# Patient Record
Sex: FEMALE | Race: WHITE | Employment: OTHER | URBAN - METROPOLITAN AREA
[De-identification: names, ages, dates, MRNs, and addresses within clinical notes are randomized per-mention and may not be internally consistent; named-entity substitution may affect disease eponyms.]

---

## 2017-01-01 ENCOUNTER — HOSPITAL ENCOUNTER (EMERGENCY)
Age: 72
Discharge: HOME | End: 2017-06-03
Attending: EMERGENCY MEDICINE
Payer: MEDICARE

## 2017-01-01 ENCOUNTER — HOSPITAL ENCOUNTER (EMERGENCY)
Age: 72
Discharge: HOME | End: 2017-07-10
Attending: EMERGENCY MEDICINE
Payer: MEDICARE

## 2017-01-01 ENCOUNTER — HOSPITAL ENCOUNTER (INPATIENT)
Dept: CARDIAC CATH/INVASIVE PROCEDURES | Age: 72
LOS: 5 days | Discharge: NURSING, REHAB OR LONG-TERM CARE | DRG: 270 | End: 2017-08-08
Attending: INTERNAL MEDICINE | Admitting: INTERNAL MEDICINE
Payer: MEDICARE

## 2017-01-01 ENCOUNTER — ANESTHESIA (OUTPATIENT)
Dept: CARDIAC CATH/INVASIVE PROCEDURES | Age: 72
DRG: 270 | End: 2017-01-01
Payer: MEDICARE

## 2017-01-01 ENCOUNTER — HOSPITAL ENCOUNTER (INPATIENT)
Age: 72
LOS: 3 days | Discharge: HOME | DRG: 871 | End: 2017-05-27
Attending: EMERGENCY MEDICINE | Admitting: INTERNAL MEDICINE
Payer: MEDICARE

## 2017-01-01 ENCOUNTER — APPOINTMENT (OUTPATIENT)
Dept: CT IMAGING | Age: 72
End: 2017-01-01
Attending: EMERGENCY MEDICINE
Payer: MEDICARE

## 2017-01-01 ENCOUNTER — APPOINTMENT (OUTPATIENT)
Dept: CT IMAGING | Age: 72
DRG: 871 | End: 2017-01-01
Attending: INTERNAL MEDICINE
Payer: MEDICARE

## 2017-01-01 ENCOUNTER — APPOINTMENT (OUTPATIENT)
Dept: GENERAL RADIOLOGY | Age: 72
End: 2017-01-01
Payer: MEDICARE

## 2017-01-01 ENCOUNTER — HOSPITAL ENCOUNTER (OUTPATIENT)
Dept: GENERAL RADIOLOGY | Age: 72
Discharge: HOME | End: 2017-07-07
Attending: ORTHOPAEDIC SURGERY
Payer: MEDICARE

## 2017-01-01 ENCOUNTER — APPOINTMENT (OUTPATIENT)
Dept: GENERAL RADIOLOGY | Age: 72
DRG: 871 | End: 2017-01-01
Attending: INTERNAL MEDICINE
Payer: MEDICARE

## 2017-01-01 ENCOUNTER — HOSPITAL ENCOUNTER (OUTPATIENT)
Dept: GENERAL RADIOLOGY | Age: 72
Discharge: HOME | End: 2017-03-27
Attending: INTERNAL MEDICINE
Payer: MEDICARE

## 2017-01-01 ENCOUNTER — CLINICAL SUPPORT (OUTPATIENT)
Dept: CARDIOLOGY CLINIC | Age: 72
End: 2017-01-01

## 2017-01-01 ENCOUNTER — APPOINTMENT (OUTPATIENT)
Dept: ULTRASOUND IMAGING | Age: 72
End: 2017-01-01
Attending: EMERGENCY MEDICINE
Payer: MEDICARE

## 2017-01-01 ENCOUNTER — HOSPITAL ENCOUNTER (EMERGENCY)
Age: 72
Discharge: HOME | End: 2017-08-15
Attending: EMERGENCY MEDICINE
Payer: MEDICARE

## 2017-01-01 ENCOUNTER — HOSPITAL ENCOUNTER (INPATIENT)
Age: 72
LOS: 1 days | End: 2017-08-22
Attending: INTERNAL MEDICINE | Admitting: INTERNAL MEDICINE
Payer: MEDICARE

## 2017-01-01 ENCOUNTER — HOSPITAL ENCOUNTER (OUTPATIENT)
Dept: CARDIAC CATH/INVASIVE PROCEDURES | Age: 72
Discharge: HOME | End: 2017-03-07
Attending: INTERNAL MEDICINE | Admitting: INTERNAL MEDICINE
Payer: MEDICARE

## 2017-01-01 ENCOUNTER — APPOINTMENT (OUTPATIENT)
Dept: CT IMAGING | Age: 72
DRG: 871 | End: 2017-01-01
Attending: EMERGENCY MEDICINE
Payer: MEDICARE

## 2017-01-01 ENCOUNTER — APPOINTMENT (OUTPATIENT)
Dept: GENERAL RADIOLOGY | Age: 72
End: 2017-01-01
Attending: EMERGENCY MEDICINE
Payer: MEDICARE

## 2017-01-01 ENCOUNTER — APPOINTMENT (OUTPATIENT)
Dept: GENERAL RADIOLOGY | Age: 72
End: 2017-01-01
Attending: RADIOLOGY
Payer: MEDICARE

## 2017-01-01 ENCOUNTER — OFFICE VISIT (OUTPATIENT)
Dept: CARDIOLOGY CLINIC | Age: 72
End: 2017-01-01

## 2017-01-01 ENCOUNTER — TELEPHONE (OUTPATIENT)
Dept: CARDIOLOGY CLINIC | Age: 72
End: 2017-01-01

## 2017-01-01 ENCOUNTER — HOSPITAL ENCOUNTER (OUTPATIENT)
Dept: CT IMAGING | Age: 72
Discharge: HOME | End: 2017-06-28
Attending: ORTHOPAEDIC SURGERY

## 2017-01-01 ENCOUNTER — APPOINTMENT (OUTPATIENT)
Dept: CARDIAC CATH/INVASIVE PROCEDURES | Age: 72
DRG: 871 | End: 2017-01-01
Payer: MEDICARE

## 2017-01-01 ENCOUNTER — APPOINTMENT (OUTPATIENT)
Dept: CT IMAGING | Age: 72
DRG: 871 | End: 2017-01-01
Attending: FAMILY MEDICINE
Payer: MEDICARE

## 2017-01-01 ENCOUNTER — APPOINTMENT (OUTPATIENT)
Dept: GENERAL RADIOLOGY | Age: 72
DRG: 871 | End: 2017-01-01
Attending: EMERGENCY MEDICINE
Payer: MEDICARE

## 2017-01-01 ENCOUNTER — HOSPITAL ENCOUNTER (EMERGENCY)
Age: 72
Discharge: HOME | End: 2017-07-30
Attending: EMERGENCY MEDICINE
Payer: MEDICARE

## 2017-01-01 ENCOUNTER — HOSPITAL ENCOUNTER (EMERGENCY)
Age: 72
Discharge: HOME | End: 2017-07-13
Attending: EMERGENCY MEDICINE
Payer: MEDICARE

## 2017-01-01 ENCOUNTER — APPOINTMENT (OUTPATIENT)
Dept: CT IMAGING | Age: 72
End: 2017-01-01
Payer: MEDICARE

## 2017-01-01 ENCOUNTER — HOSPITAL ENCOUNTER (OUTPATIENT)
Dept: GENERAL RADIOLOGY | Age: 72
Discharge: HOME | End: 2017-06-28
Attending: ORTHOPAEDIC SURGERY

## 2017-01-01 ENCOUNTER — HOSPITAL ENCOUNTER (INPATIENT)
Age: 72
LOS: 5 days | Discharge: HOSPICE | DRG: 871 | End: 2017-08-21
Attending: EMERGENCY MEDICINE | Admitting: FAMILY MEDICINE
Payer: MEDICARE

## 2017-01-01 ENCOUNTER — APPOINTMENT (OUTPATIENT)
Dept: GENERAL RADIOLOGY | Age: 72
End: 2017-01-01
Attending: PHYSICIAN ASSISTANT
Payer: MEDICARE

## 2017-01-01 ENCOUNTER — HOSPITAL ENCOUNTER (OUTPATIENT)
Dept: LAB | Age: 72
Discharge: HOME | End: 2017-03-02
Payer: MEDICARE

## 2017-01-01 ENCOUNTER — ANESTHESIA EVENT (OUTPATIENT)
Dept: CARDIAC CATH/INVASIVE PROCEDURES | Age: 72
DRG: 270 | End: 2017-01-01
Payer: MEDICARE

## 2017-01-01 ENCOUNTER — ANESTHESIA (OUTPATIENT)
Dept: NON INVASIVE DIAGNOSTICS | Age: 72
DRG: 270 | End: 2017-01-01
Payer: MEDICARE

## 2017-01-01 ENCOUNTER — HOSPICE ADMISSION (OUTPATIENT)
Dept: HOSPICE | Facility: HOSPICE | Age: 72
End: 2017-01-01
Payer: MEDICARE

## 2017-01-01 ENCOUNTER — HOSPITAL ENCOUNTER (OUTPATIENT)
Dept: CT IMAGING | Age: 72
Discharge: HOME | End: 2017-07-07
Attending: ORTHOPAEDIC SURGERY
Payer: MEDICARE

## 2017-01-01 ENCOUNTER — HOSPITAL ENCOUNTER (OUTPATIENT)
Dept: LAB | Age: 72
Discharge: HOME | End: 2017-07-26
Payer: MEDICARE

## 2017-01-01 ENCOUNTER — ANESTHESIA EVENT (OUTPATIENT)
Dept: NON INVASIVE DIAGNOSTICS | Age: 72
DRG: 270 | End: 2017-01-01
Payer: MEDICARE

## 2017-01-01 ENCOUNTER — EXTERNAL NURSING HOME DOCUMENTATION (OUTPATIENT)
Dept: FAMILY MEDICINE CLINIC | Age: 72
End: 2017-01-01

## 2017-01-01 VITALS
HEIGHT: 62 IN | SYSTOLIC BLOOD PRESSURE: 131 MMHG | HEART RATE: 75 BPM | WEIGHT: 141 LBS | RESPIRATION RATE: 18 BRPM | OXYGEN SATURATION: 95 % | TEMPERATURE: 97.7 F | BODY MASS INDEX: 25.95 KG/M2 | DIASTOLIC BLOOD PRESSURE: 51 MMHG

## 2017-01-01 VITALS
WEIGHT: 152.78 LBS | BODY MASS INDEX: 28.11 KG/M2 | HEART RATE: 77 BPM | TEMPERATURE: 98.5 F | SYSTOLIC BLOOD PRESSURE: 142 MMHG | OXYGEN SATURATION: 96 % | DIASTOLIC BLOOD PRESSURE: 60 MMHG | RESPIRATION RATE: 16 BRPM | HEIGHT: 62 IN

## 2017-01-01 VITALS
SYSTOLIC BLOOD PRESSURE: 92 MMHG | TEMPERATURE: 97.6 F | WEIGHT: 143.96 LBS | OXYGEN SATURATION: 100 % | DIASTOLIC BLOOD PRESSURE: 69 MMHG | HEART RATE: 73 BPM | BODY MASS INDEX: 26.33 KG/M2 | RESPIRATION RATE: 16 BRPM

## 2017-01-01 VITALS
WEIGHT: 144.84 LBS | TEMPERATURE: 99.8 F | BODY MASS INDEX: 26.65 KG/M2 | SYSTOLIC BLOOD PRESSURE: 130 MMHG | DIASTOLIC BLOOD PRESSURE: 48 MMHG | OXYGEN SATURATION: 99 % | HEIGHT: 62 IN | RESPIRATION RATE: 19 BRPM | HEART RATE: 75 BPM

## 2017-01-01 VITALS
HEIGHT: 62 IN | SYSTOLIC BLOOD PRESSURE: 98 MMHG | DIASTOLIC BLOOD PRESSURE: 40 MMHG | TEMPERATURE: 98 F | RESPIRATION RATE: 23 BRPM | WEIGHT: 141 LBS | BODY MASS INDEX: 25.95 KG/M2 | OXYGEN SATURATION: 88 % | HEART RATE: 76 BPM

## 2017-01-01 VITALS
RESPIRATION RATE: 18 BRPM | HEART RATE: 75 BPM | OXYGEN SATURATION: 95 % | BODY MASS INDEX: 27 KG/M2 | SYSTOLIC BLOOD PRESSURE: 134 MMHG | TEMPERATURE: 98.4 F | HEIGHT: 62 IN | DIASTOLIC BLOOD PRESSURE: 45 MMHG | WEIGHT: 146.7 LBS

## 2017-01-01 VITALS
SYSTOLIC BLOOD PRESSURE: 150 MMHG | HEIGHT: 62 IN | OXYGEN SATURATION: 96 % | RESPIRATION RATE: 16 BRPM | WEIGHT: 146.83 LBS | HEART RATE: 74 BPM | DIASTOLIC BLOOD PRESSURE: 64 MMHG | BODY MASS INDEX: 27.02 KG/M2 | TEMPERATURE: 98 F

## 2017-01-01 VITALS
HEART RATE: 75 BPM | HEIGHT: 62 IN | DIASTOLIC BLOOD PRESSURE: 65 MMHG | OXYGEN SATURATION: 93 % | BODY MASS INDEX: 27.79 KG/M2 | RESPIRATION RATE: 16 BRPM | TEMPERATURE: 98.2 F | WEIGHT: 151 LBS | SYSTOLIC BLOOD PRESSURE: 131 MMHG

## 2017-01-01 VITALS
BODY MASS INDEX: 25.73 KG/M2 | OXYGEN SATURATION: 95 % | SYSTOLIC BLOOD PRESSURE: 100 MMHG | RESPIRATION RATE: 16 BRPM | WEIGHT: 139.8 LBS | HEIGHT: 62 IN | DIASTOLIC BLOOD PRESSURE: 50 MMHG | HEART RATE: 76 BPM

## 2017-01-01 VITALS
HEIGHT: 62 IN | HEART RATE: 75 BPM | DIASTOLIC BLOOD PRESSURE: 65 MMHG | OXYGEN SATURATION: 95 % | SYSTOLIC BLOOD PRESSURE: 127 MMHG | TEMPERATURE: 99.1 F | RESPIRATION RATE: 19 BRPM | BODY MASS INDEX: 26.01 KG/M2 | WEIGHT: 141.31 LBS

## 2017-01-01 VITALS
WEIGHT: 150 LBS | TEMPERATURE: 98.6 F | SYSTOLIC BLOOD PRESSURE: 124 MMHG | DIASTOLIC BLOOD PRESSURE: 56 MMHG | HEART RATE: 98 BPM | RESPIRATION RATE: 18 BRPM | BODY MASS INDEX: 27.6 KG/M2 | OXYGEN SATURATION: 95 % | HEIGHT: 62 IN

## 2017-01-01 VITALS
HEART RATE: 75 BPM | TEMPERATURE: 98.7 F | WEIGHT: 131.17 LBS | OXYGEN SATURATION: 96 % | BODY MASS INDEX: 24.14 KG/M2 | SYSTOLIC BLOOD PRESSURE: 137 MMHG | RESPIRATION RATE: 17 BRPM | HEIGHT: 62 IN | DIASTOLIC BLOOD PRESSURE: 59 MMHG

## 2017-01-01 VITALS
HEIGHT: 62 IN | RESPIRATION RATE: 18 BRPM | DIASTOLIC BLOOD PRESSURE: 60 MMHG | SYSTOLIC BLOOD PRESSURE: 104 MMHG | BODY MASS INDEX: 26.65 KG/M2 | WEIGHT: 144.8 LBS

## 2017-01-01 VITALS
HEIGHT: 62 IN | WEIGHT: 151.4 LBS | BODY MASS INDEX: 27.86 KG/M2 | OXYGEN SATURATION: 96 % | SYSTOLIC BLOOD PRESSURE: 120 MMHG | HEART RATE: 75 BPM | DIASTOLIC BLOOD PRESSURE: 60 MMHG | RESPIRATION RATE: 18 BRPM

## 2017-01-01 DIAGNOSIS — J44.1 ACUTE EXACERBATION OF CHRONIC OBSTRUCTIVE PULMONARY DISEASE (COPD) (HCC): Primary | ICD-10-CM

## 2017-01-01 DIAGNOSIS — R06.02 SOB (SHORTNESS OF BREATH): ICD-10-CM

## 2017-01-01 DIAGNOSIS — N18.6 ESRD (END STAGE RENAL DISEASE) (HCC): Chronic | ICD-10-CM

## 2017-01-01 DIAGNOSIS — I10 ESSENTIAL HYPERTENSION: Chronic | ICD-10-CM

## 2017-01-01 DIAGNOSIS — J18.9 CAP (COMMUNITY ACQUIRED PNEUMONIA): Primary | ICD-10-CM

## 2017-01-01 DIAGNOSIS — Z95.810 ICD (IMPLANTABLE CARDIOVERTER-DEFIBRILLATOR) IN PLACE: Chronic | ICD-10-CM

## 2017-01-01 DIAGNOSIS — I48.0 PAF (PAROXYSMAL ATRIAL FIBRILLATION) (HCC): ICD-10-CM

## 2017-01-01 DIAGNOSIS — M48.02 CERVICAL STENOSIS OF SPINAL CANAL: ICD-10-CM

## 2017-01-01 DIAGNOSIS — M54.6 ACUTE BILATERAL THORACIC BACK PAIN: Primary | ICD-10-CM

## 2017-01-01 DIAGNOSIS — I50.22 CHRONIC SYSTOLIC HEART FAILURE (HCC): Chronic | ICD-10-CM

## 2017-01-01 DIAGNOSIS — I73.9 PAD (PERIPHERAL ARTERY DISEASE) (HCC): Primary | ICD-10-CM

## 2017-01-01 DIAGNOSIS — R06.82 TACHYPNEA: ICD-10-CM

## 2017-01-01 DIAGNOSIS — M54.50 LOW BACK PAIN: ICD-10-CM

## 2017-01-01 DIAGNOSIS — I25.10 CORONARY ARTERY DISEASE INVOLVING NATIVE CORONARY ARTERY OF NATIVE HEART WITHOUT ANGINA PECTORIS: ICD-10-CM

## 2017-01-01 DIAGNOSIS — M54.31 SCIATICA OF RIGHT SIDE: ICD-10-CM

## 2017-01-01 DIAGNOSIS — M79.642 HAND PAIN, LEFT: Primary | ICD-10-CM

## 2017-01-01 DIAGNOSIS — R34 ANURIA: ICD-10-CM

## 2017-01-01 DIAGNOSIS — R05.9 COUGH: ICD-10-CM

## 2017-01-01 DIAGNOSIS — I42.9 CARDIOMYOPATHY, UNSPECIFIED TYPE (HCC): ICD-10-CM

## 2017-01-01 DIAGNOSIS — E11.22 CONTROLLED TYPE 2 DIABETES MELLITUS WITH CHRONIC KIDNEY DISEASE, UNSPECIFIED CKD STAGE, UNSPECIFIED LONG TERM INSULIN USE STATUS: Chronic | ICD-10-CM

## 2017-01-01 DIAGNOSIS — I73.9 PAD (PERIPHERAL ARTERY DISEASE) (HCC): ICD-10-CM

## 2017-01-01 DIAGNOSIS — R07.9 CHEST PAIN, UNSPECIFIED TYPE: ICD-10-CM

## 2017-01-01 DIAGNOSIS — Z95.810 ICD (IMPLANTABLE CARDIOVERTER-DEFIBRILLATOR) IN PLACE: Primary | Chronic | ICD-10-CM

## 2017-01-01 DIAGNOSIS — M54.41 ACUTE RIGHT-SIDED LOW BACK PAIN WITH RIGHT-SIDED SCIATICA: Primary | ICD-10-CM

## 2017-01-01 DIAGNOSIS — R53.81 DEBILITY: ICD-10-CM

## 2017-01-01 DIAGNOSIS — G89.29 CHRONIC LOW BACK PAIN, UNSPECIFIED BACK PAIN LATERALITY, WITH SCIATICA PRESENCE UNSPECIFIED: ICD-10-CM

## 2017-01-01 DIAGNOSIS — Z99.2 ESRD ON HEMODIALYSIS (HCC): ICD-10-CM

## 2017-01-01 DIAGNOSIS — I50.22 CHRONIC SYSTOLIC HEART FAILURE (HCC): Primary | Chronic | ICD-10-CM

## 2017-01-01 DIAGNOSIS — D72.829 LEUKOCYTOSIS, UNSPECIFIED TYPE: ICD-10-CM

## 2017-01-01 DIAGNOSIS — N18.6 ESRD ON DIALYSIS (HCC): ICD-10-CM

## 2017-01-01 DIAGNOSIS — I50.22 CHRONIC SYSTOLIC CONGESTIVE HEART FAILURE (HCC): ICD-10-CM

## 2017-01-01 DIAGNOSIS — E88.09 HYPOALBUMINEMIA: ICD-10-CM

## 2017-01-01 DIAGNOSIS — R52 GENERALIZED PAIN: ICD-10-CM

## 2017-01-01 DIAGNOSIS — A41.9 SEPSIS, DUE TO UNSPECIFIED ORGANISM: Primary | ICD-10-CM

## 2017-01-01 DIAGNOSIS — Z95.810 PRESENCE OF BIVENTRICULAR AICD: Primary | ICD-10-CM

## 2017-01-01 DIAGNOSIS — R60.0 BILATERAL EDEMA OF LOWER EXTREMITY: ICD-10-CM

## 2017-01-01 DIAGNOSIS — Z79.01 LONG TERM (CURRENT) USE OF ANTICOAGULANTS: ICD-10-CM

## 2017-01-01 DIAGNOSIS — M47.817 LUMBOSACRAL SPONDYLOSIS WITHOUT MYELOPATHY: ICD-10-CM

## 2017-01-01 DIAGNOSIS — N18.6 ESRD ON HEMODIALYSIS (HCC): ICD-10-CM

## 2017-01-01 DIAGNOSIS — M51.36 DDD (DEGENERATIVE DISC DISEASE), LUMBAR: ICD-10-CM

## 2017-01-01 DIAGNOSIS — Z98.61 S/P PTCA (PERCUTANEOUS TRANSLUMINAL CORONARY ANGIOPLASTY): ICD-10-CM

## 2017-01-01 DIAGNOSIS — M85.842 OSTEOPENIA OF LEFT HAND: ICD-10-CM

## 2017-01-01 DIAGNOSIS — I70.90 ASVD (ARTERIOSCLEROTIC VASCULAR DISEASE): ICD-10-CM

## 2017-01-01 DIAGNOSIS — J90 PLEURAL EFFUSION: Primary | ICD-10-CM

## 2017-01-01 DIAGNOSIS — M54.2 NECK PAIN: ICD-10-CM

## 2017-01-01 DIAGNOSIS — M54.5 CHRONIC LOW BACK PAIN, UNSPECIFIED BACK PAIN LATERALITY, WITH SCIATICA PRESENCE UNSPECIFIED: ICD-10-CM

## 2017-01-01 DIAGNOSIS — M43.6 TORTICOLLIS: ICD-10-CM

## 2017-01-01 DIAGNOSIS — R06.02 SHORTNESS OF BREATH: ICD-10-CM

## 2017-01-01 DIAGNOSIS — Z99.2 ESRD ON DIALYSIS (HCC): ICD-10-CM

## 2017-01-01 DIAGNOSIS — J96.01 ACUTE RESPIRATORY FAILURE WITH HYPOXIA (HCC): ICD-10-CM

## 2017-01-01 DIAGNOSIS — Z98.890 S/P AV NODAL ABLATION: ICD-10-CM

## 2017-01-01 LAB
ACT BLD: 162 SECS (ref 79–138)
ACT BLD: 169 SECS (ref 79–138)
ACT BLD: 173 SECS (ref 79–138)
ACT BLD: 246 SECS (ref 79–138)
ACT BLD: 274 SECS (ref 79–138)
ACT BLD: 367 SECS (ref 79–138)
ALBUMIN SERPL BCP-MCNC: 2.4 G/DL (ref 3.5–5)
ALBUMIN SERPL BCP-MCNC: 2.8 G/DL (ref 3.5–5)
ALBUMIN SERPL BCP-MCNC: 3.2 G/DL (ref 3.5–5)
ALBUMIN SERPL BCP-MCNC: 3.3 G/DL (ref 3.5–5)
ALBUMIN SERPL-MCNC: 2.4 G/DL (ref 3.5–5)
ALBUMIN SERPL-MCNC: 2.7 G/DL (ref 3.5–5)
ALBUMIN SERPL-MCNC: 4 G/DL (ref 3.5–4.8)
ALBUMIN SERPL-MCNC: 4.2 G/DL (ref 3.5–4.8)
ALBUMIN/GLOB SERPL: 0.5 {RATIO} (ref 1.1–2.2)
ALBUMIN/GLOB SERPL: 0.5 {RATIO} (ref 1.1–2.2)
ALBUMIN/GLOB SERPL: 0.6 {RATIO} (ref 1.1–2.2)
ALBUMIN/GLOB SERPL: 0.6 {RATIO} (ref 1.1–2.2)
ALBUMIN/GLOB SERPL: 0.7 {RATIO} (ref 1.1–2.2)
ALBUMIN/GLOB SERPL: 0.8 {RATIO} (ref 1.1–2.2)
ALBUMIN/GLOB SERPL: 1.6 {RATIO} (ref 1.1–2.5)
ALBUMIN/GLOB SERPL: 1.6 {RATIO} (ref 1.2–2.2)
ALP SERPL-CCNC: 123 U/L (ref 45–117)
ALP SERPL-CCNC: 123 U/L (ref 45–117)
ALP SERPL-CCNC: 145 U/L (ref 45–117)
ALP SERPL-CCNC: 151 U/L (ref 45–117)
ALP SERPL-CCNC: 78 U/L (ref 45–117)
ALP SERPL-CCNC: 79 U/L (ref 45–117)
ALP SERPL-CCNC: 81 U/L (ref 45–117)
ALP SERPL-CCNC: 89 IU/L (ref 39–117)
ALP SERPL-CCNC: 91 U/L (ref 45–117)
ALP SERPL-CCNC: 97 IU/L (ref 39–117)
ALT SERPL-CCNC: 10 U/L (ref 12–78)
ALT SERPL-CCNC: 11 U/L (ref 12–78)
ALT SERPL-CCNC: 12 U/L (ref 12–78)
ALT SERPL-CCNC: 14 IU/L (ref 0–32)
ALT SERPL-CCNC: 14 U/L (ref 12–78)
ALT SERPL-CCNC: 8 U/L (ref 12–78)
ALT SERPL-CCNC: 9 IU/L (ref 0–32)
ALT SERPL-CCNC: 9 U/L (ref 12–78)
AMMONIA PLAS-SCNC: <10 UMOL/L
AMYLASE SERPL-CCNC: 16 U/L (ref 25–115)
ANION GAP BLD CALC-SCNC: 10 MMOL/L (ref 5–15)
ANION GAP BLD CALC-SCNC: 11 MMOL/L (ref 5–15)
ANION GAP BLD CALC-SCNC: 12 MMOL/L (ref 5–15)
ANION GAP BLD CALC-SCNC: 12 MMOL/L (ref 5–15)
ANION GAP BLD CALC-SCNC: 13 MMOL/L (ref 5–15)
ANION GAP BLD CALC-SCNC: 15 MMOL/L (ref 5–15)
ANION GAP BLD CALC-SCNC: 5 MMOL/L (ref 5–15)
ANION GAP BLD CALC-SCNC: 7 MMOL/L (ref 5–15)
ANION GAP BLD CALC-SCNC: 8 MMOL/L (ref 5–15)
ANION GAP BLD CALC-SCNC: 9 MMOL/L (ref 5–15)
ANION GAP SERPL CALC-SCNC: 11 MMOL/L (ref 5–15)
ANION GAP SERPL CALC-SCNC: 13 MMOL/L (ref 5–15)
APPEARANCE FLD: ABNORMAL
APTT PPP: 36 SEC (ref 22.1–32.5)
ARTERIAL PATENCY WRIST A: NORMAL
ARTERIAL PATENCY WRIST A: YES
ARTERIAL PATENCY WRIST A: YES
AST SERPL W P-5'-P-CCNC: 10 U/L (ref 15–37)
AST SERPL W P-5'-P-CCNC: 12 U/L (ref 15–37)
AST SERPL W P-5'-P-CCNC: 12 U/L (ref 15–37)
AST SERPL W P-5'-P-CCNC: 15 U/L (ref 15–37)
AST SERPL W P-5'-P-CCNC: 15 U/L (ref 15–37)
AST SERPL W P-5'-P-CCNC: 9 U/L (ref 15–37)
AST SERPL-CCNC: 10 U/L (ref 15–37)
AST SERPL-CCNC: 10 U/L (ref 15–37)
AST SERPL-CCNC: 12 IU/L (ref 0–40)
AST SERPL-CCNC: 9 IU/L (ref 0–40)
ATRIAL RATE: 51 BPM
ATRIAL RATE: 70 BPM
ATRIAL RATE: 75 BPM
ATRIAL RATE: 79 BPM
ATRIAL RATE: 83 BPM
BACTERIA SPEC CULT: ABNORMAL
BACTERIA SPEC CULT: ABNORMAL
BACTERIA SPEC CULT: NORMAL
BASE DEFICIT BLD-SCNC: 1 MMOL/L
BASE DEFICIT BLD-SCNC: 3 MMOL/L
BASE EXCESS BLD CALC-SCNC: 6 MMOL/L
BASOPHILS # BLD AUTO: 0 K/UL
BASOPHILS # BLD AUTO: 0 K/UL
BASOPHILS # BLD AUTO: 0 K/UL (ref 0–0.1)
BASOPHILS # BLD AUTO: 0 K/UL (ref 0–0.1)
BASOPHILS # BLD AUTO: 0.1 K/UL (ref 0–0.1)
BASOPHILS # BLD: 0 %
BASOPHILS # BLD: 0 %
BASOPHILS # BLD: 0 % (ref 0–1)
BASOPHILS # BLD: 0 % (ref 0–1)
BASOPHILS # BLD: 0 K/UL (ref 0–0.1)
BASOPHILS # BLD: 1 % (ref 0–1)
BASOPHILS NFR BLD: 0 % (ref 0–1)
BDY SITE: ABNORMAL
BDY SITE: NORMAL
BDY SITE: NORMAL
BILIRUB SERPL-MCNC: 0.3 MG/DL (ref 0–1.2)
BILIRUB SERPL-MCNC: 0.3 MG/DL (ref 0–1.2)
BILIRUB SERPL-MCNC: 0.4 MG/DL (ref 0.2–1)
BILIRUB SERPL-MCNC: 0.6 MG/DL (ref 0.2–1)
BILIRUB SERPL-MCNC: 0.6 MG/DL (ref 0.2–1)
BILIRUB SERPL-MCNC: 0.7 MG/DL (ref 0.2–1)
BILIRUB SERPL-MCNC: 0.7 MG/DL (ref 0.2–1)
BUN BLD-MCNC: 50 MG/DL (ref 9–20)
BUN SERPL-MCNC: 13 MG/DL (ref 6–20)
BUN SERPL-MCNC: 21 MG/DL (ref 6–20)
BUN SERPL-MCNC: 21 MG/DL (ref 6–20)
BUN SERPL-MCNC: 22 MG/DL (ref 8–27)
BUN SERPL-MCNC: 26 MG/DL (ref 6–20)
BUN SERPL-MCNC: 27 MG/DL (ref 6–20)
BUN SERPL-MCNC: 29 MG/DL (ref 6–20)
BUN SERPL-MCNC: 32 MG/DL (ref 6–20)
BUN SERPL-MCNC: 33 MG/DL (ref 6–20)
BUN SERPL-MCNC: 33 MG/DL (ref 8–27)
BUN SERPL-MCNC: 38 MG/DL (ref 6–20)
BUN SERPL-MCNC: 40 MG/DL (ref 6–20)
BUN SERPL-MCNC: 41 MG/DL (ref 6–20)
BUN SERPL-MCNC: 43 MG/DL (ref 6–20)
BUN SERPL-MCNC: 46 MG/DL (ref 6–20)
BUN SERPL-MCNC: 47 MG/DL (ref 6–20)
BUN SERPL-MCNC: 59 MG/DL (ref 6–20)
BUN SERPL-MCNC: 66 MG/DL (ref 6–20)
BUN/CREAT SERPL: 10 (ref 12–20)
BUN/CREAT SERPL: 4 (ref 12–20)
BUN/CREAT SERPL: 4 (ref 12–20)
BUN/CREAT SERPL: 5 (ref 12–20)
BUN/CREAT SERPL: 6 (ref 12–20)
BUN/CREAT SERPL: 6 (ref 12–20)
BUN/CREAT SERPL: 7 (ref 11–26)
BUN/CREAT SERPL: 7 (ref 12–20)
BUN/CREAT SERPL: 7 (ref 12–20)
BUN/CREAT SERPL: 8 (ref 12–20)
BUN/CREAT SERPL: 8 (ref 12–28)
BUN/CREAT SERPL: 9 (ref 12–20)
CA-I BLD-MCNC: 1.1 MMOL/L (ref 1.12–1.32)
CA-I BLD-SCNC: 1.24 MMOL/L (ref 1.12–1.32)
CALCIUM SERPL-MCNC: 8.1 MG/DL (ref 8.5–10.1)
CALCIUM SERPL-MCNC: 8.1 MG/DL (ref 8.5–10.1)
CALCIUM SERPL-MCNC: 8.7 MG/DL (ref 8.5–10.1)
CALCIUM SERPL-MCNC: 8.8 MG/DL (ref 8.5–10.1)
CALCIUM SERPL-MCNC: 8.8 MG/DL (ref 8.7–10.3)
CALCIUM SERPL-MCNC: 8.9 MG/DL (ref 8.5–10.1)
CALCIUM SERPL-MCNC: 9 MG/DL (ref 8.5–10.1)
CALCIUM SERPL-MCNC: 9.1 MG/DL (ref 8.5–10.1)
CALCIUM SERPL-MCNC: 9.2 MG/DL (ref 8.5–10.1)
CALCIUM SERPL-MCNC: 9.2 MG/DL (ref 8.5–10.1)
CALCIUM SERPL-MCNC: 9.3 MG/DL (ref 8.5–10.1)
CALCIUM SERPL-MCNC: 9.4 MG/DL (ref 8.5–10.1)
CALCIUM SERPL-MCNC: 9.5 MG/DL (ref 8.5–10.1)
CALCIUM SERPL-MCNC: 9.6 MG/DL (ref 8.5–10.1)
CALCIUM SERPL-MCNC: 9.7 MG/DL (ref 8.7–10.3)
CALCIUM SERPL-MCNC: 9.9 MG/DL (ref 8.5–10.1)
CALCULATED R AXIS, ECG10: -130 DEGREES
CALCULATED R AXIS, ECG10: -56 DEGREES
CALCULATED R AXIS, ECG10: -57 DEGREES
CALCULATED R AXIS, ECG10: 126 DEGREES
CALCULATED R AXIS, ECG10: 3 DEGREES
CALCULATED T AXIS, ECG11: -41 DEGREES
CALCULATED T AXIS, ECG11: 70 DEGREES
CALCULATED T AXIS, ECG11: 92 DEGREES
CALCULATED T AXIS, ECG11: 92 DEGREES
CALCULATED T AXIS, ECG11: 98 DEGREES
CHLORIDE BLD-SCNC: 98 MMOL/L (ref 98–107)
CHLORIDE SERPL-SCNC: 100 MMOL/L (ref 97–108)
CHLORIDE SERPL-SCNC: 91 MMOL/L (ref 96–106)
CHLORIDE SERPL-SCNC: 91 MMOL/L (ref 97–108)
CHLORIDE SERPL-SCNC: 93 MMOL/L (ref 96–106)
CHLORIDE SERPL-SCNC: 94 MMOL/L (ref 97–108)
CHLORIDE SERPL-SCNC: 97 MMOL/L (ref 97–108)
CHLORIDE SERPL-SCNC: 98 MMOL/L (ref 97–108)
CHLORIDE SERPL-SCNC: 99 MMOL/L (ref 97–108)
CHOLEST SERPL-MCNC: 107 MG/DL
CK MB CFR SERPL CALC: 5.1 % (ref 0–2.5)
CK MB SERPL-MCNC: 2.7 NG/ML (ref 5–25)
CK SERPL-CCNC: 22 U/L (ref 26–192)
CK SERPL-CCNC: 47 U/L (ref 26–192)
CK SERPL-CCNC: 53 U/L (ref 26–192)
CK SERPL-CCNC: 76 U/L (ref 26–192)
CK SERPL-CCNC: 78 U/L (ref 26–192)
CO2 BLD-SCNC: 29 MMOL/L (ref 21–32)
CO2 SERPL-SCNC: 21 MMOL/L (ref 21–32)
CO2 SERPL-SCNC: 22 MMOL/L (ref 21–32)
CO2 SERPL-SCNC: 25 MMOL/L (ref 18–29)
CO2 SERPL-SCNC: 25 MMOL/L (ref 21–32)
CO2 SERPL-SCNC: 26 MMOL/L (ref 18–29)
CO2 SERPL-SCNC: 26 MMOL/L (ref 21–32)
CO2 SERPL-SCNC: 27 MMOL/L (ref 21–32)
CO2 SERPL-SCNC: 27 MMOL/L (ref 21–32)
CO2 SERPL-SCNC: 28 MMOL/L (ref 21–32)
CO2 SERPL-SCNC: 29 MMOL/L (ref 21–32)
CO2 SERPL-SCNC: 30 MMOL/L (ref 21–32)
CO2 SERPL-SCNC: 30 MMOL/L (ref 21–32)
CO2 SERPL-SCNC: 31 MMOL/L (ref 21–32)
CO2 SERPL-SCNC: 33 MMOL/L (ref 21–32)
COLOR FLD: YELLOW
CREAT BLD-MCNC: 3.8 MG/DL (ref 0.6–1.3)
CREAT SERPL-MCNC: 2.72 MG/DL (ref 0.55–1.02)
CREAT SERPL-MCNC: 2.97 MG/DL (ref 0.57–1)
CREAT SERPL-MCNC: 3.08 MG/DL (ref 0.55–1.02)
CREAT SERPL-MCNC: 3.16 MG/DL (ref 0.55–1.02)
CREAT SERPL-MCNC: 3.89 MG/DL (ref 0.55–1.02)
CREAT SERPL-MCNC: 3.92 MG/DL (ref 0.55–1.02)
CREAT SERPL-MCNC: 3.92 MG/DL (ref 0.55–1.02)
CREAT SERPL-MCNC: 4.33 MG/DL (ref 0.57–1)
CREAT SERPL-MCNC: 4.8 MG/DL (ref 0.55–1.02)
CREAT SERPL-MCNC: 4.97 MG/DL (ref 0.55–1.02)
CREAT SERPL-MCNC: 5.09 MG/DL (ref 0.55–1.02)
CREAT SERPL-MCNC: 5.52 MG/DL (ref 0.55–1.02)
CREAT SERPL-MCNC: 5.74 MG/DL (ref 0.55–1.02)
CREAT SERPL-MCNC: 5.83 MG/DL (ref 0.55–1.02)
CREAT SERPL-MCNC: 6.15 MG/DL (ref 0.55–1.02)
CREAT SERPL-MCNC: 6.3 MG/DL (ref 0.55–1.02)
CREAT SERPL-MCNC: 6.58 MG/DL (ref 0.55–1.02)
CREAT SERPL-MCNC: 7.6 MG/DL (ref 0.55–1.02)
DIAGNOSIS, 93000: NORMAL
DIFFERENTIAL METHOD BLD: ABNORMAL
EOSINOPHIL # BLD: 0 K/UL
EOSINOPHIL # BLD: 0 K/UL
EOSINOPHIL # BLD: 0 K/UL (ref 0–0.4)
EOSINOPHIL # BLD: 0 K/UL (ref 0–0.4)
EOSINOPHIL # BLD: 0.2 K/UL (ref 0–0.4)
EOSINOPHIL # BLD: 0.2 K/UL (ref 0–0.4)
EOSINOPHIL # BLD: 0.4 K/UL (ref 0–0.4)
EOSINOPHIL # BLD: 0.5 K/UL (ref 0–0.4)
EOSINOPHIL NFR BLD: 0 %
EOSINOPHIL NFR BLD: 0 %
EOSINOPHIL NFR BLD: 0 % (ref 0–7)
EOSINOPHIL NFR BLD: 0 % (ref 0–7)
EOSINOPHIL NFR BLD: 1 % (ref 0–7)
EOSINOPHIL NFR BLD: 1 % (ref 0–7)
EOSINOPHIL NFR BLD: 4 % (ref 0–7)
EOSINOPHIL NFR BLD: 6 % (ref 0–7)
ERYTHROCYTE [DISTWIDTH] IN BLOOD BY AUTOMATED COUNT: 15.1 % (ref 11.5–14.5)
ERYTHROCYTE [DISTWIDTH] IN BLOOD BY AUTOMATED COUNT: 15.1 % (ref 11.5–14.5)
ERYTHROCYTE [DISTWIDTH] IN BLOOD BY AUTOMATED COUNT: 15.2 % (ref 11.5–14.5)
ERYTHROCYTE [DISTWIDTH] IN BLOOD BY AUTOMATED COUNT: 15.2 % (ref 11.5–14.5)
ERYTHROCYTE [DISTWIDTH] IN BLOOD BY AUTOMATED COUNT: 15.3 % (ref 11.5–14.5)
ERYTHROCYTE [DISTWIDTH] IN BLOOD BY AUTOMATED COUNT: 15.4 % (ref 11.5–14.5)
ERYTHROCYTE [DISTWIDTH] IN BLOOD BY AUTOMATED COUNT: 15.4 % (ref 11.5–14.5)
ERYTHROCYTE [DISTWIDTH] IN BLOOD BY AUTOMATED COUNT: 15.5 % (ref 11.5–14.5)
ERYTHROCYTE [DISTWIDTH] IN BLOOD BY AUTOMATED COUNT: 15.5 % (ref 11.5–14.5)
ERYTHROCYTE [DISTWIDTH] IN BLOOD BY AUTOMATED COUNT: 15.6 % (ref 11.5–14.5)
ERYTHROCYTE [DISTWIDTH] IN BLOOD BY AUTOMATED COUNT: 15.6 % (ref 11.5–14.5)
ERYTHROCYTE [DISTWIDTH] IN BLOOD BY AUTOMATED COUNT: 15.6 % (ref 12.3–15.4)
ERYTHROCYTE [DISTWIDTH] IN BLOOD BY AUTOMATED COUNT: 15.8 % (ref 11.5–14.5)
ERYTHROCYTE [DISTWIDTH] IN BLOOD BY AUTOMATED COUNT: 15.9 % (ref 11.5–14.5)
ERYTHROCYTE [DISTWIDTH] IN BLOOD BY AUTOMATED COUNT: 16.1 % (ref 11.5–14.5)
ERYTHROCYTE [DISTWIDTH] IN BLOOD BY AUTOMATED COUNT: 16.6 % (ref 12.3–15.4)
GAS FLOW.O2 O2 DELIVERY SYS: ABNORMAL L/MIN
GAS FLOW.O2 O2 DELIVERY SYS: NORMAL L/MIN
GAS FLOW.O2 O2 DELIVERY SYS: NORMAL L/MIN
GAS FLOW.O2 SETTING OXYMISER: 3 L/M
GAS FLOW.O2 SETTING OXYMISER: 3 L/M
GAS FLOW.O2 SETTING OXYMISER: 4 L/M
GLOBULIN SER CALC-MCNC: 2.5 G/DL (ref 1.5–4.5)
GLOBULIN SER CALC-MCNC: 2.7 G/DL (ref 1.5–4.5)
GLOBULIN SER CALC-MCNC: 3.7 G/DL (ref 2–4)
GLOBULIN SER CALC-MCNC: 3.9 G/DL (ref 2–4)
GLOBULIN SER CALC-MCNC: 4 G/DL (ref 2–4)
GLOBULIN SER CALC-MCNC: 4.2 G/DL (ref 2–4)
GLOBULIN SER CALC-MCNC: 4.2 G/DL (ref 2–4)
GLOBULIN SER CALC-MCNC: 4.4 G/DL (ref 2–4)
GLOBULIN SER CALC-MCNC: 4.6 G/DL (ref 2–4)
GLOBULIN SER CALC-MCNC: 4.9 G/DL (ref 2–4)
GLUCOSE BLD STRIP.AUTO-MCNC: 108 MG/DL (ref 65–100)
GLUCOSE BLD STRIP.AUTO-MCNC: 109 MG/DL (ref 65–100)
GLUCOSE BLD STRIP.AUTO-MCNC: 110 MG/DL (ref 65–100)
GLUCOSE BLD STRIP.AUTO-MCNC: 112 MG/DL (ref 65–100)
GLUCOSE BLD STRIP.AUTO-MCNC: 113 MG/DL (ref 65–100)
GLUCOSE BLD STRIP.AUTO-MCNC: 115 MG/DL (ref 65–100)
GLUCOSE BLD STRIP.AUTO-MCNC: 116 MG/DL (ref 65–100)
GLUCOSE BLD STRIP.AUTO-MCNC: 117 MG/DL (ref 65–100)
GLUCOSE BLD STRIP.AUTO-MCNC: 118 MG/DL (ref 65–100)
GLUCOSE BLD STRIP.AUTO-MCNC: 125 MG/DL (ref 65–100)
GLUCOSE BLD STRIP.AUTO-MCNC: 127 MG/DL (ref 65–100)
GLUCOSE BLD STRIP.AUTO-MCNC: 128 MG/DL (ref 65–100)
GLUCOSE BLD STRIP.AUTO-MCNC: 128 MG/DL (ref 65–100)
GLUCOSE BLD STRIP.AUTO-MCNC: 129 MG/DL (ref 65–100)
GLUCOSE BLD STRIP.AUTO-MCNC: 132 MG/DL (ref 65–100)
GLUCOSE BLD STRIP.AUTO-MCNC: 134 MG/DL (ref 65–100)
GLUCOSE BLD STRIP.AUTO-MCNC: 134 MG/DL (ref 65–100)
GLUCOSE BLD STRIP.AUTO-MCNC: 137 MG/DL (ref 65–100)
GLUCOSE BLD STRIP.AUTO-MCNC: 143 MG/DL (ref 65–100)
GLUCOSE BLD STRIP.AUTO-MCNC: 145 MG/DL (ref 65–100)
GLUCOSE BLD STRIP.AUTO-MCNC: 146 MG/DL (ref 65–100)
GLUCOSE BLD STRIP.AUTO-MCNC: 149 MG/DL (ref 65–100)
GLUCOSE BLD STRIP.AUTO-MCNC: 152 MG/DL (ref 65–100)
GLUCOSE BLD STRIP.AUTO-MCNC: 159 MG/DL (ref 65–100)
GLUCOSE BLD STRIP.AUTO-MCNC: 159 MG/DL (ref 65–100)
GLUCOSE BLD STRIP.AUTO-MCNC: 160 MG/DL (ref 65–100)
GLUCOSE BLD STRIP.AUTO-MCNC: 165 MG/DL (ref 65–100)
GLUCOSE BLD STRIP.AUTO-MCNC: 166 MG/DL (ref 65–100)
GLUCOSE BLD STRIP.AUTO-MCNC: 171 MG/DL (ref 65–100)
GLUCOSE BLD STRIP.AUTO-MCNC: 172 MG/DL (ref 65–100)
GLUCOSE BLD STRIP.AUTO-MCNC: 173 MG/DL (ref 65–100)
GLUCOSE BLD STRIP.AUTO-MCNC: 174 MG/DL (ref 65–100)
GLUCOSE BLD STRIP.AUTO-MCNC: 180 MG/DL (ref 65–100)
GLUCOSE BLD STRIP.AUTO-MCNC: 187 MG/DL (ref 65–100)
GLUCOSE BLD STRIP.AUTO-MCNC: 187 MG/DL (ref 65–100)
GLUCOSE BLD STRIP.AUTO-MCNC: 188 MG/DL (ref 65–100)
GLUCOSE BLD STRIP.AUTO-MCNC: 191 MG/DL (ref 65–100)
GLUCOSE BLD STRIP.AUTO-MCNC: 204 MG/DL (ref 65–100)
GLUCOSE BLD STRIP.AUTO-MCNC: 207 MG/DL (ref 65–100)
GLUCOSE BLD STRIP.AUTO-MCNC: 211 MG/DL (ref 65–100)
GLUCOSE BLD STRIP.AUTO-MCNC: 216 MG/DL (ref 65–100)
GLUCOSE BLD STRIP.AUTO-MCNC: 218 MG/DL (ref 65–100)
GLUCOSE BLD STRIP.AUTO-MCNC: 240 MG/DL (ref 65–100)
GLUCOSE BLD STRIP.AUTO-MCNC: 243 MG/DL (ref 65–100)
GLUCOSE BLD STRIP.AUTO-MCNC: 248 MG/DL (ref 65–100)
GLUCOSE BLD STRIP.AUTO-MCNC: 250 MG/DL (ref 65–100)
GLUCOSE BLD STRIP.AUTO-MCNC: 324 MG/DL (ref 65–100)
GLUCOSE BLD STRIP.AUTO-MCNC: 55 MG/DL (ref 65–100)
GLUCOSE BLD STRIP.AUTO-MCNC: 56 MG/DL (ref 65–100)
GLUCOSE BLD STRIP.AUTO-MCNC: 56 MG/DL (ref 65–100)
GLUCOSE BLD STRIP.AUTO-MCNC: 57 MG/DL (ref 65–100)
GLUCOSE BLD STRIP.AUTO-MCNC: 60 MG/DL (ref 65–100)
GLUCOSE BLD STRIP.AUTO-MCNC: 67 MG/DL (ref 65–100)
GLUCOSE BLD STRIP.AUTO-MCNC: 68 MG/DL (ref 65–100)
GLUCOSE BLD STRIP.AUTO-MCNC: 68 MG/DL (ref 65–100)
GLUCOSE BLD STRIP.AUTO-MCNC: 69 MG/DL (ref 65–100)
GLUCOSE BLD STRIP.AUTO-MCNC: 69 MG/DL (ref 65–100)
GLUCOSE BLD STRIP.AUTO-MCNC: 71 MG/DL (ref 65–100)
GLUCOSE BLD STRIP.AUTO-MCNC: 71 MG/DL (ref 65–100)
GLUCOSE BLD STRIP.AUTO-MCNC: 73 MG/DL (ref 65–100)
GLUCOSE BLD STRIP.AUTO-MCNC: 74 MG/DL (ref 65–100)
GLUCOSE BLD STRIP.AUTO-MCNC: 75 MG/DL (ref 65–100)
GLUCOSE BLD STRIP.AUTO-MCNC: 78 MG/DL (ref 65–100)
GLUCOSE BLD STRIP.AUTO-MCNC: 82 MG/DL (ref 65–100)
GLUCOSE BLD STRIP.AUTO-MCNC: 83 MG/DL (ref 65–100)
GLUCOSE BLD STRIP.AUTO-MCNC: 86 MG/DL (ref 65–100)
GLUCOSE BLD STRIP.AUTO-MCNC: 87 MG/DL (ref 65–100)
GLUCOSE BLD STRIP.AUTO-MCNC: 88 MG/DL (ref 65–100)
GLUCOSE BLD STRIP.AUTO-MCNC: 89 MG/DL (ref 65–100)
GLUCOSE BLD STRIP.AUTO-MCNC: 97 MG/DL (ref 65–100)
GLUCOSE BLD STRIP.AUTO-MCNC: 98 MG/DL (ref 65–100)
GLUCOSE BLD-MCNC: 128 MG/DL (ref 65–100)
GLUCOSE FLD-MCNC: 132 MG/DL
GLUCOSE SERPL-MCNC: 100 MG/DL (ref 65–100)
GLUCOSE SERPL-MCNC: 106 MG/DL (ref 65–99)
GLUCOSE SERPL-MCNC: 115 MG/DL (ref 65–100)
GLUCOSE SERPL-MCNC: 119 MG/DL (ref 65–100)
GLUCOSE SERPL-MCNC: 122 MG/DL (ref 65–100)
GLUCOSE SERPL-MCNC: 124 MG/DL (ref 65–100)
GLUCOSE SERPL-MCNC: 125 MG/DL (ref 65–100)
GLUCOSE SERPL-MCNC: 147 MG/DL (ref 65–100)
GLUCOSE SERPL-MCNC: 156 MG/DL (ref 65–100)
GLUCOSE SERPL-MCNC: 167 MG/DL (ref 65–100)
GLUCOSE SERPL-MCNC: 198 MG/DL (ref 65–100)
GLUCOSE SERPL-MCNC: 223 MG/DL (ref 65–100)
GLUCOSE SERPL-MCNC: 227 MG/DL (ref 65–99)
GLUCOSE SERPL-MCNC: 239 MG/DL (ref 65–100)
GLUCOSE SERPL-MCNC: 288 MG/DL (ref 65–100)
GLUCOSE SERPL-MCNC: 60 MG/DL (ref 65–100)
GLUCOSE SERPL-MCNC: 89 MG/DL (ref 65–100)
GLUCOSE SERPL-MCNC: 92 MG/DL (ref 65–100)
GRAM STN SPEC: NORMAL
HCO3 BLD-SCNC: 22.9 MMOL/L (ref 22–26)
HCO3 BLD-SCNC: 24.4 MMOL/L (ref 22–26)
HCO3 BLD-SCNC: 30.4 MMOL/L (ref 22–26)
HCT VFR BLD AUTO: 27.6 % (ref 35–47)
HCT VFR BLD AUTO: 27.8 % (ref 35–47)
HCT VFR BLD AUTO: 29 % (ref 35–47)
HCT VFR BLD AUTO: 29.6 % (ref 35–47)
HCT VFR BLD AUTO: 29.8 % (ref 35–47)
HCT VFR BLD AUTO: 31.6 % (ref 35–47)
HCT VFR BLD AUTO: 31.6 % (ref 35–47)
HCT VFR BLD AUTO: 32.1 % (ref 35–47)
HCT VFR BLD AUTO: 32.2 % (ref 35–47)
HCT VFR BLD AUTO: 33 % (ref 35–47)
HCT VFR BLD AUTO: 33 % (ref 35–47)
HCT VFR BLD AUTO: 34 % (ref 35–47)
HCT VFR BLD AUTO: 34.1 % (ref 34–46.6)
HCT VFR BLD AUTO: 35.3 % (ref 35–47)
HCT VFR BLD AUTO: 35.9 % (ref 35–47)
HCT VFR BLD AUTO: 39.9 % (ref 34–46.6)
HCT VFR BLD CALC: 34 % (ref 35–47)
HDLC SERPL-MCNC: 35 MG/DL
HDLC SERPL: 3.1 {RATIO} (ref 0–5)
HGB BLD-MCNC: 10 G/DL (ref 11.5–16)
HGB BLD-MCNC: 10.1 G/DL (ref 11.5–16)
HGB BLD-MCNC: 10.2 G/DL (ref 11.5–16)
HGB BLD-MCNC: 10.3 G/DL (ref 11.5–16)
HGB BLD-MCNC: 10.5 G/DL (ref 11.1–15.9)
HGB BLD-MCNC: 10.6 G/DL (ref 11.5–16)
HGB BLD-MCNC: 10.9 G/DL (ref 11.5–16)
HGB BLD-MCNC: 11.4 G/DL (ref 11.5–16)
HGB BLD-MCNC: 11.6 GM/DL (ref 11.5–16)
HGB BLD-MCNC: 11.9 G/DL (ref 11.5–16)
HGB BLD-MCNC: 13.3 G/DL (ref 11.1–15.9)
HGB BLD-MCNC: 8.7 G/DL (ref 11.5–16)
HGB BLD-MCNC: 8.8 G/DL (ref 11.5–16)
HGB BLD-MCNC: 9.3 G/DL (ref 11.5–16)
HGB BLD-MCNC: 9.4 G/DL (ref 11.5–16)
HGB BLD-MCNC: 9.6 G/DL (ref 11.5–16)
HGB BLD-MCNC: 9.8 G/DL (ref 11.5–16)
INR PPP: 1 (ref 0.8–1.2)
INR PPP: 1.1 (ref 0.8–1.2)
INR PPP: 1.1 (ref 0.9–1.1)
INR PPP: 1.2 (ref 0.9–1.1)
INTERPRETATION: NORMAL
INTERPRETATION: NORMAL
LACTATE SERPL-SCNC: 0.8 MMOL/L (ref 0.4–2)
LACTATE SERPL-SCNC: 1.4 MMOL/L (ref 0.4–2)
LACTATE SERPL-SCNC: 1.7 MMOL/L (ref 0.4–2)
LACTATE SERPL-SCNC: 2.6 MMOL/L (ref 0.4–2)
LDH FLD L TO P-CCNC: 73 U/L
LDH SERPL L TO P-CCNC: 206 U/L (ref 81–246)
LDLC SERPL CALC-MCNC: 36.4 MG/DL (ref 0–100)
LIPASE SERPL-CCNC: 53 U/L (ref 73–393)
LIPID PROFILE,FLP: ABNORMAL
LYMPHOCYTES # BLD AUTO: 1 %
LYMPHOCYTES # BLD AUTO: 13 % (ref 12–49)
LYMPHOCYTES # BLD AUTO: 14 % (ref 12–49)
LYMPHOCYTES # BLD AUTO: 5 % (ref 12–49)
LYMPHOCYTES # BLD AUTO: 6 %
LYMPHOCYTES # BLD: 0.2 K/UL
LYMPHOCYTES # BLD: 0.2 K/UL (ref 0.8–3.5)
LYMPHOCYTES # BLD: 0.4 K/UL (ref 0.8–3.5)
LYMPHOCYTES # BLD: 0.5 K/UL (ref 0.8–3.5)
LYMPHOCYTES # BLD: 0.8 K/UL
LYMPHOCYTES # BLD: 1.2 K/UL (ref 0.8–3.5)
LYMPHOCYTES # BLD: 1.2 K/UL (ref 0.8–3.5)
LYMPHOCYTES # BLD: 1.3 K/UL (ref 0.8–3.5)
LYMPHOCYTES NFR BLD: 1 % (ref 12–49)
LYMPHOCYTES NFR BLD: 2 % (ref 12–49)
LYMPHOCYTES NFR BLD: 2 % (ref 12–49)
LYMPHOCYTES NFR FLD: 18 %
MAGNESIUM SERPL-MCNC: 2 MG/DL (ref 1.6–2.4)
MAGNESIUM SERPL-MCNC: 2.1 MG/DL (ref 1.6–2.4)
MAGNESIUM SERPL-MCNC: 2.2 MG/DL (ref 1.6–2.4)
MAGNESIUM SERPL-MCNC: 2.2 MG/DL (ref 1.6–2.4)
MAGNESIUM SERPL-MCNC: 2.4 MG/DL (ref 1.6–2.4)
MCH RBC QN AUTO: 30.2 PG (ref 26–34)
MCH RBC QN AUTO: 30.3 PG (ref 26–34)
MCH RBC QN AUTO: 30.6 PG (ref 26.6–33)
MCH RBC QN AUTO: 30.6 PG (ref 26–34)
MCH RBC QN AUTO: 30.7 PG (ref 26–34)
MCH RBC QN AUTO: 30.7 PG (ref 26–34)
MCH RBC QN AUTO: 30.8 PG (ref 26–34)
MCH RBC QN AUTO: 30.9 PG (ref 26–34)
MCH RBC QN AUTO: 31 PG (ref 26.6–33)
MCH RBC QN AUTO: 31 PG (ref 26–34)
MCH RBC QN AUTO: 31.1 PG (ref 26–34)
MCH RBC QN AUTO: 31.2 PG (ref 26–34)
MCH RBC QN AUTO: 31.4 PG (ref 26–34)
MCH RBC QN AUTO: 31.6 PG (ref 26–34)
MCH RBC QN AUTO: 31.8 PG (ref 26–34)
MCH RBC QN AUTO: 32 PG (ref 26–34)
MCHC RBC AUTO-ENTMCNC: 30.8 G/DL (ref 31.5–35.7)
MCHC RBC AUTO-ENTMCNC: 30.9 G/DL (ref 30–36.5)
MCHC RBC AUTO-ENTMCNC: 31 G/DL (ref 30–36.5)
MCHC RBC AUTO-ENTMCNC: 31.2 G/DL (ref 30–36.5)
MCHC RBC AUTO-ENTMCNC: 31.2 G/DL (ref 30–36.5)
MCHC RBC AUTO-ENTMCNC: 31.5 G/DL (ref 30–36.5)
MCHC RBC AUTO-ENTMCNC: 31.7 G/DL (ref 30–36.5)
MCHC RBC AUTO-ENTMCNC: 31.8 G/DL (ref 30–36.5)
MCHC RBC AUTO-ENTMCNC: 32 G/DL (ref 30–36.5)
MCHC RBC AUTO-ENTMCNC: 32 G/DL (ref 30–36.5)
MCHC RBC AUTO-ENTMCNC: 32.1 G/DL (ref 30–36.5)
MCHC RBC AUTO-ENTMCNC: 32.2 G/DL (ref 30–36.5)
MCHC RBC AUTO-ENTMCNC: 32.3 G/DL (ref 30–36.5)
MCHC RBC AUTO-ENTMCNC: 33 G/DL (ref 30–36.5)
MCHC RBC AUTO-ENTMCNC: 33.1 G/DL (ref 30–36.5)
MCHC RBC AUTO-ENTMCNC: 33.3 G/DL (ref 31.5–35.7)
MCV RBC AUTO: 93 FL (ref 79–97)
MCV RBC AUTO: 94 FL (ref 80–99)
MCV RBC AUTO: 94.9 FL (ref 80–99)
MCV RBC AUTO: 95.2 FL (ref 80–99)
MCV RBC AUTO: 95.7 FL (ref 80–99)
MCV RBC AUTO: 95.7 FL (ref 80–99)
MCV RBC AUTO: 97.5 FL (ref 80–99)
MCV RBC AUTO: 98.2 FL (ref 80–99)
MCV RBC AUTO: 98.2 FL (ref 80–99)
MCV RBC AUTO: 98.6 FL (ref 80–99)
MCV RBC AUTO: 98.7 FL (ref 80–99)
MCV RBC AUTO: 99 FL (ref 79–97)
MCV RBC AUTO: 99.1 FL (ref 80–99)
MCV RBC AUTO: 99.2 FL (ref 80–99)
MCV RBC AUTO: 99.4 FL (ref 80–99)
MCV RBC AUTO: 99.7 FL (ref 80–99)
MESOTHL CELL NFR FLD: 1 %
MONOCYTES # BLD: 0.2 K/UL (ref 0–1)
MONOCYTES # BLD: 0.8 K/UL
MONOCYTES # BLD: 0.9 K/UL (ref 0–1)
MONOCYTES # BLD: 1.1 K/UL
MONOCYTES # BLD: 1.1 K/UL (ref 0–1)
MONOCYTES # BLD: 1.5 K/UL (ref 0–1)
MONOCYTES NFR BLD AUTO: 10 % (ref 5–13)
MONOCYTES NFR BLD AUTO: 11 % (ref 5–13)
MONOCYTES NFR BLD AUTO: 4 % (ref 5–13)
MONOCYTES NFR BLD AUTO: 5 %
MONOCYTES NFR BLD AUTO: 6 %
MONOCYTES NFR BLD: 1 % (ref 5–13)
MONOCYTES NFR BLD: 4 % (ref 5–13)
MONOCYTES NFR BLD: 8 % (ref 5–13)
MONOS+MACROS NFR FLD: 79 %
NEUTS BAND NFR BLD MANUAL: 3 % (ref 0–6)
NEUTS BAND NFR BLD MANUAL: 4 %
NEUTS SEG # BLD: 11.6 K/UL
NEUTS SEG # BLD: 17.1 K/UL (ref 1.8–8)
NEUTS SEG # BLD: 19.2 K/UL (ref 1.8–8)
NEUTS SEG # BLD: 20 K/UL
NEUTS SEG # BLD: 21.3 K/UL (ref 1.8–8)
NEUTS SEG # BLD: 21.7 K/UL (ref 1.8–8)
NEUTS SEG # BLD: 6.4 K/UL (ref 1.8–8)
NEUTS SEG # BLD: 6.9 K/UL (ref 1.8–8)
NEUTS SEG NFR BLD AUTO: 68 % (ref 32–75)
NEUTS SEG NFR BLD AUTO: 73 % (ref 32–75)
NEUTS SEG NFR BLD AUTO: 88 %
NEUTS SEG NFR BLD AUTO: 90 %
NEUTS SEG NFR BLD AUTO: 90 % (ref 32–75)
NEUTS SEG NFR BLD: 89 % (ref 32–75)
NEUTS SEG NFR BLD: 94 % (ref 32–75)
NEUTS SEG NFR BLD: 95 % (ref 32–75)
NEUTS SEG NFR FLD: 2 %
NRBC # BLD: 0.06 K/UL (ref 0–0.01)
NRBC # BLD: 0.11 K/UL (ref 0–0.01)
NRBC # BLD: 0.13 K/UL (ref 0–0.01)
NRBC BLD-RTO: 0.3 PER 100 WBC
NRBC BLD-RTO: 0.6 PER 100 WBC
NRBC BLD-RTO: 0.6 PER 100 WBC
NUC CELL # FLD: 465 /CU MM (ref 0–5)
PCO2 BLD: 40.4 MMHG (ref 35–45)
PCO2 BLD: 40.8 MMHG (ref 35–45)
PCO2 BLD: 46.6 MMHG (ref 35–45)
PH BLD: 7.36 [PH] (ref 7.35–7.45)
PH BLD: 7.39 [PH] (ref 7.35–7.45)
PH BLD: 7.42 [PH] (ref 7.35–7.45)
PHOSPHATE SERPL-MCNC: 2.6 MG/DL (ref 2.6–4.7)
PHOSPHATE SERPL-MCNC: 2.9 MG/DL (ref 2.6–4.7)
PHOSPHATE SERPL-MCNC: 3.3 MG/DL (ref 2.6–4.7)
PHOSPHATE SERPL-MCNC: 3.5 MG/DL (ref 2.6–4.7)
PHOSPHATE SERPL-MCNC: 3.8 MG/DL (ref 2.6–4.7)
PHOSPHATE SERPL-MCNC: 4.7 MG/DL (ref 2.6–4.7)
PHOSPHATE SERPL-MCNC: 8.1 MG/DL (ref 2.6–4.7)
PLATELET # BLD AUTO: 133 K/UL (ref 150–400)
PLATELET # BLD AUTO: 136 K/UL (ref 150–400)
PLATELET # BLD AUTO: 141 X10E3/UL (ref 150–379)
PLATELET # BLD AUTO: 144 K/UL (ref 150–400)
PLATELET # BLD AUTO: 153 K/UL (ref 150–400)
PLATELET # BLD AUTO: 155 K/UL (ref 150–400)
PLATELET # BLD AUTO: 173 X10E3/UL (ref 150–379)
PLATELET # BLD AUTO: 181 K/UL (ref 150–400)
PLATELET # BLD AUTO: 192 K/UL (ref 150–400)
PLATELET # BLD AUTO: 204 K/UL (ref 150–400)
PLATELET # BLD AUTO: 207 K/UL (ref 150–400)
PLATELET # BLD AUTO: 219 K/UL (ref 150–400)
PLATELET # BLD AUTO: 247 K/UL (ref 150–400)
PLATELET # BLD AUTO: 263 K/UL (ref 150–400)
PLATELET # BLD AUTO: 264 K/UL (ref 150–400)
PLATELET # BLD AUTO: 291 K/UL (ref 150–400)
PLATELET COMMENTS,PCOM: ABNORMAL
PO2 BLD: 114 MMHG (ref 80–100)
PO2 BLD: 82 MMHG (ref 80–100)
PO2 BLD: 84 MMHG (ref 80–100)
POTASSIUM BLD-SCNC: 5.5 MMOL/L (ref 3.5–5.1)
POTASSIUM SERPL-SCNC: 3.8 MMOL/L (ref 3.5–5.1)
POTASSIUM SERPL-SCNC: 3.9 MMOL/L (ref 3.5–5.1)
POTASSIUM SERPL-SCNC: 3.9 MMOL/L (ref 3.5–5.1)
POTASSIUM SERPL-SCNC: 4 MMOL/L (ref 3.5–5.1)
POTASSIUM SERPL-SCNC: 4.3 MMOL/L (ref 3.5–5.1)
POTASSIUM SERPL-SCNC: 4.5 MMOL/L (ref 3.5–5.2)
POTASSIUM SERPL-SCNC: 4.6 MMOL/L (ref 3.5–5.1)
POTASSIUM SERPL-SCNC: 4.8 MMOL/L (ref 3.5–5.1)
POTASSIUM SERPL-SCNC: 4.8 MMOL/L (ref 3.5–5.2)
POTASSIUM SERPL-SCNC: 4.9 MMOL/L (ref 3.5–5.1)
POTASSIUM SERPL-SCNC: 5.1 MMOL/L (ref 3.5–5.1)
POTASSIUM SERPL-SCNC: 5.4 MMOL/L (ref 3.5–5.1)
POTASSIUM SERPL-SCNC: 5.5 MMOL/L (ref 3.5–5.1)
POTASSIUM SERPL-SCNC: 5.6 MMOL/L (ref 3.5–5.1)
POTASSIUM SERPL-SCNC: 5.9 MMOL/L (ref 3.5–5.1)
POTASSIUM SERPL-SCNC: 6.2 MMOL/L (ref 3.5–5.1)
PROT FLD-MCNC: 2.3 G/DL
PROT SERPL-MCNC: 6.5 G/DL (ref 6.4–8.2)
PROT SERPL-MCNC: 6.5 G/DL (ref 6–8.5)
PROT SERPL-MCNC: 6.8 G/DL (ref 6.4–8.2)
PROT SERPL-MCNC: 6.9 G/DL (ref 6.4–8.2)
PROT SERPL-MCNC: 6.9 G/DL (ref 6–8.5)
PROT SERPL-MCNC: 7 G/DL (ref 6.4–8.2)
PROT SERPL-MCNC: 7 G/DL (ref 6.4–8.2)
PROT SERPL-MCNC: 7.1 G/DL (ref 6.4–8.2)
PROT SERPL-MCNC: 7.3 G/DL (ref 6.4–8.2)
PROT SERPL-MCNC: 7.7 G/DL (ref 6.4–8.2)
PROTHROMBIN TIME: 10.4 SEC (ref 9.1–12)
PROTHROMBIN TIME: 11.2 SEC (ref 9.1–12)
PROTHROMBIN TIME: 11.2 SEC (ref 9–11.1)
PROTHROMBIN TIME: 12.5 SEC (ref 9–11.1)
Q-T INTERVAL, ECG07: 438 MS
Q-T INTERVAL, ECG07: 440 MS
Q-T INTERVAL, ECG07: 454 MS
Q-T INTERVAL, ECG07: 474 MS
Q-T INTERVAL, ECG07: 476 MS
QRS DURATION, ECG06: 136 MS
QRS DURATION, ECG06: 142 MS
QRS DURATION, ECG06: 150 MS
QRS DURATION, ECG06: 156 MS
QRS DURATION, ECG06: 166 MS
QTC CALCULATION (BEZET), ECG08: 489 MS
QTC CALCULATION (BEZET), ECG08: 491 MS
QTC CALCULATION (BEZET), ECG08: 506 MS
QTC CALCULATION (BEZET), ECG08: 529 MS
QTC CALCULATION (BEZET), ECG08: 535 MS
RBC # BLD AUTO: 2.81 M/UL (ref 3.8–5.2)
RBC # BLD AUTO: 2.85 M/UL (ref 3.8–5.2)
RBC # BLD AUTO: 3.02 M/UL (ref 3.8–5.2)
RBC # BLD AUTO: 3.03 M/UL (ref 3.8–5.2)
RBC # BLD AUTO: 3.11 M/UL (ref 3.8–5.2)
RBC # BLD AUTO: 3.18 M/UL (ref 3.8–5.2)
RBC # BLD AUTO: 3.22 M/UL (ref 3.8–5.2)
RBC # BLD AUTO: 3.28 M/UL (ref 3.8–5.2)
RBC # BLD AUTO: 3.33 M/UL (ref 3.8–5.2)
RBC # BLD AUTO: 3.33 M/UL (ref 3.8–5.2)
RBC # BLD AUTO: 3.43 X10E6/UL (ref 3.77–5.28)
RBC # BLD AUTO: 3.45 M/UL (ref 3.8–5.2)
RBC # BLD AUTO: 3.45 M/UL (ref 3.8–5.2)
RBC # BLD AUTO: 3.56 M/UL (ref 3.8–5.2)
RBC # BLD AUTO: 3.82 M/UL (ref 3.8–5.2)
RBC # BLD AUTO: 4.29 X10E6/UL (ref 3.77–5.28)
RBC # FLD: >100 /CU MM
RBC MORPH BLD: ABNORMAL
SAO2 % BLD: 96 % (ref 92–97)
SAO2 % BLD: 96 % (ref 92–97)
SAO2 % BLD: 99 % (ref 92–97)
SERVICE CMNT-IMP: ABNORMAL
SERVICE CMNT-IMP: NORMAL
SODIUM BLD-SCNC: 133 MMOL/L (ref 136–145)
SODIUM SERPL-SCNC: 129 MMOL/L (ref 136–145)
SODIUM SERPL-SCNC: 132 MMOL/L (ref 136–145)
SODIUM SERPL-SCNC: 133 MMOL/L (ref 136–145)
SODIUM SERPL-SCNC: 133 MMOL/L (ref 136–145)
SODIUM SERPL-SCNC: 134 MMOL/L (ref 136–145)
SODIUM SERPL-SCNC: 135 MMOL/L (ref 136–145)
SODIUM SERPL-SCNC: 135 MMOL/L (ref 136–145)
SODIUM SERPL-SCNC: 136 MMOL/L (ref 136–145)
SODIUM SERPL-SCNC: 137 MMOL/L (ref 136–145)
SODIUM SERPL-SCNC: 138 MMOL/L (ref 136–145)
SODIUM SERPL-SCNC: 140 MMOL/L (ref 134–144)
SODIUM SERPL-SCNC: 142 MMOL/L (ref 134–144)
SPECIMEN SOURCE FLD: ABNORMAL
SPECIMEN SOURCE FLD: NORMAL
SPECIMEN TYPE: ABNORMAL
SPECIMEN TYPE: NORMAL
SPECIMEN TYPE: NORMAL
T4 FREE SERPL-MCNC: 0.9 NG/DL (ref 0.8–1.5)
THERAPEUTIC RANGE,PTTT: ABNORMAL SECS (ref 58–77)
TOTAL RESP. RATE, ITRR: 20
TOTAL RESP. RATE, ITRR: 24
TOTAL RESP. RATE, ITRR: 31
TRIGL SERPL-MCNC: 178 MG/DL (ref ?–150)
TROPONIN I SERPL-MCNC: 0.04 NG/ML
TROPONIN I SERPL-MCNC: 0.05 NG/ML
TROPONIN I SERPL-MCNC: 0.05 NG/ML
TROPONIN I SERPL-MCNC: 0.07 NG/ML
TROPONIN I SERPL-MCNC: 0.08 NG/ML
TROPONIN I SERPL-MCNC: 0.09 NG/ML
TROPONIN I SERPL-MCNC: 0.09 NG/ML
TROPONIN I SERPL-MCNC: 0.11 NG/ML
TROPONIN I SERPL-MCNC: 0.12 NG/ML
TROPONIN I SERPL-MCNC: 0.14 NG/ML
TSH SERPL DL<=0.05 MIU/L-ACNC: 3.31 UIU/ML (ref 0.36–3.74)
VENTRICULAR RATE, ECG03: 75 BPM
VENTRICULAR RATE, ECG03: 76 BPM
VLDLC SERPL CALC-MCNC: 35.6 MG/DL
WBC # BLD AUTO: 10.5 K/UL (ref 3.6–11)
WBC # BLD AUTO: 10.8 K/UL (ref 3.6–11)
WBC # BLD AUTO: 11.3 X10E3/UL (ref 3.4–10.8)
WBC # BLD AUTO: 12.1 X10E3/UL (ref 3.4–10.8)
WBC # BLD AUTO: 12.5 K/UL (ref 3.6–11)
WBC # BLD AUTO: 13.2 K/UL (ref 3.6–11)
WBC # BLD AUTO: 16.2 K/UL (ref 3.6–11)
WBC # BLD AUTO: 19.2 K/UL (ref 3.6–11)
WBC # BLD AUTO: 19.6 K/UL (ref 3.6–11)
WBC # BLD AUTO: 21.3 K/UL (ref 3.6–11)
WBC # BLD AUTO: 23.1 K/UL (ref 3.6–11)
WBC # BLD AUTO: 23.6 K/UL (ref 3.6–11)
WBC # BLD AUTO: 8.8 K/UL (ref 3.6–11)
WBC # BLD AUTO: 9.3 K/UL (ref 3.6–11)
WBC # BLD AUTO: 9.5 K/UL (ref 3.6–11)
WBC # BLD AUTO: 9.9 K/UL (ref 3.6–11)
WBC NRBC COR # BLD: ABNORMAL 10*3/UL

## 2017-01-01 PROCEDURE — 5A1D60Z PERFORMANCE OF URINARY FILTRATION, MULTIPLE: ICD-10-PCS | Performed by: INTERNAL MEDICINE

## 2017-01-01 PROCEDURE — 84443 ASSAY THYROID STIM HORMONE: CPT | Performed by: FAMILY MEDICINE

## 2017-01-01 PROCEDURE — 74011250637 HC RX REV CODE- 250/637: Performed by: INTERNAL MEDICINE

## 2017-01-01 PROCEDURE — 93005 ELECTROCARDIOGRAM TRACING: CPT

## 2017-01-01 PROCEDURE — 80048 BASIC METABOLIC PNL TOTAL CA: CPT | Performed by: INTERNAL MEDICINE

## 2017-01-01 PROCEDURE — 74011636637 HC RX REV CODE- 636/637: Performed by: INTERNAL MEDICINE

## 2017-01-01 PROCEDURE — 77010033678 HC OXYGEN DAILY

## 2017-01-01 PROCEDURE — 85025 COMPLETE CBC W/AUTO DIFF WBC: CPT | Performed by: INTERNAL MEDICINE

## 2017-01-01 PROCEDURE — 85610 PROTHROMBIN TIME: CPT

## 2017-01-01 PROCEDURE — 70450 CT HEAD/BRAIN W/O DYE: CPT

## 2017-01-01 PROCEDURE — 36415 COLL VENOUS BLD VENIPUNCTURE: CPT | Performed by: INTERNAL MEDICINE

## 2017-01-01 PROCEDURE — 85025 COMPLETE CBC W/AUTO DIFF WBC: CPT | Performed by: EMERGENCY MEDICINE

## 2017-01-01 PROCEDURE — 84484 ASSAY OF TROPONIN QUANT: CPT | Performed by: EMERGENCY MEDICINE

## 2017-01-01 PROCEDURE — 74011636637 HC RX REV CODE- 636/637: Performed by: NURSE PRACTITIONER

## 2017-01-01 PROCEDURE — 74011250637 HC RX REV CODE- 250/637: Performed by: EMERGENCY MEDICINE

## 2017-01-01 PROCEDURE — 90935 HEMODIALYSIS ONE EVALUATION: CPT

## 2017-01-01 PROCEDURE — 72132 CT LUMBAR SPINE W/DYE: CPT

## 2017-01-01 PROCEDURE — C2623 CATH, TRANSLUMIN, DRUG-COAT: HCPCS

## 2017-01-01 PROCEDURE — 77030010852 HC CATH NB ADVNTG COOK -B

## 2017-01-01 PROCEDURE — 74011250636 HC RX REV CODE- 250/636: Performed by: INTERNAL MEDICINE

## 2017-01-01 PROCEDURE — 36415 COLL VENOUS BLD VENIPUNCTURE: CPT | Performed by: FAMILY MEDICINE

## 2017-01-01 PROCEDURE — 83605 ASSAY OF LACTIC ACID: CPT | Performed by: EMERGENCY MEDICINE

## 2017-01-01 PROCEDURE — 82962 GLUCOSE BLOOD TEST: CPT

## 2017-01-01 PROCEDURE — 83735 ASSAY OF MAGNESIUM: CPT | Performed by: EMERGENCY MEDICINE

## 2017-01-01 PROCEDURE — 77030011256 HC DRSG MEPILEX <16IN NO BORD MOLN -A

## 2017-01-01 PROCEDURE — 85730 THROMBOPLASTIN TIME PARTIAL: CPT | Performed by: EMERGENCY MEDICINE

## 2017-01-01 PROCEDURE — 74011250636 HC RX REV CODE- 250/636: Performed by: EMERGENCY MEDICINE

## 2017-01-01 PROCEDURE — 84439 ASSAY OF FREE THYROXINE: CPT | Performed by: FAMILY MEDICINE

## 2017-01-01 PROCEDURE — 83690 ASSAY OF LIPASE: CPT | Performed by: EMERGENCY MEDICINE

## 2017-01-01 PROCEDURE — 74011636320 HC RX REV CODE- 636/320: Performed by: RADIOLOGY

## 2017-01-01 PROCEDURE — 88341 IMHCHEM/IMCYTCHM EA ADD ANTB: CPT | Performed by: EMERGENCY MEDICINE

## 2017-01-01 PROCEDURE — 72100 X-RAY EXAM L-S SPINE 2/3 VWS: CPT

## 2017-01-01 PROCEDURE — 74011000250 HC RX REV CODE- 250

## 2017-01-01 PROCEDURE — 72128 CT CHEST SPINE W/O DYE: CPT

## 2017-01-01 PROCEDURE — 93306 TTE W/DOPPLER COMPLETE: CPT

## 2017-01-01 PROCEDURE — 77030022017 HC DRSG HEMO QCLOT ZMED -A

## 2017-01-01 PROCEDURE — 97530 THERAPEUTIC ACTIVITIES: CPT

## 2017-01-01 PROCEDURE — 76942 ECHO GUIDE FOR BIOPSY: CPT

## 2017-01-01 PROCEDURE — 87040 BLOOD CULTURE FOR BACTERIA: CPT | Performed by: EMERGENCY MEDICINE

## 2017-01-01 PROCEDURE — 77030028236 US THORACENTESIS CATH W IMAGE

## 2017-01-01 PROCEDURE — 0651 HSPC ROUTINE HOME CARE

## 2017-01-01 PROCEDURE — 94640 AIRWAY INHALATION TREATMENT: CPT

## 2017-01-01 PROCEDURE — 74011250636 HC RX REV CODE- 250/636

## 2017-01-01 PROCEDURE — 71010 XR CHEST SNGL V: CPT

## 2017-01-01 PROCEDURE — 82550 ASSAY OF CK (CPK): CPT | Performed by: EMERGENCY MEDICINE

## 2017-01-01 PROCEDURE — 83735 ASSAY OF MAGNESIUM: CPT | Performed by: INTERNAL MEDICINE

## 2017-01-01 PROCEDURE — 74011000250 HC RX REV CODE- 250: Performed by: INTERNAL MEDICINE

## 2017-01-01 PROCEDURE — 65660000000 HC RM CCU STEPDOWN

## 2017-01-01 PROCEDURE — 84100 ASSAY OF PHOSPHORUS: CPT | Performed by: INTERNAL MEDICINE

## 2017-01-01 PROCEDURE — C1724 CATH, TRANS ATHEREC,ROTATION: HCPCS

## 2017-01-01 PROCEDURE — 77030029065 HC DRSG HEMO QCLOT ZMED -B

## 2017-01-01 PROCEDURE — C1725 CATH, TRANSLUMIN NON-LASER: HCPCS

## 2017-01-01 PROCEDURE — 3336500001 HSPC ELECTION

## 2017-01-01 PROCEDURE — 74011250636 HC RX REV CODE- 250/636: Performed by: ANESTHESIOLOGY

## 2017-01-01 PROCEDURE — 94762 N-INVAS EAR/PLS OXIMTRY CONT: CPT

## 2017-01-01 PROCEDURE — C1887 CATHETER, GUIDING: HCPCS

## 2017-01-01 PROCEDURE — 80053 COMPREHEN METABOLIC PANEL: CPT | Performed by: INTERNAL MEDICINE

## 2017-01-01 PROCEDURE — 88313 SPECIAL STAINS GROUP 2: CPT | Performed by: EMERGENCY MEDICINE

## 2017-01-01 PROCEDURE — 36415 COLL VENOUS BLD VENIPUNCTURE: CPT | Performed by: EMERGENCY MEDICINE

## 2017-01-01 PROCEDURE — 74011250637 HC RX REV CODE- 250/637: Performed by: PHYSICIAN ASSISTANT

## 2017-01-01 PROCEDURE — 96365 THER/PROPH/DIAG IV INF INIT: CPT

## 2017-01-01 PROCEDURE — 3336590001 HSPC ROOM AND BOARD

## 2017-01-01 PROCEDURE — 74011250636 HC RX REV CODE- 250/636: Performed by: PHYSICAL MEDICINE & REHABILITATION

## 2017-01-01 PROCEDURE — 88305 TISSUE EXAM BY PATHOLOGIST: CPT | Performed by: EMERGENCY MEDICINE

## 2017-01-01 PROCEDURE — 74011250637 HC RX REV CODE- 250/637: Performed by: NURSE PRACTITIONER

## 2017-01-01 PROCEDURE — C1753 CATH, INTRAVAS ULTRASOUND: HCPCS

## 2017-01-01 PROCEDURE — 83615 LACTATE (LD) (LDH) ENZYME: CPT | Performed by: EMERGENCY MEDICINE

## 2017-01-01 PROCEDURE — 96368 THER/DIAG CONCURRENT INF: CPT

## 2017-01-01 PROCEDURE — 80053 COMPREHEN METABOLIC PANEL: CPT | Performed by: EMERGENCY MEDICINE

## 2017-01-01 PROCEDURE — C1894 INTRO/SHEATH, NON-LASER: HCPCS

## 2017-01-01 PROCEDURE — 65660000001 HC RM ICU INTERMED STEPDOWN

## 2017-01-01 PROCEDURE — 77030034848

## 2017-01-01 PROCEDURE — 99152 MOD SED SAME PHYS/QHP 5/>YRS: CPT

## 2017-01-01 PROCEDURE — 82803 BLOOD GASES ANY COMBINATION: CPT

## 2017-01-01 PROCEDURE — 96374 THER/PROPH/DIAG INJ IV PUSH: CPT

## 2017-01-01 PROCEDURE — 84157 ASSAY OF PROTEIN OTHER: CPT | Performed by: EMERGENCY MEDICINE

## 2017-01-01 PROCEDURE — 74011250637 HC RX REV CODE- 250/637: Performed by: SPECIALIST

## 2017-01-01 PROCEDURE — G8979 MOBILITY GOAL STATUS: HCPCS

## 2017-01-01 PROCEDURE — 77030021533 HC CATH ANGI DX PRFMA MRTM -A

## 2017-01-01 PROCEDURE — 04CL3ZZ EXTIRPATION OF MATTER FROM LEFT FEMORAL ARTERY, PERCUTANEOUS APPROACH: ICD-10-PCS | Performed by: RADIOLOGY

## 2017-01-01 PROCEDURE — 36415 COLL VENOUS BLD VENIPUNCTURE: CPT

## 2017-01-01 PROCEDURE — 85610 PROTHROMBIN TIME: CPT | Performed by: EMERGENCY MEDICINE

## 2017-01-01 PROCEDURE — 83605 ASSAY OF LACTIC ACID: CPT | Performed by: INTERNAL MEDICINE

## 2017-01-01 PROCEDURE — G8978 MOBILITY CURRENT STATUS: HCPCS

## 2017-01-01 PROCEDURE — A9270 NON-COVERED ITEM OR SERVICE: HCPCS | Performed by: NURSE PRACTITIONER

## 2017-01-01 PROCEDURE — 77030014143 HC TY PUNC LUMBR BD -A

## 2017-01-01 PROCEDURE — C1884 EMBOLIZATION PROTECT SYST: HCPCS

## 2017-01-01 PROCEDURE — 74011250637 HC RX REV CODE- 250/637: Performed by: PHYSICAL MEDICINE & REHABILITATION

## 2017-01-01 PROCEDURE — 71010 XR CHEST PORT: CPT

## 2017-01-01 PROCEDURE — 77030002996 HC SUT SLK J&J -A

## 2017-01-01 PROCEDURE — G8978 MOBILITY CURRENT STATUS: HCPCS | Performed by: PHYSICAL THERAPIST

## 2017-01-01 PROCEDURE — C1769 GUIDE WIRE: HCPCS

## 2017-01-01 PROCEDURE — 77030029684 HC NEB SM VOL KT MONA -A

## 2017-01-01 PROCEDURE — 047L3D1 DILATION OF LEFT FEMORAL ARTERY WITH INTRALUMINAL DEVICE, USING DRUG-COATED BALLOON, PERCUTANEOUS APPROACH: ICD-10-PCS | Performed by: RADIOLOGY

## 2017-01-01 PROCEDURE — 36600 WITHDRAWAL OF ARTERIAL BLOOD: CPT

## 2017-01-01 PROCEDURE — 74011000250 HC RX REV CODE- 250: Performed by: PHYSICAL MEDICINE & REHABILITATION

## 2017-01-01 PROCEDURE — 72050 X-RAY EXAM NECK SPINE 4/5VWS: CPT

## 2017-01-01 PROCEDURE — 96376 TX/PRO/DX INJ SAME DRUG ADON: CPT

## 2017-01-01 PROCEDURE — P9047 ALBUMIN (HUMAN), 25%, 50ML: HCPCS | Performed by: INTERNAL MEDICINE

## 2017-01-01 PROCEDURE — 87077 CULTURE AEROBIC IDENTIFY: CPT | Performed by: EMERGENCY MEDICINE

## 2017-01-01 PROCEDURE — 96375 TX/PRO/DX INJ NEW DRUG ADDON: CPT

## 2017-01-01 PROCEDURE — 65270000029 HC RM PRIVATE

## 2017-01-01 PROCEDURE — 97165 OT EVAL LOW COMPLEX 30 MIN: CPT | Performed by: OCCUPATIONAL THERAPIST

## 2017-01-01 PROCEDURE — 71250 CT THORAX DX C-: CPT

## 2017-01-01 PROCEDURE — 74011250636 HC RX REV CODE- 250/636: Performed by: FAMILY MEDICINE

## 2017-01-01 PROCEDURE — 74011636320 HC RX REV CODE- 636/320

## 2017-01-01 PROCEDURE — 93971 EXTREMITY STUDY: CPT

## 2017-01-01 PROCEDURE — 74011000250 HC RX REV CODE- 250: Performed by: EMERGENCY MEDICINE

## 2017-01-01 PROCEDURE — 82150 ASSAY OF AMYLASE: CPT | Performed by: EMERGENCY MEDICINE

## 2017-01-01 PROCEDURE — 87102 FUNGUS ISOLATION CULTURE: CPT | Performed by: EMERGENCY MEDICINE

## 2017-01-01 PROCEDURE — 85027 COMPLETE CBC AUTOMATED: CPT | Performed by: INTERNAL MEDICINE

## 2017-01-01 PROCEDURE — 74011000258 HC RX REV CODE- 258: Performed by: INTERNAL MEDICINE

## 2017-01-01 PROCEDURE — 96367 TX/PROPH/DG ADDL SEQ IV INF: CPT

## 2017-01-01 PROCEDURE — 99285 EMERGENCY DEPT VISIT HI MDM: CPT

## 2017-01-01 PROCEDURE — 72125 CT NECK SPINE W/O DYE: CPT

## 2017-01-01 PROCEDURE — 85027 COMPLETE CBC AUTOMATED: CPT

## 2017-01-01 PROCEDURE — 77030019697 HC SYR ANGI INFL MRTM -B

## 2017-01-01 PROCEDURE — 80061 LIPID PANEL: CPT | Performed by: FAMILY MEDICINE

## 2017-01-01 PROCEDURE — 51702 INSERT TEMP BLADDER CATH: CPT

## 2017-01-01 PROCEDURE — 74011000258 HC RX REV CODE- 258: Performed by: FAMILY MEDICINE

## 2017-01-01 PROCEDURE — 87186 SC STD MICRODIL/AGAR DIL: CPT | Performed by: EMERGENCY MEDICINE

## 2017-01-01 PROCEDURE — 89050 BODY FLUID CELL COUNT: CPT | Performed by: EMERGENCY MEDICINE

## 2017-01-01 PROCEDURE — 87205 SMEAR GRAM STAIN: CPT | Performed by: EMERGENCY MEDICINE

## 2017-01-01 PROCEDURE — 77030003666 HC NDL SPINAL BD -A

## 2017-01-01 PROCEDURE — 74011250636 HC RX REV CODE- 250/636: Performed by: NURSE PRACTITIONER

## 2017-01-01 PROCEDURE — 99283 EMERGENCY DEPT VISIT LOW MDM: CPT

## 2017-01-01 PROCEDURE — 97161 PT EVAL LOW COMPLEX 20 MIN: CPT | Performed by: PHYSICAL THERAPIST

## 2017-01-01 PROCEDURE — 87040 BLOOD CULTURE FOR BACTERIA: CPT | Performed by: INTERNAL MEDICINE

## 2017-01-01 PROCEDURE — 71020 XR CHEST PA LAT: CPT

## 2017-01-01 PROCEDURE — 88112 CYTOPATH CELL ENHANCE TECH: CPT | Performed by: EMERGENCY MEDICINE

## 2017-01-01 PROCEDURE — 96366 THER/PROPH/DIAG IV INF ADDON: CPT

## 2017-01-01 PROCEDURE — G8980 MOBILITY D/C STATUS: HCPCS

## 2017-01-01 PROCEDURE — 88342 IMHCHEM/IMCYTCHM 1ST ANTB: CPT | Performed by: EMERGENCY MEDICINE

## 2017-01-01 PROCEDURE — 74011000258 HC RX REV CODE- 258: Performed by: EMERGENCY MEDICINE

## 2017-01-01 PROCEDURE — 65610000006 HC RM INTENSIVE CARE

## 2017-01-01 PROCEDURE — 37252 INTRVASC US NONCORONARY 1ST: CPT

## 2017-01-01 PROCEDURE — G8979 MOBILITY GOAL STATUS: HCPCS | Performed by: PHYSICAL THERAPIST

## 2017-01-01 PROCEDURE — G8987 SELF CARE CURRENT STATUS: HCPCS | Performed by: OCCUPATIONAL THERAPIST

## 2017-01-01 PROCEDURE — G8988 SELF CARE GOAL STATUS: HCPCS | Performed by: OCCUPATIONAL THERAPIST

## 2017-01-01 PROCEDURE — C1714 CATH, TRANS ATHERECTOMY, DIR: HCPCS

## 2017-01-01 PROCEDURE — B41G1ZZ FLUOROSCOPY OF LEFT LOWER EXTREMITY ARTERIES USING LOW OSMOLAR CONTRAST: ICD-10-PCS | Performed by: RADIOLOGY

## 2017-01-01 PROCEDURE — 93926 LOWER EXTREMITY STUDY: CPT

## 2017-01-01 PROCEDURE — 97162 PT EVAL MOD COMPLEX 30 MIN: CPT

## 2017-01-01 PROCEDURE — 62304 MYELOGRAPHY LUMBAR INJECTION: CPT

## 2017-01-01 PROCEDURE — 73130 X-RAY EXAM OF HAND: CPT

## 2017-01-01 PROCEDURE — 97535 SELF CARE MNGMENT TRAINING: CPT | Performed by: OCCUPATIONAL THERAPIST

## 2017-01-01 PROCEDURE — 80047 BASIC METABLC PNL IONIZED CA: CPT

## 2017-01-01 PROCEDURE — 77030013160 HC PROTCT ARM THMB DERY -A

## 2017-01-01 PROCEDURE — 85347 COAGULATION TIME ACTIVATED: CPT

## 2017-01-01 PROCEDURE — 75716 ARTERY X-RAYS ARMS/LEGS: CPT

## 2017-01-01 PROCEDURE — 72131 CT LUMBAR SPINE W/O DYE: CPT

## 2017-01-01 PROCEDURE — 74011000250 HC RX REV CODE- 250: Performed by: NURSE PRACTITIONER

## 2017-01-01 PROCEDURE — 80053 COMPREHEN METABOLIC PANEL: CPT

## 2017-01-01 PROCEDURE — C1876 STENT, NON-COA/NON-COV W/DEL: HCPCS

## 2017-01-01 PROCEDURE — 82140 ASSAY OF AMMONIA: CPT | Performed by: EMERGENCY MEDICINE

## 2017-01-01 PROCEDURE — 82945 GLUCOSE OTHER FLUID: CPT | Performed by: EMERGENCY MEDICINE

## 2017-01-01 PROCEDURE — 77030030195 HC CATH ANGI DX PRF4 MRTM -A

## 2017-01-01 PROCEDURE — 87075 CULTR BACTERIA EXCEPT BLOOD: CPT | Performed by: EMERGENCY MEDICINE

## 2017-01-01 RX ORDER — DIPHENHYDRAMINE HYDROCHLORIDE 50 MG/ML
25 INJECTION, SOLUTION INTRAMUSCULAR; INTRAVENOUS
Status: DISCONTINUED | OUTPATIENT
Start: 2017-01-01 | End: 2017-01-01 | Stop reason: HOSPADM

## 2017-01-01 RX ORDER — SORBITOL SOLUTION 70 %
30 SOLUTION, ORAL MISCELLANEOUS DAILY PRN
COMMUNITY

## 2017-01-01 RX ORDER — HYDROMORPHONE HYDROCHLORIDE 2 MG/ML
.2-.5 INJECTION, SOLUTION INTRAMUSCULAR; INTRAVENOUS; SUBCUTANEOUS
Status: DISCONTINUED | OUTPATIENT
Start: 2017-01-01 | End: 2017-01-01 | Stop reason: HOSPADM

## 2017-01-01 RX ORDER — MIDAZOLAM HYDROCHLORIDE 1 MG/ML
.5-2 INJECTION, SOLUTION INTRAMUSCULAR; INTRAVENOUS
Status: DISCONTINUED | OUTPATIENT
Start: 2017-01-01 | End: 2017-01-01

## 2017-01-01 RX ORDER — SODIUM CHLORIDE 0.9 % (FLUSH) 0.9 %
5-10 SYRINGE (ML) INJECTION EVERY 8 HOURS
Status: DISCONTINUED | OUTPATIENT
Start: 2017-01-01 | End: 2017-01-01 | Stop reason: HOSPADM

## 2017-01-01 RX ORDER — SODIUM CHLORIDE 9 MG/ML
INJECTION, SOLUTION INTRAVENOUS
Status: DISCONTINUED | OUTPATIENT
Start: 2017-01-01 | End: 2017-01-01 | Stop reason: HOSPADM

## 2017-01-01 RX ORDER — PROTAMINE SULFATE 10 MG/ML
0-50 INJECTION, SOLUTION INTRAVENOUS
Status: COMPLETED | OUTPATIENT
Start: 2017-01-01 | End: 2017-01-01

## 2017-01-01 RX ORDER — MAGNESIUM SULFATE 100 %
4 CRYSTALS MISCELLANEOUS AS NEEDED
Status: DISCONTINUED | OUTPATIENT
Start: 2017-01-01 | End: 2017-01-01 | Stop reason: HOSPADM

## 2017-01-01 RX ORDER — CLOPIDOGREL BISULFATE 75 MG/1
TABLET ORAL
Qty: 30 TAB | Refills: 0 | Status: SHIPPED | OUTPATIENT
Start: 2017-01-01

## 2017-01-01 RX ORDER — IPRATROPIUM BROMIDE AND ALBUTEROL SULFATE 2.5; .5 MG/3ML; MG/3ML
3 SOLUTION RESPIRATORY (INHALATION)
Status: DISCONTINUED | OUTPATIENT
Start: 2017-01-01 | End: 2017-01-01 | Stop reason: HOSPADM

## 2017-01-01 RX ORDER — PROTAMINE SULFATE 10 MG/ML
INJECTION, SOLUTION INTRAVENOUS AS NEEDED
Status: DISCONTINUED | OUTPATIENT
Start: 2017-01-01 | End: 2017-01-01 | Stop reason: HOSPADM

## 2017-01-01 RX ORDER — IPRATROPIUM BROMIDE AND ALBUTEROL SULFATE 2.5; .5 MG/3ML; MG/3ML
3 SOLUTION RESPIRATORY (INHALATION)
Status: DISCONTINUED | OUTPATIENT
Start: 2017-01-01 | End: 2017-01-01

## 2017-01-01 RX ORDER — LORAZEPAM 2 MG/ML
1 INJECTION INTRAMUSCULAR
Status: DISCONTINUED | OUTPATIENT
Start: 2017-01-01 | End: 2017-01-01

## 2017-01-01 RX ORDER — HEPARIN SODIUM 1000 [USP'U]/ML
INJECTION, SOLUTION INTRAVENOUS; SUBCUTANEOUS
Status: DISCONTINUED
Start: 2017-01-01 | End: 2017-01-01 | Stop reason: ALTCHOICE

## 2017-01-01 RX ORDER — FENTANYL CITRATE 50 UG/ML
INJECTION, SOLUTION INTRAMUSCULAR; INTRAVENOUS
Status: DISCONTINUED
Start: 2017-01-01 | End: 2017-01-01 | Stop reason: ALTCHOICE

## 2017-01-01 RX ORDER — HEPARIN SODIUM 200 [USP'U]/100ML
INJECTION, SOLUTION INTRAVENOUS
Status: COMPLETED
Start: 2017-01-01 | End: 2017-01-01

## 2017-01-01 RX ORDER — HYDROCODONE BITARTRATE AND ACETAMINOPHEN 5; 325 MG/1; MG/1
1 TABLET ORAL
Status: DISCONTINUED | OUTPATIENT
Start: 2017-01-01 | End: 2017-01-01 | Stop reason: HOSPADM

## 2017-01-01 RX ORDER — IODIXANOL 320 MG/ML
0-200 INJECTION, SOLUTION INTRAVASCULAR
Status: DISCONTINUED | OUTPATIENT
Start: 2017-01-01 | End: 2017-01-01 | Stop reason: HOSPADM

## 2017-01-01 RX ORDER — HYDROMORPHONE HYDROCHLORIDE 1 MG/ML
0.2 INJECTION, SOLUTION INTRAMUSCULAR; INTRAVENOUS; SUBCUTANEOUS
Status: DISCONTINUED | OUTPATIENT
Start: 2017-01-01 | End: 2017-01-01 | Stop reason: HOSPADM

## 2017-01-01 RX ORDER — MORPHINE SULFATE 10 MG/ML
2 INJECTION, SOLUTION INTRAMUSCULAR; INTRAVENOUS
Status: DISCONTINUED | OUTPATIENT
Start: 2017-01-01 | End: 2017-01-01 | Stop reason: HOSPADM

## 2017-01-01 RX ORDER — CLOPIDOGREL BISULFATE 75 MG/1
75 TABLET ORAL
Status: COMPLETED | OUTPATIENT
Start: 2017-01-01 | End: 2017-01-01

## 2017-01-01 RX ORDER — ACETAMINOPHEN 650 MG/1
650 SUPPOSITORY RECTAL
Status: DISCONTINUED | OUTPATIENT
Start: 2017-01-01 | End: 2017-01-01 | Stop reason: HOSPADM

## 2017-01-01 RX ORDER — FAMOTIDINE 10 MG/ML
20 INJECTION INTRAVENOUS DAILY
Status: DISCONTINUED | OUTPATIENT
Start: 2017-01-01 | End: 2017-01-01 | Stop reason: SDUPTHER

## 2017-01-01 RX ORDER — IODIXANOL 320 MG/ML
0-100 INJECTION, SOLUTION INTRAVASCULAR
Status: DISCONTINUED | OUTPATIENT
Start: 2017-01-01 | End: 2017-01-01

## 2017-01-01 RX ORDER — DIPHENHYDRAMINE HYDROCHLORIDE 50 MG/ML
25 INJECTION, SOLUTION INTRAMUSCULAR; INTRAVENOUS
Status: COMPLETED | OUTPATIENT
Start: 2017-01-01 | End: 2017-01-01

## 2017-01-01 RX ORDER — INSULIN LISPRO 100 [IU]/ML
INJECTION, SOLUTION INTRAVENOUS; SUBCUTANEOUS
Status: DISCONTINUED | OUTPATIENT
Start: 2017-01-01 | End: 2017-01-01 | Stop reason: HOSPADM

## 2017-01-01 RX ORDER — MORPHINE SULFATE 4 MG/ML
4 INJECTION, SOLUTION INTRAMUSCULAR; INTRAVENOUS
Status: COMPLETED | OUTPATIENT
Start: 2017-01-01 | End: 2017-01-01

## 2017-01-01 RX ORDER — HEPARIN SODIUM 200 [USP'U]/100ML
500 INJECTION, SOLUTION INTRAVENOUS ONCE
Status: DISCONTINUED | OUTPATIENT
Start: 2017-01-01 | End: 2017-01-01 | Stop reason: ALTCHOICE

## 2017-01-01 RX ORDER — LEVOFLOXACIN 500 MG/1
500 TABLET, FILM COATED ORAL
Qty: 3 TAB | Refills: 0 | Status: SHIPPED | OUTPATIENT
Start: 2017-01-01 | End: 2017-01-01

## 2017-01-01 RX ORDER — TRAMADOL HYDROCHLORIDE 50 MG/1
50 TABLET ORAL
Qty: 20 TAB | Refills: 0 | Status: SHIPPED | OUTPATIENT
Start: 2017-01-01 | End: 2017-08-25

## 2017-01-01 RX ORDER — FENTANYL CITRATE 50 UG/ML
INJECTION, SOLUTION INTRAMUSCULAR; INTRAVENOUS
Status: COMPLETED
Start: 2017-01-01 | End: 2017-01-01

## 2017-01-01 RX ORDER — LEVOFLOXACIN 5 MG/ML
500 INJECTION, SOLUTION INTRAVENOUS
Status: DISCONTINUED | OUTPATIENT
Start: 2017-01-01 | End: 2017-01-01

## 2017-01-01 RX ORDER — FENTANYL CITRATE 50 UG/ML
INJECTION, SOLUTION INTRAMUSCULAR; INTRAVENOUS
Status: DISCONTINUED
Start: 2017-01-01 | End: 2017-01-01

## 2017-01-01 RX ORDER — DIPHENHYDRAMINE HYDROCHLORIDE 50 MG/ML
25 INJECTION, SOLUTION INTRAMUSCULAR; INTRAVENOUS ONCE
Status: COMPLETED | OUTPATIENT
Start: 2017-01-01 | End: 2017-01-01

## 2017-01-01 RX ORDER — OXYCODONE AND ACETAMINOPHEN 5; 325 MG/1; MG/1
1 TABLET ORAL
Status: DISCONTINUED | OUTPATIENT
Start: 2017-01-01 | End: 2017-01-01

## 2017-01-01 RX ORDER — AMOXICILLIN 250 MG
2 CAPSULE ORAL
Status: DISCONTINUED | OUTPATIENT
Start: 2017-01-01 | End: 2017-01-01 | Stop reason: HOSPADM

## 2017-01-01 RX ORDER — LORAZEPAM 2 MG/ML
1 INJECTION INTRAMUSCULAR EVERY 4 HOURS
Status: DISCONTINUED | OUTPATIENT
Start: 2017-01-01 | End: 2017-01-01 | Stop reason: HOSPADM

## 2017-01-01 RX ORDER — HYDROMORPHONE HYDROCHLORIDE 1 MG/ML
0.2 INJECTION, SOLUTION INTRAMUSCULAR; INTRAVENOUS; SUBCUTANEOUS
Status: DISCONTINUED | OUTPATIENT
Start: 2017-01-01 | End: 2017-01-01

## 2017-01-01 RX ORDER — MIDAZOLAM HYDROCHLORIDE 1 MG/ML
INJECTION, SOLUTION INTRAMUSCULAR; INTRAVENOUS AS NEEDED
Status: DISCONTINUED | OUTPATIENT
Start: 2017-01-01 | End: 2017-01-01 | Stop reason: HOSPADM

## 2017-01-01 RX ORDER — SODIUM CHLORIDE, SODIUM LACTATE, POTASSIUM CHLORIDE, CALCIUM CHLORIDE 600; 310; 30; 20 MG/100ML; MG/100ML; MG/100ML; MG/100ML
25 INJECTION, SOLUTION INTRAVENOUS CONTINUOUS
Status: DISCONTINUED | OUTPATIENT
Start: 2017-01-01 | End: 2017-01-01 | Stop reason: HOSPADM

## 2017-01-01 RX ORDER — ALBUTEROL SULFATE 0.83 MG/ML
5 SOLUTION RESPIRATORY (INHALATION)
Status: COMPLETED | OUTPATIENT
Start: 2017-01-01 | End: 2017-01-01

## 2017-01-01 RX ORDER — SODIUM CHLORIDE 0.9 % (FLUSH) 0.9 %
5-10 SYRINGE (ML) INJECTION AS NEEDED
Status: DISCONTINUED | OUTPATIENT
Start: 2017-01-01 | End: 2017-01-01 | Stop reason: HOSPADM

## 2017-01-01 RX ORDER — CALCIUM ACETATE 667 MG/1
2 CAPSULE ORAL
Status: DISCONTINUED | OUTPATIENT
Start: 2017-01-01 | End: 2017-01-01

## 2017-01-01 RX ORDER — LEVOFLOXACIN 5 MG/ML
750 INJECTION, SOLUTION INTRAVENOUS ONCE
Status: COMPLETED | OUTPATIENT
Start: 2017-01-01 | End: 2017-01-01

## 2017-01-01 RX ORDER — HYDROCODONE BITARTRATE AND ACETAMINOPHEN 5; 325 MG/1; MG/1
1 TABLET ORAL
Status: COMPLETED | OUTPATIENT
Start: 2017-01-01 | End: 2017-01-01

## 2017-01-01 RX ORDER — PREDNISONE 20 MG/1
40 TABLET ORAL DAILY
Status: DISCONTINUED | OUTPATIENT
Start: 2017-01-01 | End: 2017-01-01 | Stop reason: HOSPADM

## 2017-01-01 RX ORDER — HEPARIN SODIUM 5000 [USP'U]/ML
5000 INJECTION, SOLUTION INTRAVENOUS; SUBCUTANEOUS EVERY 12 HOURS
Status: DISCONTINUED | OUTPATIENT
Start: 2017-01-01 | End: 2017-01-01

## 2017-01-01 RX ORDER — FLUTICASONE FUROATE AND VILANTEROL 100; 25 UG/1; UG/1
POWDER RESPIRATORY (INHALATION) DAILY
Status: DISCONTINUED | OUTPATIENT
Start: 2017-01-01 | End: 2017-01-01 | Stop reason: HOSPADM

## 2017-01-01 RX ORDER — LORAZEPAM 2 MG/ML
1 INJECTION INTRAMUSCULAR
Status: DISCONTINUED | OUTPATIENT
Start: 2017-01-01 | End: 2017-01-01 | Stop reason: HOSPADM

## 2017-01-01 RX ORDER — HEPARIN SODIUM 200 [USP'U]/100ML
500 INJECTION, SOLUTION INTRAVENOUS ONCE
Status: DISCONTINUED | OUTPATIENT
Start: 2017-01-01 | End: 2017-01-01

## 2017-01-01 RX ORDER — FAMOTIDINE 20 MG/1
20 TABLET, FILM COATED ORAL DAILY
Status: DISCONTINUED | OUTPATIENT
Start: 2017-01-01 | End: 2017-01-01

## 2017-01-01 RX ORDER — ALBUTEROL SULFATE 90 UG/1
2 AEROSOL, METERED RESPIRATORY (INHALATION)
Status: DISCONTINUED | OUTPATIENT
Start: 2017-01-01 | End: 2017-01-01 | Stop reason: HOSPADM

## 2017-01-01 RX ORDER — LIDOCAINE HYDROCHLORIDE 10 MG/ML
INJECTION INFILTRATION; PERINEURAL
Status: COMPLETED
Start: 2017-01-01 | End: 2017-01-01

## 2017-01-01 RX ORDER — METHOCARBAMOL 750 MG/1
750 TABLET, FILM COATED ORAL 3 TIMES DAILY
Qty: 15 TAB | Refills: 0 | Status: SHIPPED | OUTPATIENT
Start: 2017-01-01 | End: 2017-01-01

## 2017-01-01 RX ORDER — CLOPIDOGREL BISULFATE 75 MG/1
TABLET ORAL
Qty: 30 TAB | Refills: 0 | Status: SHIPPED | OUTPATIENT
Start: 2017-01-01 | End: 2017-01-01

## 2017-01-01 RX ORDER — IODIXANOL 320 MG/ML
INJECTION, SOLUTION INTRAVASCULAR
Status: DISCONTINUED
Start: 2017-01-01 | End: 2017-01-01

## 2017-01-01 RX ORDER — METOPROLOL TARTRATE 50 MG/1
50 TABLET ORAL 2 TIMES DAILY
Status: DISCONTINUED | OUTPATIENT
Start: 2017-01-01 | End: 2017-01-01 | Stop reason: HOSPADM

## 2017-01-01 RX ORDER — GUAIFENESIN 100 MG/5ML
81 LIQUID (ML) ORAL
Status: COMPLETED | OUTPATIENT
Start: 2017-01-01 | End: 2017-01-01

## 2017-01-01 RX ORDER — SCOLOPAMINE TRANSDERMAL SYSTEM 1 MG/1
1.5 PATCH, EXTENDED RELEASE TRANSDERMAL
Status: DISCONTINUED | OUTPATIENT
Start: 2017-01-01 | End: 2017-01-01 | Stop reason: HOSPADM

## 2017-01-01 RX ORDER — FENTANYL CITRATE 50 UG/ML
INJECTION, SOLUTION INTRAMUSCULAR; INTRAVENOUS AS NEEDED
Status: DISCONTINUED | OUTPATIENT
Start: 2017-01-01 | End: 2017-01-01 | Stop reason: HOSPADM

## 2017-01-01 RX ORDER — ACETAMINOPHEN 325 MG/1
650 TABLET ORAL
Status: DISCONTINUED | OUTPATIENT
Start: 2017-01-01 | End: 2017-01-01

## 2017-01-01 RX ORDER — IODIXANOL 320 MG/ML
INJECTION, SOLUTION INTRAVASCULAR
Status: COMPLETED
Start: 2017-01-01 | End: 2017-01-01

## 2017-01-01 RX ORDER — ALBUTEROL SULFATE 0.83 MG/ML
2.5 SOLUTION RESPIRATORY (INHALATION)
Status: DISCONTINUED | OUTPATIENT
Start: 2017-01-01 | End: 2017-01-01 | Stop reason: HOSPADM

## 2017-01-01 RX ORDER — CLOPIDOGREL BISULFATE 75 MG/1
75 TABLET ORAL DAILY
Status: DISCONTINUED | OUTPATIENT
Start: 2017-01-01 | End: 2017-01-01 | Stop reason: HOSPADM

## 2017-01-01 RX ORDER — LIDOCAINE HYDROCHLORIDE 10 MG/ML
0.1 INJECTION, SOLUTION EPIDURAL; INFILTRATION; INTRACAUDAL; PERINEURAL AS NEEDED
Status: DISCONTINUED | OUTPATIENT
Start: 2017-01-01 | End: 2017-01-01 | Stop reason: HOSPADM

## 2017-01-01 RX ORDER — HYDROMORPHONE HYDROCHLORIDE 1 MG/ML
0.5 INJECTION, SOLUTION INTRAMUSCULAR; INTRAVENOUS; SUBCUTANEOUS
Status: COMPLETED | OUTPATIENT
Start: 2017-01-01 | End: 2017-01-01

## 2017-01-01 RX ORDER — CLOPIDOGREL BISULFATE 75 MG/1
TABLET ORAL
Qty: 30 TAB | Refills: 0 | Status: SHIPPED | OUTPATIENT
Start: 2017-01-01 | End: 2017-01-01 | Stop reason: SDUPTHER

## 2017-01-01 RX ORDER — PREDNISONE 20 MG/1
60 TABLET ORAL DAILY
Status: DISCONTINUED | OUTPATIENT
Start: 2017-01-01 | End: 2017-01-01

## 2017-01-01 RX ORDER — DEXTROSE MONOHYDRATE 100 MG/ML
125-250 INJECTION, SOLUTION INTRAVENOUS AS NEEDED
Status: DISCONTINUED | OUTPATIENT
Start: 2017-01-01 | End: 2017-01-01 | Stop reason: HOSPADM

## 2017-01-01 RX ORDER — INSULIN LISPRO 100 [IU]/ML
INJECTION, SOLUTION INTRAVENOUS; SUBCUTANEOUS
Status: DISCONTINUED | OUTPATIENT
Start: 2017-01-01 | End: 2017-01-01

## 2017-01-01 RX ORDER — LIDOCAINE HYDROCHLORIDE 10 MG/ML
INJECTION INFILTRATION; PERINEURAL
Status: DISCONTINUED
Start: 2017-01-01 | End: 2017-01-01

## 2017-01-01 RX ORDER — ATORVASTATIN CALCIUM 10 MG/1
10 TABLET, FILM COATED ORAL
Status: DISCONTINUED | OUTPATIENT
Start: 2017-01-01 | End: 2017-01-01 | Stop reason: HOSPADM

## 2017-01-01 RX ORDER — ACETAMINOPHEN 325 MG/1
325 TABLET ORAL
Status: DISCONTINUED | OUTPATIENT
Start: 2017-01-01 | End: 2017-01-01 | Stop reason: HOSPADM

## 2017-01-01 RX ORDER — PREDNISONE 5 MG/1
5 TABLET ORAL DAILY
COMMUNITY
Start: 2017-01-01 | End: 2017-01-01

## 2017-01-01 RX ORDER — HEPARIN SODIUM 1000 [USP'U]/ML
1000-10000 INJECTION, SOLUTION INTRAVENOUS; SUBCUTANEOUS
Status: DISCONTINUED | OUTPATIENT
Start: 2017-01-01 | End: 2017-01-01

## 2017-01-01 RX ORDER — VANCOMYCIN/0.9 % SOD CHLORIDE 1.5G/250ML
1500 PLASTIC BAG, INJECTION (ML) INTRAVENOUS ONCE
Status: COMPLETED | OUTPATIENT
Start: 2017-01-01 | End: 2017-01-01

## 2017-01-01 RX ORDER — HALOPERIDOL 5 MG/ML
2 INJECTION INTRAMUSCULAR
Status: DISCONTINUED | OUTPATIENT
Start: 2017-01-01 | End: 2017-01-01 | Stop reason: HOSPADM

## 2017-01-01 RX ORDER — FLUTICASONE FUROATE AND VILANTEROL 100; 25 UG/1; UG/1
1 POWDER RESPIRATORY (INHALATION) DAILY
Status: DISCONTINUED | OUTPATIENT
Start: 2017-01-01 | End: 2017-01-01 | Stop reason: HOSPADM

## 2017-01-01 RX ORDER — PROPOFOL 10 MG/ML
INJECTION, EMULSION INTRAVENOUS
Status: DISCONTINUED | OUTPATIENT
Start: 2017-01-01 | End: 2017-01-01 | Stop reason: HOSPADM

## 2017-01-01 RX ORDER — ACETAMINOPHEN 325 MG/1
650 TABLET ORAL
Status: DISCONTINUED | OUTPATIENT
Start: 2017-01-01 | End: 2017-01-01 | Stop reason: HOSPADM

## 2017-01-01 RX ORDER — HEPARIN SODIUM 5000 [USP'U]/ML
5000 INJECTION, SOLUTION INTRAVENOUS; SUBCUTANEOUS EVERY 8 HOURS
Status: DISCONTINUED | OUTPATIENT
Start: 2017-01-01 | End: 2017-01-01 | Stop reason: HOSPADM

## 2017-01-01 RX ORDER — MIDAZOLAM HYDROCHLORIDE 1 MG/ML
INJECTION, SOLUTION INTRAMUSCULAR; INTRAVENOUS
Status: DISCONTINUED
Start: 2017-01-01 | End: 2017-01-01

## 2017-01-01 RX ORDER — ALBUMIN HUMAN 50 G/1000ML
25 SOLUTION INTRAVENOUS ONCE
Status: DISCONTINUED | OUTPATIENT
Start: 2017-01-01 | End: 2017-01-01

## 2017-01-01 RX ORDER — MORPHINE SULFATE 2 MG/ML
2 INJECTION, SOLUTION INTRAMUSCULAR; INTRAVENOUS EVERY 4 HOURS
Status: DISCONTINUED | OUTPATIENT
Start: 2017-01-01 | End: 2017-01-01

## 2017-01-01 RX ORDER — TRAMADOL HYDROCHLORIDE 50 MG/1
25 TABLET ORAL
Status: DISCONTINUED | OUTPATIENT
Start: 2017-01-01 | End: 2017-01-01

## 2017-01-01 RX ORDER — HYDROCODONE BITARTRATE AND ACETAMINOPHEN 5; 325 MG/1; MG/1
1 TABLET ORAL
Qty: 15 TAB | Refills: 0 | Status: SHIPPED | OUTPATIENT
Start: 2017-01-01 | End: 2017-01-01

## 2017-01-01 RX ORDER — FENTANYL CITRATE 50 UG/ML
50 INJECTION, SOLUTION INTRAMUSCULAR; INTRAVENOUS
Status: COMPLETED | OUTPATIENT
Start: 2017-01-01 | End: 2017-01-01

## 2017-01-01 RX ORDER — LEVOFLOXACIN 5 MG/ML
500 INJECTION, SOLUTION INTRAVENOUS
Status: DISCONTINUED | OUTPATIENT
Start: 2017-01-01 | End: 2017-01-01 | Stop reason: HOSPADM

## 2017-01-01 RX ORDER — ONDANSETRON 2 MG/ML
4 INJECTION INTRAMUSCULAR; INTRAVENOUS
Status: COMPLETED | OUTPATIENT
Start: 2017-01-01 | End: 2017-01-01

## 2017-01-01 RX ORDER — METHOCARBAMOL 750 MG/1
750 TABLET, FILM COATED ORAL 4 TIMES DAILY
Status: DISCONTINUED | OUTPATIENT
Start: 2017-01-01 | End: 2017-01-01 | Stop reason: HOSPADM

## 2017-01-01 RX ORDER — CALCIUM ACETATE 667 MG/1
2 CAPSULE ORAL
Status: DISCONTINUED | OUTPATIENT
Start: 2017-01-01 | End: 2017-01-01 | Stop reason: HOSPADM

## 2017-01-01 RX ORDER — HEPARIN SODIUM 200 [USP'U]/100ML
500 INJECTION, SOLUTION INTRAVENOUS ONCE
Status: COMPLETED | OUTPATIENT
Start: 2017-01-01 | End: 2017-01-01

## 2017-01-01 RX ORDER — ONDANSETRON 2 MG/ML
4 INJECTION INTRAMUSCULAR; INTRAVENOUS
Status: DISCONTINUED | OUTPATIENT
Start: 2017-01-01 | End: 2017-01-01 | Stop reason: HOSPADM

## 2017-01-01 RX ORDER — FENTANYL CITRATE 50 UG/ML
25 INJECTION, SOLUTION INTRAMUSCULAR; INTRAVENOUS
Status: DISCONTINUED | OUTPATIENT
Start: 2017-01-01 | End: 2017-01-01 | Stop reason: HOSPADM

## 2017-01-01 RX ORDER — DIAZEPAM 5 MG/1
5 TABLET ORAL
Status: COMPLETED | OUTPATIENT
Start: 2017-01-01 | End: 2017-01-01

## 2017-01-01 RX ORDER — TRIPROLIDINE/PSEUDOEPHEDRINE 2.5MG-60MG
200 TABLET ORAL
Status: DISCONTINUED | OUTPATIENT
Start: 2017-01-01 | End: 2017-01-01 | Stop reason: HOSPADM

## 2017-01-01 RX ORDER — FACIAL-BODY WIPES
10 EACH TOPICAL DAILY PRN
Status: DISCONTINUED | OUTPATIENT
Start: 2017-01-01 | End: 2017-01-01 | Stop reason: HOSPADM

## 2017-01-01 RX ORDER — FENTANYL CITRATE 50 UG/ML
25-50 INJECTION, SOLUTION INTRAMUSCULAR; INTRAVENOUS
Status: DISCONTINUED | OUTPATIENT
Start: 2017-01-01 | End: 2017-01-01

## 2017-01-01 RX ORDER — GUAIFENESIN 100 MG/5ML
81 LIQUID (ML) ORAL DAILY
Status: DISCONTINUED | OUTPATIENT
Start: 2017-01-01 | End: 2017-01-01 | Stop reason: HOSPADM

## 2017-01-01 RX ORDER — HYDROCODONE BITARTRATE AND ACETAMINOPHEN 5; 325 MG/1; MG/1
1 TABLET ORAL
COMMUNITY

## 2017-01-01 RX ORDER — MIDAZOLAM HYDROCHLORIDE 1 MG/ML
INJECTION, SOLUTION INTRAMUSCULAR; INTRAVENOUS
Status: DISCONTINUED
Start: 2017-01-01 | End: 2017-01-01 | Stop reason: HOSPADM

## 2017-01-01 RX ORDER — INSULIN LISPRO 100 [IU]/ML
5 INJECTION, SOLUTION INTRAVENOUS; SUBCUTANEOUS
Status: DISCONTINUED | OUTPATIENT
Start: 2017-01-01 | End: 2017-01-01 | Stop reason: HOSPADM

## 2017-01-01 RX ORDER — LIDOCAINE HYDROCHLORIDE 10 MG/ML
1-20 INJECTION INFILTRATION; PERINEURAL ONCE
Status: COMPLETED | OUTPATIENT
Start: 2017-01-01 | End: 2017-01-01

## 2017-01-01 RX ORDER — PREDNISONE 5 MG/1
5 TABLET ORAL DAILY
Status: DISCONTINUED | OUTPATIENT
Start: 2017-01-01 | End: 2017-01-01 | Stop reason: HOSPADM

## 2017-01-01 RX ORDER — HYDROMORPHONE HYDROCHLORIDE 1 MG/ML
.5-1 INJECTION, SOLUTION INTRAMUSCULAR; INTRAVENOUS; SUBCUTANEOUS
Status: DISCONTINUED | OUTPATIENT
Start: 2017-01-01 | End: 2017-01-01

## 2017-01-01 RX ORDER — DIPHENHYDRAMINE HYDROCHLORIDE 50 MG/ML
12.5 INJECTION, SOLUTION INTRAMUSCULAR; INTRAVENOUS
Status: DISCONTINUED | OUTPATIENT
Start: 2017-01-01 | End: 2017-01-01 | Stop reason: HOSPADM

## 2017-01-01 RX ORDER — OXYCODONE AND ACETAMINOPHEN 5; 325 MG/1; MG/1
1 TABLET ORAL
Qty: 12 TAB | Refills: 0 | Status: SHIPPED | OUTPATIENT
Start: 2017-01-01 | End: 2017-01-01

## 2017-01-01 RX ORDER — HYDROMORPHONE HYDROCHLORIDE 1 MG/ML
.5-1 INJECTION, SOLUTION INTRAMUSCULAR; INTRAVENOUS; SUBCUTANEOUS
Status: DISCONTINUED | OUTPATIENT
Start: 2017-01-01 | End: 2017-01-01 | Stop reason: HOSPADM

## 2017-01-01 RX ORDER — CEFEPIME HYDROCHLORIDE 2 G/1
2 INJECTION, POWDER, FOR SOLUTION INTRAVENOUS EVERY 24 HOURS
Status: DISCONTINUED | OUTPATIENT
Start: 2017-01-01 | End: 2017-01-01

## 2017-01-01 RX ORDER — FENTANYL CITRATE 50 UG/ML
INJECTION, SOLUTION INTRAMUSCULAR; INTRAVENOUS
Status: DISPENSED
Start: 2017-01-01 | End: 2017-01-01

## 2017-01-01 RX ORDER — LEVOFLOXACIN 5 MG/ML
500 INJECTION, SOLUTION INTRAVENOUS
Status: COMPLETED | OUTPATIENT
Start: 2017-01-01 | End: 2017-01-01

## 2017-01-01 RX ORDER — HEPARIN SODIUM 200 [USP'U]/100ML
INJECTION, SOLUTION INTRAVENOUS
Status: DISCONTINUED
Start: 2017-01-01 | End: 2017-01-01 | Stop reason: HOSPADM

## 2017-01-01 RX ORDER — INSULIN LISPRO 100 [IU]/ML
INJECTION, SOLUTION INTRAVENOUS; SUBCUTANEOUS
COMMUNITY

## 2017-01-01 RX ORDER — HYDROMORPHONE HYDROCHLORIDE 2 MG/ML
.5-1 INJECTION, SOLUTION INTRAMUSCULAR; INTRAVENOUS; SUBCUTANEOUS
Status: DISCONTINUED | OUTPATIENT
Start: 2017-01-01 | End: 2017-01-01 | Stop reason: HOSPADM

## 2017-01-01 RX ORDER — KETOROLAC TROMETHAMINE 30 MG/ML
15 INJECTION, SOLUTION INTRAMUSCULAR; INTRAVENOUS
Status: COMPLETED | OUTPATIENT
Start: 2017-01-01 | End: 2017-01-01

## 2017-01-01 RX ORDER — NALOXONE HYDROCHLORIDE 0.4 MG/ML
0.4 INJECTION, SOLUTION INTRAMUSCULAR; INTRAVENOUS; SUBCUTANEOUS ONCE
Status: DISCONTINUED | OUTPATIENT
Start: 2017-01-01 | End: 2017-01-01

## 2017-01-01 RX ORDER — LIDOCAINE HYDROCHLORIDE 10 MG/ML
1-20 INJECTION INFILTRATION; PERINEURAL
Status: DISCONTINUED | OUTPATIENT
Start: 2017-01-01 | End: 2017-01-01

## 2017-01-01 RX ORDER — GUAIFENESIN 100 MG/5ML
81 LIQUID (ML) ORAL DAILY
Status: DISCONTINUED | OUTPATIENT
Start: 2017-01-01 | End: 2017-01-01

## 2017-01-01 RX ORDER — PREDNISONE 20 MG/1
40 TABLET ORAL DAILY
Qty: 8 TAB | Refills: 0 | Status: SHIPPED | OUTPATIENT
Start: 2017-01-01 | End: 2017-01-01

## 2017-01-01 RX ORDER — PREDNISONE 5 MG/1
TABLET ORAL
Qty: 21 TAB | Refills: 0 | Status: SHIPPED | OUTPATIENT
Start: 2017-01-01 | End: 2017-01-01

## 2017-01-01 RX ORDER — ACETAMINOPHEN 650 MG/1
975 SUPPOSITORY RECTAL
Status: COMPLETED | OUTPATIENT
Start: 2017-01-01 | End: 2017-01-01

## 2017-01-01 RX ORDER — OXYCODONE AND ACETAMINOPHEN 5; 325 MG/1; MG/1
1 TABLET ORAL
Status: COMPLETED | OUTPATIENT
Start: 2017-01-01 | End: 2017-01-01

## 2017-01-01 RX ORDER — DEXTROSE 50 % IN WATER (D50W) INTRAVENOUS SYRINGE
12.5-25 AS NEEDED
Status: DISCONTINUED | OUTPATIENT
Start: 2017-01-01 | End: 2017-01-01

## 2017-01-01 RX ORDER — ALBUMIN HUMAN 250 G/1000ML
25 SOLUTION INTRAVENOUS ONCE
Status: COMPLETED | OUTPATIENT
Start: 2017-01-01 | End: 2017-01-01

## 2017-01-01 RX ORDER — GUAIFENESIN 100 MG/5ML
81 LIQUID (ML) ORAL
Status: DISCONTINUED | OUTPATIENT
Start: 2017-01-01 | End: 2017-01-01 | Stop reason: HOSPADM

## 2017-01-01 RX ORDER — HEPARIN SODIUM 200 [USP'U]/100ML
INJECTION, SOLUTION INTRAVENOUS
Status: DISCONTINUED
Start: 2017-01-01 | End: 2017-01-01 | Stop reason: ALTCHOICE

## 2017-01-01 RX ORDER — MORPHINE SULFATE 2 MG/ML
2 INJECTION, SOLUTION INTRAMUSCULAR; INTRAVENOUS
Status: DISCONTINUED | OUTPATIENT
Start: 2017-01-01 | End: 2017-01-01

## 2017-01-01 RX ORDER — GUAIFENESIN 100 MG/5ML
81 LIQUID (ML) ORAL
Status: DISCONTINUED | OUTPATIENT
Start: 2017-01-01 | End: 2017-01-01

## 2017-01-01 RX ORDER — GLYCOPYRROLATE 0.2 MG/ML
0.2 INJECTION INTRAMUSCULAR; INTRAVENOUS
Status: DISCONTINUED | OUTPATIENT
Start: 2017-01-01 | End: 2017-01-01 | Stop reason: HOSPADM

## 2017-01-01 RX ORDER — CLOPIDOGREL BISULFATE 75 MG/1
75 TABLET ORAL DAILY
Status: DISCONTINUED | OUTPATIENT
Start: 2017-01-01 | End: 2017-01-01

## 2017-01-01 RX ORDER — SODIUM POLYSTYRENE SULFONATE 15 G/60ML
30 SUSPENSION ORAL; RECTAL
Status: COMPLETED | OUTPATIENT
Start: 2017-01-01 | End: 2017-01-01

## 2017-01-01 RX ORDER — HYDROCODONE BITARTRATE AND ACETAMINOPHEN 7.5; 325 MG/15ML; MG/15ML
10 SOLUTION ORAL ONCE
Status: COMPLETED | OUTPATIENT
Start: 2017-01-01 | End: 2017-01-01

## 2017-01-01 RX ORDER — ATORVASTATIN CALCIUM 10 MG/1
10 TABLET, FILM COATED ORAL
Status: DISCONTINUED | OUTPATIENT
Start: 2017-01-01 | End: 2017-01-01

## 2017-01-01 RX ORDER — LIDOCAINE HYDROCHLORIDE 10 MG/ML
INJECTION INFILTRATION; PERINEURAL
Status: DISCONTINUED
Start: 2017-01-01 | End: 2017-01-01 | Stop reason: HOSPADM

## 2017-01-01 RX ORDER — HEPARIN SODIUM 1000 [USP'U]/ML
INJECTION, SOLUTION INTRAVENOUS; SUBCUTANEOUS
Status: COMPLETED
Start: 2017-01-01 | End: 2017-01-01

## 2017-01-01 RX ORDER — PROTAMINE SULFATE 10 MG/ML
INJECTION, SOLUTION INTRAVENOUS
Status: COMPLETED
Start: 2017-01-01 | End: 2017-01-01

## 2017-01-01 RX ORDER — MORPHINE SULFATE 2 MG/ML
4 INJECTION, SOLUTION INTRAMUSCULAR; INTRAVENOUS
Status: DISCONTINUED | OUTPATIENT
Start: 2017-01-01 | End: 2017-01-01

## 2017-01-01 RX ORDER — ALBUMIN HUMAN 250 G/1000ML
12.5 SOLUTION INTRAVENOUS
Status: DISCONTINUED | OUTPATIENT
Start: 2017-01-01 | End: 2017-01-01 | Stop reason: HOSPADM

## 2017-01-01 RX ORDER — LIDOCAINE HYDROCHLORIDE 10 MG/ML
1-20 INJECTION INFILTRATION; PERINEURAL
Status: DISCONTINUED | OUTPATIENT
Start: 2017-01-01 | End: 2017-01-01 | Stop reason: ALTCHOICE

## 2017-01-01 RX ORDER — MIDAZOLAM HYDROCHLORIDE 1 MG/ML
INJECTION, SOLUTION INTRAMUSCULAR; INTRAVENOUS
Status: COMPLETED
Start: 2017-01-01 | End: 2017-01-01

## 2017-01-01 RX ORDER — CEFTRIAXONE 1 G/1
1 INJECTION, POWDER, FOR SOLUTION INTRAMUSCULAR; INTRAVENOUS EVERY 24 HOURS
Status: DISCONTINUED | OUTPATIENT
Start: 2017-01-01 | End: 2017-01-01 | Stop reason: SDUPTHER

## 2017-01-01 RX ORDER — HYDROMORPHONE HYDROCHLORIDE 2 MG/ML
1 INJECTION, SOLUTION INTRAMUSCULAR; INTRAVENOUS; SUBCUTANEOUS EVERY 6 HOURS
Status: DISCONTINUED | OUTPATIENT
Start: 2017-01-01 | End: 2017-01-01 | Stop reason: HOSPADM

## 2017-01-01 RX ORDER — HEPARIN SODIUM 200 [USP'U]/100ML
INJECTION, SOLUTION INTRAVENOUS
Status: DISCONTINUED
Start: 2017-01-01 | End: 2017-01-01

## 2017-01-01 RX ORDER — IODIXANOL 320 MG/ML
0-200 INJECTION, SOLUTION INTRAVASCULAR
Status: DISCONTINUED | OUTPATIENT
Start: 2017-01-01 | End: 2017-01-01

## 2017-01-01 RX ORDER — PHENYLEPHRINE HCL IN 0.9% NACL 0.4MG/10ML
SYRINGE (ML) INTRAVENOUS AS NEEDED
Status: DISCONTINUED | OUTPATIENT
Start: 2017-01-01 | End: 2017-01-01 | Stop reason: HOSPADM

## 2017-01-01 RX ORDER — LEVOFLOXACIN 5 MG/ML
750 INJECTION, SOLUTION INTRAVENOUS
Status: DISCONTINUED | OUTPATIENT
Start: 2017-01-01 | End: 2017-01-01

## 2017-01-01 RX ORDER — SENNOSIDES 8.8 MG/5ML
5 LIQUID ORAL
Status: DISCONTINUED | OUTPATIENT
Start: 2017-01-01 | End: 2017-01-01 | Stop reason: HOSPADM

## 2017-01-01 RX ORDER — CALCITRIOL 0.25 UG/1
0.25 CAPSULE ORAL
COMMUNITY

## 2017-01-01 RX ORDER — ASPIRIN 81 MG/1
81 TABLET ORAL
Status: DISCONTINUED | OUTPATIENT
Start: 2017-01-01 | End: 2017-01-01 | Stop reason: HOSPADM

## 2017-01-01 RX ORDER — MORPHINE SULFATE 2 MG/ML
4 INJECTION, SOLUTION INTRAMUSCULAR; INTRAVENOUS
Status: COMPLETED | OUTPATIENT
Start: 2017-01-01 | End: 2017-01-01

## 2017-01-01 RX ORDER — PROTAMINE SULFATE 10 MG/ML
40 INJECTION, SOLUTION INTRAVENOUS ONCE
Status: COMPLETED | OUTPATIENT
Start: 2017-01-01 | End: 2017-01-01

## 2017-01-01 RX ORDER — IPRATROPIUM BROMIDE 0.5 MG/2.5ML
0.5 SOLUTION RESPIRATORY (INHALATION)
Status: COMPLETED | OUTPATIENT
Start: 2017-01-01 | End: 2017-01-01

## 2017-01-01 RX ORDER — METHOCARBAMOL 750 MG/1
750 TABLET, FILM COATED ORAL 4 TIMES DAILY
Qty: 20 TAB | Refills: 0 | Status: SHIPPED | OUTPATIENT
Start: 2017-01-01

## 2017-01-01 RX ORDER — HYDROMORPHONE HYDROCHLORIDE 1 MG/ML
1 INJECTION, SOLUTION INTRAMUSCULAR; INTRAVENOUS; SUBCUTANEOUS
Status: DISCONTINUED | OUTPATIENT
Start: 2017-01-01 | End: 2017-01-01 | Stop reason: CLARIF

## 2017-01-01 RX ORDER — INSULIN LISPRO 100 [IU]/ML
5 INJECTION, SOLUTION INTRAVENOUS; SUBCUTANEOUS
Status: DISCONTINUED | OUTPATIENT
Start: 2017-01-01 | End: 2017-01-01

## 2017-01-01 RX ORDER — DIPHENHYDRAMINE HCL 25 MG
25 CAPSULE ORAL
Status: DISCONTINUED | OUTPATIENT
Start: 2017-01-01 | End: 2017-01-01 | Stop reason: HOSPADM

## 2017-01-01 RX ORDER — PREDNISONE 20 MG/1
60 TABLET ORAL DAILY
Qty: 15 TAB | Refills: 0 | Status: SHIPPED | OUTPATIENT
Start: 2017-01-01 | End: 2017-01-01

## 2017-01-01 RX ORDER — LEVOFLOXACIN 500 MG/1
500 TABLET, FILM COATED ORAL DAILY
Qty: 7 TAB | Refills: 0 | Status: SHIPPED | OUTPATIENT
Start: 2017-01-01 | End: 2017-01-01

## 2017-01-01 RX ORDER — METOPROLOL TARTRATE 50 MG/1
50 TABLET ORAL 2 TIMES DAILY
COMMUNITY

## 2017-01-01 RX ORDER — INSULIN LISPRO 100 [IU]/ML
INJECTION, SOLUTION INTRAVENOUS; SUBCUTANEOUS
Qty: 1 VIAL | Refills: 0 | Status: SHIPPED
Start: 2017-01-01 | End: 2017-01-01

## 2017-01-01 RX ORDER — HEPARIN SODIUM 1000 [USP'U]/ML
1000-10000 INJECTION, SOLUTION INTRAVENOUS; SUBCUTANEOUS
Status: DISCONTINUED | OUTPATIENT
Start: 2017-01-01 | End: 2017-01-01 | Stop reason: ALTCHOICE

## 2017-01-01 RX ORDER — MIDAZOLAM HYDROCHLORIDE 1 MG/ML
.5-2 INJECTION, SOLUTION INTRAMUSCULAR; INTRAVENOUS
Status: DISCONTINUED | OUTPATIENT
Start: 2017-01-01 | End: 2017-01-01 | Stop reason: ALTCHOICE

## 2017-01-01 RX ORDER — GLYCOPYRROLATE 0.2 MG/ML
0.1 INJECTION INTRAMUSCULAR; INTRAVENOUS
Status: DISCONTINUED | OUTPATIENT
Start: 2017-01-01 | End: 2017-01-01 | Stop reason: HOSPADM

## 2017-01-01 RX ORDER — FENTANYL CITRATE 50 UG/ML
25-50 INJECTION, SOLUTION INTRAMUSCULAR; INTRAVENOUS
Status: DISCONTINUED | OUTPATIENT
Start: 2017-01-01 | End: 2017-01-01 | Stop reason: ALTCHOICE

## 2017-01-01 RX ORDER — ASPIRIN 81 MG/1
81 TABLET ORAL
Status: COMPLETED | OUTPATIENT
Start: 2017-01-01 | End: 2017-01-01

## 2017-01-01 RX ORDER — HYDROMORPHONE HYDROCHLORIDE 2 MG/ML
1 INJECTION, SOLUTION INTRAMUSCULAR; INTRAVENOUS; SUBCUTANEOUS
Status: DISCONTINUED | OUTPATIENT
Start: 2017-01-01 | End: 2017-01-01 | Stop reason: HOSPADM

## 2017-01-01 RX ORDER — BUDESONIDE 0.5 MG/2ML
500 INHALANT ORAL
Status: DISCONTINUED | OUTPATIENT
Start: 2017-01-01 | End: 2017-01-01

## 2017-01-01 RX ORDER — HYDROMORPHONE HYDROCHLORIDE 1 MG/ML
.2-.5 INJECTION, SOLUTION INTRAMUSCULAR; INTRAVENOUS; SUBCUTANEOUS
Status: DISCONTINUED | OUTPATIENT
Start: 2017-01-01 | End: 2017-01-01 | Stop reason: HOSPADM

## 2017-01-01 RX ADMIN — HYDROMORPHONE HYDROCHLORIDE 1 MG: 1 INJECTION, SOLUTION INTRAMUSCULAR; INTRAVENOUS; SUBCUTANEOUS at 09:39

## 2017-01-01 RX ADMIN — FLUTICASONE FUROATE AND VILANTEROL TRIFENATATE: 100; 25 POWDER RESPIRATORY (INHALATION) at 10:36

## 2017-01-01 RX ADMIN — INSULIN LISPRO 5 UNITS: 100 INJECTION, SOLUTION INTRAVENOUS; SUBCUTANEOUS at 08:49

## 2017-01-01 RX ADMIN — Medication 10 ML: at 14:00

## 2017-01-01 RX ADMIN — FENTANYL CITRATE 25 MCG: 50 INJECTION, SOLUTION INTRAMUSCULAR; INTRAVENOUS at 15:59

## 2017-01-01 RX ADMIN — HYDROCODONE BITARTRATE AND ACETAMINOPHEN 1 TABLET: 5; 325 TABLET ORAL at 07:22

## 2017-01-01 RX ADMIN — HEPARIN SODIUM 5000 UNITS: 5000 INJECTION, SOLUTION INTRAVENOUS; SUBCUTANEOUS at 09:15

## 2017-01-01 RX ADMIN — ASPIRIN 81 MG CHEWABLE TABLET 81 MG: 81 TABLET CHEWABLE at 08:47

## 2017-01-01 RX ADMIN — DIPHENHYDRAMINE HYDROCHLORIDE 25 MG: 25 CAPSULE ORAL at 10:32

## 2017-01-01 RX ADMIN — INSULIN LISPRO 2 UNITS: 100 INJECTION, SOLUTION INTRAVENOUS; SUBCUTANEOUS at 07:18

## 2017-01-01 RX ADMIN — IPRATROPIUM BROMIDE AND ALBUTEROL SULFATE 3 ML: .5; 3 SOLUTION RESPIRATORY (INHALATION) at 21:22

## 2017-01-01 RX ADMIN — METOPROLOL TARTRATE 50 MG: 50 TABLET ORAL at 14:13

## 2017-01-01 RX ADMIN — ACETAMINOPHEN 650 MG: 325 TABLET, FILM COATED ORAL at 19:55

## 2017-01-01 RX ADMIN — HYDROMORPHONE HYDROCHLORIDE 1 MG: 1 INJECTION, SOLUTION INTRAMUSCULAR; INTRAVENOUS; SUBCUTANEOUS at 21:03

## 2017-01-01 RX ADMIN — PREDNISONE 60 MG: 20 TABLET ORAL at 09:14

## 2017-01-01 RX ADMIN — INSULIN LISPRO 2 UNITS: 100 INJECTION, SOLUTION INTRAVENOUS; SUBCUTANEOUS at 13:31

## 2017-01-01 RX ADMIN — HYDROCODONE BITARTRATE AND ACETAMINOPHEN 1 TABLET: 5; 325 TABLET ORAL at 08:50

## 2017-01-01 RX ADMIN — PHENYLEPHRINE HYDROCHLORIDE 50 MCG/MIN: 10 INJECTION INTRAVENOUS at 08:42

## 2017-01-01 RX ADMIN — Medication 10 ML: at 15:25

## 2017-01-01 RX ADMIN — INSULIN LISPRO 5 UNITS: 100 INJECTION, SOLUTION INTRAVENOUS; SUBCUTANEOUS at 09:42

## 2017-01-01 RX ADMIN — LEVOFLOXACIN 500 MG: 5 INJECTION, SOLUTION INTRAVENOUS at 17:49

## 2017-01-01 RX ADMIN — IODIXANOL 10 ML: 320 INJECTION, SOLUTION INTRAVASCULAR at 09:46

## 2017-01-01 RX ADMIN — INSULIN HUMAN 20 UNITS: 100 INJECTION, SUSPENSION SUBCUTANEOUS at 16:41

## 2017-01-01 RX ADMIN — Medication 10 ML: at 07:06

## 2017-01-01 RX ADMIN — LEVOFLOXACIN 750 MG: 5 INJECTION, SOLUTION INTRAVENOUS at 05:46

## 2017-01-01 RX ADMIN — INSULIN LISPRO 5 UNITS: 100 INJECTION, SOLUTION INTRAVENOUS; SUBCUTANEOUS at 17:19

## 2017-01-01 RX ADMIN — METHOCARBAMOL 750 MG: 750 TABLET ORAL at 18:35

## 2017-01-01 RX ADMIN — IODIXANOL 80 ML: 320 INJECTION, SOLUTION INTRAVASCULAR at 09:38

## 2017-01-01 RX ADMIN — IODIXANOL 100 ML: 320 INJECTION, SOLUTION INTRAVASCULAR at 16:33

## 2017-01-01 RX ADMIN — METHOCARBAMOL 750 MG: 750 TABLET ORAL at 15:31

## 2017-01-01 RX ADMIN — SODIUM CHLORIDE 250 ML: 900 INJECTION, SOLUTION INTRAVENOUS at 20:23

## 2017-01-01 RX ADMIN — METHOCARBAMOL 750 MG: 750 TABLET ORAL at 20:40

## 2017-01-01 RX ADMIN — METOPROLOL TARTRATE 50 MG: 50 TABLET ORAL at 19:27

## 2017-01-01 RX ADMIN — HEPARIN SODIUM 1000 UNITS: 200 INJECTION, SOLUTION INTRAVENOUS at 12:45

## 2017-01-01 RX ADMIN — METHOCARBAMOL 750 MG: 750 TABLET ORAL at 14:49

## 2017-01-01 RX ADMIN — METHOCARBAMOL 750 MG: 750 TABLET ORAL at 17:34

## 2017-01-01 RX ADMIN — ATORVASTATIN CALCIUM 10 MG: 10 TABLET, FILM COATED ORAL at 22:37

## 2017-01-01 RX ADMIN — Medication 10 ML: at 13:47

## 2017-01-01 RX ADMIN — CLOPIDOGREL BISULFATE 75 MG: 75 TABLET ORAL at 16:16

## 2017-01-01 RX ADMIN — CALCIUM ACETATE 1334 MG: 667 CAPSULE ORAL at 09:41

## 2017-01-01 RX ADMIN — HYDROCODONE BITARTRATE AND ACETAMINOPHEN 1 TABLET: 5; 325 TABLET ORAL at 01:38

## 2017-01-01 RX ADMIN — HEPARIN SODIUM 7000 UNITS: 1000 INJECTION, SOLUTION INTRAVENOUS; SUBCUTANEOUS at 08:08

## 2017-01-01 RX ADMIN — Medication 10 ML: at 20:21

## 2017-01-01 RX ADMIN — LORAZEPAM 1 MG: 2 INJECTION INTRAMUSCULAR; INTRAVENOUS at 16:11

## 2017-01-01 RX ADMIN — VANCOMYCIN HYDROCHLORIDE 750 MG: 750 INJECTION, POWDER, LYOPHILIZED, FOR SOLUTION INTRAVENOUS at 00:41

## 2017-01-01 RX ADMIN — DIPHENHYDRAMINE HYDROCHLORIDE 25 MG: 50 INJECTION, SOLUTION INTRAMUSCULAR; INTRAVENOUS at 17:53

## 2017-01-01 RX ADMIN — ALBUTEROL SULFATE 5 MG: 2.5 SOLUTION RESPIRATORY (INHALATION) at 10:37

## 2017-01-01 RX ADMIN — IPRATROPIUM BROMIDE AND ALBUTEROL SULFATE 3 ML: .5; 3 SOLUTION RESPIRATORY (INHALATION) at 07:29

## 2017-01-01 RX ADMIN — HYDROMORPHONE HYDROCHLORIDE 1 MG: 2 INJECTION, SOLUTION INTRAMUSCULAR; INTRAVENOUS; SUBCUTANEOUS at 11:33

## 2017-01-01 RX ADMIN — INSULIN LISPRO 5 UNITS: 100 INJECTION, SOLUTION INTRAVENOUS; SUBCUTANEOUS at 09:27

## 2017-01-01 RX ADMIN — METOPROLOL TARTRATE 50 MG: 50 TABLET ORAL at 20:12

## 2017-01-01 RX ADMIN — HEPARIN SODIUM 1000 UNITS: 200 INJECTION, SOLUTION INTRAVENOUS at 08:08

## 2017-01-01 RX ADMIN — BUDESONIDE 500 MCG: 0.5 INHALANT RESPIRATORY (INHALATION) at 08:35

## 2017-01-01 RX ADMIN — Medication 10 ML: at 09:42

## 2017-01-01 RX ADMIN — DIPHENHYDRAMINE HYDROCHLORIDE 25 MG: 50 INJECTION, SOLUTION INTRAMUSCULAR; INTRAVENOUS at 09:56

## 2017-01-01 RX ADMIN — METOPROLOL TARTRATE 50 MG: 50 TABLET ORAL at 17:29

## 2017-01-01 RX ADMIN — MIDAZOLAM HYDROCHLORIDE 1 MG: 1 INJECTION, SOLUTION INTRAMUSCULAR; INTRAVENOUS at 13:34

## 2017-01-01 RX ADMIN — Medication 16 G: at 02:32

## 2017-01-01 RX ADMIN — CLOPIDOGREL BISULFATE 75 MG: 75 TABLET ORAL at 12:15

## 2017-01-01 RX ADMIN — CLOPIDOGREL BISULFATE 75 MG: 75 TABLET ORAL at 08:38

## 2017-01-01 RX ADMIN — FENTANYL CITRATE 50 MCG: 50 INJECTION, SOLUTION INTRAMUSCULAR; INTRAVENOUS at 20:28

## 2017-01-01 RX ADMIN — HYDROMORPHONE HYDROCHLORIDE 1 MG: 1 INJECTION, SOLUTION INTRAMUSCULAR; INTRAVENOUS; SUBCUTANEOUS at 12:23

## 2017-01-01 RX ADMIN — DEXTROSE MONOHYDRATE 250 ML: 10 INJECTION, SOLUTION INTRAVENOUS at 19:33

## 2017-01-01 RX ADMIN — MORPHINE SULFATE 2 MG: 2 INJECTION, SOLUTION INTRAMUSCULAR; INTRAVENOUS at 21:35

## 2017-01-01 RX ADMIN — METHOCARBAMOL 750 MG: 750 TABLET ORAL at 21:26

## 2017-01-01 RX ADMIN — LORAZEPAM 1 MG: 2 INJECTION INTRAMUSCULAR; INTRAVENOUS at 08:52

## 2017-01-01 RX ADMIN — LIDOCAINE HYDROCHLORIDE 8 ML: 10 INJECTION INFILTRATION; PERINEURAL at 07:47

## 2017-01-01 RX ADMIN — GLYCOPYRROLATE 0.2 MG: 0.2 INJECTION, SOLUTION INTRAMUSCULAR; INTRAVENOUS at 11:21

## 2017-01-01 RX ADMIN — HYDROCODONE BITARTRATE AND ACETAMINOPHEN 1 TABLET: 5; 325 TABLET ORAL at 03:46

## 2017-01-01 RX ADMIN — METHOCARBAMOL 750 MG: 750 TABLET ORAL at 17:12

## 2017-01-01 RX ADMIN — FENTANYL CITRATE 50 MCG: 50 INJECTION, SOLUTION INTRAMUSCULAR; INTRAVENOUS at 16:38

## 2017-01-01 RX ADMIN — ONDANSETRON HYDROCHLORIDE 4 MG: 2 INJECTION, SOLUTION INTRAMUSCULAR; INTRAVENOUS at 20:28

## 2017-01-01 RX ADMIN — ASPIRIN 81 MG 81 MG: 81 TABLET ORAL at 11:02

## 2017-01-01 RX ADMIN — CALCIUM ACETATE 1334 MG: 667 CAPSULE ORAL at 17:29

## 2017-01-01 RX ADMIN — UMECLIDINIUM 1 PUFF: 62.5 AEROSOL, POWDER ORAL at 10:32

## 2017-01-01 RX ADMIN — ACETAMINOPHEN 650 MG: 325 TABLET, FILM COATED ORAL at 07:05

## 2017-01-01 RX ADMIN — IODIXANOL 80 ML: 320 INJECTION, SOLUTION INTRAVASCULAR at 09:12

## 2017-01-01 RX ADMIN — INSULIN LISPRO 5 UNITS: 100 INJECTION, SOLUTION INTRAVENOUS; SUBCUTANEOUS at 17:22

## 2017-01-01 RX ADMIN — Medication 10 ML: at 00:16

## 2017-01-01 RX ADMIN — INSULIN LISPRO 2 UNITS: 100 INJECTION, SOLUTION INTRAVENOUS; SUBCUTANEOUS at 16:38

## 2017-01-01 RX ADMIN — METHOCARBAMOL 750 MG: 750 TABLET ORAL at 18:39

## 2017-01-01 RX ADMIN — IPRATROPIUM BROMIDE AND ALBUTEROL SULFATE 3 ML: .5; 3 SOLUTION RESPIRATORY (INHALATION) at 23:01

## 2017-01-01 RX ADMIN — Medication 10 ML: at 21:45

## 2017-01-01 RX ADMIN — METHOCARBAMOL 750 MG: 750 TABLET ORAL at 10:32

## 2017-01-01 RX ADMIN — SODIUM CHLORIDE 500 ML: 900 INJECTION, SOLUTION INTRAVENOUS at 06:49

## 2017-01-01 RX ADMIN — Medication 80 MCG: at 16:35

## 2017-01-01 RX ADMIN — Medication 10 ML: at 10:56

## 2017-01-01 RX ADMIN — HEPARIN SODIUM 6000 UNITS: 1000 INJECTION, SOLUTION INTRAVENOUS; SUBCUTANEOUS at 13:10

## 2017-01-01 RX ADMIN — IOHEXOL 20 ML: 180 INJECTION INTRAVENOUS at 09:00

## 2017-01-01 RX ADMIN — SODIUM CHLORIDE 750 MG: 900 INJECTION, SOLUTION INTRAVENOUS at 15:02

## 2017-01-01 RX ADMIN — METOPROLOL TARTRATE 50 MG: 50 TABLET ORAL at 21:26

## 2017-01-01 RX ADMIN — INSULIN HUMAN 10 UNITS: 100 INJECTION, SUSPENSION SUBCUTANEOUS at 10:33

## 2017-01-01 RX ADMIN — INSULIN HUMAN 20 UNITS: 100 INJECTION, SUSPENSION SUBCUTANEOUS at 18:16

## 2017-01-01 RX ADMIN — METHOCARBAMOL 750 MG: 750 TABLET ORAL at 08:47

## 2017-01-01 RX ADMIN — Medication 10 ML: at 05:08

## 2017-01-01 RX ADMIN — BUDESONIDE 500 MCG: 0.5 INHALANT RESPIRATORY (INHALATION) at 19:22

## 2017-01-01 RX ADMIN — HEPARIN SODIUM 1000 UNITS: 200 INJECTION, SOLUTION INTRAVENOUS at 12:44

## 2017-01-01 RX ADMIN — HYDROMORPHONE HYDROCHLORIDE 1 MG: 2 INJECTION, SOLUTION INTRAMUSCULAR; INTRAVENOUS; SUBCUTANEOUS at 15:42

## 2017-01-01 RX ADMIN — Medication 10 ML: at 06:43

## 2017-01-01 RX ADMIN — INSULIN LISPRO 5 UNITS: 100 INJECTION, SOLUTION INTRAVENOUS; SUBCUTANEOUS at 13:19

## 2017-01-01 RX ADMIN — INSULIN LISPRO 5 UNITS: 100 INJECTION, SOLUTION INTRAVENOUS; SUBCUTANEOUS at 18:17

## 2017-01-01 RX ADMIN — IPRATROPIUM BROMIDE AND ALBUTEROL SULFATE 3 ML: .5; 3 SOLUTION RESPIRATORY (INHALATION) at 07:47

## 2017-01-01 RX ADMIN — SODIUM POLYSTYRENE SULFONATE 30 G: 15 SUSPENSION ORAL; RECTAL at 10:13

## 2017-01-01 RX ADMIN — MORPHINE SULFATE 2 MG: 2 INJECTION, SOLUTION INTRAMUSCULAR; INTRAVENOUS at 18:02

## 2017-01-01 RX ADMIN — CEFEPIME HYDROCHLORIDE 1 G: 1 INJECTION, POWDER, FOR SOLUTION INTRAMUSCULAR; INTRAVENOUS at 19:45

## 2017-01-01 RX ADMIN — INSULIN LISPRO 2 UNITS: 100 INJECTION, SOLUTION INTRAVENOUS; SUBCUTANEOUS at 00:08

## 2017-01-01 RX ADMIN — METHOCARBAMOL 750 MG: 750 TABLET ORAL at 22:34

## 2017-01-01 RX ADMIN — ASPIRIN 81 MG: 81 TABLET, COATED ORAL at 14:12

## 2017-01-01 RX ADMIN — HYDROMORPHONE HYDROCHLORIDE 1 MG: 1 INJECTION, SOLUTION INTRAMUSCULAR; INTRAVENOUS; SUBCUTANEOUS at 10:39

## 2017-01-01 RX ADMIN — METOPROLOL TARTRATE 50 MG: 50 TABLET ORAL at 17:34

## 2017-01-01 RX ADMIN — HYDROMORPHONE HYDROCHLORIDE 1 MG: 1 INJECTION, SOLUTION INTRAMUSCULAR; INTRAVENOUS; SUBCUTANEOUS at 18:54

## 2017-01-01 RX ADMIN — ACETAMINOPHEN 325 MG: 325 TABLET, FILM COATED ORAL at 15:00

## 2017-01-01 RX ADMIN — Medication 16 G: at 02:24

## 2017-01-01 RX ADMIN — INSULIN LISPRO 2 UNITS: 100 INJECTION, SOLUTION INTRAVENOUS; SUBCUTANEOUS at 17:12

## 2017-01-01 RX ADMIN — FENTANYL CITRATE 25 MCG: 50 INJECTION, SOLUTION INTRAMUSCULAR; INTRAVENOUS at 17:00

## 2017-01-01 RX ADMIN — Medication 80 MCG: at 16:29

## 2017-01-01 RX ADMIN — METHOCARBAMOL 750 MG: 750 TABLET ORAL at 13:45

## 2017-01-01 RX ADMIN — METHOCARBAMOL 750 MG: 750 TABLET ORAL at 22:39

## 2017-01-01 RX ADMIN — Medication 80 MCG: at 17:07

## 2017-01-01 RX ADMIN — HEPARIN SODIUM 5000 UNITS: 5000 INJECTION, SOLUTION INTRAVENOUS; SUBCUTANEOUS at 17:29

## 2017-01-01 RX ADMIN — HYDROCODONE BITARTRATE AND ACETAMINOPHEN 1 TABLET: 5; 325 TABLET ORAL at 14:53

## 2017-01-01 RX ADMIN — LORAZEPAM 1 MG: 2 INJECTION INTRAMUSCULAR; INTRAVENOUS at 23:26

## 2017-01-01 RX ADMIN — Medication 10 ML: at 09:40

## 2017-01-01 RX ADMIN — Medication 16 G: at 17:59

## 2017-01-01 RX ADMIN — LORAZEPAM 1 MG: 2 INJECTION INTRAMUSCULAR; INTRAVENOUS at 19:11

## 2017-01-01 RX ADMIN — HYDROCODONE BITARTRATE AND ACETAMINOPHEN 1 TABLET: 5; 325 TABLET ORAL at 07:41

## 2017-01-01 RX ADMIN — EPOETIN ALFA 8000 UNITS: 4000 SOLUTION INTRAVENOUS; SUBCUTANEOUS at 21:12

## 2017-01-01 RX ADMIN — ASPIRIN 81 MG CHEWABLE TABLET 81 MG: 81 TABLET CHEWABLE at 17:34

## 2017-01-01 RX ADMIN — Medication 10 ML: at 22:38

## 2017-01-01 RX ADMIN — ALBUMIN (HUMAN) 12.5 G: 0.25 INJECTION, SOLUTION INTRAVENOUS at 12:15

## 2017-01-01 RX ADMIN — FENTANYL CITRATE 25 MCG: 50 INJECTION, SOLUTION INTRAMUSCULAR; INTRAVENOUS at 17:29

## 2017-01-01 RX ADMIN — HYDROMORPHONE HYDROCHLORIDE 1 MG: 1 INJECTION, SOLUTION INTRAMUSCULAR; INTRAVENOUS; SUBCUTANEOUS at 04:22

## 2017-01-01 RX ADMIN — VANCOMYCIN HYDROCHLORIDE 1500 MG: 10 INJECTION, POWDER, LYOPHILIZED, FOR SOLUTION INTRAVENOUS at 08:14

## 2017-01-01 RX ADMIN — INSULIN HUMAN 20 UNITS: 100 INJECTION, SUSPENSION SUBCUTANEOUS at 18:35

## 2017-01-01 RX ADMIN — HEPARIN SODIUM 5000 UNITS: 5000 INJECTION, SOLUTION INTRAVENOUS; SUBCUTANEOUS at 09:42

## 2017-01-01 RX ADMIN — FLUTICASONE FUROATE AND VILANTEROL TRIFENATATE: 100; 25 POWDER RESPIRATORY (INHALATION) at 09:25

## 2017-01-01 RX ADMIN — LORAZEPAM 1 MG: 2 INJECTION INTRAMUSCULAR; INTRAVENOUS at 13:43

## 2017-01-01 RX ADMIN — OXYCODONE HYDROCHLORIDE AND ACETAMINOPHEN 1 TABLET: 5; 325 TABLET ORAL at 09:28

## 2017-01-01 RX ADMIN — LORAZEPAM 1 MG: 2 INJECTION INTRAMUSCULAR; INTRAVENOUS at 20:35

## 2017-01-01 RX ADMIN — Medication 10 ML: at 23:00

## 2017-01-01 RX ADMIN — INSULIN LISPRO 5 UNITS: 100 INJECTION, SOLUTION INTRAVENOUS; SUBCUTANEOUS at 08:50

## 2017-01-01 RX ADMIN — PREDNISONE 40 MG: 20 TABLET ORAL at 12:15

## 2017-01-01 RX ADMIN — ERYTHROPOIETIN 8000 UNITS: 4000 INJECTION, SOLUTION INTRAVENOUS; SUBCUTANEOUS at 17:59

## 2017-01-01 RX ADMIN — HYDROCODONE BITARTRATE AND ACETAMINOPHEN 1 TABLET: 5; 325 TABLET ORAL at 13:52

## 2017-01-01 RX ADMIN — HYDROMORPHONE HYDROCHLORIDE 1 MG: 1 INJECTION, SOLUTION INTRAMUSCULAR; INTRAVENOUS; SUBCUTANEOUS at 16:49

## 2017-01-01 RX ADMIN — FLUTICASONE FUROATE AND VILANTEROL TRIFENATATE: 100; 25 POWDER RESPIRATORY (INHALATION) at 09:20

## 2017-01-01 RX ADMIN — CLOPIDOGREL BISULFATE 75 MG: 75 TABLET, FILM COATED ORAL at 12:35

## 2017-01-01 RX ADMIN — IPRATROPIUM BROMIDE AND ALBUTEROL SULFATE 3 ML: .5; 3 SOLUTION RESPIRATORY (INHALATION) at 07:42

## 2017-01-01 RX ADMIN — UMECLIDINIUM 1 PUFF: 62.5 AEROSOL, POWDER ORAL at 08:51

## 2017-01-01 RX ADMIN — INSULIN HUMAN 20 UNITS: 100 INJECTION, SUSPENSION SUBCUTANEOUS at 17:22

## 2017-01-01 RX ADMIN — SODIUM CHLORIDE: 9 INJECTION, SOLUTION INTRAVENOUS at 07:33

## 2017-01-01 RX ADMIN — IPRATROPIUM BROMIDE AND ALBUTEROL SULFATE 3 ML: .5; 3 SOLUTION RESPIRATORY (INHALATION) at 21:51

## 2017-01-01 RX ADMIN — UMECLIDINIUM 1 PUFF: 62.5 AEROSOL, POWDER ORAL at 14:12

## 2017-01-01 RX ADMIN — METOPROLOL TARTRATE 50 MG: 50 TABLET ORAL at 08:38

## 2017-01-01 RX ADMIN — ALBUTEROL SULFATE 5 MG: 2.5 SOLUTION RESPIRATORY (INHALATION) at 11:28

## 2017-01-01 RX ADMIN — Medication 10 ML: at 05:44

## 2017-01-01 RX ADMIN — PHENYLEPHRINE HYDROCHLORIDE 10 MCG/MIN: 10 INJECTION INTRAVENOUS at 00:08

## 2017-01-01 RX ADMIN — INSULIN HUMAN 20 UNITS: 100 INJECTION, SUSPENSION SUBCUTANEOUS at 09:16

## 2017-01-01 RX ADMIN — HYDROMORPHONE HYDROCHLORIDE 0.2 MG: 1 INJECTION, SOLUTION INTRAMUSCULAR; INTRAVENOUS; SUBCUTANEOUS at 09:59

## 2017-01-01 RX ADMIN — ACETAMINOPHEN 650 MG: 325 TABLET, FILM COATED ORAL at 06:25

## 2017-01-01 RX ADMIN — CLOPIDOGREL BISULFATE 75 MG: 75 TABLET ORAL at 14:13

## 2017-01-01 RX ADMIN — FENTANYL CITRATE 50 MCG: 50 INJECTION, SOLUTION INTRAMUSCULAR; INTRAVENOUS at 12:52

## 2017-01-01 RX ADMIN — IODIXANOL 50 ML: 320 INJECTION, SOLUTION INTRAVASCULAR at 09:48

## 2017-01-01 RX ADMIN — HEPARIN SODIUM 5000 UNITS: 5000 INJECTION, SOLUTION INTRAVENOUS; SUBCUTANEOUS at 02:51

## 2017-01-01 RX ADMIN — Medication 10 ML: at 15:32

## 2017-01-01 RX ADMIN — CLOPIDOGREL BISULFATE 75 MG: 75 TABLET ORAL at 08:47

## 2017-01-01 RX ADMIN — Medication 10 ML: at 06:11

## 2017-01-01 RX ADMIN — PHENYLEPHRINE HYDROCHLORIDE 20 MCG/MIN: 10 INJECTION INTRAVENOUS at 16:24

## 2017-01-01 RX ADMIN — INSULIN LISPRO 5 UNITS: 100 INJECTION, SOLUTION INTRAVENOUS; SUBCUTANEOUS at 12:27

## 2017-01-01 RX ADMIN — LIDOCAINE HYDROCHLORIDE 8 ML: 10 INJECTION, SOLUTION INFILTRATION; PERINEURAL at 07:47

## 2017-01-01 RX ADMIN — INSULIN LISPRO 5 UNITS: 100 INJECTION, SOLUTION INTRAVENOUS; SUBCUTANEOUS at 08:38

## 2017-01-01 RX ADMIN — HYDROMORPHONE HYDROCHLORIDE 1 MG: 2 INJECTION, SOLUTION INTRAMUSCULAR; INTRAVENOUS; SUBCUTANEOUS at 00:00

## 2017-01-01 RX ADMIN — LIDOCAINE HYDROCHLORIDE 8 ML: 10 INJECTION, SOLUTION INFILTRATION; PERINEURAL at 13:17

## 2017-01-01 RX ADMIN — HYDROCODONE BITARTRATE AND ACETAMINOPHEN 1 TABLET: 5; 325 TABLET ORAL at 15:58

## 2017-01-01 RX ADMIN — LORAZEPAM 1 MG: 2 INJECTION INTRAMUSCULAR; INTRAVENOUS at 02:21

## 2017-01-01 RX ADMIN — INSULIN HUMAN 20 UNITS: 100 INJECTION, SUSPENSION SUBCUTANEOUS at 17:59

## 2017-01-01 RX ADMIN — IPRATROPIUM BROMIDE AND ALBUTEROL SULFATE 3 ML: .5; 3 SOLUTION RESPIRATORY (INHALATION) at 16:49

## 2017-01-01 RX ADMIN — Medication 80 MCG: at 16:56

## 2017-01-01 RX ADMIN — Medication 10 ML: at 22:00

## 2017-01-01 RX ADMIN — HYDROMORPHONE HYDROCHLORIDE 1 MG: 1 INJECTION, SOLUTION INTRAMUSCULAR; INTRAVENOUS; SUBCUTANEOUS at 16:55

## 2017-01-01 RX ADMIN — ROFLUMILAST 500 MCG: 500 TABLET ORAL at 09:42

## 2017-01-01 RX ADMIN — MIDAZOLAM HYDROCHLORIDE 1 MG: 1 INJECTION, SOLUTION INTRAMUSCULAR; INTRAVENOUS at 15:59

## 2017-01-01 RX ADMIN — METHOCARBAMOL 750 MG: 750 TABLET ORAL at 22:37

## 2017-01-01 RX ADMIN — Medication 10 ML: at 07:18

## 2017-01-01 RX ADMIN — HYDROCODONE BITARTRATE AND ACETAMINOPHEN 1 TABLET: 5; 325 TABLET ORAL at 20:42

## 2017-01-01 RX ADMIN — GLYCOPYRROLATE 0.2 MG: 0.2 INJECTION, SOLUTION INTRAMUSCULAR; INTRAVENOUS at 08:52

## 2017-01-01 RX ADMIN — MORPHINE SULFATE 4 MG: 4 INJECTION, SOLUTION INTRAMUSCULAR; INTRAVENOUS at 13:12

## 2017-01-01 RX ADMIN — ATORVASTATIN CALCIUM 10 MG: 10 TABLET, FILM COATED ORAL at 20:40

## 2017-01-01 RX ADMIN — Medication 1 MG: at 21:37

## 2017-01-01 RX ADMIN — IPRATROPIUM BROMIDE AND ALBUTEROL SULFATE 3 ML: .5; 3 SOLUTION RESPIRATORY (INHALATION) at 16:02

## 2017-01-01 RX ADMIN — PROTAMINE SULFATE 40 MG: 10 INJECTION, SOLUTION INTRAVENOUS at 14:34

## 2017-01-01 RX ADMIN — MIDAZOLAM HYDROCHLORIDE 1 MG: 1 INJECTION, SOLUTION INTRAMUSCULAR; INTRAVENOUS at 12:52

## 2017-01-01 RX ADMIN — HYDROCODONE BITARTRATE AND ACETAMINOPHEN 1 TABLET: 5; 325 TABLET ORAL at 11:02

## 2017-01-01 RX ADMIN — HYDROMORPHONE HYDROCHLORIDE 0.2 MG: 1 INJECTION, SOLUTION INTRAMUSCULAR; INTRAVENOUS; SUBCUTANEOUS at 03:01

## 2017-01-01 RX ADMIN — INSULIN LISPRO 5 UNITS: 100 INJECTION, SOLUTION INTRAVENOUS; SUBCUTANEOUS at 16:40

## 2017-01-01 RX ADMIN — CEFEPIME HYDROCHLORIDE 2 G: 2 INJECTION, POWDER, FOR SOLUTION INTRAVENOUS at 20:48

## 2017-01-01 RX ADMIN — METOPROLOL TARTRATE 50 MG: 50 TABLET ORAL at 22:12

## 2017-01-01 RX ADMIN — HYDROMORPHONE HYDROCHLORIDE 1 MG: 1 INJECTION, SOLUTION INTRAMUSCULAR; INTRAVENOUS; SUBCUTANEOUS at 23:00

## 2017-01-01 RX ADMIN — FENTANYL CITRATE 25 MCG: 50 INJECTION, SOLUTION INTRAMUSCULAR; INTRAVENOUS at 08:00

## 2017-01-01 RX ADMIN — HYDROMORPHONE HYDROCHLORIDE 1 MG: 1 INJECTION, SOLUTION INTRAMUSCULAR; INTRAVENOUS; SUBCUTANEOUS at 21:45

## 2017-01-01 RX ADMIN — METOPROLOL TARTRATE 50 MG: 50 TABLET ORAL at 09:27

## 2017-01-01 RX ADMIN — ATORVASTATIN CALCIUM 10 MG: 10 TABLET, FILM COATED ORAL at 22:40

## 2017-01-01 RX ADMIN — ASPIRIN 81 MG CHEWABLE TABLET 81 MG: 81 TABLET CHEWABLE at 09:14

## 2017-01-01 RX ADMIN — DIAZEPAM 5 MG: 5 TABLET ORAL at 09:28

## 2017-01-01 RX ADMIN — FAMOTIDINE 20 MG: 10 INJECTION, SOLUTION INTRAVENOUS at 10:56

## 2017-01-01 RX ADMIN — FENTANYL CITRATE 25 MCG: 50 INJECTION, SOLUTION INTRAMUSCULAR; INTRAVENOUS at 09:21

## 2017-01-01 RX ADMIN — INSULIN HUMAN 20 UNITS: 100 INJECTION, SUSPENSION SUBCUTANEOUS at 09:22

## 2017-01-01 RX ADMIN — METHOCARBAMOL 750 MG: 750 TABLET ORAL at 15:22

## 2017-01-01 RX ADMIN — Medication 10 ML: at 14:10

## 2017-01-01 RX ADMIN — CEFEPIME HYDROCHLORIDE 1 G: 1 INJECTION, POWDER, FOR SOLUTION INTRAMUSCULAR; INTRAVENOUS at 20:20

## 2017-01-01 RX ADMIN — INSULIN LISPRO 2 UNITS: 100 INJECTION, SOLUTION INTRAVENOUS; SUBCUTANEOUS at 00:15

## 2017-01-01 RX ADMIN — HYDROCODONE BITARTRATE AND ACETAMINOPHEN 1 TABLET: 5; 325 TABLET ORAL at 13:13

## 2017-01-01 RX ADMIN — HYDROMORPHONE HYDROCHLORIDE 1 MG: 1 INJECTION, SOLUTION INTRAMUSCULAR; INTRAVENOUS; SUBCUTANEOUS at 15:24

## 2017-01-01 RX ADMIN — INSULIN LISPRO 2 UNITS: 100 INJECTION, SOLUTION INTRAVENOUS; SUBCUTANEOUS at 22:05

## 2017-01-01 RX ADMIN — BUDESONIDE 500 MCG: 0.5 INHALANT RESPIRATORY (INHALATION) at 21:51

## 2017-01-01 RX ADMIN — ATORVASTATIN CALCIUM 10 MG: 10 TABLET, FILM COATED ORAL at 22:12

## 2017-01-01 RX ADMIN — HYDROCODONE BITARTRATE AND ACETAMINOPHEN 1 TABLET: 5; 325 TABLET ORAL at 20:11

## 2017-01-01 RX ADMIN — IPRATROPIUM BROMIDE AND ALBUTEROL SULFATE 3 ML: .5; 3 SOLUTION RESPIRATORY (INHALATION) at 07:16

## 2017-01-01 RX ADMIN — ASPIRIN 81 MG CHEWABLE TABLET 81 MG: 81 TABLET CHEWABLE at 12:15

## 2017-01-01 RX ADMIN — LORAZEPAM 1 MG: 2 INJECTION INTRAMUSCULAR; INTRAVENOUS at 13:21

## 2017-01-01 RX ADMIN — BUDESONIDE 500 MCG: 0.5 INHALANT RESPIRATORY (INHALATION) at 07:48

## 2017-01-01 RX ADMIN — ATORVASTATIN CALCIUM 10 MG: 10 TABLET, FILM COATED ORAL at 21:26

## 2017-01-01 RX ADMIN — PROPOFOL 20 MCG/KG/MIN: 10 INJECTION, EMULSION INTRAVENOUS at 07:42

## 2017-01-01 RX ADMIN — FLUTICASONE FUROATE AND VILANTEROL TRIFENATATE 1 PUFF: 100; 25 POWDER RESPIRATORY (INHALATION) at 13:21

## 2017-01-01 RX ADMIN — HYDROMORPHONE HYDROCHLORIDE 0.5 MG: 1 INJECTION, SOLUTION INTRAMUSCULAR; INTRAVENOUS; SUBCUTANEOUS at 10:54

## 2017-01-01 RX ADMIN — LORAZEPAM 1 MG: 2 INJECTION INTRAMUSCULAR; INTRAVENOUS at 16:43

## 2017-01-01 RX ADMIN — HYDROMORPHONE HYDROCHLORIDE 1 MG: 2 INJECTION, SOLUTION INTRAMUSCULAR; INTRAVENOUS; SUBCUTANEOUS at 10:07

## 2017-01-01 RX ADMIN — FENTANYL CITRATE 25 MCG: 50 INJECTION, SOLUTION INTRAMUSCULAR; INTRAVENOUS at 08:45

## 2017-01-01 RX ADMIN — CLOPIDOGREL BISULFATE 75 MG: 75 TABLET ORAL at 09:41

## 2017-01-01 RX ADMIN — INSULIN LISPRO 2 UNITS: 100 INJECTION, SOLUTION INTRAVENOUS; SUBCUTANEOUS at 12:26

## 2017-01-01 RX ADMIN — HYDROMORPHONE HYDROCHLORIDE 0.2 MG: 1 INJECTION, SOLUTION INTRAMUSCULAR; INTRAVENOUS; SUBCUTANEOUS at 20:20

## 2017-01-01 RX ADMIN — Medication 10 ML: at 11:35

## 2017-01-01 RX ADMIN — FENTANYL CITRATE 25 MCG: 50 INJECTION, SOLUTION INTRAMUSCULAR; INTRAVENOUS at 08:30

## 2017-01-01 RX ADMIN — UMECLIDINIUM 1 PUFF: 62.5 AEROSOL, POWDER ORAL at 10:35

## 2017-01-01 RX ADMIN — METOPROLOL TARTRATE 50 MG: 50 TABLET ORAL at 14:12

## 2017-01-01 RX ADMIN — UMECLIDINIUM 1 PUFF: 62.5 AEROSOL, POWDER ORAL at 09:20

## 2017-01-01 RX ADMIN — HEPARIN SODIUM 1000 UNITS: 200 INJECTION, SOLUTION INTRAVENOUS at 08:09

## 2017-01-01 RX ADMIN — IPRATROPIUM BROMIDE AND ALBUTEROL SULFATE 3 ML: .5; 3 SOLUTION RESPIRATORY (INHALATION) at 19:26

## 2017-01-01 RX ADMIN — ROFLUMILAST 500 MCG: 500 TABLET ORAL at 09:29

## 2017-01-01 RX ADMIN — Medication 10 ML: at 05:23

## 2017-01-01 RX ADMIN — INSULIN HUMAN 20 UNITS: 100 INJECTION, SUSPENSION SUBCUTANEOUS at 08:37

## 2017-01-01 RX ADMIN — LORAZEPAM 1 MG: 2 INJECTION INTRAMUSCULAR; INTRAVENOUS at 17:53

## 2017-01-01 RX ADMIN — IPRATROPIUM BROMIDE AND ALBUTEROL SULFATE 3 ML: .5; 3 SOLUTION RESPIRATORY (INHALATION) at 15:13

## 2017-01-01 RX ADMIN — TRAMADOL HYDROCHLORIDE 25 MG: 50 TABLET, FILM COATED ORAL at 09:22

## 2017-01-01 RX ADMIN — INSULIN LISPRO 5 UNITS: 100 INJECTION, SOLUTION INTRAVENOUS; SUBCUTANEOUS at 17:59

## 2017-01-01 RX ADMIN — PREDNISONE 5 MG: 5 TABLET ORAL at 14:12

## 2017-01-01 RX ADMIN — METOPROLOL TARTRATE 50 MG: 50 TABLET ORAL at 09:42

## 2017-01-01 RX ADMIN — FENTANYL CITRATE 25 MCG: 50 INJECTION, SOLUTION INTRAMUSCULAR; INTRAVENOUS at 07:36

## 2017-01-01 RX ADMIN — HYDROCODONE BITARTRATE AND ACETAMINOPHEN 1 TABLET: 5; 325 TABLET ORAL at 14:49

## 2017-01-01 RX ADMIN — INSULIN LISPRO 5 UNITS: 100 INJECTION, SOLUTION INTRAVENOUS; SUBCUTANEOUS at 09:23

## 2017-01-01 RX ADMIN — Medication 10 ML: at 17:30

## 2017-01-01 RX ADMIN — INSULIN HUMAN 20 UNITS: 100 INJECTION, SUSPENSION SUBCUTANEOUS at 17:19

## 2017-01-01 RX ADMIN — ATORVASTATIN CALCIUM 10 MG: 10 TABLET, FILM COATED ORAL at 20:13

## 2017-01-01 RX ADMIN — BUDESONIDE 500 MCG: 0.5 INHALANT RESPIRATORY (INHALATION) at 19:26

## 2017-01-01 RX ADMIN — HYDROCODONE BITARTRATE AND ACETAMINOPHEN 1 TABLET: 5; 325 TABLET ORAL at 15:29

## 2017-01-01 RX ADMIN — IODIXANOL 90 ML: 320 INJECTION, SOLUTION INTRAVASCULAR at 13:29

## 2017-01-01 RX ADMIN — Medication 10 ML: at 18:19

## 2017-01-01 RX ADMIN — GLYCOPYRROLATE 0.2 MG: 0.2 INJECTION, SOLUTION INTRAMUSCULAR; INTRAVENOUS at 16:43

## 2017-01-01 RX ADMIN — HYDROMORPHONE HYDROCHLORIDE 1 MG: 1 INJECTION, SOLUTION INTRAMUSCULAR; INTRAVENOUS; SUBCUTANEOUS at 07:44

## 2017-01-01 RX ADMIN — ATORVASTATIN CALCIUM 10 MG: 10 TABLET, FILM COATED ORAL at 21:54

## 2017-01-01 RX ADMIN — HYDROCODONE BITARTRATE AND ACETAMINOPHEN 1 TABLET: 5; 325 TABLET ORAL at 05:13

## 2017-01-01 RX ADMIN — Medication 80 MCG: at 17:03

## 2017-01-01 RX ADMIN — HYDROMORPHONE HYDROCHLORIDE 1 MG: 1 INJECTION, SOLUTION INTRAMUSCULAR; INTRAVENOUS; SUBCUTANEOUS at 17:34

## 2017-01-01 RX ADMIN — ROFLUMILAST 500 MCG: 500 TABLET ORAL at 10:32

## 2017-01-01 RX ADMIN — Medication 120 MCG: at 16:44

## 2017-01-01 RX ADMIN — Medication 10 ML: at 16:43

## 2017-01-01 RX ADMIN — INSULIN HUMAN 20 UNITS: 100 INJECTION, SUSPENSION SUBCUTANEOUS at 08:50

## 2017-01-01 RX ADMIN — HYDROCODONE BITARTRATE AND ACETAMINOPHEN 1 TABLET: 5; 325 TABLET ORAL at 22:46

## 2017-01-01 RX ADMIN — HYDROMORPHONE HYDROCHLORIDE 1 MG: 1 INJECTION, SOLUTION INTRAMUSCULAR; INTRAVENOUS; SUBCUTANEOUS at 08:53

## 2017-01-01 RX ADMIN — IPRATROPIUM BROMIDE AND ALBUTEROL SULFATE 3 ML: .5; 3 SOLUTION RESPIRATORY (INHALATION) at 19:22

## 2017-01-01 RX ADMIN — FENTANYL CITRATE 25 MCG: 50 INJECTION, SOLUTION INTRAMUSCULAR; INTRAVENOUS at 12:23

## 2017-01-01 RX ADMIN — HYDROMORPHONE HYDROCHLORIDE 0.2 MG: 1 INJECTION, SOLUTION INTRAMUSCULAR; INTRAVENOUS; SUBCUTANEOUS at 02:54

## 2017-01-01 RX ADMIN — FLUTICASONE FUROATE AND VILANTEROL TRIFENATATE: 100; 25 POWDER RESPIRATORY (INHALATION) at 14:12

## 2017-01-01 RX ADMIN — HYDROMORPHONE HYDROCHLORIDE 1 MG: 1 INJECTION, SOLUTION INTRAMUSCULAR; INTRAVENOUS; SUBCUTANEOUS at 20:34

## 2017-01-01 RX ADMIN — Medication 10 ML: at 19:46

## 2017-01-01 RX ADMIN — HEPARIN SODIUM 5000 UNITS: 5000 INJECTION, SOLUTION INTRAVENOUS; SUBCUTANEOUS at 22:06

## 2017-01-01 RX ADMIN — UMECLIDINIUM 1 PUFF: 62.5 AEROSOL, POWDER ORAL at 08:46

## 2017-01-01 RX ADMIN — METOPROLOL TARTRATE 50 MG: 50 TABLET ORAL at 08:47

## 2017-01-01 RX ADMIN — Medication 5 ML: at 15:28

## 2017-01-01 RX ADMIN — IPRATROPIUM BROMIDE AND ALBUTEROL SULFATE 3 ML: .5; 3 SOLUTION RESPIRATORY (INHALATION) at 11:48

## 2017-01-01 RX ADMIN — METOPROLOL TARTRATE 50 MG: 50 TABLET ORAL at 18:35

## 2017-01-01 RX ADMIN — HYDROMORPHONE HYDROCHLORIDE 1 MG: 2 INJECTION, SOLUTION INTRAMUSCULAR; INTRAVENOUS; SUBCUTANEOUS at 02:01

## 2017-01-01 RX ADMIN — METHOCARBAMOL 750 MG: 750 TABLET ORAL at 14:16

## 2017-01-01 RX ADMIN — IPRATROPIUM BROMIDE AND ALBUTEROL SULFATE 3 ML: .5; 3 SOLUTION RESPIRATORY (INHALATION) at 16:53

## 2017-01-01 RX ADMIN — INSULIN LISPRO 4 UNITS: 100 INJECTION, SOLUTION INTRAVENOUS; SUBCUTANEOUS at 21:55

## 2017-01-01 RX ADMIN — Medication 80 MCG: at 17:00

## 2017-01-01 RX ADMIN — ASPIRIN 81 MG CHEWABLE TABLET 81 MG: 81 TABLET CHEWABLE at 09:42

## 2017-01-01 RX ADMIN — HEPARIN SODIUM 5000 UNITS: 5000 INJECTION, SOLUTION INTRAVENOUS; SUBCUTANEOUS at 01:00

## 2017-01-01 RX ADMIN — HYDROMORPHONE HYDROCHLORIDE 1 MG: 2 INJECTION, SOLUTION INTRAMUSCULAR; INTRAVENOUS; SUBCUTANEOUS at 14:09

## 2017-01-01 RX ADMIN — HYDROMORPHONE HYDROCHLORIDE 1 MG: 1 INJECTION, SOLUTION INTRAMUSCULAR; INTRAVENOUS; SUBCUTANEOUS at 13:16

## 2017-01-01 RX ADMIN — METOPROLOL TARTRATE 50 MG: 50 TABLET ORAL at 17:21

## 2017-01-01 RX ADMIN — LIDOCAINE HYDROCHLORIDE 5 ML: 10 INJECTION INFILTRATION; PERINEURAL at 16:27

## 2017-01-01 RX ADMIN — HEPARIN SODIUM 5000 UNITS: 5000 INJECTION, SOLUTION INTRAVENOUS; SUBCUTANEOUS at 12:25

## 2017-01-01 RX ADMIN — METHOCARBAMOL 750 MG: 750 TABLET ORAL at 17:19

## 2017-01-01 RX ADMIN — ROFLUMILAST 500 MCG: 500 TABLET ORAL at 12:15

## 2017-01-01 RX ADMIN — ROFLUMILAST 500 MCG: 500 TABLET ORAL at 09:21

## 2017-01-01 RX ADMIN — PHENYLEPHRINE HYDROCHLORIDE 25 MCG/MIN: 10 INJECTION INTRAVENOUS at 18:09

## 2017-01-01 RX ADMIN — PROPOFOL 25 MCG/KG/MIN: 10 INJECTION, EMULSION INTRAVENOUS at 16:01

## 2017-01-01 RX ADMIN — CEFEPIME HYDROCHLORIDE 2 G: 2 INJECTION, POWDER, FOR SOLUTION INTRAVENOUS at 06:41

## 2017-01-01 RX ADMIN — CEFEPIME HYDROCHLORIDE 1 G: 1 INJECTION, POWDER, FOR SOLUTION INTRAMUSCULAR; INTRAVENOUS at 00:07

## 2017-01-01 RX ADMIN — ROFLUMILAST 500 MCG: 500 TABLET ORAL at 08:47

## 2017-01-01 RX ADMIN — INSULIN HUMAN 20 UNITS: 100 INJECTION, SUSPENSION SUBCUTANEOUS at 17:29

## 2017-01-01 RX ADMIN — ROFLUMILAST 500 MCG: 500 TABLET ORAL at 14:13

## 2017-01-01 RX ADMIN — HYDROMORPHONE HYDROCHLORIDE 0.2 MG: 1 INJECTION, SOLUTION INTRAMUSCULAR; INTRAVENOUS; SUBCUTANEOUS at 06:25

## 2017-01-01 RX ADMIN — ONDANSETRON 4 MG: 2 INJECTION INTRAMUSCULAR; INTRAVENOUS at 18:01

## 2017-01-01 RX ADMIN — METOPROLOL TARTRATE 50 MG: 50 TABLET ORAL at 09:21

## 2017-01-01 RX ADMIN — ASPIRIN 81 MG: 81 TABLET, COATED ORAL at 12:35

## 2017-01-01 RX ADMIN — METHOCARBAMOL 750 MG: 750 TABLET ORAL at 09:21

## 2017-01-01 RX ADMIN — HYDROMORPHONE HYDROCHLORIDE 1 MG: 1 INJECTION, SOLUTION INTRAMUSCULAR; INTRAVENOUS; SUBCUTANEOUS at 13:38

## 2017-01-01 RX ADMIN — ALBUTEROL SULFATE 5 MG: 2.5 SOLUTION RESPIRATORY (INHALATION) at 08:50

## 2017-01-01 RX ADMIN — HYDROCODONE BITARTRATE AND ACETAMINOPHEN 1 TABLET: 5; 325 TABLET ORAL at 19:51

## 2017-01-01 RX ADMIN — Medication 10 ML: at 09:26

## 2017-01-01 RX ADMIN — HEPARIN SODIUM 5000 UNITS: 5000 INJECTION, SOLUTION INTRAVENOUS; SUBCUTANEOUS at 12:07

## 2017-01-01 RX ADMIN — INSULIN LISPRO 5 UNITS: 100 INJECTION, SOLUTION INTRAVENOUS; SUBCUTANEOUS at 18:34

## 2017-01-01 RX ADMIN — PROTAMINE SULFATE 40 MG: 10 INJECTION, SOLUTION INTRAVENOUS at 09:47

## 2017-01-01 RX ADMIN — FENTANYL CITRATE 25 MCG: 50 INJECTION, SOLUTION INTRAMUSCULAR; INTRAVENOUS at 11:26

## 2017-01-01 RX ADMIN — CLOPIDOGREL BISULFATE 75 MG: 75 TABLET ORAL at 09:14

## 2017-01-01 RX ADMIN — LIDOCAINE HYDROCHLORIDE 5 ML: 10 INJECTION, SOLUTION INFILTRATION; PERINEURAL at 16:27

## 2017-01-01 RX ADMIN — INSULIN LISPRO 3 UNITS: 100 INJECTION, SOLUTION INTRAVENOUS; SUBCUTANEOUS at 18:17

## 2017-01-01 RX ADMIN — HYDROMORPHONE HYDROCHLORIDE 1 MG: 1 INJECTION, SOLUTION INTRAMUSCULAR; INTRAVENOUS; SUBCUTANEOUS at 21:59

## 2017-01-01 RX ADMIN — HYDROMORPHONE HYDROCHLORIDE 1 MG: 1 INJECTION, SOLUTION INTRAMUSCULAR; INTRAVENOUS; SUBCUTANEOUS at 00:21

## 2017-01-01 RX ADMIN — IODIXANOL 90 ML: 320 INJECTION, SOLUTION INTRAVASCULAR at 14:17

## 2017-01-01 RX ADMIN — CALCIUM ACETATE 1334 MG: 667 CAPSULE ORAL at 13:19

## 2017-01-01 RX ADMIN — INSULIN LISPRO 2 UNITS: 100 INJECTION, SOLUTION INTRAVENOUS; SUBCUTANEOUS at 08:37

## 2017-01-01 RX ADMIN — HEPARIN SODIUM 5000 UNITS: 5000 INJECTION, SOLUTION INTRAVENOUS; SUBCUTANEOUS at 11:02

## 2017-01-01 RX ADMIN — HEPARIN SODIUM 5000 UNITS: 5000 INJECTION, SOLUTION INTRAVENOUS; SUBCUTANEOUS at 23:52

## 2017-01-01 RX ADMIN — ALBUMIN (HUMAN) 25 G: 0.25 INJECTION, SOLUTION INTRAVENOUS at 23:57

## 2017-01-01 RX ADMIN — FLUTICASONE FUROATE AND VILANTEROL TRIFENATATE 1 PUFF: 100; 25 POWDER RESPIRATORY (INHALATION) at 08:51

## 2017-01-01 RX ADMIN — HYDROMORPHONE HYDROCHLORIDE 1 MG: 1 INJECTION, SOLUTION INTRAMUSCULAR; INTRAVENOUS; SUBCUTANEOUS at 12:43

## 2017-01-01 RX ADMIN — INSULIN HUMAN 20 UNITS: 100 INJECTION, SUSPENSION SUBCUTANEOUS at 09:27

## 2017-01-01 RX ADMIN — FENTANYL CITRATE 50 MCG: 50 INJECTION, SOLUTION INTRAMUSCULAR; INTRAVENOUS at 14:09

## 2017-01-01 RX ADMIN — CLOPIDOGREL BISULFATE 75 MG: 75 TABLET ORAL at 17:34

## 2017-01-01 RX ADMIN — IPRATROPIUM BROMIDE AND ALBUTEROL SULFATE 3 ML: .5; 3 SOLUTION RESPIRATORY (INHALATION) at 08:35

## 2017-01-01 RX ADMIN — HYDROMORPHONE HYDROCHLORIDE 1 MG: 1 INJECTION, SOLUTION INTRAMUSCULAR; INTRAVENOUS; SUBCUTANEOUS at 16:11

## 2017-01-01 RX ADMIN — MORPHINE SULFATE 2 MG: 2 INJECTION, SOLUTION INTRAMUSCULAR; INTRAVENOUS at 19:10

## 2017-01-01 RX ADMIN — INSULIN LISPRO 2 UNITS: 100 INJECTION, SOLUTION INTRAVENOUS; SUBCUTANEOUS at 17:29

## 2017-01-01 RX ADMIN — INSULIN LISPRO 3 UNITS: 100 INJECTION, SOLUTION INTRAVENOUS; SUBCUTANEOUS at 09:43

## 2017-01-01 RX ADMIN — IPRATROPIUM BROMIDE AND ALBUTEROL SULFATE 3 ML: .5; 3 SOLUTION RESPIRATORY (INHALATION) at 12:40

## 2017-01-01 RX ADMIN — LORAZEPAM 1 MG: 2 INJECTION INTRAMUSCULAR; INTRAVENOUS at 12:22

## 2017-01-01 RX ADMIN — GLYCOPYRROLATE 0.2 MG: 0.2 INJECTION, SOLUTION INTRAMUSCULAR; INTRAVENOUS at 12:23

## 2017-01-01 RX ADMIN — GLYCOPYRROLATE 0.1 MG: 0.2 INJECTION INTRAMUSCULAR; INTRAVENOUS at 21:34

## 2017-01-01 RX ADMIN — Medication 16 G: at 18:14

## 2017-01-01 RX ADMIN — DIPHENHYDRAMINE HYDROCHLORIDE 25 MG: 50 INJECTION, SOLUTION INTRAMUSCULAR; INTRAVENOUS at 12:07

## 2017-01-01 RX ADMIN — Medication 10 ML: at 21:56

## 2017-01-01 RX ADMIN — LORAZEPAM 1 MG: 2 INJECTION INTRAMUSCULAR; INTRAVENOUS at 14:57

## 2017-01-01 RX ADMIN — HYDROCODONE BITARTRATE AND ACETAMINOPHEN 1 TABLET: 5; 325 TABLET ORAL at 21:32

## 2017-01-01 RX ADMIN — Medication 10 ML: at 02:26

## 2017-01-01 RX ADMIN — ASPIRIN 81 MG CHEWABLE TABLET 81 MG: 81 TABLET CHEWABLE at 07:00

## 2017-01-01 RX ADMIN — OXYCODONE HYDROCHLORIDE AND ACETAMINOPHEN 1 TABLET: 5; 325 TABLET ORAL at 10:31

## 2017-01-01 RX ADMIN — FENTANYL CITRATE 25 MCG: 50 INJECTION, SOLUTION INTRAMUSCULAR; INTRAVENOUS at 07:43

## 2017-01-01 RX ADMIN — ACETAMINOPHEN 650 MG: 325 TABLET, FILM COATED ORAL at 19:09

## 2017-01-01 RX ADMIN — HYDROCODONE BITARTRATE AND ACETAMINOPHEN 1 TABLET: 5; 325 TABLET ORAL at 07:27

## 2017-01-01 RX ADMIN — ROFLUMILAST 500 MCG: 500 TABLET ORAL at 17:34

## 2017-01-01 RX ADMIN — METOPROLOL TARTRATE 50 MG: 50 TABLET ORAL at 12:15

## 2017-01-01 RX ADMIN — INSULIN LISPRO 3 UNITS: 100 INJECTION, SOLUTION INTRAVENOUS; SUBCUTANEOUS at 17:23

## 2017-01-01 RX ADMIN — FLUTICASONE FUROATE AND VILANTEROL TRIFENATATE 1 PUFF: 100; 25 POWDER RESPIRATORY (INHALATION) at 12:16

## 2017-01-01 RX ADMIN — FLUTICASONE FUROATE AND VILANTEROL TRIFENATATE: 100; 25 POWDER RESPIRATORY (INHALATION) at 08:46

## 2017-01-01 RX ADMIN — CLOPIDOGREL BISULFATE 75 MG: 75 TABLET ORAL at 09:21

## 2017-01-01 RX ADMIN — Medication 10 ML: at 02:19

## 2017-01-01 RX ADMIN — HYDROMORPHONE HYDROCHLORIDE 0.5 MG: 1 INJECTION, SOLUTION INTRAMUSCULAR; INTRAVENOUS; SUBCUTANEOUS at 22:29

## 2017-01-01 RX ADMIN — Medication 10 ML: at 20:13

## 2017-01-01 RX ADMIN — CEFTRIAXONE 1 G: 1 INJECTION, POWDER, FOR SOLUTION INTRAMUSCULAR; INTRAVENOUS at 10:58

## 2017-01-01 RX ADMIN — ACETAMINOPHEN 650 MG: 325 TABLET, FILM COATED ORAL at 04:42

## 2017-01-01 RX ADMIN — FENTANYL CITRATE 25 MCG: 50 INJECTION, SOLUTION INTRAMUSCULAR; INTRAVENOUS at 08:44

## 2017-01-01 RX ADMIN — METHOCARBAMOL 750 MG: 750 TABLET ORAL at 18:47

## 2017-01-01 RX ADMIN — Medication 10 ML: at 13:17

## 2017-01-01 RX ADMIN — Medication 10 ML: at 02:56

## 2017-01-01 RX ADMIN — LEVOFLOXACIN 500 MG: 5 INJECTION, SOLUTION INTRAVENOUS at 17:23

## 2017-01-01 RX ADMIN — HEPARIN SODIUM 5000 UNITS: 5000 INJECTION, SOLUTION INTRAVENOUS; SUBCUTANEOUS at 17:22

## 2017-01-01 RX ADMIN — Medication 10 ML: at 22:07

## 2017-01-01 RX ADMIN — HYDROCODONE BITARTRATE AND ACETAMINOPHEN 1 TABLET: 5; 325 TABLET ORAL at 16:40

## 2017-01-01 RX ADMIN — INSULIN HUMAN 20 UNITS: 100 INJECTION, SUSPENSION SUBCUTANEOUS at 08:48

## 2017-01-01 RX ADMIN — CLOPIDOGREL BISULFATE 75 MG: 75 TABLET ORAL at 11:02

## 2017-01-01 RX ADMIN — IPRATROPIUM BROMIDE 0.5 MG: 0.5 SOLUTION RESPIRATORY (INHALATION) at 09:22

## 2017-01-01 RX ADMIN — FENTANYL CITRATE 50 MCG: 50 INJECTION, SOLUTION INTRAMUSCULAR; INTRAVENOUS at 13:34

## 2017-01-01 RX ADMIN — Medication 120 MCG: at 16:45

## 2017-01-01 RX ADMIN — ATORVASTATIN CALCIUM 10 MG: 10 TABLET, FILM COATED ORAL at 22:07

## 2017-01-01 RX ADMIN — CALCIUM ACETATE 1334 MG: 667 CAPSULE ORAL at 17:21

## 2017-01-01 RX ADMIN — METHOCARBAMOL 750 MG: 750 TABLET ORAL at 18:18

## 2017-01-01 RX ADMIN — INSULIN HUMAN 20 UNITS: 100 INJECTION, SUSPENSION SUBCUTANEOUS at 09:43

## 2017-01-01 RX ADMIN — OXYCODONE HYDROCHLORIDE AND ACETAMINOPHEN 1 TABLET: 5; 325 TABLET ORAL at 12:27

## 2017-01-01 RX ADMIN — METOPROLOL TARTRATE 50 MG: 50 TABLET ORAL at 18:47

## 2017-01-01 RX ADMIN — ACETAMINOPHEN 650 MG: 325 TABLET, FILM COATED ORAL at 22:10

## 2017-01-01 RX ADMIN — Medication 10 ML: at 20:52

## 2017-01-01 RX ADMIN — CLOPIDOGREL BISULFATE 75 MG: 75 TABLET ORAL at 09:27

## 2017-01-01 RX ADMIN — INSULIN LISPRO 5 UNITS: 100 INJECTION, SOLUTION INTRAVENOUS; SUBCUTANEOUS at 17:30

## 2017-01-01 RX ADMIN — PROTAMINE SULFATE 40 MG: 10 INJECTION, SOLUTION INTRAVENOUS at 09:45

## 2017-01-01 RX ADMIN — Medication 10 ML: at 14:14

## 2017-01-01 RX ADMIN — ASPIRIN 81 MG CHEWABLE TABLET 81 MG: 81 TABLET CHEWABLE at 14:13

## 2017-01-01 RX ADMIN — HEPARIN SODIUM 5000 UNITS: 5000 INJECTION, SOLUTION INTRAVENOUS; SUBCUTANEOUS at 00:44

## 2017-01-01 RX ADMIN — ASPIRIN 81 MG CHEWABLE TABLET 81 MG: 81 TABLET CHEWABLE at 09:21

## 2017-01-01 RX ADMIN — INSULIN LISPRO 5 UNITS: 100 INJECTION, SOLUTION INTRAVENOUS; SUBCUTANEOUS at 09:16

## 2017-01-01 RX ADMIN — ALBUTEROL SULFATE 5 MG: 2.5 SOLUTION RESPIRATORY (INHALATION) at 11:05

## 2017-01-01 RX ADMIN — Medication 10 ML: at 23:45

## 2017-01-01 RX ADMIN — Medication 10 ML: at 07:49

## 2017-01-01 RX ADMIN — BUDESONIDE 500 MCG: 0.5 INHALANT RESPIRATORY (INHALATION) at 07:42

## 2017-01-01 RX ADMIN — SODIUM CHLORIDE 500 ML: 900 INJECTION, SOLUTION INTRAVENOUS at 07:36

## 2017-01-01 RX ADMIN — METHOCARBAMOL 750 MG: 750 TABLET ORAL at 22:12

## 2017-01-01 RX ADMIN — ASPIRIN 81 MG CHEWABLE TABLET 81 MG: 81 TABLET CHEWABLE at 08:38

## 2017-01-01 RX ADMIN — ATORVASTATIN CALCIUM 10 MG: 10 TABLET, FILM COATED ORAL at 20:51

## 2017-01-01 RX ADMIN — ONDANSETRON 4 MG: 2 INJECTION INTRAMUSCULAR; INTRAVENOUS at 08:50

## 2017-01-01 RX ADMIN — HYDROMORPHONE HYDROCHLORIDE 1 MG: 1 INJECTION, SOLUTION INTRAMUSCULAR; INTRAVENOUS; SUBCUTANEOUS at 19:56

## 2017-01-01 RX ADMIN — INSULIN LISPRO 3 UNITS: 100 INJECTION, SOLUTION INTRAVENOUS; SUBCUTANEOUS at 12:31

## 2017-01-01 RX ADMIN — FENTANYL CITRATE 25 MCG: 50 INJECTION, SOLUTION INTRAMUSCULAR; INTRAVENOUS at 08:54

## 2017-01-01 RX ADMIN — Medication 1 MG: at 01:44

## 2017-01-01 RX ADMIN — INSULIN LISPRO 5 UNITS: 100 INJECTION, SOLUTION INTRAVENOUS; SUBCUTANEOUS at 14:46

## 2017-01-01 RX ADMIN — VANCOMYCIN HYDROCHLORIDE 1000 MG: 1 INJECTION, POWDER, LYOPHILIZED, FOR SOLUTION INTRAVENOUS at 22:30

## 2017-01-01 RX ADMIN — KETOROLAC TROMETHAMINE 15 MG: 30 INJECTION, SOLUTION INTRAMUSCULAR at 16:31

## 2017-01-01 RX ADMIN — ATORVASTATIN CALCIUM 10 MG: 10 TABLET, FILM COATED ORAL at 22:02

## 2017-01-01 RX ADMIN — Medication 10 ML: at 22:12

## 2017-01-01 RX ADMIN — Medication 10 ML: at 21:26

## 2017-01-01 RX ADMIN — UMECLIDINIUM 1 PUFF: 62.5 AEROSOL, POWDER ORAL at 09:24

## 2017-01-01 RX ADMIN — HYDROCODONE BITARTRATE AND ACETAMINOPHEN 10 MG: 2.5; 108 SOLUTION ORAL at 11:25

## 2017-01-01 RX ADMIN — IPRATROPIUM BROMIDE AND ALBUTEROL SULFATE 3 ML: .5; 3 SOLUTION RESPIRATORY (INHALATION) at 11:18

## 2017-01-01 RX ADMIN — ATORVASTATIN CALCIUM 10 MG: 10 TABLET, FILM COATED ORAL at 00:44

## 2017-01-01 RX ADMIN — IPRATROPIUM BROMIDE AND ALBUTEROL SULFATE 3 ML: .5; 3 SOLUTION RESPIRATORY (INHALATION) at 13:00

## 2017-01-01 RX ADMIN — PREDNISONE 40 MG: 20 TABLET ORAL at 09:42

## 2017-01-01 RX ADMIN — INSULIN LISPRO 5 UNITS: 100 INJECTION, SOLUTION INTRAVENOUS; SUBCUTANEOUS at 07:16

## 2017-01-01 RX ADMIN — HYDROCODONE BITARTRATE AND ACETAMINOPHEN 1 TABLET: 5; 325 TABLET ORAL at 22:40

## 2017-01-01 RX ADMIN — CALCIUM ACETATE 1334 MG: 667 CAPSULE ORAL at 12:15

## 2017-01-01 RX ADMIN — HYDROMORPHONE HYDROCHLORIDE 1 MG: 1 INJECTION, SOLUTION INTRAMUSCULAR; INTRAVENOUS; SUBCUTANEOUS at 02:21

## 2017-01-01 RX ADMIN — ASPIRIN 81 MG 81 MG: 81 TABLET ORAL at 16:16

## 2017-01-01 RX ADMIN — CLOPIDOGREL BISULFATE 75 MG: 75 TABLET, FILM COATED ORAL at 14:13

## 2017-01-01 RX ADMIN — ACETAMINOPHEN 975 MG: 650 SUPPOSITORY RECTAL at 05:11

## 2017-01-01 RX ADMIN — INSULIN LISPRO 2 UNITS: 100 INJECTION, SOLUTION INTRAVENOUS; SUBCUTANEOUS at 12:27

## 2017-01-01 RX ADMIN — FAMOTIDINE 20 MG: 20 TABLET ORAL at 11:02

## 2017-01-01 RX ADMIN — METOPROLOL TARTRATE 50 MG: 50 TABLET ORAL at 22:37

## 2017-01-01 RX ADMIN — INSULIN LISPRO 2 UNITS: 100 INJECTION, SOLUTION INTRAVENOUS; SUBCUTANEOUS at 13:20

## 2017-01-01 RX ADMIN — INSULIN HUMAN 20 UNITS: 100 INJECTION, SUSPENSION SUBCUTANEOUS at 16:42

## 2017-01-01 RX ADMIN — HYDROMORPHONE HYDROCHLORIDE 0.2 MG: 1 INJECTION, SOLUTION INTRAMUSCULAR; INTRAVENOUS; SUBCUTANEOUS at 03:48

## 2017-02-28 NOTE — PROGRESS NOTES
2/28/2017 3:30 PM      Subjective: Verena Avilez is here for f/u visit. Now c/o right LE claudication. Limiting. Gage class 3-4. Left left symptoms has completely resolved since undergoing intervention. She denies other symptoms. Visit Vitals    /60 (BP 1 Location: Left arm, BP Patient Position: Sitting)    Pulse 75    Resp 18    Ht 5' 2\" (1.575 m)    Wt 151 lb 6.4 oz (68.7 kg)    SpO2 96%    BMI 27.69 kg/m2     Current Outpatient Prescriptions   Medication Sig    predniSONE (DELTASONE) 5 mg tablet Take 5 mg by mouth daily.  HYDROcodone-acetaminophen (NORCO) 5-325 mg per tablet Take 1 Tab by mouth every four (4) hours as needed for Pain. Max Daily Amount: 6 Tabs.  albuterol-ipratropium (DUO-NEB) 2.5 mg-0.5 mg/3 ml nebu 3 mL by Nebulization route every six (6) hours as needed.  insulin lispro (HUMALOG) 100 unit/mL injection 5 Units by SubCUTAneous route Before breakfast, lunch, and dinner.  insulin NPH (NOVOLIN N, HUMULIN N) 100 unit/mL injection 20 Units by SubCUTAneous route two (2) times daily (with meals).  Insulin Syringe-Needle U-100 0.5 mL 29 gauge x 1/2\" syrg 1 Each by Does Not Apply route five (5) times daily.  alcohol swabs (ALCOHOL PADS) padm 1 Each by Apply Externally route Multiple.  albuterol (PROVENTIL HFA, VENTOLIN HFA, PROAIR HFA) 90 mcg/actuation inhaler Take 2 Puffs by inhalation every four (4) hours as needed for Wheezing.  clopidogrel (PLAVIX) 75 mg tablet Take 1 Tab by mouth daily.  metoprolol tartrate (LOPRESSOR) 50 mg tablet Take 50 mg by mouth.  acetaminophen (TYLENOL) 325 mg tablet Take 650 mg by mouth every six (6) hours as needed for Pain or Fever (Temp >100.5).  budesonide-formoterol (SYMBICORT) 160-4.5 mcg/actuation HFA inhaler Take 2 Puffs by inhalation two (2) times a day. Patient takes at 0500 and 2100    roflumilast (DALIRESP) tab tablet Take 500 mcg by mouth daily.     atorvastatin (LIPITOR) 10 mg tablet Take 10 mg by mouth nightly.  cholecalciferol, vitamin D3, (VITAMIN D3) 2,000 unit tab Take 1 Tab by mouth daily.  tiotropium (SPIRIVA WITH HANDIHALER) 18 mcg inhalation capsule Take 1 Cap by inhalation daily.  aspirin 81 mg tablet Take 81 mg by mouth every morning. Indications: MYOCARDIAL INFARCTION PREVENTION    aluminum-magnesium hydroxide 200-200 mg/5 mL susp 30 mL, diphenhydrAMINE 12.5 mg/5 mL elix 75 mg, lidocaine 2 % soln 30 mL, sucralfate 100 mg/mL susp 3 g oral solution (compounded) Take 5 mL by mouth Before breakfast, lunch, and dinner.  calcium acetate (PHOSLO) 667 mg cap Take 2 Caps by mouth three (3) times daily (with meals).  senna (SENOKOT) 8.6 mg tablet Take 2 Tabs by mouth nightly. No current facility-administered medications for this visit. Objective:      Visit Vitals    /60 (BP 1 Location: Left arm, BP Patient Position: Sitting)    Pulse 75    Resp 18    Ht 5' 2\" (1.575 m)    Wt 151 lb 6.4 oz (68.7 kg)    SpO2 96%    BMI 27.69 kg/m2       Data Review:     Reviewed and/or ordered active problem list, medication list tests    Past Medical History:   Diagnosis Date    Acute on chronic diastolic CHF (congestive heart failure), NYHA class 2 ([facility] 2/19/2015    Adrenal mass, left (HCC)     2.4 Cm om Feb 2010    Anemia associated with chronic renal failure     Arthritis     right knee    Asthma     CKD (chronic kidney disease), stage IV (HCC)     ying,[facility]    COPD     Diabetes ([facility] dx 1997~    type II    ESRD (end stage renal disease) on dialysis (HCC)     Generalized social phobia     HTN (hypertension)     Hypercholesteremia     Hyperkalemia     Nausea & vomiting 11/11/2013    Other ill-defined conditions(799.89)     fistula, R upper arm.     Pneumonia 12/10/2014    left lower lobe with sepsis    Presence of biventricular AICD 4/22/2016 4/20/16 Aisle50 biventricular AICD implant  Renal calculus or stone     small Lt by US 3/2012.  S/P AV lv ablation 4/22/2016 4/20/16    Smokers' cough ([address]       Past Surgical History:   Procedure Laterality Date    COLONOSCOPY,DIAGNOSTIC  3/28/2016         HX HEENT      Lasik eye surgery    HX OPEN CHOLECYSTECTOMY  ~ 1985    HX OTHER SURGICAL      adrenal mass removed    HX SHOULDER ARTHROSCOPY  2008    removal bone spur left shoulder    KNEE SCOPE,CLEAN/DRAIN      UPPER GI ENDOSCOPY,DIAGNOSIS  3/28/2016         VASCULAR SURGERY PROCEDURE UNLIST      dialysis in right arm     Allergies   Allergen Reactions    Grapefruit Hives    Orange Juice Hives    Peach (Prunus Persica) Hives      Family History   Problem Relation Age of Onset    Diabetes Mother     Heart Disease Mother     Hypertension Mother     High Cholesterol Mother     Arthritis-osteo Mother     Cancer Father      Lung    Arthritis-osteo Father     Cancer Brother      x2 - lung cancer      Social History     Social History    Marital status:      Spouse name: N/A    Number of children: N/A    Years of education: N/A     Occupational History     Retired     Social History Main Topics    Smoking status: Former Smoker     Packs/day: 2.00     Years: 48.00     Types: Cigarettes     Quit date: 1/3/2012    Smokeless tobacco: Never Used    Alcohol use No    Drug use: No    Sexual activity: Not on file     Other Topics Concern    Not on file     Social History Narrative         Review of Systems     General: Not Present- Anorexia, Chills, Dietary Changes, Fatigue, Fever, Medication Changes, Night Sweats, Weight Gain > 10lbs. and Weight Loss > 10lbs. .  Skin: Not Present- Bruising and Excessive Sweating. HEENT: Not Present- Headache, Visual Loss and Vertigo. Respiratory: Not Present- Cough, Decreased Exercise Tolerance, Difficulty Breathing, Snoring and Wheezing.   Cardiovascular: Not Present- Abnormal Blood Pressure, Chest Pain, Difficulty Breathing On Exertion, Edema, Fainting / Blacking Out, Irregular Heart Beat, Orthopnea, Palpitations, Paroxysmal Nocturnal Dyspnea, Rapid Heart Rate, Shortness of Breath and Swelling of Extremities. Gastrointestinal: Not Present- Black, Tarry Stool, Bloody Stool, Diarrhea, Hematemesis, Rectal Bleeding and Vomiting. Musculoskeletal: Not Present- Muscle Pain and Muscle Weakness. Neurological: Not Present- Dizziness. Psychiatric: Not Present- Depression. Endocrine: Not Present- Cold Intolerance, Heat Intolerance and Thyroid Problems. Hematology: Not Present- Abnormal Bleeding, Anemia, Blood Clots and Easy Bruising.       Physical Exam   The physical exam findings are as follows:       General   Mental Status - Alert. General Appearance - Not in acute distress. Chest and Lung Exam   Inspection: Accessory muscles - No use of accessory muscles in breathing. Auscultation:   Breath sounds: - Normal.      Cardiovascular   Inspection: Jugular vein - Bilateral - Inspection Normal.  Palpation/Percussion:   Apical Impulse: - Normal.  Auscultation: Rhythm - Regular. Heart Sounds - S1 WNL and S2 WNL. No S3 or S4. Murmurs & Other Heart Sounds: Auscultation of the heart reveals - No Murmurs. Carotid arteries - No Carotid bruit. Peripheral Vascular   Upper Extremity: Inspection - Bilateral - No Cyanotic nailbeds or Digital clubbing. Lower Extremity:   Palpation: Dorsalis pedis pulse - Bilateral - weak. Posterior tibia pulse - Bilateral - weak. Edema - Bilateral - No edema. Assessment:       ICD-10-CM ICD-9-CM    1. PAD (peripheral artery disease) ([facility]) I73.9 443.9 CBC W/O DIFF      METABOLIC PANEL, COMPREHENSIVE      PROTHROMBIN TIME + INR      IR ANGIO EXT LOWER BI   2. ESRD (end stage renal disease) ([facility]) N18.6 585.6 CBC W/O DIFF      METABOLIC PANEL, COMPREHENSIVE      PROTHROMBIN TIME + INR      IR ANGIO EXT LOWER BI   3.  Essential hypertension I10 401.9 CBC W/O DIFF      METABOLIC PANEL, COMPREHENSIVE PROTHROMBIN TIME + INR      IR ANGIO EXT LOWER BI   4. Coronary artery disease involving native coronary artery of native heart without angina pectoris I25.10 414.01 CBC W/O DIFF      METABOLIC PANEL, COMPREHENSIVE      PROTHROMBIN TIME + INR      IR ANGIO EXT LOWER BI   5. PAF (paroxysmal atrial fibrillation) (HCC) I48.0 427.31        Plan:     PAD: s/p left SFA  intervention. Asymptomatic. C/o right LE symptoms. Will proceed with right SFA intervention as previously planned.      PAF with RVR:    S/p AV lv ablation and PPM. Stable.     No BB or CCB due to hypotension  CHADS2 VASc score is very high, but no AC due to bleeding concerns      CAD, Cardiomyopathy: on aspirin and statin. No BB or ACE due to hypotension.       COPD: on home O2. ESRD on HD.

## 2017-03-03 NOTE — TELEPHONE ENCOUNTER
Called pt and left message to call me back. NOTED.  WAS INFORMED I COULD NOT GET A HOLD OF PT BEFORE THE PROCEDURE ON 3/6/17. HE WAS INFORMED ON 3/3/17.

## 2017-03-06 NOTE — PROGRESS NOTES
SHEATH PULL NOTE:    Patient informed of procedure with questions answered with review. Sheath site prepped with Chloraprep swab. *** fr sheath in *** pulled by ***,RN. Hand hold and ***, with manual compression to site. No bleeding, no hematoma, no pain at site. Hemostasis obtained with hand hold/manual compression at site. Patient tolerated well. No change in status. Handhold for {Numbers; 0-50 (by 5):05355} minutes. No change at site. *** dressing applied to site. No bleeding, no hematoma, no pain/discomfort at site. Groin instructions provided with review. Continue to monitor procedure site and patient status. *Advised patient to keep head flat and extremity flat to decrease risk of bleeding. *Recommended that patient not drink for ONE HOUR post sheath pull completion. *Recommended that patient not eat for TWO HOURS post sheath pull completion. *Instructed patient on rationale for delay of PO products to decrease risk for aspiration and if additional treatment to procedure site is required. Patient verbalized understanding of instructions with review.

## 2017-03-06 NOTE — IP AVS SNAPSHOT
[address] [facility] 
[phone] Patient: Talon Keith MRN: [id number] UED:[birth year] You are allergic to the following Allergen Reactions Grapefruit Hives Orange Juice Hives Peach (Prunus Persica) Hives Recent Documentation Height Weight Breastfeeding? BMI OB Status Smoking Status 1.575 m 68.5 kg No 27.62 kg/m2 Postmenopausal Former Smoker Emergency Contacts Name Discharge Info Relation Home Work Mobile Stephanie Rasmussen DISCHARGE CAREGIVER [3] Other Relative [6] [phone] [facility] CAREGIVER [3] Child [2] [id number] Maria D Rasmussen  Other Relative [6] [phone] About your hospitalization You were admitted on:  March 6, 2017 You last received care in the:  [facility] 2 [facility] You were discharged on:  March 7, 2017 Unit phone number:  [phone] Why you were hospitalized Your primary diagnosis was:  Not on File Your diagnoses also included:  Pad (Peripheral Artery Disease) (Hcc) Providers Seen During Your Hospitalizations Provider Role Specialty Primary office phone Benson Aly MD Attending Provider Cardiology [phone] Your Primary Care Physician (PCP) Primary Care Physician Office Phone Office Fax Jim Fu [phone] Follow-up Information Follow up With Details Comments Contact Info Li Hager MD   [place] [address] 
[phone] Benson Aly MD Schedule an appointment as soon as possible for a visit in 2 weeks at vascular clinic  [address] [place] [address] 
[phone] Current Discharge Medication List  
  
CONTINUE these medications which have CHANGED Dose & Instructions Dispensing Information Comments Morning Noon Evening Bedtime  
 metoprolol tartrate 50 mg tablet Commonly known as:  LOPRESSOR What changed:  Another medication with the same name was removed. Continue taking this medication, and follow the directions you see here. Your next dose is: Today, Tomorrow Other:  _________ Take  by mouth two (2) times a day. Refills:  0 CONTINUE these medications which have NOT CHANGED Dose & Instructions Dispensing Information Comments Morning Noon Evening Bedtime  
 acetaminophen 325 mg tablet Commonly known as:  TYLENOL Your next dose is: Today, Tomorrow Other:  _________ Dose:  650 mg Take 650 mg by mouth every six (6) hours as needed for Pain or Fever (Temp >100.5). Refills:  0  
     
   
   
   
  
 albuterol 90 mcg/actuation inhaler Commonly known as:  PROVENTIL HFA, VENTOLIN HFA, PROAIR HFA Your next dose is: Today, Tomorrow Other:  _________ Dose:  2 Puff Take 2 Puffs by inhalation every four (4) hours as needed for Wheezing. Quantity:  1 Inhaler Refills:  0  
     
   
   
   
  
 albuterol-ipratropium 2.5 mg-0.5 mg/3 ml Nebu Commonly known as:  Ryan Angelo Your next dose is: Today, Tomorrow Other:  _________ Dose:  3 mL  
3 mL by Nebulization route every six (6) hours as needed. Quantity:  100 Nebule Refills:  1  
     
   
   
   
  
 alcohol swabs Padm Commonly known as:  ALCOHOL PADS Your next dose is: Today, Tomorrow Other:  _________ Dose:  1 Each  
1 Each by Apply Externally route Multiple. Quantity:  100 Pad Refills:  1  
     
   
   
   
  
 aluminum-magnesium hydroxide 200-200 mg/5 mL susp 30 mL, diphenhydrAMINE 12.5 mg/5 mL elix 75 mg, lidocaine 2 % soln 30 mL, sucralfate 100 mg/mL susp 3 g oral solution (compounded) Your next dose is: Today, Tomorrow Other:  _________ Dose:  5 mL Take 5 mL by mouth Before breakfast, lunch, and dinner. Quantity:  100 mL Refills:  0  
     
   
   
   
  
 aspirin 81 mg tablet Your next dose is: Today, Tomorrow Other:  _________ Dose:  81 mg Take 81 mg by mouth every morning. Indications: MYOCARDIAL INFARCTION PREVENTION Refills:  0  
     
   
   
   
  
 calcium acetate 667 mg Cap Commonly known as:  PHOSLO Your next dose is: Today, Tomorrow Other:  _________ Dose:  2 Cap Take 2 Caps by mouth three (3) times daily (with meals). Refills:  0  
     
   
   
   
  
 clopidogrel 75 mg Tab Commonly known as:  PLAVIX Your next dose is: Today, Tomorrow Other:  _________ Dose:  75 mg Take 1 Tab by mouth daily. Quantity:  30 Tab Refills:  2 DALIRESP Tab tablet Generic drug:  roflumilast  
   
Your next dose is: Today, Tomorrow Other:  _________ Dose:  500 mcg Take 500 mcg by mouth daily. Refills:  0 HYDROcodone-acetaminophen 5-325 mg per tablet Commonly known as:  Michelle President Your next dose is: Today, Tomorrow Other:  _________ Dose:  1 Tab Take 1 Tab by mouth every four (4) hours as needed for Pain. Max Daily Amount: 6 Tabs. Quantity:  30 Tab Refills:  0  
     
   
   
   
  
 insulin lispro 100 unit/mL injection Commonly known as:  HUMALOG Your next dose is: Today, Tomorrow Other:  _________ Dose:  5 Units 5 Units by SubCUTAneous route Before breakfast, lunch, and dinner. Quantity:  1 Vial  
Refills:  1  
     
   
   
   
  
 insulin  unit/mL injection Commonly known as:  Chanel Gains Your next dose is: Today, Tomorrow Other:  _________ Dose:  20 Units 20 Units by SubCUTAneous route two (2) times daily (with meals). Quantity:  1 Vial  
Refills:  1 Insulin Syringe-Needle U-100 0.5 mL 29 gauge x 1/2\" Syrg Your next dose is: Today, Tomorrow Other:  _________ Dose:  1 Each  
1 Each by Does Not Apply route five (5) times daily. Quantity:  150 Syringe Refills:  1 LIPITOR 10 mg tablet Generic drug:  atorvastatin Your next dose is: Today, Tomorrow Other:  _________ Dose:  10 mg Take 10 mg by mouth nightly. Refills:  0  
     
   
   
   
  
 predniSONE 5 mg tablet Commonly known as:  Shade Ok Your next dose is: Today, Tomorrow Other:  _________ Dose:  5 mg Take 5 mg by mouth daily. Refills:  0 SPIRIVA WITH HANDIHALER 18 mcg inhalation capsule Generic drug:  tiotropium Your next dose is: Today, Tomorrow Other:  _________ Dose:  1 Cap Take 1 Cap by inhalation daily. Refills:  0  
     
   
   
   
  
 SYMBICORT 160-4.5 mcg/actuation HFA inhaler Generic drug:  budesonide-formoterol Your next dose is: Today, Tomorrow Other:  _________ Dose:  2 Puff Take 2 Puffs by inhalation two (2) times a day. Patient takes at 0500 and 2100 Refills:  0  
     
   
   
   
  
 VITAMIN D3 2,000 unit Tab Generic drug:  cholecalciferol (vitamin D3) Your next dose is: Today, Tomorrow Other:  _________ Dose:  1 Tab Take 1 Tab by mouth daily. Refills:  0 Discharge Instructions [id number] [place] [phone] CARDIOLOGY DISCHARGE INSTRUCTIONS Patient ID: Kaz Aranda [id number] 
89 y.o. 
1945 Admit Date: 3/6/2017 Discharge Date: 3/7/2017 Admitting Physician: Bryanna Sherman MD  
 
Discharge Physician: Dr. Jersey Alcantraa Admission Diagnoses:  
PAD [facility]peripheral artery disease) ([facility] [I73.9] ESRD (end stage renal disease) ([facility] [N18.6] Essential hypertension [I10] Coronary artery disease involving native coronary artery of native heart without angina pectoris [I25.10] Discharge Diagnoses: Active Problems: 
  PAD (peripheral artery disease) ([address] (10/4/2016) Overview: R SFA intervention 3/6/17 Discharge Condition: Good Cardiology Procedures this Admission:  R SFA Disposition: home Reference discharge instructions provided by nursing for diet and activity. Signed: 
Mounika Louis NP 
3/7/2017 
9:10 AM 
PERIPHERAL CATHETERIZATION/PCI DISCHARGE INSTRUCTIONS It is normal to feel tired the first couple days. Take it easy and follow the physicians instructions. CHECK THE CATHETER INSERTION SITE DAILY: 
 
You may shower 24 hours after the procedure, remove the bandage during showering. Wash with soap and water and pat dry. Gentle cleaning of the site with soap and water is sufficient, cover with a dry clean dressing or bandage. Do not apply creams or powders to the area. Do not sit in a bathtub or pool of water for 7 days or until wound has completely healed. Temporary bruising and discomfort is normal and may last a few weeks. You may have a  formation of a small lump at the site which may last up to 6 weeks. CALL THE PHYSICIANS: 
 
If the site becomes red, swollen or feels warm to the touch If there is bleeding or drainage or if there is unusual pain at the groin or down the leg. If there is any bleeding, lie down, apply pressure or have someone apply pressure with a clean cloth until the bleeding stops. If the bleeding continues, call 911 to be transported to the hospital. 
DO  [place] [address] ACTIVITY: 
 
For the first 24-48 hours or as instructed by the physician: No lifting, pushing or pulling over 10 pounds and no straining the insertion site.  Do not life grocery bags or the garbage can, do not run the vacuum  or  for 7 days. Start with short walks as in the hospital and gradually increase as tolerated each day. It is recommended to walk 30 minutes 5-7 days per week. Follow your physicians instructions on activity. Avoid walking outside in extremes of heat or cold. Walk inside when it is cold and windy or hot and humid. Things to keep in mind: 
No driving for at least 24 hours, or as designated by your physician. Limit the number of times you go up and down the stairs Take rests and pace yourself with activity. Be careful and do not strain with bowel movements. MEDICATIONS: 
 
Take all medications as prescribed Call your physician if you have any questions Keep an updated list of your medications with you at all times and give a list to your physician and pharmacist 
 
 
AFTER CARE: 
 
Follow up with your physician as instructed. Follow a heart healthy diet with proper portion control, daily stress management, daily exercise, blood pressure and cholesterol control , and smoking cessation. Discharge Orders None Introducing hospitals & HEALTH SERVICES! Rosie Stone introduces [employer] patient portal. Now you can access parts of your medical record, email your doctor's office, and request medication refills online. 1. In your internet browser, go to [email or web address] 2. Click on the First Time User? Click Here link in the Sign In box. You will see the New Member Sign Up page. 3. Enter your [employer] Access Code exactly as it appears below. You will not need to use this code after youve completed the sign-up process. If you do not sign up before the expiration date, you must request a new code. · [employer] Access Code: [email or web address] Expires: 3/13/2017  7:13 PM 
 
4. Enter the last four digits of your Social Security Number (xxxx) and Date of Birth (mm/dd/yyyy) as indicated and click Submit.  You will be taken to the next sign-up page. 5. Create a [employer] ID. This will be your [employer] login ID and cannot be changed, so think of one that is secure and easy to remember. 6. Create a [employer] password. You can change your password at any time. 7. Enter your Password Reset Question and Answer. This can be used at a later time if you forget your password. 8. Enter your e-mail address. You will receive e-mail notification when new information is available in [address] 9. Click Sign Up. You can now view and download portions of your medical record. 10. Click the Download Summary menu link to download a portable copy of your medical information. If you have questions, please visit the Frequently Asked Questions section of the [employer] website. Remember, [employer] is NOT to be used for urgent needs. For medical emergencies, dial 911. Now available from your iPhone and Android! General Information Please provide this summary of care documentation to your next provider. Patient Signature:  ____________________________________________________________ Date:  ____________________________________________________________  
  
Zahcariah Porras Provider Signature:  ____________________________________________________________ Date:  ____________________________________________________________

## 2017-03-06 NOTE — PROGRESS NOTES
TRANSFER - IN REPORT:    Verbal report received from Shonda Ross RN(name) on Mariza Junior  being received from CCL(unit) for routine progression of care      Report consisted of patients Situation, Background, Assessment and   Recommendations(SBAR). Information from the following report(s) Procedure Summary and MAR was reviewed with the receiving nurse. Opportunity for questions and clarification was provided. Assessment completed upon patients arrival to unit and care assumed.

## 2017-03-06 NOTE — PROGRESS NOTES
Renal-pt seen/examined. SP cath by dr. Maranda Pena. For HD in am and dc. Will arrange, Saleem Torres aware.   Sean Zhou MD

## 2017-03-07 NOTE — DISCHARGE INSTRUCTIONS
[address] [place] [address]  [phone]      CARDIOLOGY DISCHARGE INSTRUCTIONS      Patient ID:  Lisette Holter  [id number]  66 y.o.  1945    Admit Date: 3/6/2017    Discharge Date: 3/7/2017     Admitting Physician: Trang Martinez MD     Discharge Physician: Dr. Abida Coyle     Admission Diagnoses:   PAD (peripheral artery disease) ([facility] [I73.9]  ESRD (end stage renal disease) ([facility] [N18.6]  Essential hypertension [I10]  Coronary artery disease involving native coronary artery of native heart without angina pectoris [I25.10]    Discharge Diagnoses: Active Problems:    PAD (peripheral artery disease) ([facility] (10/4/2016)      Overview: R SFA intervention 3/6/17         Discharge Condition: Good    Cardiology Procedures this Admission:  R SFA    Disposition: home    Reference discharge instructions provided by nursing for diet and activity. Signed:  Shea Ritter NP  3/7/2017  9:10 AM  PERIPHERAL CATHETERIZATION/PCI DISCHARGE INSTRUCTIONS    It is normal to feel tired the first couple days. Take it easy and follow the physicians instructions. CHECK THE CATHETER INSERTION SITE DAILY:    You may shower 24 hours after the procedure, remove the bandage during showering. Wash with soap and water and pat dry. Gentle cleaning of the site with soap and water is sufficient, cover with a dry clean dressing or bandage. Do not apply creams or powders to the area. Do not sit in a bathtub or pool of water for 7 days or until wound has completely healed. Temporary bruising and discomfort is normal and may last a few weeks. You may have a  formation of a small lump at the site which may last up to 6 weeks. CALL THE PHYSICIANS:    If the site becomes red, swollen or feels warm to the touch  If there is bleeding or drainage or if there is unusual pain at the groin or down the leg.   If there is any bleeding, lie down, apply pressure or have someone apply pressure with a clean cloth until the bleeding stops. If the bleeding continues, call 911 to be transported to the hospital.  DO NOT DRIVE YOURSELF, [address]. ACTIVITY:    For the first 24-48 hours or as instructed by the physician:  No lifting, pushing or pulling over 10 pounds and no straining the insertion site. Do not life grocery bags or the garbage can, do not run the vacuum  or  for 7 days. Start with short walks as in the hospital and gradually increase as tolerated each day. It is recommended to walk 30 minutes 5-7 days per week. Follow your physicians instructions on activity. Avoid walking outside in extremes of heat or cold. Walk inside when it is cold and windy or hot and humid. Things to keep in mind:  No driving for at least 24 hours, or as designated by your physician. Limit the number of times you go up and down the stairs  Take rests and pace yourself with activity. Be careful and do not strain with bowel movements. MEDICATIONS:    Take all medications as prescribed  Call your physician if you have any questions  Keep an updated list of your medications with you at all times and give a list to your physician and pharmacist      AFTER CARE:    Follow up with your physician as instructed. Follow a heart healthy diet with proper portion control, daily stress management, daily exercise, blood pressure and cholesterol control , and smoking cessation.

## 2017-03-07 NOTE — PROGRESS NOTES
oob and ambulating with cane and no assist. Tolerated well. Cath site c/d/i no bleeding or hematoma. Dc paperwork reviewed with patient   Aware of fu apts, medications including side effects.  No further questions

## 2017-03-07 NOTE — DIALYSIS
[facility]                         [phone]  Vitals Pre Post Assessment Pre Post   /81 117/45 LOC Alert and oriented x 3 Alert and oriented x 3   HR 81 75 Lungs Diminished Diminished   Temp 98.8 97.8 Cardiac Regular Regular   Resp 16 16 Skin Warm and dry Warm and dry   Weight 69.5 kg -2.8 kg Edema None None      Pain Denies C/o of Neck pain ( Primary nurse aware)     Orders   Duration: Start: 1024 End: 1325 Total: 3 hrs   Dialyzer: Revaclear   K Bath: 3, changed to 2k+ d/t k+ of 6.2. Dr. Daniel Espinoza aware. Ca Bath: 2.5   Na / Bicarb: 140/35   Target Fluid Removal: 3000 ml     Access   Type & Location: Right upper arm AVF. Comments:    Cannulated with 15 gauge needles x 2. Good bruit and thrill. Labs   Hep B status / date: HBsAB 68 as of 1/13/17 ( From Clinic)   Obtained/Reviewed  Critical Results Called Reviewed  K+ of 6.2. MD aware. Meds Given   Name Dose Route   None ordered                 Total Liters Process: 66.8   Net Fluid Removed: 2800 ml      Comments   Time Out Done: Yes, 1024   Primary Nurse Rpt Pre: [facility], RN   Primary Nurse Rpt Post: [facility], RN   Pt Education: Procedural   Care Plan: Continue with HD as ordered. Monitor K+. Tx Summary: Pt seen by Dr. Daniel Espinoza on initiation of HD. Pt tolerated HD and fluid removal well. VSS. No c/o voiced. With 8 mts left on the machine, pt c/o of getting cramps in her hands. Cut the UF off. All possible blood given back post HD. Sites held until hemostasis achieved, site covered with guaze and dressing. Pt transported back to room in no distress.

## 2017-03-07 NOTE — PROGRESS NOTES
Pharmacist Discharge Medication Reconciliation    Significant PMH:   Past Medical History:   Diagnosis Date    Acute on chronic diastolic CHF (congestive heart failure), NYHA class 2 ([facility] 2/19/2015    Adrenal mass, left (HCC)     2.4 Cm om Feb 2010    Anemia associated with chronic renal failure     Arthritis     right knee    Asthma     CKD (chronic kidney disease), stage IV (HCC)     ying,dialysis tujones,thur sat  [facility]    COPD     Diabetes ([facility] dx 1997~    type II    ESRD (end stage renal disease) on dialysis (HCC)     Generalized social phobia     HTN (hypertension)     Hypercholesteremia     Hyperkalemia     Nausea & vomiting 11/11/2013    Other ill-defined conditions(799.89)     fistula, R upper arm.  Pneumonia 12/10/2014    left lower lobe with sepsis    Presence of biventricular AICD 4/22/2016 4/20/16 Zykis biventricular AICD implant    Renal calculus or stone     small Lt by US 3/2012.  S/P AV lv ablation 4/22/2016 4/20/16    Smokers' cough [facility])      Chief Complaint for this Admission: No chief complaint on file. Allergies: Grapefruit; Orange juice; and Peach (prunus persica)    Discharge Medications:   Current Discharge Medication List        CONTINUE these medications which have NOT CHANGED    Details   metoprolol tartrate (LOPRESSOR) 50 mg tablet Take  by mouth two (2) times a day. predniSONE (DELTASONE) 5 mg tablet Take 5 mg by mouth daily. HYDROcodone-acetaminophen (NORCO) 5-325 mg per tablet Take 1 Tab by mouth every four (4) hours as needed for Pain. Max Daily Amount: 6 Tabs. Qty: 30 Tab, Refills: 0      albuterol-ipratropium (DUO-NEB) 2.5 mg-0.5 mg/3 ml nebu 3 mL by Nebulization route every six (6) hours as needed. Qty: 100 Nebule, Refills: 1      insulin NPH (NOVOLIN N, HUMULIN N) 100 unit/mL injection 20 Units by SubCUTAneous route two (2) times daily (with meals).   Qty: 1 Vial, Refills: 1      albuterol (PROVENTIL HFA, VENTOLIN HFA, PROAIR HFA) 90 mcg/actuation inhaler Take 2 Puffs by inhalation every four (4) hours as needed for Wheezing. Qty: 1 Inhaler, Refills: 0      clopidogrel (PLAVIX) 75 mg tablet Take 1 Tab by mouth daily. Qty: 30 Tab, Refills: 2      acetaminophen (TYLENOL) 325 mg tablet Take 650 mg by mouth every six (6) hours as needed for Pain or Fever (Temp >100.5). budesonide-formoterol (SYMBICORT) 160-4.5 mcg/actuation HFA inhaler Take 2 Puffs by inhalation two (2) times a day. Patient takes at 0500 and 2100      roflumilast (DALIRESP) tab tablet Take 500 mcg by mouth daily. atorvastatin (LIPITOR) 10 mg tablet Take 10 mg by mouth nightly. cholecalciferol, vitamin D3, (VITAMIN D3) 2,000 unit tab Take 1 Tab by mouth daily. tiotropium (SPIRIVA WITH HANDIHALER) 18 mcg inhalation capsule Take 1 Cap by inhalation daily. aspirin 81 mg tablet Take 81 mg by mouth every morning. Indications: MYOCARDIAL INFARCTION PREVENTION      insulin lispro (HUMALOG) 100 unit/mL injection 5 Units by SubCUTAneous route Before breakfast, lunch, and dinner. Qty: 1 Vial, Refills: 1      aluminum-magnesium hydroxide 200-200 mg/5 mL susp 30 mL, diphenhydrAMINE 12.5 mg/5 mL elix 75 mg, lidocaine 2 % soln 30 mL, sucralfate 100 mg/mL susp 3 g oral solution (compounded) Take 5 mL by mouth Before breakfast, lunch, and dinner. Qty: 100 mL, Refills: 0      Insulin Syringe-Needle U-100 0.5 mL 29 gauge x 1/2\" syrg 1 Each by Does Not Apply route five (5) times daily. Qty: 150 Syringe, Refills: 1      alcohol swabs (ALCOHOL PADS) padm 1 Each by Apply Externally route Multiple. Qty: 100 Pad, Refills: 1      calcium acetate (PHOSLO) 667 mg cap Take 2 Caps by mouth three (3) times daily (with meals). The patient's chart, MAR and AVS were reviewed by Fredia Hodgkins, RPH.     Discharging Provider: Lisandra Bernard  Recommendations/Clarifications: added PTA and current Metoprolol tartrate 50mg PO BID to discharge list since the dosing was missing from the discharge list (only med name provided)    Additional Comments:   Time spent: 20 min    Thank You,     Mona Loomis, [facility]

## 2017-03-07 NOTE — PROCEDURES
Marcelo Richards, [facility]   CORONARY ANGIOPLASTY       Name:  Lisette Man   MR#:  [id number]   :  1945   Account #:  [de-identified]        Date of Adm:  2017       PROCEDURES PERFORMED:   1. Third order catheter placement from left common femoral artery   access site into right common femoral artery. 2. Ultrasound-guided femoral artery access. 3. Right superficial femoral artery excision atherectomy and followed   by drug-coated balloon dilatation. INDICATIONS: Right lower extremity claudication, [place] class 3-  4. BRIEF PROCEDURE NOTE: The left groin was prepped and draped. The left femoral artery access was obtained using a modified Seldinger   technique and ultrasound guidance. A 5-[place]an femoral artery sheath   was placed. After which a 5-[place] RIM catheter and a Supra Core   wire were used to cross into the contralateral right SFA. Afterwards, a   7-[place]an, 45 cm Destination sheath was placed into the right common   femoral artery. The patient was given IV heparin for anticoagulation. The right SFA lesion was crossed using the long P[place]ater wire. Afterwards a #5 Spider filter was placed into the right popliteal artery. An LXC SilverHawk atherectomy catheter was used for excisional   atherectomy for right SFA. A significant amount of plaque was   removed. Predilatation was performed using 5 x 150 EverCross   balloon. Afterwards, a drug-coated balloon dilatations were performed   using two 6 x 150 and one 6 x 60 Lutonix drug-coated balloons at 7 mmHg   balloon inflations, 2 minutes each, were performed across the SFA. Final angiogram was performed which revealed excellent angiographic   result. No evidence of any flow-limiting dissection. Pre-procedure   stenosis was 80% . Post-residual stenosis   revealed less than 10% with excellent distal flow. No evidence of any   distal embolization.  7-[place]an Destination sheath was replaced by a short   8-Mongolian sheath. The IV heparin was reversed using protamine. CONCLUSION: Successful excisional atherectomy and balloon   angioplasty of right superficial femoral artery. Preprocedure stenosis of   80%. Postprocedure residual was less than 10%. COMPLICATIONS: None. ESTIMATED BLOOD LOSS: Minimal.     SPECIMENS REMOVED: None. ANESTHESIA: IV conscious sedation and local anesthesia.          Shayy Sepulveda MD      [facility] / [place] [place]   D:  03/06/2017   14:30   T:  03/07/2017   11:55   Job #:  [id number]

## 2017-03-07 NOTE — PROGRESS NOTES
3/7/2017 8:20 AM    Admit Date: 3/6/2017    Admit Diagnosis: PAD (peripheral artery disease) (HCC) [I73.9];ESRD (end sta*    Subjective: Ramila Juarez feels much better in right leg now. No more discomfort. Visit Vitals    /59    Pulse 75    Temp 98.5 °F (36.9 °C)    Resp 18    Ht 5' 2\" (1.575 m)    Wt 151 lb (68.5 kg)    SpO2 98%    Breastfeeding No    BMI 27.62 kg/m2     Current Facility-Administered Medications   Medication Dose Route Frequency    sodium chloride (NS) flush 5-10 mL  5-10 mL IntraVENous Q8H    sodium chloride (NS) flush 5-10 mL  5-10 mL IntraVENous PRN    acetaminophen (TYLENOL) tablet 650 mg  650 mg Oral Q6H PRN    albuterol (PROVENTIL HFA, VENTOLIN HFA, PROAIR HFA) inhaler 2 Puff  2 Puff Inhalation Q4H PRN    albuterol-ipratropium (DUO-NEB) 2.5 MG-0.5 MG/3 ML  3 mL Nebulization Q6H PRN    magic mouthwash cpd (with sucralfate)  5 mL Oral TIDAC    aspirin delayed-release tablet 81 mg  81 mg Oral 7am    atorvastatin (LIPITOR) tablet 10 mg  10 mg Oral QHS    fluticasone-vilanterol (BREO ELLIPTA) 100mcg-25mcg/puff  1 Puff Inhalation DAILY    clopidogrel (PLAVIX) tablet 75 mg  75 mg Oral DAILY    HYDROcodone-acetaminophen (NORCO) 5-325 mg per tablet 1 Tab  1 Tab Oral Q4H PRN    insulin lispro (HUMALOG) injection 5 Units  5 Units SubCUTAneous TIDAC    insulin NPH (NOVOLIN N, HUMULIN N) injection 20 Units  20 Units SubCUTAneous BID WITH MEALS    metoprolol tartrate (LOPRESSOR) tablet 50 mg  50 mg Oral BID    predniSONE (DELTASONE) tablet 5 mg  5 mg Oral DAILY    roflumilast (DALIRESP) tablet 500 mcg  500 mcg Oral DAILY    umeclidinium (INCRUSE ELLIPTA) 62.5 mcg/actuation  1 Puff Inhalation DAILY         Objective:      Visit Vitals    /59    Pulse 75    Temp 98.5 °F (36.9 °C)    Resp 18    Ht 5' 2\" (1.575 m)    Wt 151 lb (68.5 kg)    SpO2 98%    Breastfeeding No    BMI 27.62 kg/m2       Physical Exam:  Abdomen: soft, non-tender. Bowel sounds normal.   Extremities: no cyanosis or edema  Heart: regular rate and rhythm, S1, S2 normal, no murmur, click, rub or gallop  Lungs: clear to auscultation bilaterally  Neurologic: Grossly normal    Data Review:   Labs:    Recent Results (from the past 24 hour(s))   GLUCOSE, POC    Collection Time: 03/06/17 11:43 AM   Result Value Ref Range    Glucose (POC) 187 (H) 65 - 100 mg/dL    Performed by Gunnar Cardoso (NOELV)*    POC ACTIVATED CLOTTING TIME    Collection Time: 03/06/17  2:45 PM   Result Value Ref Range    Activated Clotting Time (POC) 173 (H) 79 - 138 SECS   POC ACTIVATED CLOTTING TIME    Collection Time: 03/06/17  2:52 PM   Result Value Ref Range    Activated Clotting Time (POC) 162 (H) 79 - 138 SECS   GLUCOSE, POC    Collection Time: 03/06/17  5:53 PM   Result Value Ref Range    Glucose (POC) 146 (H) 65 - 100 mg/dL    Performed by Deja Xavier    GLUCOSE, POC    Collection Time: 03/06/17 10:50 PM   Result Value Ref Range    Glucose (POC) 98 65 - 100 mg/dL    Performed by Deja Xavier    METABOLIC PANEL, BASIC    Collection Time: 03/07/17  4:59 AM   Result Value Ref Range    Sodium 136 136 - 145 mmol/L    Potassium 6.2 (H) 3.5 - 5.1 mmol/L    Chloride 100 97 - 108 mmol/L    CO2 21 21 - 32 mmol/L    Anion gap 15 5 - 15 mmol/L    Glucose 122 (H) 65 - 100 mg/dL    BUN 66 (H) 6 - 20 MG/DL    Creatinine 7.60 (H) 0.55 - 1.02 MG/DL    BUN/Creatinine ratio 9 (L) 12 - 20      GFR est AA 6 (L) >60 ml/min/1.73m2    GFR est non-AA 5 (L) >60 ml/min/1.73m2    Calcium 8.1 (L) 8.5 - 10.1 MG/DL   MAGNESIUM    Collection Time: 03/07/17  4:59 AM   Result Value Ref Range    Magnesium 2.4 1.6 - 2.4 mg/dL   PHOSPHORUS    Collection Time: 03/07/17  4:59 AM   Result Value Ref Range    Phosphorus 8.1 (H) 2.6 - 4.7 MG/DL   CBC W/O DIFF    Collection Time: 03/07/17  4:59 AM   Result Value Ref Range    WBC 10.8 3.6 - 11.0 K/uL    RBC 3.82 3.80 - 5.20 M/uL    HGB 11.9 11.5 - 16.0 g/dL    HCT 35.9 35.0 - 47.0 %    MCV 94.0 80.0 - 99.0 FL    MCH 31.2 26.0 - 34.0 PG    MCHC 33.1 30.0 - 36.5 g/dL    RDW 15.8 (H) 11.5 - 14.5 %    PLATELET 652 984 - 385 K/uL   GLUCOSE, POC    Collection Time: 03/07/17  7:35 AM   Result Value Ref Range    Glucose (POC) 116 (H) 65 - 100 mg/dL    Performed by [place]        Telemetry: paced      Assessment:     Active Problems:    PAD (peripheral artery disease) ([facility] (10/4/2016)        Plan:     1. S/p right SFA intervention. Right LE pain has resolved. Left groin stable. Ok to dc. F/u in couple of weeks.

## 2017-03-07 NOTE — CARDIO/PULMONARY
Cardiopulmonary Rehab:       Chart Reviewed. Pt is a 66 y.o. Female admitted with PAD,  s/p right SFA intervention. Medical History: COPD, HF, diabetes, ESRD, HTN, hypercholesteremia, AICD  EF 15-20%, Echo 3/24/2016  Former Smoker    Pt last visited for CHF teaching 11/14/16. CHF: Pt visited. Pt received the CHF teaching packet at a prior admission. Provided printed teaching materials re: \"Heart Failure: Self Care; Avoiding Triggers with Heart Failure and Limiting Sodium and Fluids with Heart Failure. \" Reviewed the low sodium diet, checking labels for sodium in canned, packaged and processed foods, CHF S&Ss, when to call the doctor and the benefits of daily weights. Pt reports \"I don't follow that low sodium diet. I eat out all the time and I know I get lots of salt\". Pt eats states she does not eat fast foods, mostly [employer], [employer], etc. Inquired if she understood the rationale for low sodium diet. Pt states \"With all the salt, I hold extra water. \"     Pt weighs with dialysis. She takes he medications as prescribed but has no plans on changing diet. Post cath: Printed material given and discussed re: heart healthy habits, the cardiac diet, medication management, what to expect following coronary angioplasty, and post cardiac catheterization instructions. Discussed post catheterization restrictions including no lifting, no tub baths and no straining for 7 days. Also discussed what to do if bleeding or bruising at the cath insertion site is observed. Reviewed the cardiac diet (low NA/fat/CHOL), the importance of medication compliance, monitoring for any unusual signs & symptoms and when to call the doctor. Smoking history was assessed. Pt is a  former smoker, quit 6 years ago, cold turkey. She denies cravings at this time. Pt also avoids second hand smoke. Pt verbalized understanding. Will follow.

## 2017-04-13 NOTE — PROGRESS NOTES
Subjective/HPI:     Chris Roca is a 66 y.o. female is here for her annual ICD f/u appt. The patient denies chest pain/worsening shortness of breath, orthopnea, PND, LE edema, palpitations, syncope, presyncope or fatigue. PCP Provider  Jose Roberto Ham MD  Past Medical History:   Diagnosis Date    Acute on chronic diastolic CHF (congestive heart failure), NYHA class 2 ([address] 2/19/2015    Adrenal mass, left (HCC)     2.4 Cm om Feb 2010    Anemia associated with chronic renal failure     Arthritis     right knee    Asthma     CKD (chronic kidney disease), stage IV (HCC)     ying,dialysis tues,thur sat  [facility]    COPD     Diabetes ([facility] [address] dx 1997~    type II    ESRD (end stage renal disease) on dialysis ([address]     Generalized social phobia     HTN (hypertension)     Hypercholesteremia     Hyperkalemia     Nausea & vomiting 11/11/2013    Other ill-defined conditions     fistula, R upper arm.  Pneumonia 12/10/2014    left lower lobe with sepsis    Presence of biventricular AICD 4/22/2016 4/20/16 Portr biventricular AICD implant    Renal calculus or stone     small Lt by US 3/2012.       S/P AV lv ablation 04/22/2016 4/20/16    Smokers' cough ([address]       Past Surgical History:   Procedure Laterality Date    COLONOSCOPY,DIAGNOSTIC  3/28/2016         HX HEENT      Lasik eye surgery    HX OPEN CHOLECYSTECTOMY  ~ 1985    HX OTHER SURGICAL      adrenal mass removed    HX SHOULDER ARTHROSCOPY  2008    removal bone spur left shoulder    KNEE SCOPE,CLEAN/DRAIN      UPPER GI ENDOSCOPY,DIAGNOSIS  3/28/2016         VASCULAR SURGERY PROCEDURE UNLIST      dialysis in right arm     Allergies   Allergen Reactions    Grapefruit Hives    Orange Juice Hives    Peach (Prunus Persica) Hives      Family History   Problem Relation Age of Onset    Diabetes Mother     Heart Disease Mother     Hypertension Mother     High Cholesterol Mother     Arthritis-osteo Mother     Cancer Father      Lung    Arthritis-osteo Father     Cancer Brother      x2 - lung cancer      Current Outpatient Prescriptions   Medication Sig    clopidogrel (PLAVIX) 75 mg tab TAKE 1 TABLET BY MOUTH DAILY    metoprolol tartrate (LOPRESSOR) 50 mg tablet Take  by mouth two (2) times a day.  predniSONE (DELTASONE) 5 mg tablet Take 5 mg by mouth daily.  HYDROcodone-acetaminophen (NORCO) 5-325 mg per tablet Take 1 Tab by mouth every four (4) hours as needed for Pain. Max Daily Amount: 6 Tabs.  albuterol-ipratropium (DUO-NEB) 2.5 mg-0.5 mg/3 ml nebu 3 mL by Nebulization route every six (6) hours as needed.  alcohol swabs (ALCOHOL PADS) padm 1 Each by Apply Externally route Multiple.  albuterol (PROVENTIL HFA, VENTOLIN HFA, PROAIR HFA) 90 mcg/actuation inhaler Take 2 Puffs by inhalation every four (4) hours as needed for Wheezing.  calcium acetate (PHOSLO) 667 mg cap Take 2 Caps by mouth three (3) times daily (with meals).  acetaminophen (TYLENOL) 325 mg tablet Take 650 mg by mouth every six (6) hours as needed for Pain or Fever (Temp >100.5).  budesonide-formoterol (SYMBICORT) 160-4.5 mcg/actuation HFA inhaler Take 2 Puffs by inhalation two (2) times a day. Patient takes at 0500 and 2100    roflumilast (DALIRESP) tab tablet Take 500 mcg by mouth daily.  atorvastatin (LIPITOR) 10 mg tablet Take 10 mg by mouth nightly.  cholecalciferol, vitamin D3, (VITAMIN D3) 2,000 unit tab Take 1 Tab by mouth daily.  tiotropium (SPIRIVA WITH HANDIHALER) 18 mcg inhalation capsule Take 1 Cap by inhalation daily.  aspirin 81 mg tablet Take 81 mg by mouth every morning. Indications: MYOCARDIAL INFARCTION PREVENTION    insulin lispro (HUMALOG) 100 unit/mL injection 5 Units by SubCUTAneous route Before breakfast, lunch, and dinner.  insulin NPH (NOVOLIN N, HUMULIN N) 100 unit/mL injection 20 Units by SubCUTAneous route two (2) times daily (with meals).     aluminum-magnesium hydroxide 200-200 mg/5 mL susp 30 mL, diphenhydrAMINE 12.5 mg/5 mL elix 75 mg, lidocaine 2 % soln 30 mL, sucralfate 100 mg/mL susp 3 g oral solution (compounded) Take 5 mL by mouth Before breakfast, lunch, and dinner.  Insulin Syringe-Needle U-100 0.5 mL 29 gauge x 1/2\" syrg 1 Each by Does Not Apply route five (5) times daily. No current facility-administered medications for this visit. Vitals:    04/13/17 1337   BP: 104/60   Resp: 18   Weight: 144 lb 12.8 oz (65.7 kg)   Height: 5' 2\" (1.575 m)     Social History     Social History    Marital status:      Spouse name: N/A    Number of children: N/A    Years of education: N/A     Occupational History     Retired     Social History Main Topics    Smoking status: Former Smoker     Packs/day: 2.00     Years: 48.00     Types: Cigarettes     Quit date: 1/3/2012    Smokeless tobacco: Never Used    Alcohol use No    Drug use: No    Sexual activity: Not on file     Other Topics Concern    Not on file     Social History Narrative       I have reviewed the nurses notes, vitals, problem list, allergy list, medical history, family, social history and medications. Review of Symptoms:    General: Pt denies excessive weight gain or loss. Pt is able to conduct ADL's  HEENT: Denies blurred vision, headaches, epistaxis and difficulty swallowing. Respiratory: Denies worsening shortness of breath, LORENZO, wheezing or stridor. Cardiovascular: Denies precordial pain, palpitations, edema or PND  Gastrointestinal: Denies poor appetite, indigestion, abdominal pain or blood in stool  Urinary: Denies dysuria, pyuria  Musculoskeletal: Denies pain or swelling from muscles or joints  Neurologic: Denies tremor, paresthesias, or sensory motor disturbance  Skin: Denies rash, itching or texture change.   Psych: Denies depression        Physical Exam:      General: Well developed, in no acute distress, cooperative and alert  HEENT: No carotid bruits, no JVD, trach is midline. Neck Supple, PEERL, EOM intact. Heart:  Normal S1/S2 negative S3 or S4. Regular,+KELLYE, no gallop or rub.   Respiratory: Clear bilaterally x 4, no wheezing or rales  Abdomen:   Soft, non-tender, no masses, bowel sounds are active.   Extremities:  No edema, normal cap refill, no cyanosis, atraumatic. Neuro: A&Ox3, speech clear   Skin: Skin color is normal. non diaphoretic  Vascular: 2+ pulses symmetric in all extremities    Cardiographics    ECG: V paced    [employer] ICD- 100% RV/LV paced  No events  5.5 year battery life    Results for orders placed or performed during the hospital encounter of 11/14/16   EKG, 12 LEAD, INITIAL   Result Value Ref Range    Ventricular Rate 75 BPM    Atrial Rate 57 BPM    QRS Duration 144 ms    Q-T Interval 454 ms    QTC Calculation (Bezet) 506 ms    Calculated R Axis 11 degrees    Calculated T Axis 103 degrees    Diagnosis       Ventricular-paced rhythm  Biventricular pacemaker detected  Confirmed by Luther Brewster ([id number]) on 11/15/2016 7:48:27 AM           Cardiology Labs:  Lab Results   Component Value Date/Time    Cholesterol, total 172 03/08/2016 05:04 PM    HDL Cholesterol 53 03/08/2016 05:04 PM    LDL, calculated 83.2 03/08/2016 05:04 PM    Triglyceride 179 03/08/2016 05:04 PM    CHOL/HDL Ratio 3.2 03/08/2016 05:04 PM       Lab Results   Component Value Date/Time    Sodium 136 03/07/2017 04:59 AM    Potassium 6.2 03/07/2017 04:59 AM    Chloride 100 03/07/2017 04:59 AM    CO2 21 03/07/2017 04:59 AM    Anion gap 15 03/07/2017 04:59 AM    Glucose 122 03/07/2017 04:59 AM    BUN 66 03/07/2017 04:59 AM    Creatinine 7.60 03/07/2017 04:59 AM    BUN/Creatinine ratio 9 03/07/2017 04:59 AM    GFR est AA 6 03/07/2017 04:59 AM    GFR est non-AA 5 03/07/2017 04:59 AM    Calcium 8.1 03/07/2017 04:59 AM    AST (SGOT) 9 03/02/2017 01:38 PM    Alk.  phosphatase 97 03/02/2017 01:38 PM    Protein, total 6.9 03/02/2017 01:38 PM    Albumin 4.2 03/02/2017 01:38 PM    Globulin 3.5 12/13/2016 06:06 PM    A-G Ratio 1.6 03/02/2017 01:38 PM    ALT (SGPT) 14 03/02/2017 01:38 PM           Assessment:     Assessment:     Raghavendra Scott was seen today for heart problem. Diagnoses and all orders for this visit:    Chronic systolic heart failure (HCC)  -     AMB POC EKG ROUTINE W/ 12 LEADS, INTER & REP  -     2D ECHO COMPLETE ADULT (TTE) W OR WO CONTR; Future    Coronary artery disease involving native coronary artery of native heart without angina pectoris  -     AMB POC EKG ROUTINE W/ 12 LEADS, INTER & REP  -     2D ECHO COMPLETE ADULT (TTE) W OR WO CONTR; Future    Cardiomyopathy, unspecified type  -     AMB POC EKG ROUTINE W/ 12 LEADS, INTER & REP  -     2D ECHO COMPLETE ADULT (TTE) W OR WO CONTR; Future    Essential hypertension  -     AMB POC EKG ROUTINE W/ 12 LEADS, INTER & REP  -     2D ECHO COMPLETE ADULT (TTE) W OR WO CONTR; Future    PAF (paroxysmal atrial fibrillation) (HCC)  -     AMB POC EKG ROUTINE W/ 12 LEADS, INTER & REP  -     2D ECHO COMPLETE ADULT (TTE) W OR WO CONTR; Future    Controlled type 2 diabetes mellitus with chronic kidney disease, unspecified CKD stage, unspecified long term insulin use status  -     AMB POC EKG ROUTINE W/ 12 LEADS, INTER & REP  -     2D ECHO COMPLETE ADULT (TTE) W OR WO CONTR; Future    ICD (implantable cardioverter-defibrillator) in place  -     AMB POC EKG ROUTINE W/ 12 LEADS, INTER & REP  -     2D ECHO COMPLETE ADULT (TTE) W OR WO CONTR; Future        ICD-10-CM ICD-9-CM    1. Chronic systolic heart failure (HCC) I50.22 428.22 AMB POC EKG ROUTINE W/ 12 LEADS, INTER & REP      2D ECHO COMPLETE ADULT (TTE) W OR WO CONTR   2. Coronary artery disease involving native coronary artery of native heart without angina pectoris I25.10 414.01 AMB POC EKG ROUTINE W/ 12 LEADS, INTER & REP      2D ECHO COMPLETE ADULT (TTE) W OR WO CONTR   3.  Cardiomyopathy, unspecified type I42.9 425.4 AMB POC EKG ROUTINE W/ 12 LEADS, INTER & REP 2D ECHO COMPLETE ADULT (TTE) W OR WO CONTR   4. Essential hypertension I10 401.9 AMB POC EKG ROUTINE W/ 12 LEADS, INTER & REP      2D ECHO COMPLETE ADULT (TTE) W OR WO CONTR   5. PAF (paroxysmal atrial fibrillation) (HCC) I48.0 427.31 AMB POC EKG ROUTINE W/ 12 LEADS, INTER & REP      2D ECHO COMPLETE ADULT (TTE) W OR WO CONTR   6. Controlled type 2 diabetes mellitus with chronic kidney disease, unspecified CKD stage, unspecified long term insulin use status E11.22 250.40 AMB POC EKG ROUTINE W/ 12 LEADS, INTER & REP     585.9 2D ECHO COMPLETE ADULT (TTE) W OR WO CONTR   7. ICD (implantable cardioverter-defibrillator) in place Z95.810 V45.02 AMB POC EKG ROUTINE W/ 12 LEADS, INTER & REP      2D ECHO COMPLETE ADULT (TTE) W OR WO CONTR     Orders Placed This Encounter    AMB POC EKG ROUTINE W/ 12 LEADS, INTER & REP     Order Specific Question:   Reason for Exam:     Answer:   routine    2D ECHO COMPLETE ADULT (TTE) W OR WO CONTR     Standing Status:   Future     Standing Expiration Date:   10/13/2017     Order Specific Question:   Reason for Exam:     Answer:   ICD        Plan:     Patient presents today for his annual ICD f/u appt, denies worsening/new cardiac complaints, though noted to be SOB walking down office lopez. She is 100% V paced. BP normotensive. I will evaluate with an echo. Continue current care and f/u in 1 year unless changes to echo.     Cyndie Tolentino NP

## 2017-04-28 NOTE — TELEPHONE ENCOUNTER
----- Message from Home Gibbs NP sent at 4/20/2017  9:02 AM EDT -----  Please let pt know her heart strength is now normal! No other changes. Continue current care.

## 2017-05-24 PROBLEM — R07.9 CHEST PAIN: Status: ACTIVE | Noted: 2017-01-01

## 2017-05-24 NOTE — TELEPHONE ENCOUNTER
Verified patient with two identifiers. Spoke with patient's daughter Levell Orf she stated mom had chest pain last night, feeling cold was moaning and groaning. Today daughter stated patient is still having chest pain and she can not recall what happen last night. Advised her patient needs to go to ER. She verbalized understanding.

## 2017-05-24 NOTE — ED NOTES
Tech assisted pt to bedside commode for urine sample but pt unable as she is dialysis pt and produces very little urine.

## 2017-05-24 NOTE — ED NOTES
Bedside shift change report given to [address] (oncoming nurse) by Mandie Cordero (offgoing nurse). Report included the following information SBAR, ED Summary and MAR.

## 2017-05-24 NOTE — ED TRIAGE NOTES
Patient arrives via EMS for pressure/pain under her left breast that began last night and worsened today. 8/10, denies N/V or pain radiation. Hx of pacemaker.

## 2017-05-24 NOTE — IP AVS SNAPSHOT
[address] [facility] 
[phone] Patient: Radha Harper MRN: [id number] You are allergic to the following Allergen Reactions Grapefruit Hives Orange Juice Hives Peach (Prunus Persica) Hives Recent Documentation Height Weight Breastfeeding? BMI OB Status Smoking Status 1.575 m 69.3 kg No 27.94 kg/m2 Postmenopausal Former Smoker Unresulted Labs Order Current Status CULTURE, BLOOD, PAIRED Preliminary result Emergency Contacts Name Discharge Info Relation Home Work Mobile Stephanie Rasmussen DISCHARGE CAREGIVER [3] Other Relative [6] [phone]  [facility] CAREGIVER [3] Child [2] [phone] Maria D Rasmussen  Other Relative [6] [phone] About your hospitalization You were admitted on:  May 24, 2017 You last received care in the:  [facility] 2 CARDIOPULMONARY CARE You were discharged on:  May 27, 2017 Unit phone number:  [phone] Why you were hospitalized Your primary diagnosis was:  Not on File Your diagnoses also included:  Chest Pain Providers Seen During Your Hospitalizations Provider Role Specialty Primary office phone Usama Chao MD Attending Provider Emergency Medicine [phone] Snehal Rajput MD Attending Provider Internal Medicine [phone] Your Primary Care Physician (PCP) Primary Care Physician Office Phone Office Fax Chris Calderon [phone] Follow-up Information Follow up With Details Comments Contact Info Radu Aguiar MD In 5 days  [facility] [address] [address] 
[phone] Yoly Xavier MD In 2 weeks  [address] 
[phone] Current Discharge Medication List  
  
START taking these medications Dose & Instructions Dispensing Information Comments Morning Noon Evening Bedtime  
 levoFLOXacin 500 mg tablet Commonly known as:  Jc Jones Your last dose was: Your next dose is:    
   
   
 Dose:  500 mg Take 1 Tab by mouth every fourty-eight (48) hours for 6 days. Quantity:  3 Tab Refills:  0 CONTINUE these medications which have CHANGED Dose & Instructions Dispensing Information Comments Morning Noon Evening Bedtime  
 clopidogrel 75 mg Tab Commonly known as:  PLAVIX What changed:  Another medication with the same name was removed. Continue taking this medication, and follow the directions you see here. Your last dose was: Your next dose is: TAKE 1 TABLET BY MOUTH DAILY Quantity:  30 Tab Refills:  0 CONTINUE these medications which have NOT CHANGED Dose & Instructions Dispensing Information Comments Morning Noon Evening Bedtime  
 acetaminophen 325 mg tablet Commonly known as:  TYLENOL Your last dose was: Your next dose is:    
   
   
 Dose:  650 mg Take 650 mg by mouth every six (6) hours as needed for Pain or Fever (Temp >100.5). Refills:  0  
     
   
   
   
  
 albuterol 90 mcg/actuation inhaler Commonly known as:  PROVENTIL HFA, VENTOLIN HFA, PROAIR HFA Your last dose was: Your next dose is:    
   
   
 Dose:  2 Puff Take 2 Puffs by inhalation every four (4) hours as needed for Wheezing. Quantity:  1 Inhaler Refills:  0  
     
   
   
   
  
 albuterol-ipratropium 2.5 mg-0.5 mg/3 ml Nebu Commonly known as:  Adele Badder Your last dose was: Your next dose is:    
   
   
 Dose:  3 mL  
3 mL by Nebulization route every six (6) hours as needed. Quantity:  100 Nebule Refills:  1  
     
   
   
   
  
 alcohol swabs Padm Commonly known as:  ALCOHOL PADS Your last dose was: Your next dose is: Dose:  1 Each  
1 Each by Apply Externally route Multiple. Quantity:  100 Pad Refills:  1  
     
   
   
   
  
 aluminum-magnesium hydroxide 200-200 mg/5 mL susp 30 mL, diphenhydrAMINE 12.5 mg/5 mL elix 75 mg, lidocaine 2 % soln 30 mL, sucralfate 100 mg/mL susp 3 g oral solution (compounded) Your last dose was: Your next dose is:    
   
   
 Dose:  5 mL Take 5 mL by mouth Before breakfast, lunch, and dinner. Quantity:  100 mL Refills:  0  
     
   
   
   
  
 aspirin 81 mg tablet Your last dose was: Your next dose is:    
   
   
 Dose:  81 mg Take 81 mg by mouth every morning. Indications: MYOCARDIAL INFARCTION PREVENTION Refills:  0  
     
   
   
   
  
 calcium acetate 667 mg Cap Commonly known as:  PHOSLO Your last dose was: Your next dose is:    
   
   
 Dose:  2 Cap Take 2 Caps by mouth three (3) times daily (with meals). Refills:  0  
     
   
   
   
  
 DALIRESP Tab tablet Generic drug:  roflumilast  
   
Your last dose was: Your next dose is:    
   
   
 Dose:  500 mcg Take 500 mcg by mouth daily. Refills:  0 HYDROcodone-acetaminophen 5-325 mg per tablet Commonly known as:  Meño Waldo Your last dose was: Your next dose is:    
   
   
 Dose:  1 Tab Take 1 Tab by mouth every four (4) hours as needed for Pain. Max Daily Amount: 6 Tabs. Quantity:  30 Tab Refills:  0  
     
   
   
   
  
 insulin lispro 100 unit/mL injection Commonly known as:  HUMALOG Your last dose was: Your next dose is:    
   
   
 Dose:  5 Units 5 Units by SubCUTAneous route Before breakfast, lunch, and dinner. Quantity:  1 Vial  
Refills:  1  
     
   
   
   
  
 insulin  unit/mL injection Commonly known as:  Josiah Thu Your last dose was: Your next dose is:    
   
   
 Dose:  20 Units 20 Units by SubCUTAneous route two (2) times daily (with meals). Quantity:  1 Vial  
Refills:  1 Insulin Syringe-Needle U-100 0.5 mL 29 gauge x 1/2\" Syrg Your last dose was: Your next dose is:    
   
   
 Dose:  1 Each  
1 Each by Does Not Apply route five (5) times daily. Quantity:  150 Syringe Refills:  1 LIPITOR 10 mg tablet Generic drug:  atorvastatin Your last dose was: Your next dose is:    
   
   
 Dose:  10 mg Take 10 mg by mouth nightly. Refills:  0  
     
   
   
   
  
 metoprolol tartrate 50 mg tablet Commonly known as:  LOPRESSOR Your last dose was: Your next dose is: Take  by mouth two (2) times a day. Refills:  0  
     
   
   
   
  
 predniSONE 5 mg tablet Commonly known as:  Silvia Jn Your last dose was: Your next dose is:    
   
   
 Dose:  5 mg Take 5 mg by mouth daily. Refills:  0 SPIRIVA WITH HANDIHALER 18 mcg inhalation capsule Generic drug:  tiotropium Your last dose was: Your next dose is:    
   
   
 Dose:  1 Cap Take 1 Cap by inhalation daily. Refills:  0  
     
   
   
   
  
 SYMBICORT 160-4.5 mcg/actuation HFA inhaler Generic drug:  budesonide-formoterol Your last dose was: Your next dose is:    
   
   
 Dose:  2 Puff Take 2 Puffs by inhalation two (2) times a day. Patient takes at 0500 and 2100 Refills:  0  
     
   
   
   
  
 VITAMIN D3 2,000 unit Tab Generic drug:  cholecalciferol (vitamin D3) Your last dose was: Your next dose is:    
   
   
 Dose:  1 Tab Take 1 Tab by mouth daily. Refills:  0 Where to Get Your Medications Information on where to get these meds will be given to you by the nurse or doctor. ! Ask your nurse or doctor about these medications  
  levoFLOXacin 500 mg tablet Discharge Instructions Pneumonia: Care Instructions Your Care Instructions Pneumonia is an infection of the lungs. Most cases are caused by infections from bacteria or viruses. Pneumonia may be mild or very severe. If it is caused by bacteria, you will be treated with antibiotics. It may take a few weeks to a few months to recover fully from pneumonia, depending on how sick you were and whether your overall health is good. Follow-up care is a key part of your treatment and safety. Be sure to make and go to all appointments, and call your doctor if you are having problems. Its also a good idea to know your test results and keep a list of the medicines you take. How can you care for yourself at home? · Take your antibiotics exactly as directed. Do not stop taking the medicine just because you are feeling better. You need to take the full course of antibiotics. · Take your medicines exactly as prescribed. Call your doctor if you think you are having a problem with your medicine. · Get plenty of rest and sleep. You may feel weak and tired for a while, but your energy level will improve with time. · To prevent dehydration, drink plenty of fluids, enough so that your urine is light yellow or clear like water. Choose water and other caffeine-free clear liquids until you feel better. If you have kidney, heart, or liver disease and have to limit fluids, talk with your doctor before you increase the amount of fluids you drink. · Take care of your cough so you can rest. A cough that brings up mucus from your lungs is common with pneumonia. It is one way your body gets rid of the infection. But if coughing keeps you from resting or causes severe fatigue and chest-wall pain, talk to your doctor. He or she may suggest that you take a medicine to reduce the cough. · Use a vaporizer or humidifier to add moisture to your bedroom. Follow the directions for cleaning the machine. · Do not smoke or allow others to smoke around you. Smoke will make your cough last longer. If you need help quitting, talk to your doctor about stop-smoking programs and medicines. These can increase your chances of quitting for good. · Take an over-the-counter pain medicine, such as acetaminophen (Tylenol), ibuprofen (Advil, Motrin), or naproxen (Aleve). Read and follow all instructions on the label. · Do not take two or more pain medicines at the same time unless the doctor told you to. Many pain medicines have acetaminophen, which is Tylenol. Too much acetaminophen (Tylenol) can be harmful. · If you were given a spirometer to measure how well your lungs are working, use it as instructed. This can help your doctor tell how your recovery is going. · To prevent pneumonia in the future, talk to your doctor about getting a flu vaccine (once a year) and a pneumococcal vaccine (one time only for most people). When should you call for help? Call 911 anytime you think you may need emergency care. For example, call if: 
· You have severe trouble breathing. Call your doctor now or seek immediate medical care if: 
· You cough up dark brown or bloody mucus (sputum). · You have new or worse trouble breathing. · You are dizzy or lightheaded, or you feel like you may faint. Watch closely for changes in your health, and be sure to contact your doctor if: 
· You have a new or higher fever. · You are coughing more deeply or more often. · You are not getting better after 2 days (48 hours). · You do not get better as expected. Where can you learn more? Go to http://chiqui-cleveland.info/. Enter [id number] in the search box to learn more about \"Pneumonia: Care Instructions. \" Current as of: May 23, 2016 Content Version: 11.2 © 7357-9013 [employer], [employer].  Care instructions adapted under license by [employer] (which disclaims liability or warranty for this information). If you have questions about a medical condition or this instruction, always ask your healthcare professional. [address] any warranty or liability for your use of this information. Discharge Orders None [place] Announcement We are excited to announce that we are making your provider's discharge notes available to you in [employer]. You will see these notes when they are completed and signed by the physician that discharged you from your recent hospital stay. If you have any questions or concerns about any information you see in Bioptigen, please call the Health Information Department where you were seen or reach out to your Primary Care Provider for more information about your plan of care. General Information Please provide this summary of care documentation to your next provider. Patient Signature:  ____________________________________________________________ Date:  ____________________________________________________________  
  
Yuliya Napier Provider Signature:  ____________________________________________________________ Date:  ____________________________________________________________

## 2017-05-24 NOTE — DISCHARGE INSTRUCTIONS
Pneumonia: Care Instructions  Your Care Instructions    Pneumonia is an infection of the lungs. Most cases are caused by infections from bacteria or viruses. Pneumonia may be mild or very severe. If it is caused by bacteria, you will be treated with antibiotics. It may take a few weeks to a few months to recover fully from pneumonia, depending on how sick you were and whether your overall health is good. Follow-up care is a key part of your treatment and safety. Be sure to make and go to all appointments, and call your doctor if you are having problems. Its also a good idea to know your test results and keep a list of the medicines you take. How can you care for yourself at home? · Take your antibiotics exactly as directed. Do not stop taking the medicine just because you are feeling better. You need to take the full course of antibiotics. · Take your medicines exactly as prescribed. Call your doctor if you think you are having a problem with your medicine. · Get plenty of rest and sleep. You may feel weak and tired for a while, but your energy level will improve with time. · To prevent dehydration, drink plenty of fluids, enough so that your urine is light yellow or clear like water. Choose water and other caffeine-free clear liquids until you feel better. If you have kidney, heart, or liver disease and have to limit fluids, talk with your doctor before you increase the amount of fluids you drink. · Take care of your cough so you can rest. A cough that brings up mucus from your lungs is common with pneumonia. It is one way your body gets rid of the infection. But if coughing keeps you from resting or causes severe fatigue and chest-wall pain, talk to your doctor. He or she may suggest that you take a medicine to reduce the cough. · Use a vaporizer or humidifier to add moisture to your bedroom. Follow the directions for cleaning the machine. · Do not smoke or allow others to smoke around you.  Smoke will make your cough last longer. If you need help quitting, talk to your doctor about stop-smoking programs and medicines. These can increase your chances of quitting for good. · Take an over-the-counter pain medicine, such as acetaminophen (Tylenol), ibuprofen (Advil, Motrin), or naproxen (Aleve). Read and follow all instructions on the label. · Do not take two or more pain medicines at the same time unless the doctor told you to. Many pain medicines have acetaminophen, which is Tylenol. Too much acetaminophen (Tylenol) can be harmful. · If you were given a spirometer to measure how well your lungs are working, use it as instructed. This can help your doctor tell how your recovery is going. · To prevent pneumonia in the future, talk to your doctor about getting a flu vaccine (once a year) and a pneumococcal vaccine (one time only for most people). When should you call for help? Call 911 anytime you think you may need emergency care. For example, call if:  · You have severe trouble breathing. Call your doctor now or seek immediate medical care if:  · You cough up dark brown or bloody mucus (sputum). · You have new or worse trouble breathing. · You are dizzy or lightheaded, or you feel like you may faint. Watch closely for changes in your health, and be sure to contact your doctor if:  · You have a new or higher fever. · You are coughing more deeply or more often. · You are not getting better after 2 days (48 hours). · You do not get better as expected. Where can you learn more? Go to http://chiqui-cleveland.info/. Enter [id number] in the search box to learn more about \"Pneumonia: Care Instructions. \"  Current as of: May 23, 2016  Content Version: 11.2  © 7378-7724 [employer], [employer]. Care instructions adapted under license by [employer] (which disclaims liability or warranty for this information).  If you have questions about a medical condition or this instruction, always ask your healthcare professional. [address] [address] any warranty or liability for your use of this information.

## 2017-05-25 NOTE — ED NOTES
Bedside and Verbal shift change report given to Mary Jane Roberts RN  (oncoming nurse) by Hoang Vail RN  (offgoing nurse). Report included the following information SBAR, Kardex, ED Summary, Intake/Output, MAR, Recent Results and Med Rec Status.

## 2017-05-25 NOTE — ED NOTES
TRANSFER - OUT REPORT:    Verbal report given to Karolina Martinez RN on Adrian Stuart  being transferred to [address] [place] [address] for routine progression of care       Report consisted of patients Situation, Background, Assessment and   Recommendations(SBAR). Information from the following report(s) SBAR, Kardex and MAR was reviewed with the receiving nurse. Lines:   Peripheral IV 03/06/17 Left Antecubital (Active)        Opportunity for questions and clarification was provided.       Patient transported with:   [place]

## 2017-05-25 NOTE — ED NOTES
Bedside shift change report given to Nereida Holland RN (oncoming nurse) received from [facility] (offgoing nurse). Report included the following information SBAR, Kardex, ED Summary, Procedure Summary, MAR and Recent Results. Assumed care of Pt. Pt in bed resting comfortably at this time.

## 2017-05-25 NOTE — ED NOTES
Dr. Chely Dunlap at bedside for admission evaluation. Patient complaints of 10/10 pain despite given 50 mcg Fentanyl at 2030. Unable to complete medication reconciliation due to patient not knowing her medications or keeping a list.  Patient informed to have her sister bring all her medications in the morning for proper reconciliation. [facility] uses the [facility] on [address]. Dr. Chely Dunlap aware of staff unable to complete medication reconciliation at this time.

## 2017-05-25 NOTE — ED NOTES
Dr. Clinton Love requested clarification of morphine IV order for renal patient. Order discontinued at this time.

## 2017-05-25 NOTE — PROGRESS NOTES
Hospitalist Progress Note    NAME: Siri Taylor   :  1945   MRN:  [id number]       Interim Hospital Summary: 67 y.o. female whom presented on 2017 with      Assessment / Plan:  Acute on chronic hypoxic respiratory failure  Severe sepsis  HCAP  Acute COPD exacerbation  -chest CT showed   -nebs/symbicort, prednisone, empiric abx with vancomycin, levaquin  -f/u with BCx  -continue O2 suppl, wean as tolerated    ESRD  Hyperkalemia  -cont' phoslo  -nephro consulted for HD    T2DM  -cont' home insulin  -SSI    CAD/PAD/HTN/Chronic diastolic heart failure  -cont' ASA/plavix, BB, statin  -no clinical evidence of exacerbation    Anemia  -likely due to renal failure  -no evidence of acute bleed  -monitor cbc      Body mass index is 27.18 kg/(m^2). Code Status: FULL  Surrogate Decision Maker: daughter     DVT Prophylaxis:  heparin  Baseline: walks with a cane     Subjective:     Chief Complaint / Reason for Physician Visit  Pt seen and examined at bedside. She is accompanied by her daughter. No acute complaints. Cherelle Mcmahoneker to go home since it's her birthday today. Normally not on home O2. Discussed with RN events overnight. Review of Systems:  Symptom Y/N Comments  Symptom Y/N Comments   Fever/Chills n   Chest Pain n    Poor Appetite n   Edema n    Cough n   Abdominal Pain n    Sputum    Joint Pain     SOB/LORENZO y   Pruritis/Rash     Nausea/vomit    Tolerating PT/OT     Diarrhea    Tolerating Diet     Constipation    Other       Could NOT obtain due to:      Objective:     VITALS:   Last 24hrs VS reviewed since prior progress note.  Most recent are:  Patient Vitals for the past 24 hrs:   Temp Pulse Resp BP SpO2   17 1015 - 75 - 117/43 98 %   17 0815 - 75 - 125/56 99 %   17 0330 - 75 - 114/41 98 %   17 0315 - 75 - 114/54 98 %   17 0300 98.4 °F (36.9 °C) 75 21 118/46 99 %   17 0230 - 75 20 (!) 121/36 99 %   17 0200 - 75 20 124/40 100 %   17 0130 - 75 23 117/45 99 %   05/25/17 0100 - 75 22 121/70 99 %   05/25/17 0045 - 75 24 (!) 137/96 98 %   05/25/17 0030 98.8 °F (37.1 °C) 75 21 125/74 100 %   05/25/17 0015 - 75 23 126/56 100 %   05/24/17 2345 - 75 20 137/49 93 %   05/24/17 2230 - 75 22 133/68 95 %   05/24/17 2130 - 75 23 127/49 95 %   05/24/17 2100 - 75 19 - 92 %   05/24/17 2058 99.1 °F (37.3 °C) 76 22 130/54 93 %   05/24/17 2045 - 75 23 - 97 %   05/24/17 2030 - 75 19 - 92 %   05/24/17 2015 - 75 23 - 94 %   05/24/17 1945 - 78 17 - (!) 86 %   05/24/17 1915 - 77 20 - 93 %   05/24/17 1900 - 75 18 - 93 %   05/24/17 1615 98.1 °F (36.7 °C) 75 18 - 92 %   05/24/17 1600 - 75 18 - 92 %     No intake or output data in the 24 hours ending 05/25/17 1356     PHYSICAL EXAM:  General: WD, WN. Alert, cooperative, no acute distress    EENT:  EOMI. Anicteric sclerae. MMM  Resp:  +wheezing b/l. No accessory muscle use  CV:  Regular  rhythm,  No edema  GI:  Soft, Non distended, Non tender.  +Bowel sounds  Neurologic:  Alert and oriented X 3, normal speech,   Psych:   Good insight. Not anxious nor agitated  Skin:  No rashes. No jaundice    Reviewed most current lab test results and cultures  YES  Reviewed most current radiology test results   YES  Review and summation of old records today    NO  Reviewed patient's current orders and MAR    YES  PMH/SH reviewed - no change compared to H&P  ________________________________________________________________________  Care Plan discussed with:    Comments   Patient x    Family  x daughter   RN x    Care Manager     Consultant                        Multidiciplinary team rounds were held today with , nursing, pharmacist and clinical coordinator. Patient's plan of care was discussed; medications were reviewed and discharge planning was addressed.      ________________________________________________________________________  Total NON critical care TIME: 45*   Minutes    Total CRITICAL CARE TIME Spent:   Minutes non procedure based Comments   >50% of visit spent in counseling and coordination of care x    ________________________________________________________________________  Yadira Abdul MD     Procedures: see electronic medical records for all procedures/Xrays and details which were not copied into this note but were reviewed prior to creation of Plan. LABS:  I reviewed today's most current labs and imaging studies.   Pertinent labs include:  Recent Labs      05/25/17   0521  05/24/17   1620   WBC  16.2*  23.6*   HGB  8.7*  10.0*   HCT  27.6*  32.1*   PLT  181  207     Recent Labs      05/25/17   0521  05/24/17   1620   NA  134*  133*   K  5.4*  5.6*   CL  98  94*   CO2  25  27   GLU  147*  239*   BUN  47*  43*   CREA  5.74*  5.52*   CA  9.1  9.5   ALB   --   2.8*   TBILI   --   0.4   SGOT   --   10*   ALT   --   10*       Signed: Yadira Abdul MD

## 2017-05-25 NOTE — DIABETES MGMT
[facility]        Recommendations/ Comments: If appropriate, please consider adding a new A1C test to next lab draw to assess home management. A1c:   Lab Results   Component Value Date/Time    Hemoglobin A1c 11.6 11/15/2016 10:46 AM    Hemoglobin A1c 11.7 11/14/2016 10:53 PM       Will continue to follow as needed. Thank you.   NOEL AcevesN, RN, [address] [place] [address]

## 2017-05-25 NOTE — ED NOTES
Bedside and Verbal shift change report given to Axel Quezada RN  (oncoming nurse) by Eber Lazo RN  (offgoing nurse). Report included the following information SBAR, Kardex, ED Summary, Intake/Output, MAR, Recent Results and Med Rec Status.

## 2017-05-25 NOTE — ED NOTES
Bedside and Verbal shift change report given to [address] [facility] (oncoming nurse) by Corby Petit (offgoing nurse). Report included the following information SBAR, Kardex, ED Summary, MAR, Recent Results and Cardiac Rhythm Paced.

## 2017-05-25 NOTE — H&P
Hospitalist Admission Note    NAME: Anu Arredondo   :  [birth year]   MRN:  [id number]     Date/Time:  2017 9:33 PM    Patient PCP: Huy Gonzalez MD  ________________________________________________________________________    My assessment of this patient's clinical condition and my plan of care is as follows. Assessment / Plan:  Pneumonia  Severe sepsis  Acute on chronic resp failure with COPD hx  Chest pain suspect pleurisy  -cont HCAP treatment and start duonebs; the patient should not have an extensive amount of IVF since she is an ESRD patient  -cont supplemental O2 and have increased standing home prednisone given that the patient is here with an acute illness  -cont symbicort and daliresp from home  -awaiting repeat of lactic acid; dilaudid prn for pain and will get chest CT without contrast as she complains of pain out of proportion to findings  ESRD  hyperkalemia  -nephrology consulted for HD and cont phoslo  -dialysis tomorrow should resolve the K  DM   -the patient takes NPH twice a day + standing insulin before meals - cont this and add sliding scale, likely with increase in prednisone will need more   anemia  -stable; deemed in prior note to be due to chronic disease but has a hx of GI bleed 2016  CAD/PAD  HTN  Chronic diastolic chf  -cont ASA and statin as well as plavix; cont BB  -daily weights    Code Status: FULL  Surrogate Decision Maker: daughter    DVT Prophylaxis:  heparin  Baseline: walks with a cane        Subjective:   CHIEF COMPLAINT:  Chest pain starting last night    HISTORY OF PRESENT ILLNESS:     Alyssa Ennis is a 66 y.o.   female who presents with the CC above and the PMhx below. The patient says she has never had anything like this before in the past and this has been constant since last night, progressively getting worse. SHe is due for dialysis tomorrow.  The pain feels like a knife going into her chest and is left sided beneath her breast. The pain woke her up from sleep last and she took tylenol as well as 4 baby ASA without releif at home. The pain is worse with deep breaths and movement. +cough for the last couple of days with yellow phlegm. No f/c. No recent plane trips or immobility. We were asked to admit for work up and evaluation of the above problems. Past Medical History:   Diagnosis Date    Acute on chronic diastolic CHF (congestive heart failure), NYHA class 2 ([address] 2/19/2015    Adrenal mass, left (HCC)     2.4 Cm om Feb 2010    Anemia associated with chronic renal failure     Arthritis     right knee    Asthma     CKD (chronic kidney disease), stage IV (HCC)     ying,dialysis tues,thur sat  [facility]    COPD     Diabetes ([facility] [address] dx 1997~    type II    Generalized social phobia     HTN (hypertension)     Hypercholesteremia     Nausea & vomiting 11/11/2013    Other ill-defined conditions     fistula, R upper arm.  Pneumonia 12/10/2014    left lower lobe with sepsis    Presence of biventricular AICD 4/22/2016 4/20/16 [employer] biventricular AICD implant    Renal calculus or stone     small Lt by US 3/2012.       S/P AV lv ablation 04/22/2016 4/20/16    Smokers' cough ([facility] [address]         Past Surgical History:   Procedure Laterality Date    COLONOSCOPY,DIAGNOSTIC  3/28/2016         HX HEENT      Lasik eye surgery    HX OPEN CHOLECYSTECTOMY  ~ 1985    HX OTHER SURGICAL      adrenal mass removed    HX SHOULDER ARTHROSCOPY  2008    removal bone spur left shoulder    KNEE SCOPE,CLEAN/DRAIN      UPPER GI ENDOSCOPY,DIAGNOSIS  3/28/2016         VASCULAR SURGERY PROCEDURE UNLIST      dialysis in right arm       Social History   Substance Use Topics    Smoking status: Former Smoker     Packs/day: 2.00     Years: 48.00     Types: Cigarettes     Quit date: 1/3/2012    Smokeless tobacco: Never Used    Alcohol use No        Family History   Problem Relation Age of Onset    Diabetes Mother     Heart Disease Mother     Hypertension Mother     High Cholesterol Mother     Arthritis-osteo Mother     Cancer Father      Lung    Arthritis-osteo Father     Cancer Brother      x2 - lung cancer      Allergies   Allergen Reactions    Grapefruit Hives    Orange Juice Hives    Peach (Prunus Persica) Hives        Prior to Admission medications    Medication Sig Start Date End Date Taking? Authorizing Provider   levoFLOXacin (LEVAQUIN) 500 mg tablet Take 1 Tab by mouth daily. 5/24/17  Yes Tiffany Corrigan MD   clopidogrel (PLAVIX) 75 mg tab TAKE 1 TABLET BY MOUTH DAILY 4/24/17   Ashley Roth MD   clopidogrel (PLAVIX) 75 mg tab TAKE 1 TABLET BY MOUTH DAILY 3/10/17   Ashley Roth MD   metoprolol tartrate (LOPRESSOR) 50 mg tablet Take  by mouth two (2) times a day. Historical Provider   predniSONE (DELTASONE) 5 mg tablet Take 5 mg by mouth daily. 1/25/17   Historical Provider   HYDROcodone-acetaminophen (NORCO) 5-325 mg per tablet Take 1 Tab by mouth every four (4) hours as needed for Pain. Max Daily Amount: 6 Tabs. 11/19/16   Nicolette Stone MD   albuterol-ipratropium (DUO-NEB) 2.5 mg-0.5 mg/3 ml nebu 3 mL by Nebulization route every six (6) hours as needed. 11/19/16   Nicolette Stone MD   insulin lispro (HUMALOG) 100 unit/mL injection 5 Units by SubCUTAneous route Before breakfast, lunch, and dinner. 11/19/16   Nicolette Stone MD   insulin NPH (NOVOLIN N, HUMULIN N) 100 unit/mL injection 20 Units by SubCUTAneous route two (2) times daily (with meals). 11/19/16   Nicolette Stone MD   aluminum-magnesium hydroxide 200-200 mg/5 mL susp 30 mL, diphenhydrAMINE 12.5 mg/5 mL elix 75 mg, lidocaine 2 % soln 30 mL, sucralfate 100 mg/mL susp 3 g oral solution (compounded) Take 5 mL by mouth Before breakfast, lunch, and dinner. 11/19/16   Nicolette Stone MD   Insulin Syringe-Needle U-100 0.5 mL 29 gauge x 1/2\" syrg 1 Each by Does Not Apply route five (5) times daily.  11/19/16   Dior Ovalle Jacqueline Mccloud MD   alcohol swabs (ALCOHOL PADS) padm 1 Each by Apply Externally route Multiple. 11/19/16   Alba Garcia MD   albuterol (PROVENTIL HFA, VENTOLIN HFA, PROAIR HFA) 90 mcg/actuation inhaler Take 2 Puffs by inhalation every four (4) hours as needed for Wheezing. 11/8/16   Saniya Ramirez MD   calcium acetate (PHOSLO) 667 mg cap Take 2 Caps by mouth three (3) times daily (with meals). 8/20/16   Historical Provider   acetaminophen (TYLENOL) 325 mg tablet Take 650 mg by mouth every six (6) hours as needed for Pain or Fever (Temp >100.5). Historical Provider   budesonide-formoterol (SYMBICORT) 160-4.5 mcg/actuation HFA inhaler Take 2 Puffs by inhalation two (2) times a day. Patient takes at 0500 and 2100    Alvin Mejia MD   roflumilast (DALIRESP) tab tablet Take 500 mcg by mouth daily. Alvin Mejia MD   atorvastatin (LIPITOR) 10 mg tablet Take 10 mg by mouth nightly. Alvin Mejia MD   cholecalciferol, vitamin D3, (VITAMIN D3) 2,000 unit tab Take 1 Tab by mouth daily. Alvin Mejia MD   tiotropium (SPIRIVA WITH HANDIHALER) 18 mcg inhalation capsule Take 1 Cap by inhalation daily. Historical Provider   aspirin 81 mg tablet Take 81 mg by mouth every morning. Indications: MYOCARDIAL INFARCTION PREVENTION 11/29/10   Historical Provider       REVIEW OF SYSTEMS:     I am not able to complete the review of systems because:    The patient is intubated and sedated    The patient has altered mental status due to his acute medical problems    The patient has baseline aphasia from prior stroke(s)    The patient has baseline dementia and is not reliable historian    The patient is in acute medical distress and unable to provide information           Total of 12 systems reviewed as follows:       POSITIVE= underlined text  Negative = text not underlined  General:  fever, chills, sweats, generalized weakness, weight loss/gain,      loss of appetite   Eyes:    blurred vision, eye pain, loss of vision, double vision  ENT:    rhinorrhea, pharyngitis   Respiratory:   cough, sputum production, SOB, LORENZO, wheezing, pleuritic pain   Cardiology:   chest pain, palpitations, orthopnea, PND, edema, syncope   Gastrointestinal:  abdominal pain , N/V, diarrhea, dysphagia, constipation, bleeding   Genitourinary:  frequency, urgency, dysuria, hematuria, incontinence   Muskuloskeletal :  arthralgia, myalgia, back pain  Hematology:  easy bruising, nose or gum bleeding, lymphadenopathy   Dermatological: rash, ulceration, pruritis, color change / jaundice  Endocrine:   hot flashes or polydipsia   Neurological:  headache, dizziness, confusion, focal weakness, paresthesia,     Speech difficulties, memory loss, gait difficulty  Psychological: Feelings of anxiety, depression, agitation    Objective:   VITALS:    Visit Vitals    /54 (BP 1 Location: Left arm, BP Patient Position: At rest)    Pulse 76    Temp 99.1 °F (37.3 °C)    Resp 22    Ht 5' 2\" (1.575 m)    Wt 68 kg (150 lb)    SpO2 93%    BMI 27.44 kg/m2       PHYSICAL EXAM:    General:    Alert, cooperative, + distress from pain, appears stated age. HEENT: Atraumatic, anicteric sclerae, pink conjunctivae       mucosa moist,  dentition fair  Neck:  Supple, symmetrical,  thyroid: non tender  Lungs:    auscultation bilaterally. No Wheezing or Rhonchi. sparse rales. Chest wall:  + tenderness b/l,  No Accessory muscle use. Heart:   Regular  rhythm,     No edema  Abdomen:   Soft, non-tender. Not distended. Bowel sounds normal  Extremities: No cyanosis. No clubbing,      Skin turgor normal,   Radial dial pulse 2+  Skin:     Not pale. Not Jaundiced      Psych:  Good insight. Not depressed. Not anxious or agitated. Neurologic: EOMs intact. No facial asymmetry. No aphasia or slurred speech. Symmetrical strength UE, Sensation grossly intact.  Alert and oriented X 4.     _______________________________________________________________________  Care Plan discussed with: Comments   Patient x    Family      RN     Care Manager                    Consultant:      _______________________________________________________________________  Expected  Disposition:   Home with Family x   HH/PT/OT/RN x   SNF/LTC    CYRIL    ________________________________________________________________________  TOTAL TIME:    Minutes    Critical Care Provided     Minutes non procedure based      Comments     Reviewed previous records   >50% of visit spent in counseling and coordination of care  Discussion with patient and/or family and questions answered       ________________________________________________________________________  Signed: Ovidio Hanley MD    Procedures: see electronic medical records for all procedures/Xrays and details which were not copied into this note but were reviewed prior to creation of Plan. LAB DATA REVIEWED:    Recent Results (from the past 24 hour(s))   CBC WITH AUTOMATED DIFF    Collection Time: 05/24/17  4:20 PM   Result Value Ref Range    WBC 23.6 (H) 3.6 - 11.0 K/uL    RBC 3.22 (L) 3.80 - 5.20 M/uL    HGB 10.0 (L) 11.5 - 16.0 g/dL    HCT 32.1 (L) 35.0 - 47.0 %    MCV 99.7 (H) 80.0 - 99.0 FL    MCH 31.1 26.0 - 34.0 PG    MCHC 31.2 30.0 - 36.5 g/dL    RDW 16.1 (H) 11.5 - 14.5 %    PLATELET 215 688 - 212 K/uL    NEUTROPHILS 90 (H) 32 - 75 %    LYMPHOCYTES 5 (L) 12 - 49 %    MONOCYTES 4 (L) 5 - 13 %    EOSINOPHILS 1 0 - 7 %    BASOPHILS 0 0 - 1 %    ABS. NEUTROPHILS 21.3 (H) 1.8 - 8.0 K/UL    ABS. LYMPHOCYTES 1.2 0.8 - 3.5 K/UL    ABS. MONOCYTES 0.9 0.0 - 1.0 K/UL    ABS. EOSINOPHILS 0.2 0.0 - 0.4 K/UL    ABS.  BASOPHILS 0.0 0.0 - 0.1 K/UL    RBC COMMENTS ANISOCYTOSIS  PRESENT       METABOLIC PANEL, COMPREHENSIVE    Collection Time: 05/24/17  4:20 PM   Result Value Ref Range    Sodium 133 (L) 136 - 145 mmol/L    Potassium 5.6 (H) 3.5 - 5.1 mmol/L    Chloride 94 (L) 97 - 108 mmol/L    CO2 27 21 - 32 mmol/L    Anion gap 12 5 - 15 mmol/L    Glucose 239 (H) 65 - 100 mg/dL BUN 43 (H) 6 - 20 MG/DL    Creatinine 5.52 (H) 0.55 - 1.02 MG/DL    BUN/Creatinine ratio 8 (L) 12 - 20      GFR est AA 9 (L) >60 ml/min/1.73m2    GFR est non-AA 8 (L) >60 ml/min/1.73m2    Calcium 9.5 8.5 - 10.1 MG/DL    Bilirubin, total 0.4 0.2 - 1.0 MG/DL    ALT (SGPT) 10 (L) 12 - 78 U/L    AST (SGOT) 10 (L) 15 - 37 U/L    Alk.  phosphatase 79 45 - 117 U/L    Protein, total 7.0 6.4 - 8.2 g/dL    Albumin 2.8 (L) 3.5 - 5.0 g/dL    Globulin 4.2 (H) 2.0 - 4.0 g/dL    A-G Ratio 0.7 (L) 1.1 - 2.2     CK W/ REFLX CKMB    Collection Time: 05/24/17  4:20 PM   Result Value Ref Range    CK 47 26 - 192 U/L   TROPONIN I    Collection Time: 05/24/17  4:20 PM   Result Value Ref Range    Troponin-I, Qt. 0.05 (H) <0.05 ng/mL   LACTIC ACID, PLASMA    Collection Time: 05/24/17  7:13 PM   Result Value Ref Range    Lactic acid 2.6 (HH) 0.4 - 2.0 MMOL/L

## 2017-05-25 NOTE — CONSULTS
Consultation Note    NAME: Micheal Salcedo   :     MRN:  [id number]     Date/Time:   17 12:26 pm    I have been asked to see this patient by Dr. Shania Atkins  for advice/opinion re: esrd. Assessment :    Plan:  ESRD TTS at [facility]  COPD  PNA (HCAP)  Anemia  Mild hyperkalemia Plan: HD here today (might go home later); UF 3 liters as tolerated; 2 K bath       Subjective:   CHIEF COMPLAINT:  esrd    HISTORY OF PRESENT ILLNESS:     Marychuy Salcedo is a 67 y.o.   female who has a history of esrd. Ms. Candice Greene presented to the er with severe sharp chest pain. Pleuritic. Some sob and cough. She was found to have PNA. She has not missed HD. TTS is her usual HD schedule. She uses oxygen as needed at home. Past Medical History:   Diagnosis Date    Acute on chronic diastolic CHF (congestive heart failure), NYHA class 2 ([address] 2015    Adrenal mass, left (HCC)     2.4 Cm om 2010    Anemia associated with chronic renal failure     Arthritis     right knee    Asthma     CKD (chronic kidney disease), stage IV (HCC)     ying[facility]    COPD     Diabetes ([facility] [address] dx ~    type II    Generalized social phobia     HTN (hypertension)     Hypercholesteremia     Nausea & vomiting 2013    Other ill-defined conditions     fistula, R upper arm.  Pneumonia 12/10/2014    left lower lobe with sepsis    Presence of biventricular AICD 2016 [employer] biventricular AICD implant    Renal calculus or stone     small Lt by US 3/2012.       S/P AV lv ablation 2016    Smokers' cough ([address]       Past Surgical History:   Procedure Laterality Date    COLONOSCOPY,DIAGNOSTIC  3/28/2016         HX HEENT      Lasik eye surgery    HX OPEN CHOLECYSTECTOMY  ~     HX OTHER SURGICAL      adrenal mass removed    HX SHOULDER ARTHROSCOPY      removal bone spur left shoulder    KNEE SCOPE,CLEAN/DRAIN      UPPER GI ENDOSCOPY,DIAGNOSIS  3/28/2016         VASCULAR SURGERY PROCEDURE UNLIST      dialysis in right arm     Social History   Substance Use Topics    Smoking status: Former Smoker     Packs/day: 2.00     Years: 48.00     Types: Cigarettes     Quit date: 1/3/2012    Smokeless tobacco: Never Used    Alcohol use No      Family History   Problem Relation Age of Onset    Diabetes Mother     Heart Disease Mother     Hypertension Mother     High Cholesterol Mother     Arthritis-osteo Mother     Cancer Father      Lung    Arthritis-osteo Father     Cancer Brother      x2 - lung cancer      Allergies   Allergen Reactions    Grapefruit Hives    Orange Juice Hives    Peach (Prunus Persica) Hives      Prior to Admission medications    Medication Sig Start Date End Date Taking? Authorizing Provider   levoFLOXacin (LEVAQUIN) 500 mg tablet Take 1 Tab by mouth daily. 5/24/17  Yes Michael Ariza MD   clopidogrel (PLAVIX) 75 mg tab TAKE 1 TABLET BY MOUTH DAILY 4/24/17   Mehnaz Cavanaugh MD   clopidogrel (PLAVIX) 75 mg tab TAKE 1 TABLET BY MOUTH DAILY 3/10/17   Mehnaz Cavanaugh MD   metoprolol tartrate (LOPRESSOR) 50 mg tablet Take  by mouth two (2) times a day. Historical Provider   predniSONE (DELTASONE) 5 mg tablet Take 5 mg by mouth daily. 1/25/17   Historical Provider   HYDROcodone-acetaminophen (NORCO) 5-325 mg per tablet Take 1 Tab by mouth every four (4) hours as needed for Pain. Max Daily Amount: 6 Tabs. 11/19/16   Nathaly Zaragoza MD   albuterol-ipratropium (DUO-NEB) 2.5 mg-0.5 mg/3 ml nebu 3 mL by Nebulization route every six (6) hours as needed. 11/19/16   Nathaly Zaragoza MD   insulin lispro (HUMALOG) 100 unit/mL injection 5 Units by SubCUTAneous route Before breakfast, lunch, and dinner. 11/19/16   Nathaly Zaragoza MD   insulin NPH (NOVOLIN N, HUMULIN N) 100 unit/mL injection 20 Units by SubCUTAneous route two (2) times daily (with meals).  11/19/16   Nathaly Zaragoza MD aluminum-magnesium hydroxide 200-200 mg/5 mL susp 30 mL, diphenhydrAMINE 12.5 mg/5 mL elix 75 mg, lidocaine 2 % soln 30 mL, sucralfate 100 mg/mL susp 3 g oral solution (compounded) Take 5 mL by mouth Before breakfast, lunch, and dinner. 11/19/16   Shellie Evans MD   Insulin Syringe-Needle U-100 0.5 mL 29 gauge x 1/2\" syrg 1 Each by Does Not Apply route five (5) times daily. 11/19/16   Shellie Evans MD   alcohol swabs (ALCOHOL PADS) padm 1 Each by Apply Externally route Multiple. 11/19/16   Shellie Evans MD   albuterol (PROVENTIL HFA, VENTOLIN HFA, PROAIR HFA) 90 mcg/actuation inhaler Take 2 Puffs by inhalation every four (4) hours as needed for Wheezing. 11/8/16   Nik Morgan MD   calcium acetate (PHOSLO) 667 mg cap Take 2 Caps by mouth three (3) times daily (with meals). 8/20/16   Historical Provider   acetaminophen (TYLENOL) 325 mg tablet Take 650 mg by mouth every six (6) hours as needed for Pain or Fever (Temp >100.5). Historical Provider   budesonide-formoterol (SYMBICORT) 160-4.5 mcg/actuation HFA inhaler Take 2 Puffs by inhalation two (2) times a day. Patient takes at 0500 and 2100    Alvin Mejia MD   roflumilast (DALIRESP) tab tablet Take 500 mcg by mouth daily. Alvin Mejia MD   atorvastatin (LIPITOR) 10 mg tablet Take 10 mg by mouth nightly. Alvin Mejia MD   cholecalciferol, vitamin D3, (VITAMIN D3) 2,000 unit tab Take 1 Tab by mouth daily. Alvin Mejia MD   tiotropium (SPIRIVA WITH HANDIHALER) 18 mcg inhalation capsule Take 1 Cap by inhalation daily. Historical Provider   aspirin 81 mg tablet Take 81 mg by mouth every morning.  Indications: MYOCARDIAL INFARCTION PREVENTION 11/29/10   Historical Provider     REVIEW OF SYSTEMS:     []  Unable to obtain reliable ROS due to  [] mental status  [] sedated   [] intubated   [x] Total of 12 systems reviewed as follows:  Constitutional: negative fever, negative chills, negative weight loss  Eyes:   negative visual changes  ENT: negative sore throat, tongue or lip swelling  Respiratory:  + cough,  dyspnea  Cards:  negative for , palpitations, lower extremity edema; + CP  GI:   negative for nausea, vomiting, diarrhea, and abdominal pain  :  negative for frequency, dysuria  Integument:  negative for rash and pruritus  Heme:  negative for easy bruising and gum/nose bleeding  Musculoskel: negative for myalgias,  back pain and muscle weakness  Neuro:  negative for headaches, dizziness, vertigo  Psych:  negative for feelings of anxiety, depression   Travel?: none    Objective:   VITALS:    Visit Vitals    /53 (BP 1 Location: Left arm, BP Patient Position: At rest)    Pulse 77    Temp 98.2 °F (36.8 °C)    Resp 20    Ht 5' 2\" (1.575 m)    Wt 66 kg (145 lb 8 oz)    SpO2 98%    Breastfeeding No    BMI 26.61 kg/m2     PHYSICAL EXAM:  Gen:  []  WD []  WN  [] cachectic []  thin [x]  obese []  disheveled             [x]  ill apearing  []   Critical  [x]   Chronic    [x]  No acute distress    HEENT:   [x] NC/AT/PERRLA/EOMI    [] pink conjunctivae      [] pale conjunctivae                  PERRL  [] yes  [] no      [] moist mucosa    [] dry mucosa    hearing intact to voice [x] yes  [] No                 NECK:   supple [x] yes  [] no        masses [] yes  [] No               thyroid  []  non tender  []  tender    RESP:   [] CTA bilaterally/no wheezing/rhonchi/rales/crackles    [] rhonchi bilaterally - no dullness  [] wheezing   [x] rhonchi   [] crackles     use of accessory muscles [] yes [x] no    CARD:   []  regular rate and rhythm/No murmurs/rubs/gallops    murmur  [] yes ()  [] no      Rubs  [] yes  [] no       Gallops [] yes  [] no    Rate []  regular  []  irregular        carotid bruits  [] Right  []  Left                 LE edema - mild [x] yes  [] no           JVP  []  yes   [x]  no  AV access - positive thrill and bruit, no sign of inflammation    ABD:    [x] soft/non distended/non tender/+bowel sounds/no HSM    []  Rigid tenderness [] yes [] no   Liver enlargement  []  yes []  no                Spleen enlargement  []  yes []  no     distended []  yes [] no     bowel sound  [] hypoactive   [] hyperactive    LYMPH:    Neck []  yes []  no       Axillae []  yes []  no    SKIN:   Rashes []  yes   [x]  no    Ulcers []  yes   []  no               [] tight to palpitation    skin turgor []  good  [] poor  [] decreased               Cyanosis/clubbing []  yes []  no    NEUR:   [x] cranial nerves II-XII grossly intact       [] Cranial nerves deficit                 []  facial droop    []  slurred speech   [] aphasic     [] Strength normal     []  weakness  []  LUE  []   RUE/ []  LLE  []   RLE    follows commands  [x]  yes []  no           PSYCH:   insight [] poor [x] good   Alert and Oriented to  [x] person  [x] place  [x]  time                    [] depressed [] anxious [] agitated  [] lethargic [] stuporous  [] sedated     LAB DATA REVIEWED:    Recent Labs      05/25/17   0521  05/24/17   1620   WBC  16.2*  23.6*   HGB  8.7*  10.0*   HCT  27.6*  32.1*   PLT  181  207     Recent Labs      05/26/17   0343  05/25/17   0521  05/24/17   1620   NA  137  134*  133*   K  5.1  5.4*  5.6*   CL  97  98  94*   CO2  30  25  27   BUN  40*  47*  43*   CREA  4.97*  5.74*  5.52*   GLU  223*  147*  239*   CA  9.2  9.1  9.5     Recent Labs      05/24/17   1620   SGOT  10*   ALT  10*   AP  79   TBILI  0.4   ALB  2.8*   GLOB  4.2*     No results for input(s): INR, PTP, APTT in the last 72 hours. No lab exists for component: INREXT   No results for input(s): FE, TIBC, PSAT, FERR in the last 72 hours. No results for input(s): PH, PCO2, PO2 in the last 72 hours. No results for input(s): CPK, CKMB in the last 72 hours.     No lab exists for component: TROPONINI  Lab Results   Component Value Date/Time    Glucose (POC) 216 05/26/2017 07:54 AM    Glucose (POC) 211 05/26/2017 07:29 AM    Glucose (POC) 243 05/25/2017 09:18 PM    Glucose (POC) 143 05/25/2017 05:00 PM Glucose (POC) 132 05/25/2017 09:01 AM       Procedures: see electronic medical records for all procedures/Xrays and details which were not copied into this note but were reviewed prior to creation of Plan.    ________________________________________________________________________       ___________________________________________________  Consulting Physician:  Melly Presley MD

## 2017-05-25 NOTE — PROGRESS NOTES
TRANSFER - IN REPORT:    Verbal report received from Mandie(name) on [place]  being received from ED(unit) for routine progression of care      Report consisted of patients Situation, Background, Assessment and   Recommendations(SBAR). Information from the following report(s) SBAR, Kardex and MAR was reviewed with the receiving nurse. Opportunity for questions and clarification was provided. Assessment completed upon patients arrival to unit and care assumed.      Primary Nurse Santy Navarro and Bhupendra Driscoll RN performed a dual skin assessment on this patient No impairment noted  Todd score is 19

## 2017-05-25 NOTE — PROCEDURES
[facility]  [phone]    Vitals   Pre   Post   Assessment   Pre   Post     Temp  98.2  98.0 LOC  Alert, oriented No change   HR   60 75 Lungs   Coarse, diminished. 022L nc. sao2 100%  no change   B/P   142/62 133/59 Cardiac   Hrr, b/p stable. afebrile  no change   Resp   18 20 Skin   Fragile , thin. Multiple purplish areas on bue's  no change   Pain level  0 0 Edema  Trace BLE's     No change   Orders:    Duration:   Start:    1130 End:    1610 Total:   2.45 hrs   Dialyzer:   Dialyzer/Set Up Inspection: Revaclear (05/25/17 1315)   K Bath:   Dialysate K (mEq/L): 2 (05/25/17 1315)   Ca Bath:   Dialysate CA (mEq/L): 2.5 (05/25/17 1315)   Na/Bicarb:   Dialysate NA (mEq/L): 140 (05/25/17 1315)   Target Fluid Removal:   Goal/Amount of Fluid to Remove (mL): 3000 mL (05/25/17 1315)   Access     Type & Location:   r upper arm avf   Labs     Obtained/Reviewed   Critical Results Called   Date when labs were drawn-  Hgb-    HGB   Date Value Ref Range Status   05/25/2017 8.7 (L) 11.5 - 16.0 g/dL Final     K-    Potassium   Date Value Ref Range Status   05/25/2017 5.4 (H) 3.5 - 5.1 mmol/L Final     Ca-   Calcium   Date Value Ref Range Status   05/25/2017 9.1 8.5 - 10.1 MG/DL Final     Bun-   BUN   Date Value Ref Range Status   05/25/2017 47 (H) 6 - 20 MG/DL Final     Creat-   Creatinine   Date Value Ref Range Status   05/25/2017 5.74 (H) 0.55 - 1.02 MG/DL Final        Medications/ Blood Products Given     Name   Dose   Route and Time     vancomycin 750 mg  HD 1500             Blood Volume Processed (BVP):    48.2 Net Fluid   Removed:  2.245L   Comments   Time Out Done: 1115  Primary Nurse Rpt Pre:Megha WIGGINS  Primary Nurse Rpt Post:Mandie WIGGINS  Pt Education: aware of treatment plan. Care Plan: CRF- ongoing  Tx Summary:1100:pt. Received on unit in stable condition. No c/o chest pain or discomfort voiced. Explained procedure and consents  Signed per protocol. Pt. Assessed, access assess: +b/+t. Pt. Then cannulated with #15g needles w/o difficulty. 1200: pt. Resting comfortably. No complaints voiced  1610: treatment complete 15 min early due to dialyzer clotting. . Needles removed. Bleeding stopped in 15 min. Admiting Diagnosis:Chest pain  Pt's previous clinic-[facility]  Consent signed - Informed Consent Verified: Yes (05/25/17 1315)  [facility] Consent - yes  Hepatitis Status- hep abs 77 1/12/17  Machine #- Machine Number: [id number] (05/25/17 1315)  Telemetry status-  Pre-dialysis wt. - Pre-Dialysis Weight: 67.4 kg (148 lb 9.4 oz) (05/25/17 1315)

## 2017-05-25 NOTE — ED NOTES
Dr. Salvatore Abdi notified of patient's increase in chest pain at this time with nausea. Notified patient states she only urinates about every 2 days. Order obtained to discontinue urinalysis. Per shift report patient became hypoxic when they stood patient. Patient asked current weight. Will need to weigh later however patient is hypoxic at rest on room air 86%; placed on oxygen per nasal cannula 2 liters. Provider notified.

## 2017-05-26 NOTE — PROGRESS NOTES
Hospitalist Progress Note    NAME: Jennifer Díaz   :     MRN:  [id number]       Interim Hospital Summary: 67 y.o. female whom presented on 2017 with      Assessment / Plan:  Acute on chronic hypoxic respiratory failure  Severe sepsis  HCAP  Acute COPD exacerbation  -chest CT showed   -nebs/symbicort, prednisone, Levaquin/Cefepime. Vancomycin discontinued  -Bcx NTD  -continue O2 suppl, wean as tolerated  -PT/OT/CM    ESRD  Hyperkalemia  -cont' phoslo  -nephro consulted for HD    T2DM  -cont' home insulin  -SSI    CAD/PAD/HTN/Chronic diastolic heart failure  -cont' ASA/plavix, BB, statin  -no clinical evidence of exacerbation    Anemia  -likely due to renal failure  -no evidence of acute bleed  -monitor cbc      Body mass index is 26.61 kg/(m^2). Code Status: FULL  Surrogate Decision Maker: daughter     DVT Prophylaxis:  heparin  Baseline: walks with a cane     Subjective:     Chief Complaint / Reason for Physician Visit  Pt seen and examined at bedside. No acute complaints. Unsteady on her feet. Discussed with RN events overnight. Review of Systems:  Symptom Y/N Comments  Symptom Y/N Comments   Fever/Chills n   Chest Pain n    Poor Appetite n   Edema n    Cough n   Abdominal Pain n    Sputum    Joint Pain     SOB/LORENZO y   Pruritis/Rash     Nausea/vomit    Tolerating PT/OT     Diarrhea    Tolerating Diet     Constipation    Other       Could NOT obtain due to:      Objective:     VITALS:   Last 24hrs VS reviewed since prior progress note.  Most recent are:  Patient Vitals for the past 24 hrs:   Temp Pulse Resp BP SpO2   17 1118 - - - - 98 %   17 0717 98.2 °F (36.8 °C) 77 20 126/53 98 %   17 0716 - - - - 98 %   17 0318 97.4 °F (36.3 °C) 75 - 129/51 96 %   17 2301 - - - - 96 %   17 2256 97.7 °F (36.5 °C) 73 20 118/46 96 %   17 1933 97.2 °F (36.2 °C) 76 18 112/42 94 %   17 1926 - - - - 97 %   17 1656 98.4 °F (36.9 °C) 75 22 165/53 100 % 05/25/17 1610 98 °F (36.7 °C) 75 20 133/59 -   05/25/17 1545 - 65 20 104/51 -   05/25/17 1515 - 73 20 131/54 -   05/25/17 1445 - 75 20 137/60 -   05/25/17 1415 - 75 16 144/64 -   05/25/17 1345 - 75 20 144/64 -   05/25/17 1315 98.2 °F (36.8 °C) 60 20 146/62 -       Intake/Output Summary (Last 24 hours) at 05/26/17 1137  Last data filed at 05/26/17 0240   Gross per 24 hour   Intake              270 ml   Output             2249 ml   Net            -1979 ml        PHYSICAL EXAM:  General: WD, WN. Alert, cooperative, no acute distress    EENT:  EOMI. Anicteric sclerae. MMM  Resp:  No wheezing, CTA b/l. No accessory muscle use  CV:  Regular  rhythm,  No edema  GI:  Soft, Non distended, Non tender.  +Bowel sounds  Neurologic:  Alert and oriented X 3, normal speech,   Psych:   Good insight. Not anxious nor agitated  Skin:  No rashes. No jaundice    Reviewed most current lab test results and cultures  YES  Reviewed most current radiology test results   YES  Review and summation of old records today    NO  Reviewed patient's current orders and MAR    YES  PMH/ reviewed - no change compared to H&P  ________________________________________________________________________  Care Plan discussed with:    Comments   Patient x    Family      RN x    Care Manager     Consultant                        Multidiciplinary team rounds were held today with , nursing, pharmacist and clinical coordinator. Patient's plan of care was discussed; medications were reviewed and discharge planning was addressed.      ________________________________________________________________________  Total NON critical care TIME: 45*   Minutes    Total CRITICAL CARE TIME Spent:   Minutes non procedure based      Comments   >50% of visit spent in counseling and coordination of care x    ________________________________________________________________________  Fahad Orellana MD     Procedures: see electronic medical records for all procedures/Xrays and details which were not copied into this note but were reviewed prior to creation of Plan. LABS:  I reviewed today's most current labs and imaging studies.   Pertinent labs include:  Recent Labs      05/25/17   0521  05/24/17   1620   WBC  16.2*  23.6*   HGB  8.7*  10.0*   HCT  27.6*  32.1*   PLT  181  207     Recent Labs      05/26/17   0343  05/25/17   0521  05/24/17   1620   NA  137  134*  133*   K  5.1  5.4*  5.6*   CL  97  98  94*   CO2  30  25  27   GLU  223*  147*  239*   BUN  40*  47*  43*   CREA  4.97*  5.74*  5.52*   CA  9.2  9.1  9.5   ALB   --    --   2.8*   TBILI   --    --   0.4   SGOT   --    --   10*   ALT   --    --   10*       Signed: Belinda Villarreal MD

## 2017-05-26 NOTE — WOUND CARE
Wound Care consult for left elbow skin tear that was present on admission. This is a simple skin tear over one of the many purpural spots on her arms. Partial thickness with minimal bleeding. Treatment: Cleansed with Carraklenz spray and wiped with gauze. Applied a silicone dressing and then a foam dressing. Leave in place for one week. Discussed with staff nurse, Elaina Arguello.    Sumanth Sargent RN, BSN, [employer]

## 2017-05-26 NOTE — PROGRESS NOTES
ADULT PROTOCOL: JET AEROSOL ASSESSMENT    Patient  Mine Payan     67 y.o.   female     5/26/2017  3:15 PM    Breath Sounds Pre Procedure: Right Breath Sounds: Clear, Diminished                               Left Breath Sounds: Clear, Diminished    Breath Sounds Post Procedure: Right Breath Sounds: Clear, Diminished                                 Left Breath Sounds: Clear, Diminished    Breathing pattern: Pre procedure Breathing Pattern: Regular          Post procedure Breathing Pattern: Regular    Heart Rate: Pre procedure Pulse: 75           Post procedure Pulse: 83    Resp Rate: Pre procedure Respirations: 16           Post procedure Respirations: 16            Cough: Pre procedure Cough: Congested               Post procedure Cough: Congested      Oxygen: O2 Device: Room air   room air     Changed: NO    SpO2: Pre procedure SpO2: 95 %   without oxygen              Post procedure SpO2: 95 %  without oxygen    Nebulizer Therapy: Current medications Aerosolized Medications: DuoNeb      Changed: YES    Smoking History: former smoker    Problem List:   Patient Active Problem List   Diagnosis Code    Adrenal tumor D49.7    History of tobacco abuse Z87.891    HTN (hypertension) I10    ESRD (end stage renal disease) ([facility]) N18.6    Anemia of chronic kidney failure N18.9, D63.1    DM (diabetes mellitus) type II controlled with renal manifestation ([facility]) E11.29    AVF (arteriovenous fistula) ([facility]) I77.0    SOB (shortness of breath) R06.02    COPD (chronic obstructive pulmonary disease) ([facility]) J44.9    GERD (gastroesophageal reflux disease) K21.9    Obesity E66.9    ASVD (arteriosclerotic vascular disease) I70.90    History of CVA (cerebrovascular accident) Z86.73    Pneumonia, organism unspecified J18.9    Left-sided chest wall pain R07.89    Acute-on-chronic respiratory failure ([facility]) J96.20    Acute on chronic diastolic CHF (congestive heart failure), NYHA class 2 ([facility]) I50.33    Myocardial ischemia I25.9    Angina, class III ([facility]) I20.9    S/P PTCA (percutaneous transluminal coronary angioplasty) Z98.61    CAD (coronary artery disease) I25.10    Type II or unspecified type diabetes mellitus without mention of complication, uncontrolled E11.65    ASHD (arteriosclerotic heart disease) I25.10    Elevated troponin R74.8    Weakness of right side of body R53.1    Shock ([facility]) R57.9    Acute upper GI bleed K92.2    Fracture of right humerus S42.301A    Debility R53.81    Weakness R53.1    Hypoalbuminemia E88.09    Right arm pain M79.601    Goals of care, counseling/discussion Z71.89    Acute respiratory failure with hypoxia ([facility]) J96.01    PAF (paroxysmal atrial fibrillation) ([facility]) I48.0    Cardiomyopathy ([facility]) I42.9    Chronic systolic congestive heart failure ([facility]) I50.22    Presence of biventricular AICD Z95.810    S/P AV lv ablation Z98.890    Hypoglycemia E16.2    Leucocytosis D72.829    Chronic systolic heart failure ([facility]) I50.22    ICD (implantable cardioverter-defibrillator) in place Z95.810    AICD (automatic cardioverter/defibrillator) present Z95.810    Late effects of CVA (cerebrovascular accident) I69.90    Type 2 diabetes mellitus without complication ([facility]) L70.5    Iron deficiency anemia D50.9    PAD (peripheral artery disease) ([facility]) I73.9    COPD exacerbation ([facility]) J44.1    Chest pain R07.9       Respiratory Therapist: Neela oRsado RT

## 2017-05-26 NOTE — INTERDISCIPLINARY ROUNDS
Cardiopulmonary Care Interdisciplinary rounds were held today to discuss patient plan of care and outcomes. The following members were present: PT, NP/Physician, Pharmacy, Nursing, Nutritionist and Case Management. Plan of Care: Continue current treatment plan, chronic home O2, add PT/OT.

## 2017-05-26 NOTE — PROGRESS NOTES
NAME: Doroteo Soni        :          MRN:  [id number]        Assessment :    Plan:  --ESRD TTS at [facility]  COPD  PNA (HCAP)  Anemia  Mild hyperkalemia No HD today       Subjective:     Chief Complaint:  Feels ok. Still a bit of a cough. Denies cp or sob. Seems near to baseline to me. ~2.3 liters off with HD yesterday     Review of Systems:    Symptom Y/N Comments  Symptom Y/N Comments   Fever/Chills    Chest Pain     Poor Appetite    Edema     Cough    Abdominal Pain     Sputum    Joint Pain     SOB/LORENZO    Pruritis/Rash     Nausea/vomit    Tolerating PT/OT     Diarrhea    Tolerating Diet     Constipation    Other       Could not obtain due to:      Objective:     VITALS:   Last 24hrs VS reviewed since prior progress note. Most recent are:  Visit Vitals    /53 (BP 1 Location: Left arm, BP Patient Position: At rest)    Pulse 77    Temp 98.2 °F (36.8 °C)    Resp 20    Ht 5' 2\" (1.575 m)    Wt 66 kg (145 lb 8 oz)    SpO2 98%    Breastfeeding No    BMI 26.61 kg/m2       Intake/Output Summary (Last 24 hours) at 17 1132  Last data filed at 17 0240   Gross per 24 hour   Intake              270 ml   Output             2249 ml   Net            -1979 ml      Telemetry Reviewed:     PHYSICAL EXAM:  General: WD, WN. Alert, cooperative, no acute distress  Resp:  CTA Bilaterally. No Wheezing/Rhonchi/Rales. No access. muscle use  CV:  Regular  rhythm,  No murmur (), No Rubs, No Gallops.  No edema  GI:  Soft, Non distended, Non tender.  +Bowel sounds, no HSM      Lab Data Reviewed: (see below)    Medications Reviewed: (see below)    PMH/SH reviewed - no change compared to H&P  ________________________________________________________________________  Care Plan discussed with:  Patient y    [facility]:          Comments   >50% of visit spent in counseling and coordination of care       ________________________________________________________________________  Gia Gates MD     Procedures: see electronic medical records for all procedures/Xrays and details which  were not copied into this note but were reviewed prior to creation of Plan. LABS:  Recent Labs      05/25/17   0521  05/24/17   1620   WBC  16.2*  23.6*   HGB  8.7*  10.0*   HCT  27.6*  32.1*   PLT  181  207     Recent Labs      05/26/17   0343  05/25/17   0521  05/24/17   1620   NA  137  134*  133*   K  5.1  5.4*  5.6*   CL  97  98  94*   CO2  30  25  27   BUN  40*  47*  43*   CREA  4.97*  5.74*  5.52*   GLU  223*  147*  239*   CA  9.2  9.1  9.5     Recent Labs      05/24/17   1620   SGOT  10*   AP  79   TP  7.0   ALB  2.8*   GLOB  4.2*     No results for input(s): INR, PTP, APTT in the last 72 hours. No lab exists for component: INREXT   No results for input(s): FE, TIBC, PSAT, FERR in the last 72 hours. No results found for: FOL, RBCF   No results for input(s): PH, PCO2, PO2 in the last 72 hours. No results for input(s): CPK, CKMB in the last 72 hours.     No lab exists for component: TROPONINI  No components found for: [place]  Lab Results   Component Value Date/Time    Color DARK YELLOW 04/29/2016 02:40 AM    Appearance HAZY 04/29/2016 02:40 AM    Specific gravity 1.025 04/29/2016 02:40 AM    Specific gravity 1.018 07/27/2015 08:02 PM    pH (UA) 5.0 04/29/2016 02:40 AM    Protein 300 04/29/2016 02:40 AM    Glucose NEGATIVE  04/29/2016 02:40 AM    Ketone TRACE 04/29/2016 02:40 AM    Bilirubin NEGATIVE  04/29/2016 02:40 AM    Urobilinogen 0.2 04/29/2016 02:40 AM    Nitrites NEGATIVE  04/29/2016 02:40 AM    Leukocyte Esterase NEGATIVE  04/29/2016 02:40 AM    Epithelial cells FEW 04/29/2016 02:40 AM    Bacteria 2+ 04/29/2016 02:40 AM    WBC 0-4 04/29/2016 02:40 AM    RBC 0-5 04/29/2016 02:40 AM       MEDICATIONS:  Current Facility-Administered Medications   Medication Dose Route Frequency    calcium acetate (PHOSLO) capsule 1,334 mg  2 Cap Oral TID WITH MEALS    cefepime (MAXIPIME) 1 g in 0.9% sodium chloride (MBP/ADV) 50 mL  1 g IntraVENous DAILY    albuterol-ipratropium (DUO-NEB) 2.5 MG-0.5 MG/3 ML  3 mL Nebulization Q4H RT    VANCOMYCIN INFORMATION NOTE   Other DAILY    predniSONE (DELTASONE) tablet 40 mg  40 mg Oral DAILY    albuterol-ipratropium (DUO-NEB) 2.5 MG-0.5 MG/3 ML  3 mL Nebulization Q6H PRN    clopidogrel (PLAVIX) tablet 75 mg  75 mg Oral DAILY    roflumilast (DALIRESP) tablet 500 mcg  500 mcg Oral DAILY    fluticasone-vilanterol (BREO ELLIPTA) 100mcg-25mcg/puff  1 Puff Inhalation DAILY    atorvastatin (LIPITOR) tablet 10 mg  10 mg Oral QHS    metoprolol tartrate (LOPRESSOR) tablet 50 mg  50 mg Oral BID    insulin NPH (NOVOLIN N, HUMULIN N) injection 20 Units  20 Units SubCUTAneous BID WITH MEALS    insulin lispro (HUMALOG) injection 5 Units  5 Units SubCUTAneous TIDAC    insulin lispro (HUMALOG) injection   SubCUTAneous AC&HS    glucose chewable tablet 16 g  4 Tab Oral PRN    glucagon (GLUCAGEN) injection 1 mg  1 mg IntraMUSCular PRN    dextrose 10 % infusion 125-250 mL  125-250 mL IntraVENous PRN    sodium chloride (NS) flush 5-10 mL  5-10 mL IntraVENous Q8H    sodium chloride (NS) flush 5-10 mL  5-10 mL IntraVENous PRN    acetaminophen (TYLENOL) tablet 650 mg  650 mg Oral Q4H PRN    HYDROmorphone (PF) (DILAUDID) injection 0.2 mg  0.2 mg IntraVENous Q3H PRN    heparin (porcine) injection 5,000 Units  5,000 Units SubCUTAneous Q8H    levoFLOXacin (LEVAQUIN) 500 mg in D5W IVPB  500 mg IntraVENous Q48H    aspirin chewable tablet 81 mg  81 mg Oral DAILY

## 2017-05-26 NOTE — DISCHARGE SUMMARY
Discharge Summary      Name: Petra Hickman  [id number]  YOB: 1945 (Age: 67 y.o.)   Date of Admission: 5/24/2017  Date of Discharge: 5/27/2017  Attending Physician: Ernestina Anna MD    Discharge Diagnosis:   Acute on chronic hypoxic respiratory failure  Severe sepsis  HCAP  Acute COPD exacerbation  ESRD  Hyperkalemia  CAD/PAD/HTN/Chronic diastolic heart failure  Body mass index is 26.61 kg/(m^2)    Consultants: None    Procedures:  none    Brief Admission History/Reason for Admission Per Tami Arteaga MD:   Jessica Orozco is a 66 y.o. female who presents with the CC above and the PMhx below. The patient says she has never had anything like this before in the past and this has been constant since last night, progressively getting worse. SHe is due for dialysis tomorrow. The pain feels like a knife going into her chest and is left sided beneath her breast. The pain woke her up from sleep last and she took tylenol as well as 4 baby ASA without releif at home. The pain is worse with deep breaths and movement. +cough for the last couple of days with yellow phlegm. No f/c. No recent plane trips or immobility.    We were asked to admit for work up and evaluation of the above problems.      Brief Hospital Course by Main Problems:   Acute on chronic hypoxic respiratory failure  Severe sepsis  HCAP  Acute COPD exacerbation  Chest CT showed there is airspace process throughout the left lower lobe with small left pleural effusion. She was empirically treated with vancomycin/levaquin/cefepime, nebs/symbicort, prednisone. Bcx remained NTD abx de-escalated to Levaquin alone. ESRD  Hyperkalemia  Cont' phoslo. Resume PTA HD     T2DM, cont' home insulin     CAD/PAD/HTN/Chronic diastolic heart failure. Resume ASA/plavix, BB, statin. No clinical evidence of exacerbation.     Body mass index is 26.61 kg/(m^2)    Discharge Exam:  Patient seen and examined by me on discharge day.  Pertinent Findings:  Visit Vitals    /50 (BP 1 Location: Left arm)    Pulse 75    Temp 98.3 °F (36.8 °C)    Resp 18    Ht 5' 2\" (1.575 m)    Wt 69.3 kg (152 lb 12.5 oz)    SpO2 96%    Breastfeeding No    BMI 27.94 kg/m2     Gen:    Not in distress  Chest: Clear lungs  CVS:   Regular rhythm. No edema  Abd:  Soft, not distended, not tender    Discharge/Recent Laboratory Results:  Recent Labs      05/27/17   0305   NA  129*   K  5.9*   CL  91*   CO2  28   BUN  59*   GLU  288*   CA  9.3   PHOS  4.7     Recent Labs      05/27/17   0305   HGB  8.8*   HCT  27.8*   WBC  13.2*   PLT  192       Discharge Medications:  Current Discharge Medication List      START taking these medications    Details   levoFLOXacin (LEVAQUIN) 500 mg tablet Take 1 Tab by mouth every fourty-eight (48) hours for 6 days. Qty: 3 Tab, Refills: 0         CONTINUE these medications which have NOT CHANGED    Details   clopidogrel (PLAVIX) 75 mg tab TAKE 1 TABLET BY MOUTH DAILY  Qty: 30 Tab, Refills: 0      metoprolol tartrate (LOPRESSOR) 50 mg tablet Take  by mouth two (2) times a day. predniSONE (DELTASONE) 5 mg tablet Take 5 mg by mouth daily. HYDROcodone-acetaminophen (NORCO) 5-325 mg per tablet Take 1 Tab by mouth every four (4) hours as needed for Pain. Max Daily Amount: 6 Tabs. Qty: 30 Tab, Refills: 0      albuterol-ipratropium (DUO-NEB) 2.5 mg-0.5 mg/3 ml nebu 3 mL by Nebulization route every six (6) hours as needed. Qty: 100 Nebule, Refills: 1      insulin lispro (HUMALOG) 100 unit/mL injection 5 Units by SubCUTAneous route Before breakfast, lunch, and dinner. Qty: 1 Vial, Refills: 1      insulin NPH (NOVOLIN N, HUMULIN N) 100 unit/mL injection 20 Units by SubCUTAneous route two (2) times daily (with meals).   Qty: 1 Vial, Refills: 1      aluminum-magnesium hydroxide 200-200 mg/5 mL susp 30 mL, diphenhydrAMINE 12.5 mg/5 mL elix 75 mg, lidocaine 2 % soln 30 mL, sucralfate 100 mg/mL susp 3 g oral solution (compounded) Take 5 mL by mouth Before breakfast, lunch, and dinner. Qty: 100 mL, Refills: 0      Insulin Syringe-Needle U-100 0.5 mL 29 gauge x 1/2\" syrg 1 Each by Does Not Apply route five (5) times daily. Qty: 150 Syringe, Refills: 1      alcohol swabs (ALCOHOL PADS) padm 1 Each by Apply Externally route Multiple. Qty: 100 Pad, Refills: 1      albuterol (PROVENTIL HFA, VENTOLIN HFA, PROAIR HFA) 90 mcg/actuation inhaler Take 2 Puffs by inhalation every four (4) hours as needed for Wheezing. Qty: 1 Inhaler, Refills: 0      calcium acetate (PHOSLO) 667 mg cap Take 2 Caps by mouth three (3) times daily (with meals). acetaminophen (TYLENOL) 325 mg tablet Take 650 mg by mouth every six (6) hours as needed for Pain or Fever (Temp >100.5). budesonide-formoterol (SYMBICORT) 160-4.5 mcg/actuation HFA inhaler Take 2 Puffs by inhalation two (2) times a day. Patient takes at 0500 and 2100      roflumilast (DALIRESP) tab tablet Take 500 mcg by mouth daily. atorvastatin (LIPITOR) 10 mg tablet Take 10 mg by mouth nightly. cholecalciferol, vitamin D3, (VITAMIN D3) 2,000 unit tab Take 1 Tab by mouth daily. tiotropium (SPIRIVA WITH HANDIHALER) 18 mcg inhalation capsule Take 1 Cap by inhalation daily. aspirin 81 mg tablet Take 81 mg by mouth every morning.  Indications: MYOCARDIAL INFARCTION PREVENTION             DISPOSITION:    Home with Family: x   Home with HH/PT/OT/RN:    SNF/LTC:    CYRIL:    OTHER:          Follow up with:   PCP : Gabbi Mary MD  Follow-up Information     Follow up With Details Comments [address] MD In 5 days  [address] [facility]      Alessia Pedro MD In 2 weeks  [address] [facility]  [address]             Diet: cardiac    Total time in minutes spent coordinating this discharge (includes going over instructions, follow-up, prescriptions, and preparing report for sign off to her PCP) :  45 minutes

## 2017-05-26 NOTE — PROGRESS NOTES
7:42 AM  Report received from Makenzie Villalpando RN. SBAR, Kardex, Procedure Summary, Intake/Output, Recent Results, Med Rec Status and Cardiac Rhythm PACED were discussed.     Karrie Allen RN

## 2017-05-27 NOTE — PROCEDURES
[facility] [place]  [phone]    Vitals   Pre   Post   Assessment   Pre   Post     Temp  Temp: 98.3 °F (36.8 °C) (05/27/17 0755)  98.5 LOC  A&OX4 A&OX4   HR   Pulse (Heart Rate): 76 (05/27/17 0755) 75 Lungs   diminished  diminished   B/P   BP: 148/55 (05/27/17 0755) 202/50 Cardiac   NSR  NSR   Resp   Resp Rate: 16 (05/27/17 0755) 16 Skin   intact  intact   Pain level  Pain Intensity 1: 1 (05/27/17 0720) 0 Edema    none   none   Orders:    Duration:   Start:    8304 End:    1055 Total:   3 hours   Dialyzer:   Dialyzer/Set Up Inspection: Victor M Hall (05/27/17 0755)   K Bath:   Dialysate K (mEq/L): 3 (05/27/17 0755)   Ca Bath:   Dialysate CA (mEq/L): 2.5 (05/27/17 0755)   Na/Bicarb:   Dialysate NA (mEq/L): 140 (05/27/17 0755)   Target Fluid Removal:   Goal/Amount of Fluid to Remove (mL): 3000 mL (05/27/17 0755)   Access     Type & Location:   IONA AVF, 15 gauge needles used. Post tx: Sites held 10 minutes bleeding stopped. Labs     Obtained/Reviewed   Critical Results Called   Date when labs were drawn-  Hgb-    HGB   Date Value Ref Range Status   05/27/2017 8.8 (L) 11.5 - 16.0 g/dL Final     K-    Potassium   Date Value Ref Range Status   05/27/2017 5.9 (H) 3.5 - 5.1 mmol/L Final     Ca-   Calcium   Date Value Ref Range Status   05/27/2017 9.3 8.5 - 10.1 MG/DL Final     Bun-   BUN   Date Value Ref Range Status   05/27/2017 59 (H) 6 - 20 MG/DL Final     Creat-   Creatinine   Date Value Ref Range Status   05/27/2017 6.30 (H) 0.55 - 1.02 MG/DL Final        Medications/ Blood Products Given     Name   Dose   Route and Time     none                Blood Volume Processed (BVP):    75.9 liters Net Fluid   Removed:  3000 ml   Comments Patient tolerated tx well, no complaints during or after. Per Dr William Starks ok to remove 3 kg instead of 2. Report called to [employer], using SBAR.   Time Out Done: yes, [address] [place] [facility]  Primary Nurse Rpt Pre: Nathalie Murphy RN  Primary Nurse Rpt Post: Rex Lynn RN  Pt Education: fluid restriction and dietary restrictions  Care Plan: Dialysis per MD orders  Tx Summary: 3 Hours, 2 K 2.5 Ca Removed 3 kg  Admiting Diagnosis:  Pt's previous clinic-[facility]  Consent signed - Informed Consent Verified: Yes (05/25/17 1315)  Alberto Consent -yes   Hepatitis Status- antibodies 77 1-12-17, antigen negative 4-21-16  Machine #- Machine Number: 87 (05/27/17 3415)  Telemetry status-on tele  Pre-dialysis wt. - Pre-Dialysis Weight: 67.4 kg (148 lb 9.4 oz) (05/25/17 6298)

## 2017-05-27 NOTE — PROGRESS NOTES
Occupational Therapy  Chart reviewed, referral received. Pt is off the floor for dialysis. Spoke with RN who reported pt has been up ad aroldo and is scheduled for discharge today. Will follow back as time permits. Thank you.  Denia Catherine MS, OTR/L

## 2017-05-27 NOTE — PROGRESS NOTES
[address] SHIFT NURSING NOTE    Bedside and Verbal shift change report given to [address] LANIER[address]hakti Magaña [facility] (oncoming nurse) by Miriam Sargent (offgoing nurse). Report included the following information SBAR. SHIFT SUMMARY:         Admission Date 5/24/2017   Admission Diagnosis Chest pain   Consults IP CONSULT TO NEPHROLOGY        Consults   [x] PT   [x] OT   [] Speech   [] Palliative      [] Hospice    [] Case Management   [] None   Cardiac Monitoring   [x] Yes   [] No     Antibiotics   [x] Yes   [] No   GI Prophylaxis  (Ex: Protonix, Pepcid, etc,.)   [] Yes   [x] No          DVT Prophylaxis   SCDs:             Kevin stockings:         [x] Medication (Ex: Lovenox, Eliquis, Brilinta, Coumadin,  Heparin, etc..)   [] Contraindicated   [] No VTE needed       Urinary Catheter             LDAs               Peripheral IV 03/06/17 Left Antecubital (Active)       Peripheral IV 05/25/17 Left Forearm (Active)   Site Assessment Clean, dry, & intact 5/26/2017  8:05 PM   Phlebitis Assessment 0 5/26/2017  8:05 PM   Infiltration Assessment 0 5/26/2017  8:05 PM   Dressing Status Clean, dry, & intact 5/26/2017  8:05 PM   Dressing Type Transparent;Tape 5/26/2017  8:05 PM   Hub Color/Line Status Blue; Infusing;Flushed 5/26/2017  8:05 PM                      I/Os   Intake/Output Summary (Last 24 hours) at 05/26/17 2009  Last data filed at 05/26/17 0240   Gross per 24 hour   Intake               50 ml   Output                0 ml   Net               50 ml         Activity Level Activity Level: Up with Assistance     Activity Assistance: Partial (one person)   Diet Active Orders   There are no active orders of the following type(s): Diet. Purposeful Rounding every 1-2 hour?    [x] Yes    Burak Score  Total Score: 2   Bed Alarm (If score 3 or >)   [] Yes    [] Refused (See signed refusal form in chart)   Todd Score  Todd Score: 19       Todd Score (if score 14 or less)   [] PMT consult   [] Nutrition consult   [x] Wound Care consult      []  Specialty bed         Influenza Vaccine Received Flu Vaccine for Current Season (usually Sept-March): Not Flu Season               Needs prior to discharge:   Home O2 required:    [] Yes   [x] No     If yes, how much O2 required?     Other:    Last Bowel Movement Date: 05/25/17   Readmission Risk Assessment Tool Score High Risk            40       Total Score        4 More than 1 Admission in calendar year    5 Patient Insurance is Medicare, Medicaid or Self Pay    31 Charlson Comorbidity Score        Criteria that do not apply:    Relationship with PCP    Patient Living Status    Patient Length of Stay > 5       Expected Length of Stay 4d 21h   Actual Length of Stay 2

## 2017-05-27 NOTE — PROGRESS NOTES
I have reviewed discharge instructions with the patient. The patient verbalized understanding. AVS, quick disclosure, mychart, prescriptions, medication information and appointment information completed/given. IV and monitor removed.     Patient discharged per nurse

## 2017-06-03 NOTE — ED PROVIDER NOTES
HPI Comments: Ellen Huggins is a 67 y.o. female with hx of HTN, HLD, DM, CKD, arthritis who presents ambulatory to the ED c/o R posterior hip pain with radiation to R knee since waking up this morning. She notes taking plavix daily. Pt denies recent injuries, numbness/tingling, urinary/ fecal incontinence fever, chills, N/V/D. Social hx: -(former) Tobacco use, - EtOH use, - Illicit drug use    PCP: Juan Diego Vann MD    There are no other complaints, changes or physical findings at this time.;      The history is provided by the patient. No  was used. Past Medical History:   Diagnosis Date    Acute on chronic diastolic CHF (congestive heart failure), NYHA class 2 ([address] 2/19/2015    Adrenal mass, left (HCC)     2.4 Cm om Feb 2010    Anemia associated with chronic renal failure     Arthritis     right knee    Asthma     CKD (chronic kidney disease), stage IV (HCC)     ying,[facility]    COPD     Diabetes ([facility] dx 1997~    type II    Generalized social phobia     HTN (hypertension)     Hypercholesteremia     Nausea & vomiting 11/11/2013    Other ill-defined conditions     fistula, R upper arm.  Pneumonia 12/10/2014    left lower lobe with sepsis    Presence of biventricular AICD 4/22/2016 4/20/16 [place]umm biventricular AICD implant    Renal calculus or stone     small Lt by US 3/2012.       S/P AV lv ablation 04/22/2016 4/20/16    Smokers' cough ([facility] [address]        Past Surgical History:   Procedure Laterality Date    COLONOSCOPY,DIAGNOSTIC  3/28/2016         HX HEENT      Lasik eye surgery    HX OPEN CHOLECYSTECTOMY  ~ 1985    HX OTHER SURGICAL      adrenal mass removed    HX SHOULDER ARTHROSCOPY  2008    removal bone spur left shoulder    KNEE SCOPE,CLEAN/DRAIN      UPPER GI ENDOSCOPY,DIAGNOSIS  3/28/2016         VASCULAR SURGERY PROCEDURE UNLIST      dialysis in right arm         Family History:   Problem Relation Age of Onset    Diabetes Mother     Heart Disease Mother     Hypertension Mother     High Cholesterol Mother     Arthritis-osteo Mother     Cancer Father      Lung    Arthritis-osteo Father     Cancer Brother      x2 - lung cancer       Social History     Social History    Marital status:      Spouse name: N/A    Number of children: N/A    Years of education: N/A     Occupational History     Retired     Social History Main Topics    Smoking status: Former Smoker     Packs/day: 2.00     Years: 48.00     Types: Cigarettes     Quit date: 1/3/2012    Smokeless tobacco: Never Used    Alcohol use No    Drug use: No    Sexual activity: Not on file     Other Topics Concern    Not on file     Social History Narrative         ALLERGIES: Grapefruit; Orange juice; and Peach (prunus persica)    Review of Systems   Constitutional: Negative. Negative for activity change, appetite change, chills, fatigue, fever and unexpected weight change. HENT: Negative. Negative for congestion, hearing loss, rhinorrhea, sneezing and voice change. Eyes: Negative. Negative for pain and visual disturbance. Respiratory: Negative. Negative for apnea, cough, choking, chest tightness and shortness of breath. Cardiovascular: Negative. Negative for chest pain and palpitations. Gastrointestinal: Negative. Negative for abdominal distention, abdominal pain, blood in stool, constipation, diarrhea, nausea and vomiting. Genitourinary: Negative. Negative for decreased urine volume, difficulty urinating, flank pain, frequency and urgency. No discharge   Musculoskeletal: Positive for arthralgias (R hip, R knee). Negative for back pain, myalgias and neck stiffness. Skin: Negative. Negative for color change and rash. Neurological: Negative. Negative for dizziness, seizures, syncope, speech difficulty, weakness, numbness and headaches. Hematological: Negative for adenopathy. Psychiatric/Behavioral: Negative. Negative for agitation, behavioral problems, dysphoric mood and suicidal ideas. The patient is not nervous/anxious. Patient Vitals for the past 12 hrs:   Temp Pulse Resp BP SpO2   06/03/17 0902 98 °F (36.7 °C) 74 16 150/64 96 %         Physical Exam   Constitutional: She is oriented to person, place, and time. She appears well-developed and well-nourished. No distress. HENT:   Head: Normocephalic and atraumatic. Right Ear: External ear normal.   Left Ear: External ear normal.   Nose: Nose normal.   Mouth/Throat: Oropharynx is clear and moist. No oropharyngeal exudate. Eyes: Conjunctivae and EOM are normal. Pupils are equal, round, and reactive to light. Right eye exhibits no discharge. Left eye exhibits no discharge. No scleral icterus. Neck: Normal range of motion. Neck supple. No JVD present. No tracheal deviation present. Cardiovascular: Normal rate, regular rhythm, normal heart sounds and intact distal pulses. Exam reveals no gallop and no friction rub. No murmur heard. Pulmonary/Chest: Effort normal and breath sounds normal. No respiratory distress. She has no wheezes. She has no rales. She exhibits no tenderness. Abdominal: Soft. Bowel sounds are normal. She exhibits no distension and no mass. There is no tenderness. There is no rebound and no guarding. Genitourinary:   Genitourinary Comments: No urinary or fecal incontinence. Musculoskeletal: Normal range of motion. She exhibits no edema or tenderness. Tender to R low back. Lymphadenopathy:     She has no cervical adenopathy. Neurological: She is alert and oriented to person, place, and time. She has normal reflexes. No cranial nerve deficit. She exhibits normal muscle tone. Coordination normal.   Negative straight leg raise. NVI. Skin: Skin is warm and dry. She is not diaphoretic. Psychiatric: She has a normal mood and affect.  Her behavior is normal. Judgment and thought content normal.   Nursing note and vitals reviewed. MDM  Number of Diagnoses or Management Options  Diagnosis management comments: DDx: DDD, DJD, sciatica       Amount and/or Complexity of Data Reviewed  Tests in the radiology section of CPT®: ordered and reviewed  Review and summarize past medical records: yes    Patient Progress  Patient progress: stable    ED Course       Procedures        IMAGING RESULTS:    XR SPINE LUMB 2 OR 3 V   Final Result   EXAM: XR SPINE LUMB 2 OR 3 V  INDICATION: Back Pain  COMPARISON: X-ray of the lumbar spine, 3/27/2017  . FINDINGS:   AP, lateral and spot lateral views of the lumbar spine demonstrate degenerative  changes throughout the lumbar spine with slight levoscoliosis and left, lateral  subluxation of L3 and complete loss of disc space height at L3/L4. The vertebral  body heights are not significantly changed. Vascular calcifications. .  IMPRESSION  IMPRESSION:   1. Degenerative changes throughout the lumbar spine. Progression of degenerative  disc disease at L3/L4.             MEDICATIONS GIVEN:  Medications   oxyCODONE-acetaminophen (PERCOCET) 5-325 mg per tablet 1 Tab (1 Tab Oral Given 6/3/17 0928)   diazePAM (VALIUM) tablet 5 mg (5 mg Oral Given 6/3/17 0928)       IMPRESSION:  1. Acute right-sided low back pain with right-sided sciatica    2. DDD (degenerative disc disease), lumbar    3. Sciatica of right side        PLAN: Discharge home  1. Discharge Medication List as of 6/3/2017  9:29 AM      START taking these medications    Details   oxyCODONE-acetaminophen (PERCOCET) 5-325 mg per tablet Take 1 Tab by mouth every six (6) hours as needed for Pain. Max Daily Amount: 4 Tabs., Print, Disp-12 Tab, R-0      methocarbamol (ROBAXIN-750) 750 mg tablet Take 1 Tab by mouth four (4) times daily. , Normal, Disp-20 Tab, R-0         CONTINUE these medications which have NOT CHANGED    Details   clopidogrel (PLAVIX) 75 mg tab TAKE 1 TABLET BY MOUTH DAILY, Normal, Disp-30 Tab, R-0 metoprolol tartrate (LOPRESSOR) 50 mg tablet Take  by mouth two (2) times a day., Historical Med      predniSONE (DELTASONE) 5 mg tablet Take 5 mg by mouth daily. , Historical Med      HYDROcodone-acetaminophen (NORCO) 5-325 mg per tablet Take 1 Tab by mouth every four (4) hours as needed for Pain. Max Daily Amount: 6 Tabs., Print, Disp-30 Tab, R-0      albuterol-ipratropium (DUO-NEB) 2.5 mg-0.5 mg/3 ml nebu 3 mL by Nebulization route every six (6) hours as needed. , Print, Disp-100 Nebule, R-1      insulin lispro (HUMALOG) 100 unit/mL injection 5 Units by SubCUTAneous route Before breakfast, lunch, and dinner., Print, Disp-1 Vial, R-1      insulin NPH (NOVOLIN N, HUMULIN N) 100 unit/mL injection 20 Units by SubCUTAneous route two (2) times daily (with meals). , Print, Disp-1 Vial, R-1      aluminum-magnesium hydroxide 200-200 mg/5 mL susp 30 mL, diphenhydrAMINE 12.5 mg/5 mL elix 75 mg, lidocaine 2 % soln 30 mL, sucralfate 100 mg/mL susp 3 g oral solution (compounded) Take 5 mL by mouth Before breakfast, lunch, and dinner., Print, Disp-100 mL, R-0      Insulin Syringe-Needle U-100 0.5 mL 29 gauge x 1/2\" syrg 1 Each by Does Not Apply route five (5) times daily. , Print, Disp-150 Syringe, R-1      alcohol swabs (ALCOHOL PADS) padm 1 Each by Apply Externally route Multiple., Print, Disp-100 Pad, R-1      albuterol (PROVENTIL HFA, VENTOLIN HFA, PROAIR HFA) 90 mcg/actuation inhaler Take 2 Puffs by inhalation every four (4) hours as needed for Wheezing., Normal, Disp-1 Inhaler, R-0      calcium acetate (PHOSLO) 667 mg cap Take 2 Caps by mouth three (3) times daily (with meals). , Historical Med      acetaminophen (TYLENOL) 325 mg tablet Take 650 mg by mouth every six (6) hours as needed for Pain or Fever (Temp >100.5). , Historical Med      budesonide-formoterol (SYMBICORT) 160-4.5 mcg/actuation HFA inhaler Take 2 Puffs by inhalation two (2) times a day.  Patient takes at 0500 and 2100, Historical Med      roflumilast (DALIRESP) tab tablet Take 500 mcg by mouth daily. , Historical Med      atorvastatin (LIPITOR) 10 mg tablet Take 10 mg by mouth nightly., Historical Med      cholecalciferol, vitamin D3, (VITAMIN D3) 2,000 unit tab Take 1 Tab by mouth daily. , Historical Med      tiotropium (SPIRIVA WITH HANDIHALER) 18 mcg inhalation capsule Take 1 Cap by inhalation daily. , Historical Med      aspirin 81 mg tablet Take 81 mg by mouth every morning. Indications: MYOCARDIAL INFARCTION PREVENTION, Historical Med           2. Follow-up Information     Follow up With Details Comments Contact Info    Heidy Wolfe MD In 2 days As needed [address]      Elder Cabrera MD In 2 days As needed [address] [facility]  [address]  [phone]          3. Return to ED if worse     DISCHARGE NOTE  10:28 AM  The patient has been re-evaluated and is ready for discharge. Reviewed available results with patient. Counseled pt on diagnosis and care plan. Pt has expressed understanding, and all questions have been answered. Pt agrees with plan and agrees to F/U as recommended, or return to the ED if their sxs worsen. Discharge instructions have been provided and explained to the pt, along with reasons to return to the ED. This note is prepared by Sangeeta Hess, acting as Scribe for [employer] JOSE LUIS Hernandez: The scribe's documentation has been prepared under my direction and personally reviewed by me in its entirety. I confirm that the note above accurately reflects all work, treatment, procedures, and medical decision making performed by me.

## 2017-06-03 NOTE — ED NOTES
Patient identified and read over and explained discharge instructions with time for questions by attending MD/PA. Patient has verbalized understanding of discharge instructions.    Discharge by wheelchair

## 2017-06-03 NOTE — DISCHARGE INSTRUCTIONS
Back Pain: Care Instructions  Your Care Instructions    Back pain has many possible causes. It is often related to problems with muscles and ligaments of the back. It may also be related to problems with the nerves, discs, or bones of the back. Moving, lifting, standing, sitting, or sleeping in an awkward way can strain the back. Sometimes you don't notice the injury until later. Arthritis is another common cause of back pain. Although it may hurt a lot, back pain usually improves on its own within several weeks. Most people recover in 12 weeks or less. Using good home treatment and being careful not to stress your back can help you feel better sooner. Follow-up care is a key part of your treatment and safety. Be sure to make and go to all appointments, and call your doctor if you are having problems. Its also a good idea to know your test results and keep a list of the medicines you take. How can you care for yourself at home? · Sit or lie in positions that are most comfortable and reduce your pain. Try one of these positions when you lie down:  ¨ Lie on your back with your knees bent and supported by large pillows. ¨ Lie on the floor with your legs on the seat of a sofa or chair. [place] Lighter on your side with your knees and hips bent and a pillow between your legs. ¨ Lie on your stomach if it does not make pain worse. · Do not sit up in bed, and avoid soft couches and twisted positions. Bed rest can help relieve pain at first, but it delays healing. Avoid bed rest after the first day of back pain. · Change positions every 30 minutes. If you must sit for long periods of time, take breaks from sitting. Get up and walk around, or lie in a comfortable position. · Try using a heating pad on a low or medium setting for 15 to 20 minutes every 2 or 3 hours. Try a warm shower in place of one session with the heating pad. · You can also try an ice pack for 10 to 15 minutes every 2 to 3 hours.  Put a thin cloth between the ice pack and your skin. · Take pain medicines exactly as directed. ¨ If the doctor gave you a prescription medicine for pain, take it as prescribed. ¨ If you are not taking a prescription pain medicine, ask your doctor if you can take an over-the-counter medicine. · Take short walks several times a day. You can start with 5 to 10 minutes, 3 or 4 times a day, and work up to longer walks. Walk on level surfaces and avoid hills and stairs until your back is better. · Return to work and other activities as soon as you can. Continued rest without activity is usually not good for your back. · To prevent future back pain, do exercises to stretch and strengthen your back and stomach. Learn how to use good posture, safe lifting techniques, and proper body mechanics. When should you call for help? Call your doctor now or seek immediate medical care if:  · You have new or worsening numbness in your legs. · You have new or worsening weakness in your legs. (This could make it hard to stand up.)  · You lose control of your bladder or bowels. Watch closely for changes in your health, and be sure to contact your doctor if:  · Your pain gets worse. · You are not getting better after 2 weeks. Where can you learn more? Go to http://chiqui-[place].info/. Enter J389 in the search box to learn more about \"Back Pain: Care Instructions. \"  Current as of: May 23, 2016  Content Version: 11.2  © 1620-7346 [employer]. Care instructions adapted under license by [employer] (which disclaims liability or warranty for this information). If you have questions about a medical condition or this instruction, always ask your healthcare professional. [address] any warranty or liability for your use of this information.

## 2017-07-07 NOTE — DISCHARGE INSTRUCTIONS
[facility]  Special Procedures/Radiology Department    Radiologist: Dr. Марина roberson    Date:  7/7/2017      Myelogram Discharge Instructions    Restrict your activity for the next 48 hours. You may get up and sit in the chair or get up and go to the bathroom with slow movements. You must keep your head above your chest until 8 a.m. tomorrow. Rest as much as possible tonight and tomorrow. Resume your previous diet and follow the medication reconciliation form. Drink plenty of water. Avoid products with caffeine. You may take Tylenol, as directed on the label, for pain or discomfort. Avoid ibuprofen (Advil, Motrin) and aspirin as they may cause bleeding. Avoid lifting anything heavier than 5 pounds. Avoid excessive bending and lifting as this may increase the chance of having a headache. Be sure to follow up with your physician. Take your CD disk with you. If you have severe pain, or if you have any questions or concerns, please call [phone] and ask to speak to the nurse on-call.         Patient Signature:  ______________________________________________

## 2017-07-07 NOTE — IP AVS SNAPSHOT
[address] [facility] 
[phone] Patient: Saeed Whitlock MRN: [id number] ZVR:[id number] You are allergic to the following Allergen Reactions Grapefruit Hives Orange Juice Hives Peach (Prunus Persica) Hives Recent Documentation OB Status Smoking Status Postmenopausal Former Smoker Emergency Contacts Name Discharge Info Relation Home Work Mobile [facility] DISCHARGE CAREGIVER [3] Sister [23] [phone] [facility] CAREGIVER [3] Child [2] [phone] About your hospitalization You were admitted on:  July 7, 2017 You last received care in the:  [facility] You were discharged on:  July 7, 2017 Unit phone number:  [phone] Why you were hospitalized Your primary diagnosis was:  Not on File Providers Seen During Your Hospitalizations Provider Role Specialty Primary office phone Ricardo Braxton MD Attending Provider Orthopedic Surgery [phone] Your Primary Care Physician (PCP) Primary Care Physician Office Phone Office Fax Ludwin Crew [phone] Follow-up Information None Your Appointments Friday July 07, 2017 10:00 AM EDT  
CT SPINE LUMB W CONT with ED [facility] CT 1 [facility] RAD CT ([address]) [address] [facility]  
[phone] CONTRAST STUDY: 1. The patient should not eat solid food four hours before the appointment but should be encouraged to drink clear liquids. 2. The patient will require IV access for contrast administration.  3. The patient should not take  Ibuprofen (Advil, Motrin, etc.) and Naproxen Sodium (Aleve, etc.)  on the day of the exam. Stopping non-steroidal anti-inflammatory agents (NSAIDs) like Ibuprofen decreases the risk of kidney damage from the x-ray contrast (dye). 4. Bring any non [facility] facility films/images pertaining to the area of interest with you on the day of appointment. 5. Bring current lab work if available(within last 90 days [facility]) ***If scheduled at [facility], iSTAT is not available, labs will need to be done before appointment*** 6. Check in at registration at least 30 minutes before appt time unless you were instructed to do otherwise. 7. If you have to drink oral contrast please pick it up any weekday prior to your appointment, if you cannot please check in 2 hrs  before appt time. Patient should report to outpatient registration ([facility]) 30 minutes prior to the appointment time unless instructed otherwise. Tuesday July 25, 2017  3:45 PM EDT  
PACEMAKER with PACEMAKER, [facility] [address] [facility]) [address] [facility]  
[phone] Current Discharge Medication List  
  
ASK your doctor about these medications Dose & Instructions Dispensing Information Comments Morning Noon Evening Bedtime  
 acetaminophen 325 mg tablet Commonly known as:  TYLENOL Your last dose was: Your next dose is:    
   
   
 Dose:  650 mg Take 650 mg by mouth every six (6) hours as needed for Pain or Fever (Temp >100.5). Refills:  0  
     
   
   
   
  
 albuterol 90 mcg/actuation inhaler Commonly known as:  PROVENTIL HFA, VENTOLIN HFA, PROAIR HFA Your last dose was: Your next dose is:    
   
   
 Dose:  2 Puff Take 2 Puffs by inhalation every four (4) hours as needed for Wheezing. Quantity:  1 Inhaler Refills:  0  
     
   
   
   
  
 albuterol-ipratropium 2.5 mg-0.5 mg/3 ml Nebu Commonly known as:  [place] Freitas Your last dose was: Your next dose is:    
   
   
 Dose:  3 mL  
3 mL by Nebulization route every six (6) hours as needed. Quantity:  100 Nebule Refills:  1  
     
   
   
   
  
 alcohol swabs Padm Commonly known as:  ALCOHOL PADS Your last dose was: Your next dose is:    
   
   
 Dose:  1 Each  
1 Each by Apply Externally route Multiple. Quantity:  100 Pad Refills:  1  
     
   
   
   
  
 aluminum-magnesium hydroxide 200-200 mg/5 mL susp 30 mL, diphenhydrAMINE 12.5 mg/5 mL elix 75 mg, lidocaine 2 % soln 30 mL, sucralfate 100 mg/mL susp 3 g oral solution (compounded) Your last dose was: Your next dose is:    
   
   
 Dose:  5 mL Take 5 mL by mouth Before breakfast, lunch, and dinner. Quantity:  100 mL Refills:  0  
     
   
   
   
  
 aspirin 81 mg tablet Your last dose was: Your next dose is:    
   
   
 Dose:  81 mg Take 81 mg by mouth every morning. Indications: MYOCARDIAL INFARCTION PREVENTION Refills:  0  
     
   
   
   
  
 calcium acetate 667 mg Cap Commonly known as:  PHOSLO Your last dose was: Your next dose is:    
   
   
 Dose:  2 Cap Take 2 Caps by mouth three (3) times daily (with meals). Refills:  0  
     
   
   
   
  
 clopidogrel 75 mg Tab Commonly known as:  PLAVIX Your last dose was: Your next dose is: TAKE 1 TABLET BY MOUTH DAILY Quantity:  30 Tab Refills:  0  
     
   
   
   
  
 DALIRESP Tab tablet Generic drug:  roflumilast  
   
Your last dose was: Your next dose is:    
   
   
 Dose:  500 mcg Take 500 mcg by mouth daily. Refills:  0 HYDROcodone-acetaminophen 5-325 mg per tablet Commonly known as:  Wisam Beckmanist Your last dose was: Your next dose is:    
   
   
 Dose:  1 Tab Take 1 Tab by mouth every four (4) hours as needed for Pain. Max Daily Amount: 6 Tabs. Quantity:  30 Tab Refills:  0  
     
   
   
   
  
 insulin lispro 100 unit/mL injection Commonly known as:  HUMALOG Your last dose was: Your next dose is:    
   
   
 Dose:  5 Units 5 Units by SubCUTAneous route Before breakfast, lunch, and dinner. Quantity:  1 Vial  
Refills:  1  
     
   
   
   
  
 insulin  unit/mL injection Commonly known as:  Makenzie Ortizft Your last dose was: Your next dose is:    
   
   
 Dose:  20 Units 20 Units by SubCUTAneous route two (2) times daily (with meals). Quantity:  1 Vial  
Refills:  1 Insulin Syringe-Needle U-100 0.5 mL 29 gauge x 1/2\" Syrg Your last dose was: Your next dose is:    
   
   
 Dose:  1 Each  
1 Each by Does Not Apply route five (5) times daily. Quantity:  150 Syringe Refills:  1 LIPITOR 10 mg tablet Generic drug:  atorvastatin Your last dose was: Your next dose is:    
   
   
 Dose:  10 mg Take 10 mg by mouth nightly. Refills:  0  
     
   
   
   
  
 methocarbamol 750 mg tablet Commonly known as:  [id number] Your last dose was: Your next dose is:    
   
   
 Dose:  750 mg Take 1 Tab by mouth four (4) times daily. Quantity:  20 Tab Refills:  0  
     
   
   
   
  
 metoprolol tartrate 50 mg tablet Commonly known as:  LOPRESSOR Your last dose was: Your next dose is: Take  by mouth two (2) times a day. Refills:  0  
     
   
   
   
  
 oxyCODONE-acetaminophen 5-325 mg per tablet Commonly known as:  PERCOCET Your last dose was: Your next dose is:    
   
   
 Dose:  1 Tab Take 1 Tab by mouth every six (6) hours as needed for Pain. Max Daily Amount: 4 Tabs. Quantity:  12 Tab Refills:  0  
     
   
   
   
  
 predniSONE 5 mg tablet Commonly known as:  Ancel Clock Your last dose was: Your next dose is:    
   
   
 Dose:  5 mg Take 5 mg by mouth daily. Refills:  0 SPIRIVA WITH HANDIHALER 18 mcg inhalation capsule Generic drug:  tiotropium Your last dose was: Your next dose is:    
   
   
 Dose:  1 Cap Take 1 Cap by inhalation daily. Refills:  0  
     
   
   
   
  
 SYMBICORT 160-4.5 mcg/actuation HFA inhaler Generic drug:  budesonide-formoterol Your last dose was: Your next dose is:    
   
   
 Dose:  2 Puff Take 2 Puffs by inhalation two (2) times a day. Patient takes at 0500 and 2100 Refills:  0  
     
   
   
   
  
 VITAMIN D3 2,000 unit Tab Generic drug:  cholecalciferol (vitamin D3) Your last dose was: Your next dose is:    
   
   
 Dose:  1 Tab Take 1 Tab by mouth daily. Refills:  0 Discharge Instructions [facility] Special Procedures/Radiology Department Radiologist: Dr. Roxane Pitts Date:  7/7/2017 Myelogram Discharge Instructions Restrict your activity for the next 48 hours. You may get up and sit in the chair or get up and go to the bathroom with slow movements. You must keep your head above your chest until 8 a.m. tomorrow. Rest as much as possible tonight and tomorrow. Resume your previous diet and follow the medication reconciliation form. Drink plenty of water. Avoid products with caffeine. You may take Tylenol, as directed on the label, for pain or discomfort. Avoid ibuprofen (Advil, Motrin) and aspirin as they may cause bleeding. Avoid lifting anything heavier than 5 pounds. Avoid excessive bending and lifting as this may increase the chance of having a headache. Be sure to follow up with your physician. Take your CD disk with you. If you have severe pain, or if you have any questions or concerns, please call [phone] and ask to speak to the nurse on-call. Patient Signature:  ______________________________________________ Discharge Orders None General Information Please provide this summary of care documentation to your next provider. Patient Signature:  ____________________________________________________________ Date:  ____________________________________________________________  
  
Jeremy Julien Provider Signature:  ____________________________________________________________ Date:  ____________________________________________________________

## 2017-07-07 NOTE — IP AVS SNAPSHOT
Current Discharge Medication List  
  
ASK your doctor about these medications Dose & Instructions Dispensing Information Comments Morning Noon Evening Bedtime  
 acetaminophen 325 mg tablet Commonly known as:  TYLENOL Your last dose was: Your next dose is:    
   
   
 Dose:  650 mg Take 650 mg by mouth every six (6) hours as needed for Pain or Fever (Temp >100.5). Refills:  0  
     
   
   
   
  
 albuterol 90 mcg/actuation inhaler Commonly known as:  PROVENTIL HFA, VENTOLIN HFA, PROAIR HFA Your last dose was: Your next dose is:    
   
   
 Dose:  2 Puff Take 2 Puffs by inhalation every four (4) hours as needed for Wheezing. Quantity:  1 Inhaler Refills:  0  
     
   
   
   
  
 albuterol-ipratropium 2.5 mg-0.5 mg/3 ml Nebu Commonly known as:  Maryanne Amato Your last dose was: Your next dose is:    
   
   
 Dose:  3 mL  
3 mL by Nebulization route every six (6) hours as needed. Quantity:  100 Nebule Refills:  1  
     
   
   
   
  
 alcohol swabs Padm Commonly known as:  ALCOHOL PADS Your last dose was: Your next dose is:    
   
   
 Dose:  1 Each  
1 Each by Apply Externally route Multiple. Quantity:  100 Pad Refills:  1  
     
   
   
   
  
 aluminum-magnesium hydroxide 200-200 mg/5 mL susp 30 mL, diphenhydrAMINE 12.5 mg/5 mL elix 75 mg, lidocaine 2 % soln 30 mL, sucralfate 100 mg/mL susp 3 g oral solution (compounded) Your last dose was: Your next dose is:    
   
   
 Dose:  5 mL Take 5 mL by mouth Before breakfast, lunch, and dinner. Quantity:  100 mL Refills:  0  
     
   
   
   
  
 aspirin 81 mg tablet Your last dose was: Your next dose is:    
   
   
 Dose:  81 mg Take 81 mg by mouth every morning. Indications: MYOCARDIAL INFARCTION PREVENTION Refills:  0  
     
   
   
   
  
 calcium acetate 667 mg Cap Commonly known as:  PHOSLO Your last dose was: Your next dose is:    
   
   
 Dose:  2 Cap Take 2 Caps by mouth three (3) times daily (with meals). Refills:  0  
     
   
   
   
  
 clopidogrel 75 mg Tab Commonly known as:  PLAVIX Your last dose was: Your next dose is: TAKE 1 TABLET BY MOUTH DAILY Quantity:  30 Tab Refills:  0  
     
   
   
   
  
 DALIRESP Tab tablet Generic drug:  roflumilast  
   
Your last dose was: Your next dose is:    
   
   
 Dose:  500 mcg Take 500 mcg by mouth daily. Refills:  0 HYDROcodone-acetaminophen 5-325 mg per tablet Commonly known as:  Pina Ro Your last dose was: Your next dose is:    
   
   
 Dose:  1 Tab Take 1 Tab by mouth every four (4) hours as needed for Pain. Max Daily Amount: 6 Tabs. Quantity:  30 Tab Refills:  0  
     
   
   
   
  
 insulin lispro 100 unit/mL injection Commonly known as:  HUMALOG Your last dose was: Your next dose is:    
   
   
 Dose:  5 Units 5 Units by SubCUTAneous route Before breakfast, lunch, and dinner. Quantity:  1 Vial  
Refills:  1  
     
   
   
   
  
 insulin  unit/mL injection Commonly known as:  Scott Romero Your last dose was: Your next dose is:    
   
   
 Dose:  20 Units 20 Units by SubCUTAneous route two (2) times daily (with meals). Quantity:  1 Vial  
Refills:  1 Insulin Syringe-Needle U-100 0.5 mL 29 gauge x 1/2\" Syrg Your last dose was: Your next dose is:    
   
   
 Dose:  1 Each  
1 Each by Does Not Apply route five (5) times daily. Quantity:  150 Syringe Refills:  1 LIPITOR 10 mg tablet Generic drug:  atorvastatin Your last dose was: Your next dose is:    
   
   
 Dose:  10 mg Take 10 mg by mouth nightly. Refills:  0  
     
   
   
   
  
 methocarbamol 750 mg tablet Commonly known as:  [id number]-400 Your last dose was: Your next dose is:    
   
   
 Dose:  750 mg Take 1 Tab by mouth four (4) times daily. Quantity:  20 Tab Refills:  0  
     
   
   
   
  
 metoprolol tartrate 50 mg tablet Commonly known as:  LOPRESSOR Your last dose was: Your next dose is: Take  by mouth two (2) times a day. Refills:  0  
     
   
   
   
  
 oxyCODONE-acetaminophen 5-325 mg per tablet Commonly known as:  PERCOCET Your last dose was: Your next dose is:    
   
   
 Dose:  1 Tab Take 1 Tab by mouth every six (6) hours as needed for Pain. Max Daily Amount: 4 Tabs. Quantity:  12 Tab Refills:  0  
     
   
   
   
  
 predniSONE 5 mg tablet Commonly known as:  Esteban Castillo Your last dose was: Your next dose is:    
   
   
 Dose:  5 mg Take 5 mg by mouth daily. Refills:  0 SPIRIVA WITH HANDIHALER 18 mcg inhalation capsule Generic drug:  tiotropium Your last dose was: Your next dose is:    
   
   
 Dose:  1 Cap Take 1 Cap by inhalation daily. Refills:  0  
     
   
   
   
  
 SYMBICORT 160-4.5 mcg/actuation HFA inhaler Generic drug:  budesonide-formoterol Your last dose was: Your next dose is:    
   
   
 Dose:  2 Puff Take 2 Puffs by inhalation two (2) times a day. Patient takes at 0500 and 2100 Refills:  0  
     
   
   
   
  
 VITAMIN D3 2,000 unit Tab Generic drug:  cholecalciferol (vitamin D3) Your last dose was: Your next dose is:    
   
   
 Dose:  1 Tab Take 1 Tab by mouth daily. Refills:  0

## 2017-07-10 NOTE — ED PROVIDER NOTES
HPI Comments: Garo Love is a 67 y.o. female with PMhx significant for HTN, hypercholesteremia, DM, chronic renal failure, CHF, and COPD who presents via EMS to the ED with cc of right hip pain. Pt states that her pain began on 7/7/17 after she went for a CT myelogram. She describes the pain as a dull ache that radiates down her leg. Pt also c/o SOB, diarrhea x 2, non productive cough, decreased appetite, nausea, and 3 episodes of nonbloody/ nonbilious vomiting. Pt states that she usually sits for 3 hours of dialysis on Tuesday, Thursday, and Saturday and was only able to sit for 2 hours on 7/8/17 due to her pain. Pt notes her usual dry weight is 64 kg. She notes that she has not taken anything for her pain and has stopped taking her medication other than Symbicort. She also explains that she is usually on 2L O2 NC at home. Pt denies weakness in RLE, hematochezia, numbness in RLE, fever, cough, or abdominal pain. She also denies any sick contacts. Social Hx: - Tobacco, - EtOH, - Illicit Drugs    PCP: Gretchen De Luna MD  Nephrologist: Dr. Andres Guidry    There are no other complaints, changes or physical findings at this time. The history is provided by the patient, a caregiver and the EMS personnel. No  was used. Past Medical History:   Diagnosis Date    Acute on chronic diastolic CHF (congestive heart failure), NYHA class 2 ([facility] [address] 2/19/2015    Adrenal mass, left (HCC)     2.4 Cm om Feb 2010    Anemia associated with chronic renal failure     Arthritis     right knee    Asthma     CKD (chronic kidney disease), stage IV (HCC)     [facility]    COPD     Diabetes ([facility] [address] dx 1997~    type II    Generalized social phobia     HTN (hypertension)     Hypercholesteremia     Nausea & vomiting 11/11/2013    Other ill-defined conditions     fistula, R upper arm.     Pneumonia 12/10/2014    left lower lobe with sepsis    Presence of biventricular AICD 4/22/2016 4/20/16 [employer] biventricular AICD implant    Renal calculus or stone     small Lt by US 3/2012.  S/P AV lv ablation 04/22/2016 4/20/16    Smokers' cough ([address]        Past Surgical History:   Procedure Laterality Date    COLONOSCOPY,DIAGNOSTIC  3/28/2016         HX HEENT      Lasik eye surgery    HX OPEN CHOLECYSTECTOMY  ~ 1985    HX OTHER SURGICAL      adrenal mass removed    HX SHOULDER ARTHROSCOPY  2008    removal bone spur left shoulder    KNEE SCOPE,CLEAN/DRAIN      UPPER GI ENDOSCOPY,DIAGNOSIS  3/28/2016         VASCULAR SURGERY PROCEDURE UNLIST      dialysis in right arm         Family History:   Problem Relation Age of Onset    Diabetes Mother     Heart Disease Mother     Hypertension Mother     High Cholesterol Mother     Arthritis-osteo Mother     Cancer Father      Lung    Arthritis-osteo Father     Cancer Brother      x2 - lung cancer       Social History     Social History    Marital status:      Spouse name: N/A    Number of children: N/A    Years of education: N/A     Occupational History     Retired     Social History Main Topics    Smoking status: Former Smoker     Packs/day: 2.00     Years: 48.00     Types: Cigarettes     Quit date: 1/3/2012    Smokeless tobacco: Never Used    Alcohol use No    Drug use: No    Sexual activity: Not on file     Other Topics Concern    Not on file     Social History Narrative         ALLERGIES: Grapefruit; Orange juice; and Peach (prunus persica)    Review of Systems   Constitutional: Negative for chills, diaphoresis, fever and unexpected weight change. HENT: Negative for rhinorrhea and sore throat. Eyes: Negative for pain. Respiratory: Positive for cough (non-productive) and shortness of breath. Negative for wheezing and stridor. Cardiovascular: Negative for chest pain and leg swelling. Gastrointestinal: Positive for diarrhea, nausea and vomiting.  Negative for abdominal pain and blood in stool. Genitourinary: Negative for difficulty urinating, dysuria and flank pain. Musculoskeletal: Positive for arthralgias (R hip). Negative for back pain and neck stiffness. Skin: Negative for rash. Neurological: Negative for seizures, syncope, weakness (RLE), light-headedness, numbness (RLE) and headaches. Psychiatric/Behavioral: Negative for confusion. Patient Vitals for the past 12 hrs:   Temp Pulse Resp BP SpO2   07/10/17 1745 - 75 17 137/59 96 %   07/10/17 1729 - 74 20 142/50 100 %   07/10/17 1720 - 75 20 137/47 100 %   07/10/17 1700 - (!) 112 22 121/68 100 %   07/10/17 1600 - 75 19 146/56 99 %   07/10/17 1530 - 75 19 156/75 98 %   07/10/17 1500 - 75 13 160/61 97 %   07/10/17 1430 - 76 21 (!) 144/112 (!) 88 %   07/10/17 1400 - 75 16 156/59 (!) 88 %   07/10/17 1330 - 75 20 - 97 %   07/10/17 1100 - 75 15 158/55 97 %   07/10/17 1030 - 75 18 157/58 93 %   07/10/17 1000 - 75 18 (!) 150/93 93 %   07/10/17 0930 - 75 22 160/59 98 %   07/10/17 0850 - 75 - - -   07/10/17 0830 - - - (!) 171/92 99 %   07/10/17 0829 98.7 °F (37.1 °C) 77 26 (!) 171/92 100 %       Physical Exam   Constitutional: She is oriented to person, place, and time. She appears well-developed and well-nourished. No distress. HENT:   Nose: Nose normal.   Mouth/Throat: Oropharynx is clear and moist. No oropharyngeal exudate. Eyes: Conjunctivae and EOM are normal. Pupils are equal, round, and reactive to light. Right eye exhibits no discharge. Left eye exhibits no discharge. No scleral icterus. Neck: Normal range of motion. Neck supple. No JVD present. Cardiovascular: Normal rate, regular rhythm, normal heart sounds and intact distal pulses. No murmur heard. Pacemaker left upper chest   Pulmonary/Chest: No stridor. She has no wheezes. Saturation 92% on 2L NC, trace rales in R lung base   Abdominal: Soft. Bowel sounds are normal. She exhibits no distension. There is no tenderness.  There is no rebound and no guarding. Musculoskeletal: She exhibits no edema or tenderness. AV fistula RUE, PO site palpable thrill   Neurological: She is alert and oriented to person, place, and time. 5/5 strength bilateral lower extremities, no midline tenderness to palpation of T or L-spine, bilateral lower extremity sensation intact to light touch   Skin: Skin is warm and dry. No rash noted. She is not diaphoretic. Psychiatric: She has a normal mood and affect. Nursing note and vitals reviewed. MDM  Number of Diagnoses or Management Options  ESRD on hemodialysis [facility]):   Pleural effusion:   Diagnosis management comments: SOB in dialysis pt secondary to pleural effusion. Remainder of workup here unremarkable. Pt without increased oxygen requirement. Thoracentesis pending at time of sign out. Amount and/or Complexity of Data Reviewed  Clinical lab tests: ordered and reviewed  Tests in the radiology section of CPT®: ordered and reviewed  Tests in the medicine section of CPT®: ordered and reviewed  Obtain history from someone other than the patient: yes (Nursing staff, EMS)  Review and summarize past medical records: yes  Discuss the patient with other providers: yes (Nephrology)  Independent visualization of images, tracings, or specimens: yes    Patient Progress  Patient progress: stable    ED Course       Procedures    EKG interpretation: (Preliminary) 09:08  Rhythm: Ventricular paced rhythm; and regular . Rate (approx.): 76; Axis: normal; QRS interval: normal ; ST/T wave: normal;Other findings: abnormal ekg. Written by Terrie Davis ED Scribe as dictated by Mar Lombard, MD    CONSULT NOTE:  12:11 PM  Mar Lombard, MD spoke with Dr. Cheryl Lea,  Specialty: Nephrology  Discussed patient's hx, disposition, and available diagnostic and imaging results. Reviewed care plans. Consultant agrees with plans as outlined. Arrange for thoracentesis. If unable they can arrange it as outpatient.       PROGRESS NOTE:  1:33 PM  Called ultrasound to attempt to arrange a thoracentesis. Radiology will coordinate with ultrasound and call back. LABORATORY TESTS:  Recent Results (from the past 12 hour(s))   CBC WITH AUTOMATED DIFF    Collection Time: 07/10/17  8:47 AM   Result Value Ref Range    WBC 9.3 3.6 - 11.0 K/uL    RBC 3.02 (L) 3.80 - 5.20 M/uL    HGB 9.6 (L) 11.5 - 16.0 g/dL    HCT 29.8 (L) 35.0 - 47.0 %    MCV 98.7 80.0 - 99.0 FL    MCH 31.8 26.0 - 34.0 PG    MCHC 32.2 30.0 - 36.5 g/dL    RDW 15.5 (H) 11.5 - 14.5 %    PLATELET 344 241 - 414 K/uL    NEUTROPHILS 68 32 - 75 %    LYMPHOCYTES 14 12 - 49 %    MONOCYTES 11 5 - 13 %    EOSINOPHILS 6 0 - 7 %    BASOPHILS 1 0 - 1 %    ABS. NEUTROPHILS 6.4 1.8 - 8.0 K/UL    ABS. LYMPHOCYTES 1.3 0.8 - 3.5 K/UL    ABS. MONOCYTES 1.1 (H) 0.0 - 1.0 K/UL    ABS. EOSINOPHILS 0.5 (H) 0.0 - 0.4 K/UL    ABS. BASOPHILS 0.1 0.0 - 0.1 K/UL   METABOLIC PANEL, COMPREHENSIVE    Collection Time: 07/10/17  8:47 AM   Result Value Ref Range    Sodium 136 136 - 145 mmol/L    Potassium 4.3 3.5 - 5.1 mmol/L    Chloride 98 97 - 108 mmol/L    CO2 29 21 - 32 mmol/L    Anion gap 9 5 - 15 mmol/L    Glucose 119 (H) 65 - 100 mg/dL    BUN 27 (H) 6 - 20 MG/DL    Creatinine 6.15 (H) 0.55 - 1.02 MG/DL    BUN/Creatinine ratio 4 (L) 12 - 20      GFR est AA 8 (L) >60 ml/min/1.73m2    GFR est non-AA 7 (L) >60 ml/min/1.73m2    Calcium 8.8 8.5 - 10.1 MG/DL    Bilirubin, total 0.4 0.2 - 1.0 MG/DL    ALT (SGPT) 12 12 - 78 U/L    AST (SGOT) 12 (L) 15 - 37 U/L    Alk.  phosphatase 81 45 - 117 U/L    Protein, total 7.1 6.4 - 8.2 g/dL    Albumin 3.2 (L) 3.5 - 5.0 g/dL    Globulin 3.9 2.0 - 4.0 g/dL    A-G Ratio 0.8 (L) 1.1 - 2.2     MAGNESIUM    Collection Time: 07/10/17  8:47 AM   Result Value Ref Range    Magnesium 2.2 1.6 - 2.4 mg/dL   CK W/ CKMB & INDEX    Collection Time: 07/10/17  8:47 AM   Result Value Ref Range    CK 53 26 - 192 U/L    CK - MB 2.7 <3.6 NG/ML    CK-MB Index 5.1 (H) 0 - 2.5     TROPONIN I Collection Time: 07/10/17  8:47 AM   Result Value Ref Range    Troponin-I, Qt. 0.11 (H) <0.05 ng/mL   TROPONIN I    Collection Time: 07/10/17 11:26 AM   Result Value Ref Range    Troponin-I, Qt. 0.09 (H) <0.05 ng/mL   LD    Collection Time: 07/10/17 11:26 AM   Result Value Ref Range     81 - 246 U/L       IMAGING RESULTS:  XR CHEST PORT   Final Result   INDICATION:Chest Pain     COMPARISON:  5/24/17     FINDINGS:   A portable AP radiograph of the chest was obtained at 0859 hours. The patient is on a cardiac monitor. Patient again noted to have pacemaker or  defibrillator in place with at 2 wires. Cardiomegaly similar to the prior exam with atherosclerotic calcification in the  aorta. Mild vascular prominence in part related to technique. Improved aeration at the left base since the prior study in May. .   Patient has now developed a right pleural effusion with associated atelectasis  or other airspace disease at the right base. .      IMPRESSION  IMPRESSION: Interval development right pleural effusion and associated  atelectasis versus other airspace disease at the right base.      US THORACENTESIS CATH W IMAGE      Clinical indication: Right pleural effusion.     With ultrasound guidance, after adequate skin preparation local anesthesia, a 5  Maori catheter was placed into the right pleural space, 8 7 0 cc of orange  stained fluid were drained.     IMPRESSION   impression: Ultrasound-guided right thoracentesis. CXR Results  (Last 48 hours)               07/10/17 1734  XR CHEST SNGL V Final result    Impression:   impression: No residual right pleural effusion. Narrative:  Clinical indication: Thoracentesis. Portable AP erect view of the chest obtained after a right thoracentesis show no   residual fluid. Mild interstitial prominence and cardiomegaly.            07/10/17 0909  XR CHEST PORT Final result    Impression:  IMPRESSION: Interval development right pleural effusion and associated atelectasis versus other airspace disease at the right base. Narrative:  INDICATION:Chest Pain       COMPARISON:  5/24/17       FINDINGS:    A portable AP radiograph of the chest was obtained at 0859 hours. The patient is on a cardiac monitor. Patient again noted to have pacemaker or   defibrillator in place with at 2 wires. Cardiomegaly similar to the prior exam with atherosclerotic calcification in the   aorta. Mild vascular prominence in part related to technique. Improved aeration at the left base since the prior study in May. .    Patient has now developed a right pleural effusion with associated atelectasis   or other airspace disease at the right base. Annamary Ripa MEDICATIONS GIVEN:  Medications   albuterol (PROVENTIL VENTOLIN) nebulizer solution 5 mg (5 mg Nebulization Given 7/10/17 0850)   ipratropium (ATROVENT) 0.02 % nebulizer solution 0.5 mg (0.5 mg Nebulization Given 7/10/17 0922)   ondansetron (ZOFRAN) injection 4 mg (4 mg IntraVENous Given 7/10/17 0850)   HYDROcodone-acetaminophen (NORCO) 5-325 mg per tablet 1 Tab (1 Tab Oral Given 7/10/17 0850)   HYDROcodone-acetaminophen (NORCO) 5-325 mg per tablet 1 Tab (1 Tab Oral Given 7/10/17 1558)       IMPRESSION:  1. Pleural effusion    2. ESRD on hemodialysis ([facility] [address]        PLAN:  1. Current Discharge Medication List        2. Follow-up Information     Follow up With Details Comments Contact Trae Uriarte MD Call in 2 days  [address] [facility]      Marcell Bazzi MD Call in 2 days  [address]  [address]  [address] [phone]      \[facility]\"" EMERGENCY DEPT  As needed, If symptoms worsen [address]  [address]  [phone]        Return to ED if worse     SIGN OUT:  3:46 PM  Patient's presentation, labs/imaging and plan of care was reviewed with Keily Agrawal MD as part of sign out.   They will await thoracentesis as part of the plan discussed with the patient. Arabella Riley MD's assistance in completion of this plan is greatly appreciated but it should be noted that I will be the provider of record for this patient. Isabelle Cook MD     Attestation Note:  This note is prepared by [facility], acting as Scribe for MD Isabelle Simpson MD: The scribe's documentation has been prepared under my direction and personally reviewed by me in its entirety. I confirm that the note above accurately reflects all work, treatment, procedures, and medical decision making performed by me. LABORATORY TESTS:  Recent Results (from the past 12 hour(s))   CBC WITH AUTOMATED DIFF    Collection Time: 07/10/17  8:47 AM   Result Value Ref Range    WBC 9.3 3.6 - 11.0 K/uL    RBC 3.02 (L) 3.80 - 5.20 M/uL    HGB 9.6 (L) 11.5 - 16.0 g/dL    HCT 29.8 (L) 35.0 - 47.0 %    MCV 98.7 80.0 - 99.0 FL    MCH 31.8 26.0 - 34.0 PG    MCHC 32.2 30.0 - 36.5 g/dL    RDW 15.5 (H) 11.5 - 14.5 %    PLATELET 637 660 - 652 K/uL    NEUTROPHILS 68 32 - 75 %    LYMPHOCYTES 14 12 - 49 %    MONOCYTES 11 5 - 13 %    EOSINOPHILS 6 0 - 7 %    BASOPHILS 1 0 - 1 %    ABS. NEUTROPHILS 6.4 1.8 - 8.0 K/UL    ABS. LYMPHOCYTES 1.3 0.8 - 3.5 K/UL    ABS. MONOCYTES 1.1 (H) 0.0 - 1.0 K/UL    ABS. EOSINOPHILS 0.5 (H) 0.0 - 0.4 K/UL    ABS.  BASOPHILS 0.1 0.0 - 0.1 K/UL   METABOLIC PANEL, COMPREHENSIVE    Collection Time: 07/10/17  8:47 AM   Result Value Ref Range    Sodium 136 136 - 145 mmol/L    Potassium 4.3 3.5 - 5.1 mmol/L    Chloride 98 97 - 108 mmol/L    CO2 29 21 - 32 mmol/L    Anion gap 9 5 - 15 mmol/L    Glucose 119 (H) 65 - 100 mg/dL    BUN 27 (H) 6 - 20 MG/DL    Creatinine 6.15 (H) 0.55 - 1.02 MG/DL    BUN/Creatinine ratio 4 (L) 12 - 20      GFR est AA 8 (L) >60 ml/min/1.73m2    GFR est non-AA 7 (L) >60 ml/min/1.73m2    Calcium 8.8 8.5 - 10.1 MG/DL    Bilirubin, total 0.4 0.2 - 1.0 MG/DL    ALT (SGPT) 12 12 - 78 U/L    AST (SGOT) 12 (L) 15 - 37 U/L Alk. phosphatase 81 45 - 117 U/L    Protein, total 7.1 6.4 - 8.2 g/dL    Albumin 3.2 (L) 3.5 - 5.0 g/dL    Globulin 3.9 2.0 - 4.0 g/dL    A-G Ratio 0.8 (L) 1.1 - 2.2     MAGNESIUM    Collection Time: 07/10/17  8:47 AM   Result Value Ref Range    Magnesium 2.2 1.6 - 2.4 mg/dL   CK W/ CKMB & INDEX    Collection Time: 07/10/17  8:47 AM   Result Value Ref Range    CK 53 26 - 192 U/L    CK - MB 2.7 <3.6 NG/ML    CK-MB Index 5.1 (H) 0 - 2.5     TROPONIN I    Collection Time: 07/10/17  8:47 AM   Result Value Ref Range    Troponin-I, Qt. 0.11 (H) <0.05 ng/mL   TROPONIN I    Collection Time: 07/10/17 11:26 AM   Result Value Ref Range    Troponin-I, Qt. 0.09 (H) <0.05 ng/mL   LD    Collection Time: 07/10/17 11:26 AM   Result Value Ref Range     81 - 246 U/L       Return to ED if worse     Discharge Note:  6:17 PM  The pt is ready for discharge. The pt's signs, symptoms, diagnosis, and discharge instructions have been discussed and pt has conveyed their understanding. The pt is to follow up as recommended or return to ER should their symptoms worsen. Plan has been discussed and pt is in agreement. This note is prepared by Flavia Landon, acting as a Scribe for Alexandra Wooten MD.    Alexandra Wooten MD: The scribe's documentation has been prepared under my direction and personally reviewed by me in its entirety. I confirm that the notes above accurately reflects all work, treatment, procedures, and medical decision making performed by me.

## 2017-07-10 NOTE — ED TRIAGE NOTES
Patient arrives via EMS, ambulatory to bed. Patient reports that myelogram done to evaluate for a pinched nerve last Friday. Patient reports having pain in her R hip down her leg ever since that she describes as dull. Patient reports that she has had increased shortness of breath. Patient is dialysis patient T, TR, Sat and states that she went on Saturday but was only able to stay for 2 hours instead of her normal 3 hours because her leg was hurting her. Patient also reports some diarrhea for a few days. Denies blood in stool. Patient reports that she has a dry cough. Arrives on 2l/min NC which is patient's normal at home for COPD. No distress noted. Will continue to monitor.

## 2017-07-10 NOTE — ED NOTES
Patient requested food tray. Dr. López Heading informed and stated that patient could only have fluids prior to having thoracentesis done. Patient informed. Given PO fluids. Patient resting, call bell within reach. No distress noted.

## 2017-07-10 NOTE — PROGRESS NOTES
TRANSFER - OUT REPORT:    Verbal report given to ROSALIE Easton(name) on Derick Kc  being transferred to ER rm 22(unit) for routine progression of care       Report consisted of patients Situation, Background, Assessment and   Recommendations(SBAR). Information from the following report(s) Procedure Summary was reviewed with the receiving nurse. Lines:   Peripheral IV 07/10/17 Left Antecubital (Active)        Opportunity for questions and clarification was provided.       Patient transported with:   O2 @ 2 liters

## 2017-07-10 NOTE — ED NOTES
Patient back in room resting quietly with family at bedside. Patient placed back on wall oxygen at 2l/min nc. Patient informed that repeat chest xray will be acquired prior to discharge. Patient requested meal tray. Meal tray ordered from dietary for patient. No distress noted. Will continue to monitor.

## 2017-07-10 NOTE — DISCHARGE INSTRUCTIONS
Kidney Dialysis: Care Instructions  Your Care Instructions  Dialysis is a process that filters wastes from the blood when your kidneys can no longer do the job. It is not a cure, but it can help you live longer and feel better. It is a lifesaving treatment when you have kidney failure. Normal kidneys work 24 hours a day to clean wastes from your blood. Your kidneys are not able to do this job, so a process called dialysis will do some of the work for your kidneys. You and your doctor will decide which type of dialysis you should have. Peritoneal dialysis uses the lining of your belly (peritoneum) to filter your blood. You can do it at home, on a daily basis. Hemodialysis uses a man-made filter called a dialyzer to clean your blood. Most people need to go to a hospital or clinic 3 days a week for several hours each time. Sometimes hemodialysis can be done at home. It is normal to have questions about your treatment, and you have a right to know what is happening to you. Learning about dialysis can help you take an active role in your treatment. Dialysis does not cure kidney disease, but it can help you live longer and feel better. You will need to follow your diet and treatment schedule carefully. Follow-up care is a key part of your treatment and safety. Be sure to make and go to all appointments, and call your doctor if you are having problems. It's also a good idea to know your test results and keep a list of the medicines you take. What do you need to know about peritoneal dialysis? Peritoneal dialysis uses the lining of your belly (or peritoneal membrane) to filter your blood. Before you can begin peritoneal dialysis, your doctor will need to place a thin tube called a catheter in your belly. This is the dialysis access. · Peritoneal dialysis can be done at home or in any clean place. You may be able to do it while you sleep. · You can do it by yourself.  You do not have to rely on help from others. · You can do it at the times you choose as long as you do the right number of treatments. · It has to be done every day of the week. · Some people find it hard to do all the required steps. · It increases your chance for a serious infection of the lining of the belly (peritoneum). What do you need to know about hemodialysis? Hemodialysis uses a man-made membrane called a dialyzer to clean your blood. You are connected to the dialyzer by tubes attached to your blood vessels. Before you start hemodialysis, your doctor will create a site where the blood can flow in and out of your body during your dialysis sessions. This site is called the vascular access. It may be a fistula, made by connecting an artery and a vein. Or it may be a graft, which is a tube implanted under your skin. · Hemodialysis is done mainly by trained health workers who can watch for any problems. · It allows you to be in contact with other people having dialysis. This can help provide emotional support. · You can schedule your treatments in the evenings so you can keep working. · You may be able to do home hemodialysis, which gives you more control over your schedule. · It usually needs to be done on a set schedule 3 times a week. · It can cause side effects. The most common side effects are low blood pressure and muscle cramps. These can often be treated easily. · It requires needle sticks during every treatment, which bothers some people. Others get used to it and even do the needle sticks themselves. How can you care for yourself at home? · Be sure to have all of your dialysis sessions. Do not try to shorten or skip your sessions. You have a better chance of a longer and healthier life by getting your full treatment. · Your doctor or health care team will show you the steps you need to go through each day before, during, and after dialysis. Be sure to follow these steps.  If you do not understand a step, talk to your team.  · Your doctor and dietitian will help you design menus that follow your diet. Be sure to follow your diet guidelines. ¨ You will need to limit fluids and certain foods that contain salt (sodium), potassium, and phosphorus. ¨ You may need to follow a heart-healthy diet to keep the fat (cholesterol) in your blood under control. ¨ You may need higher levels of protein in your diet. · Your doctor may recommend certain vitamins. But do not take any other medicine, including over-the-counter medicines, vitamins, and herbal products, without talking to your doctor first.  · Do not smoke. Smoking raises your risk of many health problems, including more kidney damage. If you need help quitting, talk to your doctor about stop-smoking programs and medicines. These can increase your chances of quitting for good. · Do not take ibuprofen (Advil, Motrin), naproxen (Aleve), or similar medicines, unless your doctor tells you to. These medicines may make kidney problems worse. When should you call for help? Call 911 anytime you think you may need emergency care. For example, call if:  · You passed out (lost consciousness). · You have severe shortness of breath. Call your doctor now or seek immediate medical care if:  · You have swelling in your hands or feet. · You are dizzy or lightheaded, or you feel like you may faint. · You have an unusual weight gain. · You have trembling or trouble thinking clearly. · You have a fever. Watch closely for changes in your health, and be sure to contact your doctor if:  · You have any problems with dialysis or your diet. Where can you learn more? Go to http://chiqui-cleveland.info/. Enter I323 in the search box to learn more about \"Kidney Dialysis: Care Instructions. \"  Current as of: April 3, 2017  Content Version: 11.3  © 8786-4719 [email or web address].  Care instructions adapted under license by [employer] (which disclaims liability or warranty for this information). If you have questions about a medical condition or this instruction, always ask your healthcare professional. [address] any warranty or liability for your use of this information. Thoracentesis: What to Expect at Home  Your Recovery  Thoracentesis (say \"qfme-xl-vpi-BRE-sis\") is a procedure to remove fluid from the space between the lungs and the chest wall (pleural space). This procedure may also be called a \"chest tap. \" It is normal to have a small amount of fluid in the pleural space. But too much fluid can build up because of problems such as infection, heart failure, or lung cancer. The procedure may have been done to help with shortness of breath and pain caused by the fluid buildup. Or you may have had this procedure so the doctor could test the fluid to find the cause of the buildup. Your chest may be sore where the doctor put the needle or catheter into your skin (the puncture site). This usually gets better after a day or two. You can go back to work or your normal activities as soon as you feel up to it. If the doctor sent the fluid to a lab for testing, it may take several days to get the results. The doctor or nurse will discuss the results with you. This care sheet gives you a general idea about how long it will take for you to recover. But each person recovers at a different pace. Follow the steps below to feel better as quickly as possible. How can you care for yourself at home? Activity  · Rest when you feel tired. Getting enough sleep will help you recover. · Avoid strenuous activities, such as bicycle riding, jogging, weight lifting, or aerobic exercise, until your doctor says it is okay. · You may shower. Do not take a bath until the puncture site has healed, or until your doctor tells you it is okay. · Ask your doctor when you can drive again. · You may need to take 1 or 2 days off from work.  It depends on the type of work you do and how you feel. Diet  · You can eat your normal diet. · Drink plenty of fluids (unless your doctor tells you not to). Medicines  · Your doctor will tell you if and when you can restart your medicines. He or she will also give you instructions about taking any new medicines. · If you take blood thinners, such as warfarin (Coumadin), clopidogrel (Plavix), or aspirin, be sure to talk to your doctor. He or she will tell you if and when to start taking those medicines again. Make sure that you understand exactly what your doctor wants you to do. · Be safe with medicines. Take pain medicines exactly as directed. ¨ If the doctor gave you a prescription medicine for pain, take it as prescribed. ¨ If you are not taking a prescription pain medicine, ask your doctor if you can take an over-the-counter medicine. ¨ Do not take two or more pain medicines at the same time unless the doctor told you to. Many pain medicines have acetaminophen, which is Tylenol. Too much acetaminophen (Tylenol) can be harmful. · If you think your pain medicine is making you sick to your stomach:  ¨ Take your medicine after meals (unless your doctor has told you not to). ¨ Ask your doctor for a different pain medicine. · If your doctor prescribed antibiotics, take them as directed. Do not stop taking them just because you feel better. You need to take the full course of antibiotics. Care of the puncture site  · Wash the area daily with warm, soapy water, and pat it dry. Don't use hydrogen peroxide or alcohol, which may delay healing. You may cover the area with a gauze bandage if it weeps or rubs against clothing. Change the bandage every day. · Keep the area clean and dry. Follow-up care is a key part of your treatment and safety. Be sure to make and go to all appointments, and call your doctor if you are having problems. It's also a good idea to know your test results and keep a list of the medicines you take.   When should you call for help? Call 911 anytime you think you may need emergency care. For example, call if:  · You passed out (lost consciousness). · You have severe trouble breathing. · You have sudden chest pain and shortness of breath, or you cough up blood. Call your doctor now or seek immediate medical care if:  · You have shortness of breath that is new or getting worse. · You have new or worse pain in your chest, especially when you take a deep breath. · You are sick to your stomach or cannot keep fluids down. · You have a fever over 100°F.  · Bright red blood has soaked through the bandage over your puncture site. · You have signs of infection, such as:  ¨ Increased pain, swelling, warmth, or redness. ¨ Red streaks leading from the puncture site. ¨ Pus draining from the puncture site. ¨ Swollen lymph nodes in your neck, armpits, or groin. ¨ A fever. · You cough up a lot more mucus than normal, or your mucus changes color. Watch closely for changes in your health, and be sure to contact your doctor if you have any problems. Where can you learn more? Go to http://chiqui-cleveland.info/. Enter S504 in the search box to learn more about \"Thoracentesis: What to Expect at Home. \"  Current as of: March 25, 2017  Content Version: 11.3  © 3772-7735 [employer], Incorporated. Care instructions adapted under license by [employer] (which disclaims liability or warranty for this information). If you have questions about a medical condition or this instruction, always ask your healthcare professional. [place] [address] any warranty or liability for your use of this information.

## 2017-07-10 NOTE — PROGRESS NOTES
Name of procedure:  US guided thoracentesis    Complications, if any, r/t procedure: none      Sedation medications given:N/A      Sedation tolerated: N/A      VS : Stable vs Unstable: stable      Post Procedure Care Needed/order sets in connectcare:see connect care      Any other specific needs to pt. Care:observe dressing for any bleeding.

## 2017-07-13 NOTE — DISCHARGE INSTRUCTIONS
Please the list of Pain Management Specialist below for your convenience:    Samreen Grewal M.D. [phone] Pain Management  Ashely Miguel M.D. [phone] Physical Medicine and [place] Rodríguez M.D. [phone] Physical Medicine and Viry Zepeda M.D. [phone] Pain Management  Daniel Viera M.D. [phone] Pain Management    Dr. Arlene Christine, [address], [address] [place], [place] [address] , [address]    [phone]       [facility]                            MARCIN Yoon MD DO    [address], [address]    [phone]       Dr. Irasema Leonard. [address]                                     [place] [phone]                                                                                   Dr. Lenora Bass, [facility] [address] [facility]    [address] [facility]                                           [address] [facility], [address], [address] [facility], [facility] [address]    www. [employer]                                            [phone] Dr. Avery Jarrell, [address] [facility] [address] Antonia Howard                            [phone]    [place], [address]                         [phone]                                                       Dr. Olivia Raglandcinbrian [place], 21 [facility]    21         Dr. Segun Reyes    [place] [address]    [place], [address] [facility]    [phone]    [facility]. [address] [facility], [address] [place] [address]    [phone]    [facility]  [email or web address]                  COPD Exacerbation Plan: Care Instructions  Your Care Instructions  If you have chronic obstructive pulmonary disease (COPD), your usual shortness of breath could suddenly get worse. You may start coughing more and have more mucus. This flare-up is called a COPD exacerbation (say \"mi-XGG-ga-[facility]Y-[facility]\"). A lung infection or air pollution could set off an exacerbation. Sometimes it can happen after a quick change in temperature or being around chemicals. Work with your doctor to make a plan for dealing with an exacerbation. You can better manage it if you plan ahead. Follow-up care is a key part of your treatment and safety. Be sure to make and go to all appointments, and call your doctor if you are having problems. It's also a good idea to know your test results and keep a list of the medicines you take. How can you care for yourself at home?   During an exacerbation  · Do not panic if you start to have one. Quick treatment at home may help you prevent serious breathing problems. If you have a COPD exacerbation plan that you developed with your doctor, follow it. · Take your medicines exactly as your doctor tells you. ¨ Use your inhaler as directed by your doctor. If your symptoms do not get better after you use your medicine, have someone take you to the emergency room. Call an ambulance if necessary. ¨ With inhaled medicines, a spacer or a nebulizer may help you get more medicine to your lungs. Ask your doctor or pharmacist how to use them properly. Practice using the spacer in front of a mirror before you have an exacerbation. This may help you get the medicine into your lungs quickly. ¨ If your doctor has given you steroid pills, take them as directed. ¨ Your doctor may have given you a prescription for antibiotics, which you can fill if you need it. ¨ Talk to your doctor if you have any problems with your medicine. And call your doctor if you have to use your antibiotic or steroid pills. Preventing an exacerbation  · Do not smoke. This is the most important step you can take to prevent more damage to your lungs and prevent problems. If you already smoke, it is never too late to stop. If you need help quitting, talk to your doctor about stop-smoking programs and medicines. These can increase your chances of quitting for good. · Take your daily medicines as prescribed. · Avoid colds and flu. ¨ Get a pneumococcal vaccine. ¨ Get a flu vaccine each year, as soon as it is available. Ask those you live or work with to do the same, so they will not get the flu and infect you. ¨ Try to stay away from people with colds or the flu. ¨ Wash your hands often. · Avoid secondhand smoke; air pollution; cold, dry air; hot, humid air; and high altitudes. Stay at home with your windows closed when air pollution is bad. · Learn breathing techniques for COPD, such as breathing through pursed lips.  These techniques can help you breathe easier during an exacerbation. When should you call for help? Call 911 anytime you think you may need emergency care. For example, call if:  · You have severe trouble breathing. · You have severe chest pain. Call your doctor now or seek immediate medical care if:  · You have new or worse shortness of breath. · You develop new chest pain. · You are coughing more deeply or more often, especially if you notice more mucus or a change in the color of your mucus. · You cough up blood. · You have new or increased swelling in your legs or belly. · You have a fever. Watch closely for changes in your health, and be sure to contact your doctor if:  · You need to use your antibiotic or steroid pills. · Your symptoms are getting worse. Where can you learn more? Go to http://chiqui-cleveland.info/. Enter L453 in the search box to learn more about \"COPD Exacerbation Plan: Care Instructions. \"  Current as of: March 25, 2017  Content Version: 11.3  © 2175-6159 [employer]. Care instructions adapted under license by [employer] (which disclaims liability or warranty for this information). If you have questions about a medical condition or this instruction, always ask your healthcare professional. [address] any warranty or liability for your use of this information.

## 2017-07-13 NOTE — ROUTINE PROCESS
Dr Cary Ross reviewed discharge instructions with the patient. The patient verbalized understanding. Daughter at bedside. Will send prescriptions to pharmacy. Assisted to wheelchair and to private vehicle by RN, alert and stable for discharge.

## 2017-07-13 NOTE — ED NOTES
Pt resting on left side, reports breathing is better, but pain at left chest remains. Albuterol treatment started. Will f/u MD re: pain med.   Assisted pt to position of comfort with blanket

## 2017-07-13 NOTE — ED PROVIDER NOTES
HPI Comments: Maria D King is a 67 y.o. female with pertinent PMHx of HTN, DM, COPD, CHF, CKD who presents ambulatory to ED c/o shortness of breath that has worsened today during dialysis, along with associated left sided rib pain. She additionally reports recent congestion, nausea, and diarrhea. She is on a Tuesday/Thursday/Saturday dialysis schedule, and she typically has 2 liters drawn off but she only had 1 liter drawn off today because they stopped early due to her symptoms. Pt notes she had this pain before in the past. Per medical records, the pt came to the ED [facility] ED 3 days ago for pleural effusion, and she was discharged for outpatient pleuracentesis. Pt denies any fevers, and chills. PCP:  Neena Hargrove MD    Social Hx:  tobacco use (Former), EtOH use (-)    There are no other complaints, changes or physical findings at this time. The history is provided by the patient. Past Medical History:   Diagnosis Date    Acute on chronic diastolic CHF (congestive heart failure), NYHA class 2 ([address] 2/19/2015    Adrenal mass, left (HCC)     2.4 Cm om Feb 2010    Anemia associated with chronic renal failure     Arthritis     right knee    Asthma     CKD (chronic kidney disease), stage IV (HCC)     ying,[facility]    COPD     Diabetes ([facility] [address] dx 1997~    type II    Generalized social phobia     HTN (hypertension)     Hypercholesteremia     Nausea & vomiting 11/11/2013    Other ill-defined conditions     fistula, R upper arm.  Pneumonia 12/10/2014    left lower lobe with sepsis    Presence of biventricular AICD 4/22/2016 4/20/16 [employer] biventricular AICD implant    Renal calculus or stone     small Lt by US 3/2012.       S/P AV lv ablation 04/22/2016 4/20/16    Smokers' cough [facility])        Past Surgical History:   Procedure Laterality Date    COLONOSCOPY,DIAGNOSTIC  3/28/2016         HX HEENT      Lasik eye surgery    HX OPEN CHOLECYSTECTOMY  ~ 1985    HX OTHER SURGICAL      adrenal mass removed    HX SHOULDER ARTHROSCOPY  2008    removal bone spur left shoulder    KNEE SCOPE,CLEAN/DRAIN      UPPER GI ENDOSCOPY,DIAGNOSIS  3/28/2016         VASCULAR SURGERY PROCEDURE UNLIST      dialysis in right arm         Family History:   Problem Relation Age of Onset    Diabetes Mother     Heart Disease Mother     Hypertension Mother     High Cholesterol Mother     Arthritis-osteo Mother     Cancer Father      Lung    Arthritis-osteo Father     Cancer Brother      x2 - lung cancer       Social History     Social History    Marital status:      Spouse name: N/A    Number of children: N/A    Years of education: N/A     Occupational History     Retired     Social History Main Topics    Smoking status: Former Smoker     Packs/day: 2.00     Years: 48.00     Types: Cigarettes     Quit date: 1/3/2012    Smokeless tobacco: Never Used    Alcohol use No    Drug use: No    Sexual activity: Not on file     Other Topics Concern    Not on file     Social History Narrative         ALLERGIES: Grapefruit; Orange juice; and Peach (prunus persica)    Review of Systems   Constitutional: Negative. Negative for activity change, appetite change, chills, fatigue, fever and unexpected weight change. HENT: Positive for congestion. Negative for hearing loss, rhinorrhea, sneezing and voice change. Eyes: Negative. Negative for pain and visual disturbance. Respiratory: Positive for shortness of breath. Negative for apnea, cough, choking and chest tightness. Cardiovascular: Negative. Negative for chest pain and palpitations. Gastrointestinal: Positive for diarrhea and nausea. Negative for abdominal distention, abdominal pain, blood in stool and vomiting. Genitourinary: Negative for difficulty urinating, frequency and urgency.         No discharge   Musculoskeletal: Negative for arthralgias, back pain, myalgias and neck stiffness. Skin: Negative. Negative for color change and rash. Neurological: Negative. Negative for dizziness, seizures, syncope, speech difficulty, weakness, numbness and headaches. Hematological: Negative for adenopathy. Psychiatric/Behavioral: Negative. Negative for agitation, behavioral problems, dysphoric mood and suicidal ideas. The patient is not nervous/anxious. Vitals:    07/13/17 1245 07/13/17 1300 07/13/17 1305 07/13/17 1313   BP: (!) 157/35 131/51     Pulse: 75 75 75 75   Resp: 20 17 17 18   Temp:       SpO2: 94% (!) 89% 94% 95%   Weight:       Height:                Physical Exam   Constitutional: She is oriented to person, place, and time. She appears well-developed and well-nourished. No distress. HENT:   Head: Normocephalic and atraumatic. Mouth/Throat: Oropharynx is clear and moist. No oropharyngeal exudate. Eyes: Conjunctivae and EOM are normal. Pupils are equal, round, and reactive to light. Right eye exhibits no discharge. Left eye exhibits no discharge. Neck: Normal range of motion. Neck supple. Cardiovascular: Normal rate, regular rhythm and intact distal pulses. Exam reveals no gallop and no friction rub. No murmur heard. Palpable thrill over fistula in right upper extremities    Pulmonary/Chest: Effort normal. No respiratory distress. She has wheezes (difuse through out). She has no rales. She exhibits no tenderness. TTP over mid axillary line of chest wall   Abdominal: Soft. Bowel sounds are normal. She exhibits no distension and no mass. There is no tenderness. There is no rebound and no guarding. Musculoskeletal: Normal range of motion. She exhibits no edema. Lymphadenopathy:     She has no cervical adenopathy. Neurological: She is alert and oriented to person, place, and time. No cranial nerve deficit. Coordination normal.   Skin: Skin is warm and dry. No rash noted. No erythema. Psychiatric: She has a normal mood and affect.    Nursing note and vitals reviewed. MDM  Number of Diagnoses or Management Options  Diagnosis management comments: DDx: ACS, arrhythmia, CHF, volume overload, pneumonia        Amount and/or Complexity of Data Reviewed  Clinical lab tests: reviewed and ordered  Tests in the radiology section of CPT®: ordered and reviewed  Tests in the medicine section of CPT®: ordered and reviewed  Review and summarize past medical records: yes  Independent visualization of images, tracings, or specimens: yes    Patient Progress  Patient progress: stable    ED Course       Procedures      Chief Complaint   Patient presents with    Shortness of Breath     SOB worse today during dialysis with left sided rib pain. Sent from dialysis after 1L removed. 9:26 AM  The patients presenting problems have been discussed, and they are in agreement with the care plan formulated and outlined with them. I have encouraged them to ask questions as they arise throughout their visit.     MEDICATIONS GIVEN:  Medications   sodium chloride (NS) flush 5-10 mL (10 mL IntraVENous Given 7/13/17 0942)   sodium chloride (NS) flush 5-10 mL (not administered)   albuterol (PROVENTIL VENTOLIN) nebulizer solution 5 mg (5 mg Nebulization Given 7/13/17 1128)   HYDROcodone-acetaminophen (HYCET) 0.5-21.7 mg/mL oral solution 10 mg (10 mg Oral Given 7/13/17 1125)   morphine injection 4 mg (4 mg IntraVENous Given 7/13/17 1312)       LABS REVIEWED:  Recent Results (from the past 24 hour(s))   EKG, 12 LEAD, INITIAL    Collection Time: 07/13/17  8:40 AM   Result Value Ref Range    Ventricular Rate 75 BPM    Atrial Rate 79 BPM    QRS Duration 150 ms    Q-T Interval 474 ms    QTC Calculation (Bezet) 529 ms    Calculated R Axis -130 degrees    Calculated T Axis 70 degrees    Diagnosis       Ventricular-paced rhythm  Biventricular pacemaker detected  When compared with ECG of 10-JUL-2017 09:08,  No significant change was found  Confirmed by NEREIDA Caldera ([id number]) on 7/13/2017 1:39:13 PM     CBC WITH AUTOMATED DIFF    Collection Time: 07/13/17  9:18 AM   Result Value Ref Range    WBC 9.5 3.6 - 11.0 K/uL    RBC 3.28 (L) 3.80 - 5.20 M/uL    HGB 10.3 (L) 11.5 - 16.0 g/dL    HCT 32.2 (L) 35.0 - 47.0 %    MCV 98.2 80.0 - 99.0 FL    MCH 31.4 26.0 - 34.0 PG    MCHC 32.0 30.0 - 36.5 g/dL    RDW 15.2 (H) 11.5 - 14.5 %    PLATELET 625 973 - 943 K/uL    NEUTROPHILS 73 32 - 75 %    LYMPHOCYTES 13 12 - 49 %    MONOCYTES 10 5 - 13 %    EOSINOPHILS 4 0 - 7 %    BASOPHILS 0 0 - 1 %    ABS. NEUTROPHILS 6.9 1.8 - 8.0 K/UL    ABS. LYMPHOCYTES 1.2 0.8 - 3.5 K/UL    ABS. MONOCYTES 0.9 0.0 - 1.0 K/UL    ABS. EOSINOPHILS 0.4 0.0 - 0.4 K/UL    ABS. BASOPHILS 0.0 0.0 - 0.1 K/UL   METABOLIC PANEL, COMPREHENSIVE    Collection Time: 07/13/17  9:18 AM   Result Value Ref Range    Sodium 137 136 - 145 mmol/L    Potassium 3.8 3.5 - 5.1 mmol/L    Chloride 98 97 - 108 mmol/L    CO2 31 21 - 32 mmol/L    Anion gap 8 5 - 15 mmol/L    Glucose 100 65 - 100 mg/dL    BUN 13 6 - 20 MG/DL    Creatinine 3.08 (H) 0.55 - 1.02 MG/DL    BUN/Creatinine ratio 4 (L) 12 - 20      GFR est AA 18 (L) >60 ml/min/1.73m2    GFR est non-AA 15 (L) >60 ml/min/1.73m2    Calcium 9.0 8.5 - 10.1 MG/DL    Bilirubin, total 0.4 0.2 - 1.0 MG/DL    ALT (SGPT) 12 12 - 78 U/L    AST (SGOT) 15 15 - 37 U/L    Alk.  phosphatase 78 45 - 117 U/L    Protein, total 7.3 6.4 - 8.2 g/dL    Albumin 3.3 (L) 3.5 - 5.0 g/dL    Globulin 4.0 2.0 - 4.0 g/dL    A-G Ratio 0.8 (L) 1.1 - 2.2     CK W/ REFLX CKMB    Collection Time: 07/13/17  9:18 AM   Result Value Ref Range    CK 76 26 - 192 U/L   TROPONIN I    Collection Time: 07/13/17  9:18 AM   Result Value Ref Range    Troponin-I, Qt. 0.09 (H) <0.05 ng/mL   MAGNESIUM    Collection Time: 07/13/17  9:18 AM   Result Value Ref Range    Magnesium 2.0 1.6 - 2.4 mg/dL   PROTHROMBIN TIME + INR    Collection Time: 07/13/17  9:18 AM   Result Value Ref Range    INR 1.1 0.9 - 1.1      Prothrombin time 11.2 (H) 9.0 - 11.1 sec       VITAL SIGNS:  Patient Vitals for the past 12 hrs:   Temp Pulse Resp BP SpO2   07/13/17 1313 - 75 18 - 95 %   07/13/17 1305 - 75 17 - 94 %   07/13/17 1300 - 75 17 131/51 (!) 89 %   07/13/17 1245 - 75 20 (!) 157/35 94 %   07/13/17 1230 - 75 18 153/49 94 %   07/13/17 1215 - 75 17 148/50 97 %   07/13/17 1200 - 78 18 165/57 98 %   07/13/17 1145 - 76 13 (!) 136/99 (!) 88 %   07/13/17 1130 - 75 17 (!) 140/97 97 %   07/13/17 1125 - 76 14 - 97 %   07/13/17 1105 - 75 - (!) 159/134 -   07/13/17 1100 - 75 24 (!) 159/134 95 %   07/13/17 1045 - 75 18 (!) 156/117 97 %   07/13/17 1030 - 75 18 155/60 99 %   07/13/17 1015 - 75 16 159/71 100 %   07/13/17 1000 - 75 19 151/58 98 %   07/13/17 0945 - 75 15 139/78 98 %   07/13/17 0930 - 75 23 103/72 99 %   07/13/17 0915 - 75 20 151/56 99 %   07/13/17 0900 - 75 20 169/66 93 %   07/13/17 0859 - 75 18 - 99 %   07/13/17 0855 - 75 17 - 95 %   07/13/17 0839 97.7 °F (36.5 °C) 76 16 (!) 150/116 -       RADIOLOGY RESULTS:  The following have been ordered and reviewed:  CXR Results  (Last 48 hours)               07/13/17 0927  XR CHEST PORT Final result    Impression:  Impression: Small right effusion with underlying atelectasis. Narrative: Indication: Shortness of breath       Comparison: 7/10/2017       Portable exam of the chest obtained at 9:15 demonstrates stable cardiomegaly. Pacer leads are unchanged in position. There is a small right pleural effusion   with underlying atelectasis. The osseous structures are unremarkable. EKG interpretation: (Preliminary) 8:40 AM  Rhythm: Ventricular - paced rhythm Rate (approx.): 75 bpm; Axis: normal; QRS interval: normal ; ST/T wave: normal;         PROGRESS NOTES:    11:44 AM  The patient has been examined after this most recent nebulizer breathing treatment. They are showing some clinically improved aeration upon auscultation and some decreased work of breathing. The patient also states that they are improving.   I will continue to closely monitor their vital signs and their progress as this treatment continues. DIAGNOSIS:    1. Acute exacerbation of chronic obstructive pulmonary disease (COPD) ([facility] [address]        PLAN:  Follow-up Information     Follow up With Details Comments Contact Info    Monae Calix MD Call in 2 days As needed, If symptoms worsen [address] [facility]      Magdalena Gutierrez MD Call in 2 days  [phone] [facility]  Jeremy Lal  [facility]  [phone]          Discharge Medication List as of 7/13/2017  1:01 PM            ED COURSE: The patients hospital course has been uncomplicated. 1:23 PM  [place]  results have been reviewed with her. She has been counseled regarding her diagnosis. She verbally conveys understanding and agreement of the signs, symptoms, diagnosis, treatment and prognosis and additionally agrees to follow up as recommended with Dr. Monae Calix MD in 24 - 48 hours. She also agrees with the care-plan and conveys that all of her questions have been answered. I have also put together some discharge instructions for her that include: 1) educational information regarding their diagnosis, 2) how to care for their diagnosis at home, as well a 3) list of reasons why they would want to return to the ED prior to their follow-up appointment, should their condition change. Attestations: This note is prepared by Rianna Rene. Mary Alice Mata, acting as Scribe for rAon Escobar. Michael Jo, [address] [facility] Michael Jo MD: The scribe's documentation has been prepared under my direction and personally reviewed by me in its entirety. I confirm that the note above accurately reflects all work, treatment, procedures, and medical decision making performed by me.

## 2017-07-25 NOTE — PROGRESS NOTES
Patient C/O left leg pain all the time. She has a spinal injection scheduled with Dr Patria Dial this Friday.

## 2017-07-25 NOTE — PROGRESS NOTES
7/25/2017 4:22 PM      Subjective: Valdez Grossman is seen in office today for c/o LE pain. C/o rest pain for last few weeks. Visit Vitals    /50 (BP 1 Location: Left arm, BP Patient Position: Sitting)    Pulse 76    Resp 16    Ht 5' 2\" (1.575 m)    Wt 139 lb 12.8 oz (63.4 kg)    SpO2 95%    BMI 25.57 kg/m2     Current Outpatient Prescriptions   Medication Sig    OXYGEN-AIR DELIVERY SYSTEMS by Does Not Apply route.  clopidogrel (PLAVIX) 75 mg tab TAKE 1 TABLET BY MOUTH DAILY    methocarbamol (ROBAXIN-750) 750 mg tablet Take 1 Tab by mouth four (4) times daily.  metoprolol tartrate (LOPRESSOR) 50 mg tablet Take  by mouth two (2) times a day.  HYDROcodone-acetaminophen (NORCO) 5-325 mg per tablet Take 1 Tab by mouth every four (4) hours as needed for Pain. Max Daily Amount: 6 Tabs.  albuterol-ipratropium (DUO-NEB) 2.5 mg-0.5 mg/3 ml nebu 3 mL by Nebulization route every six (6) hours as needed.  insulin lispro (HUMALOG) 100 unit/mL injection 5 Units by SubCUTAneous route Before breakfast, lunch, and dinner.  insulin NPH (NOVOLIN N, HUMULIN N) 100 unit/mL injection 20 Units by SubCUTAneous route two (2) times daily (with meals).  aluminum-magnesium hydroxide 200-200 mg/5 mL susp 30 mL, diphenhydrAMINE 12.5 mg/5 mL elix 75 mg, lidocaine 2 % soln 30 mL, sucralfate 100 mg/mL susp 3 g oral solution (compounded) Take 5 mL by mouth Before breakfast, lunch, and dinner.  Insulin Syringe-Needle U-100 0.5 mL 29 gauge x 1/2\" syrg 1 Each by Does Not Apply route five (5) times daily.  alcohol swabs (ALCOHOL PADS) padm 1 Each by Apply Externally route Multiple.  albuterol (PROVENTIL HFA, VENTOLIN HFA, PROAIR HFA) 90 mcg/actuation inhaler Take 2 Puffs by inhalation every four (4) hours as needed for Wheezing.  calcium acetate (PHOSLO) 667 mg cap Take 2 Caps by mouth three (3) times daily (with meals).     acetaminophen (TYLENOL) 325 mg tablet Take 650 mg by mouth every six (6) hours as needed for Pain or Fever (Temp >100.5).  budesonide-formoterol (SYMBICORT) 160-4.5 mcg/actuation HFA inhaler Take 2 Puffs by inhalation two (2) times a day. Patient takes at 0500 and 2100    roflumilast (DALIRESP) tab tablet Take 500 mcg by mouth daily.  atorvastatin (LIPITOR) 10 mg tablet Take 10 mg by mouth nightly.  cholecalciferol, vitamin D3, (VITAMIN D3) 2,000 unit tab Take 1 Tab by mouth daily.  aspirin 81 mg tablet Take 81 mg by mouth every morning. Indications: MYOCARDIAL INFARCTION PREVENTION    oxyCODONE-acetaminophen (PERCOCET) 5-325 mg per tablet Take 1 Tab by mouth every six (6) hours as needed for Pain. Max Daily Amount: 4 Tabs.  tiotropium (SPIRIVA WITH HANDIHALER) 18 mcg inhalation capsule Take 1 Cap by inhalation daily. No current facility-administered medications for this visit. Objective:      Visit Vitals    /50 (BP 1 Location: Left arm, BP Patient Position: Sitting)    Pulse 76    Resp 16    Ht 5' 2\" (1.575 m)    Wt 139 lb 12.8 oz (63.4 kg)    SpO2 95%    BMI 25.57 kg/m2       Data Review:     Reviewed and/or ordered active problem list, medication list tests    Past Medical History:   Diagnosis Date    Acute on chronic diastolic CHF (congestive heart failure), NYHA class 2 ([facility] [address] 2/19/2015    Adrenal mass, left (HCC)     2.4 Cm om Feb 2010    Anemia associated with chronic renal failure     Arthritis     right knee    Asthma     CKD (chronic kidney disease), stage IV (HCC)     ying,[facility]    COPD     Diabetes ([facility] [address] dx 1997~    type II    Generalized social phobia     HTN (hypertension)     Hypercholesteremia     Nausea & vomiting 11/11/2013    Other ill-defined conditions     fistula, R upper arm.     Pneumonia 12/10/2014    left lower lobe with sepsis    Presence of biventricular AICD 4/22/2016 4/20/16 [facility] [employer] biventricular AICD implant    Renal calculus or stone     small Lt by US 3/2012.  S/P AV lv ablation 04/22/2016 4/20/16    Smokers' cough ([address]       Past Surgical History:   Procedure Laterality Date    COLONOSCOPY,DIAGNOSTIC  3/28/2016         HX HEENT      Lasik eye surgery    HX OPEN CHOLECYSTECTOMY  ~ 1985    HX OTHER SURGICAL      adrenal mass removed    HX SHOULDER ARTHROSCOPY  2008    removal bone spur left shoulder    KNEE SCOPE,CLEAN/DRAIN      UPPER GI ENDOSCOPY,DIAGNOSIS  3/28/2016         VASCULAR SURGERY PROCEDURE UNLIST      dialysis in right arm     Allergies   Allergen Reactions    Grapefruit Hives    Orange Juice Hives    Peach (Prunus Persica) Hives      Family History   Problem Relation Age of Onset    Diabetes Mother     Heart Disease Mother     Hypertension Mother     High Cholesterol Mother     Arthritis-osteo Mother     Cancer Father      Lung    Arthritis-osteo Father     Cancer Brother      x2 - lung cancer      Social History     Social History    Marital status:      Spouse name: N/A    Number of children: N/A    Years of education: N/A     Occupational History     Retired     Social History Main Topics    Smoking status: Former Smoker     Packs/day: 2.00     Years: 48.00     Types: Cigarettes     Quit date: 1/3/2012    Smokeless tobacco: Never Used    Alcohol use No    Drug use: No    Sexual activity: Not on file     Other Topics Concern    Not on file     Social History Narrative         Review of Systems     General: Not Present- Anorexia, Chills, Dietary Changes, Fatigue, Fever, Medication Changes, Night Sweats, Weight Gain > 10lbs. and Weight Loss > 10lbs. .  Skin: Not Present- Bruising and Excessive Sweating. HEENT: Not Present- Headache, Visual Loss and Vertigo. Respiratory: Not Present- Cough, Decreased Exercise Tolerance, Difficulty Breathing, Snoring and Wheezing.   Cardiovascular: Not Present- Abnormal Blood Pressure, Chest Pain, Difficulty Breathing On Exertion, Edema, Fainting / Blacking Out, Irregular Heart Beat, Orthopnea, Palpitations, Paroxysmal Nocturnal Dyspnea, Rapid Heart Rate, Shortness of Breath and Swelling of Extremities. Gastrointestinal: Not Present- Black, Tarry Stool, Bloody Stool, Diarrhea, Hematemesis, Rectal Bleeding and Vomiting. Musculoskeletal: Not Present- Muscle Pain and Muscle Weakness. Neurological: Not Present- Dizziness. Psychiatric: Not Present- Depression. Endocrine: Not Present- Cold Intolerance, Heat Intolerance and Thyroid Problems. Hematology: Not Present- Abnormal Bleeding, Anemia, Blood Clots and Easy Bruising.       Physical Exam   The physical exam findings are as follows:       General   Mental Status - Alert. General Appearance - Not in acute distress. Chest and Lung Exam   Inspection: Accessory muscles - No use of accessory muscles in breathing. Auscultation:   Breath sounds: - Normal.      Cardiovascular   Inspection: Jugular vein - Bilateral - Inspection Normal.  Palpation/Percussion:   Apical Impulse: - Normal.  Auscultation: Rhythm - Regular. Heart Sounds - S1 WNL and S2 WNL. No S3 or S4. Murmurs & Other Heart Sounds: Auscultation of the heart reveals - No Murmurs. Carotid arteries - No Carotid bruit. Peripheral Vascular   Upper Extremity: Inspection - Bilateral - No Cyanotic nailbeds or Digital clubbing. Lower Extremity:   Palpation: Dorsalis pedis pulse - Bilateral - NP. Posterior tibia pulse - Bilateral - NP. Edema - Bilateral - No edema. Assessment:       ICD-10-CM ICD-9-CM    1. PAD (peripheral artery disease) ([facility]) I73.9 443.9 CBC W/O DIFF      METABOLIC PANEL, COMPREHENSIVE      PROTHROMBIN TIME + INR      IR ANGIO EXT LOWER BI      ANKLE BRACHIAL INDEX   2. ESRD (end stage renal disease) ([facility]) N18.6 585.6 CBC W/O DIFF      METABOLIC PANEL, COMPREHENSIVE      PROTHROMBIN TIME + INR      IR ANGIO EXT LOWER BI      ANKLE BRACHIAL INDEX   3.  Coronary artery disease involving native coronary artery of native heart without angina pectoris I25.10 414.01 CBC W/O DIFF      METABOLIC PANEL, COMPREHENSIVE      PROTHROMBIN TIME + INR      IR ANGIO EXT LOWER BI      ANKLE BRACHIAL INDEX   4. Essential hypertension I10 401.9 CBC W/O DIFF      METABOLIC PANEL, COMPREHENSIVE      PROTHROMBIN TIME + INR      IR ANGIO EXT LOWER BI      ANKLE BRACHIAL INDEX   5. S/P PTCA (percutaneous transluminal coronary angioplasty) Z98.61 V45.82 CBC W/O DIFF      METABOLIC PANEL, COMPREHENSIVE      PROTHROMBIN TIME + INR      IR ANGIO EXT LOWER BI      ANKLE BRACHIAL INDEX       Plan:     PAD: b/l LE intervention. Check DESTINY. Will likely need LE angio.        PAF with RVR:    S/p AV lv ablation and PPM. Stable. F//u with EP.      No BB or CCB due to hypotension  CHADS2 VASc score is very high, but no AC due to bleeding concerns      CAD, Cardiomyopathy: on aspirin and statin. No BB or ACE due to hypotension.       COPD: on home O2.      ESRD on HD.

## 2017-07-25 NOTE — MR AVS SNAPSHOT
Visit Information Date & Time Provider Department Dept. Phone Encounter #  
 7/25/2017  3:45 PM Marry Holland, [address] [facility] [phone] [id number] Your Appointments 10/26/2017 11:15 AM  
PACEMAKER with PACEMAKER, [facility]) Appt Note: BSC BiV ICD 3mo check, VVI, no landline, coming from dialysis [address] [place] [address]  
[phone] [address] Upcoming Health Maintenance Date Due  
 FOOT EXAM Q1 5/25/1955 MICROALBUMIN Q1 5/25/1954 EYE EXAM RETINAL OR DILATED Q1 5/25/1954 DTaP/Tdap/Td series (1 - Tdap) 5/25/1965 BREAST CANCER SCRN MAMMOGRAM 5/25/1994 ZOSTER VACCINE AGE 60> 3/25/2004 GLAUCOMA SCREENING Q2Y 5/25/2009 OSTEOPOROSIS SCREENING (DEXA) 5/25/2009 MEDICARE YEARLY EXAM 5/25/2009 LIPID PANEL Q1 3/8/2017 FOBT Q 1 YEAR AGE 50-75 3/25/2017 HEMOGLOBIN A1C Q6M 5/15/2017 INFLUENZA AGE 9 TO ADULT 8/1/2017 Allergies as of 7/25/2017  Review Complete On: 7/25/2017 By: Marry Holland MD  
  
 Severity Noted Reaction Type Reactions Grapefruit  01/02/2015    Hives Orange Juice  01/23/2016    Hives Peach (Prunus Persica)  05/27/2013    Hives Current Immunizations  Reviewed on 6/3/2017 Name Date Influenza Vaccine 9/15/2014, 10/8/2013 Influenza Vaccine Split 10/1/2011 Influenza Vaccine Whole 9/16/2012 Pneumococcal Polysaccharide (PPSV-23) 12/12/2014  9:17 AM  
 Pneumococcal Vaccine (Unspecified Type) 10/1/2016 ZZZ-RETIRED (DO NOT USE) Pneumococcal Vaccine (Unspecified Type) 10/1/2011 Not reviewed this visit You Were Diagnosed With   
  
 Codes Comments PAD (peripheral artery disease) (HCC)    -  Primary ICD-10-CM: I73.9 ICD-9-CM: 443.9 ESRD (end stage renal disease) (RUSTca 75.)     ICD-10-CM: N18.6 ICD-9-CM: 585.6 Coronary artery disease involving native coronary artery of native heart without angina pectoris     ICD-10-CM: I25.10 ICD-9-CM: 414.01 Essential hypertension     ICD-10-CM: I10 
ICD-9-CM: 401.9 S/P PTCA (percutaneous transluminal coronary angioplasty)     ICD-10-CM: Z98.61 ICD-9-CM: V45.82 Vitals BP Pulse Resp Height(growth percentile) Weight(growth percentile) SpO2  
 100/50 (BP 1 Location: Left arm, BP Patient Position: Sitting) 76 16 5' 2\" (1.575 m) 139 lb 12.8 oz (63.4 kg) 95% BMI OB Status Smoking Status 25.57 kg/m2 Postmenopausal Former Smoker BMI and BSA Data Body Mass Index Body Surface Area 25.57 kg/m 2 1.67 m 2 Preferred Pharmacy Pharmacy Name Phone [phone] [facility] [place] [address] [phone] Your Updated Medication List  
  
   
This list is accurate as of: 7/25/17  5:01 PM.  Always use your most recent med list.  
  
  
  
  
 acetaminophen 325 mg tablet Commonly known as:  TYLENOL Take 650 mg by mouth every six (6) hours as needed for Pain or Fever (Temp >100.5). albuterol 90 mcg/actuation inhaler Commonly known as:  PROVENTIL HFA, VENTOLIN HFA, PROAIR HFA Take 2 Puffs by inhalation every four (4) hours as needed for Wheezing. albuterol-ipratropium 2.5 mg-0.5 mg/3 ml Nebu Commonly known as:  DUO-NEB  
3 mL by Nebulization route every six (6) hours as needed. alcohol swabs Padm Commonly known as:  ALCOHOL PADS  
1 Each by Apply Externally route Multiple. aluminum-magnesium hydroxide 200-200 mg/5 mL susp 30 mL, diphenhydrAMINE 12.5 mg/5 mL elix 75 mg, lidocaine 2 % soln 30 mL, sucralfate 100 mg/mL susp 3 g oral solution (compounded) Take 5 mL by mouth Before breakfast, lunch, and dinner. aspirin 81 mg tablet Take 81 mg by mouth every morning. Indications: MYOCARDIAL INFARCTION PREVENTION  
  
 calcium acetate 667 mg Cap Commonly known as:  PHOSLO Take 2 Caps by mouth three (3) times daily (with meals). clopidogrel 75 mg Tab Commonly known as:  PLAVIX TAKE 1 TABLET BY MOUTH DAILY DALIRESP Tab tablet Generic drug:  roflumilast  
Take 500 mcg by mouth daily. HYDROcodone-acetaminophen 5-325 mg per tablet Commonly known as:  Al Pretty Take 1 Tab by mouth every four (4) hours as needed for Pain. Max Daily Amount: 6 Tabs. insulin lispro 100 unit/mL injection Commonly known as:  HUMALOG  
5 Units by SubCUTAneous route Before breakfast, lunch, and dinner. insulin  unit/mL injection Commonly known as:  NOVOLIN N, HUMULIN N  
20 Units by SubCUTAneous route two (2) times daily (with meals). Insulin Syringe-Needle U-100 0.5 mL 29 gauge x 1/2\" Syrg 1 Each by Does Not Apply route five (5) times daily. LIPITOR 10 mg tablet Generic drug:  atorvastatin Take 10 mg by mouth nightly. methocarbamol 750 mg tablet Commonly known as:  NNUFVMJ-765 Take 1 Tab by mouth four (4) times daily. metoprolol tartrate 50 mg tablet Commonly known as:  LOPRESSOR Take  by mouth two (2) times a day. oxyCODONE-acetaminophen 5-325 mg per tablet Commonly known as:  PERCOCET Take 1 Tab by mouth every six (6) hours as needed for Pain. Max Daily Amount: 4 Tabs. OXYGEN-AIR DELIVERY SYSTEMS  
by Does Not Apply route. SPIRIVA WITH HANDIHALER 18 mcg inhalation capsule Generic drug:  tiotropium Take 1 Cap by inhalation daily. SYMBICORT 160-4.5 mcg/actuation HFA inhaler Generic drug:  budesonide-formoterol Take 2 Puffs by inhalation two (2) times a day. Patient takes at 0500 and 2100 VITAMIN D3 2,000 unit Tab Generic drug:  cholecalciferol (vitamin D3) Take 1 Tab by mouth daily. We Performed the Following CBC W/O DIFF [50221 CPT(R)] METABOLIC PANEL, COMPREHENSIVE [81105 CPT(R)] PROTHROMBIN TIME + INR [11202 CPT(R)] To-Do List   
 07/25/2017 Imaging:  ANKLE BRACHIAL INDEX   
  
 07/25/2017 Imaging:  IR ANGIO EXT LOWER BI   
  
 08/02/2017 2:15 PM  
  Appointment with CATH LAB 1 [facility] at [facility] ([phone]) NPO AFTER MIDNIGHT! ROUTINE CASES:  Please arrive 2 hours prior to your scheduled appointment time. If your scheduled appointment is for 7:30am, 8:00am or 8:15am, please arrive by 6:45am.  NON ROUTINE CASES:  1. If you require labs, x-ray, EKG or meds-please arrive 3 hours prior to your appointment time. 2.  If you require hydration prior to your procedure-please arrive 4 hours prior to your appointment time. ** It is the [facility] responsibility to notify the Cath Lab  of any prep required outside of the normal routine case** Please provide this summary of care documentation to your next provider. Your primary care clinician is listed as Belgica Girard. If you have any questions after today's visit, please call [phone].

## 2017-07-30 NOTE — DISCHARGE INSTRUCTIONS
Hand Pain: Care Instructions  Your Care Instructions  Common causes of hand pain are overuse and injuries, such as might happen during sports or home repair projects. Everyday wear and tear, especially as you get older, also can cause hand pain. Most minor hand injuries will heal on their own, and home treatment is usually all you need to do. If you have sudden and severe pain, you may need tests and treatment. Follow-up care is a key part of your treatment and safety. Be sure to make and go to all appointments, and call your doctor if you are having problems. Its also a good idea to know your test results and keep a list of the medicines you take. How can you care for yourself at home? · Take pain medicines exactly as directed. ¨ If the doctor gave you a prescription medicine for pain, take it as prescribed. ¨ If you are not taking a prescription pain medicine, ask your doctor if you can take an over-the-counter medicine. · Rest and protect your hand. Take a break from any activity that may cause pain. · Put ice or a cold pack on your hand for 10 to 20 minutes at a time. Put a thin cloth between the ice and your skin. · Prop up the sore hand on a pillow when you ice it or anytime you sit or lie down during the next 3 days. Try to keep it above the level of your heart. This will help reduce swelling. · If your doctor recommends a sling, splint, or elastic bandage to support your hand, wear it as directed. When should you call for help? Call 911 anytime you think you may need emergency care. For example, call if:  · Your hand turns cool or pale or changes color. Call your doctor now or seek immediate medical care if:  · You cannot move your hand. · Your hand pops, moves out of its normal position, and then returns to its normal position. · You have signs of infection, such as:  ¨ Increased pain, swelling, warmth, or redness. ¨ Red streaks leading from the sore area.   ¨ Pus draining from a place on your hand. ¨ A fever. · Your hand feels numb or tingly. Watch closely for changes in your health, and be sure to contact your doctor if:  · Your hand feels unstable when you try to use it. · You do not get better as expected. · You have any new symptoms, such as swelling. · Bruises from an injury to your hand last longer than 2 weeks. Where can you learn more? Go to http://chiqui-cleveland.info/. Enter R273 in the search box to learn more about \"Hand Pain: Care Instructions. \"  Current as of: March 20, 2017  Content Version: 11.3  © 5234-8560 [employer]. Care instructions adapted under license by [employer] (which disclaims liability or warranty for this information). If you have questions about a medical condition or this instruction, always ask your healthcare professional. [place] [address] any warranty or liability for your use of this information. Cervical Spinal Stenosis: Care Instructions  Your Care Instructions    Spinal stenosis is a narrowing of the canal that surrounds the spinal cord and nerve roots. Sometimes bone and other tissue grow into this canal and press on the nerves that branch out from the spinal cord. This can happen as a part of aging. When the narrowing happens in your neck, it's called cervical spinal stenosis. It often causes stiffness, pain, numbness, and weakness in the neck, shoulders, arms, hands, or legs. It can even cause problems with your balance, coordination, and bowel or bladder control. But some people have no symptoms. You may be able to get relief from the symptoms of spinal stenosis by taking pain medicine. Your doctor may suggest physical therapy and exercises to keep your spine strong and flexible. Some people try steroid shots to reduce swelling. If pain and numbness in your neck, arms, or legs are still so bad that you cannot do your normal activities, you may need surgery.   Follow-up care is a key part of your treatment and safety. Be sure to make and go to all appointments, and call your doctor if you are having problems. It's also a good idea to know your test results and keep a list of the medicines you take. How can you care for yourself at home? · Ask your doctor if you can take an over-the-counter pain medicine, such as acetaminophen (Tylenol), ibuprofen (Advil, Motrin), or naproxen (Aleve). Be safe with medicines. Read and follow all instructions on the label. · Do not take two or more pain medicines at the same time unless the doctor told you to. Many pain medicines have acetaminophen, which is Tylenol. Too much acetaminophen (Tylenol) can be harmful. · Change positions often when you are standing or sitting. This may reduce pressure on the spinal cord and its nerves. · When you rest, use pillows or towel rolls to support your neck and head in a comfortable position. · Follow your doctor's instructions about activity. He or she may tell you not to do sports or activities that could injure your neck. · Stretch your neck and shoulders as your doctor or physical therapist recommends. If your doctor says it is okay to do them, these exercises may help:  ¨ Neck stretches to the side. Keep your shoulders relaxed and slowly tilt your head straight over toward one shoulder. Hold for 15 seconds. Let the weight of your head stretch your muscles. Then do the same toward the other shoulder. ¨ Neck rotations. Keep your chin level and slowly turn your head to one side. Hold for 15 seconds. Then do the same to the other side. ¨ Shoulder rolls. Roll your shoulders up, then back, and then down in a smooth, circular motion. Repeat several times. When should you call for help? Call 911 anytime you think you may need emergency care. For example, call if:  · You are unable to move an arm or a leg at all.   Call your doctor now or seek immediate medical care if:  · You have new or worse symptoms in your neck, arms, or legs. Symptoms may include:  ¨ Numbness or tingling. ¨ Weakness. ¨ Pain. · You lose bladder or bowel control. Watch closely for changes in your health, and be sure to contact your doctor if:  · You are not getting better as expected. Where can you learn more? Go to http://chiqui-cleveland.info/. Enter  in the search box to learn more about \"Cervical Spinal Stenosis: Care Instructions. \"  Current as of: March 21, 2017  Content Version: 11.3  © 0602-2882 [employer]. Care instructions adapted under license by [employer] (which disclaims liability or warranty for this information). If you have questions about a medical condition or this instruction, always ask your healthcare professional. [address] any warranty or liability for your use of this information.

## 2017-07-30 NOTE — ED TRIAGE NOTES
Assumed care of pt, pt ambulatory from triage, resting on stretcher in position of comfort, call bell within reach, pt reports hand numbness beginning this morning, woke with numbness, reports she is unable to  anything or hold anything, when touching pt on left she reports lower arm and hand \"feel different\", pt has raised area to wrist that she reports has been there for \" a couple of years\"

## 2017-07-30 NOTE — Clinical Note
Rest, gentle stretching. Follow up with Orthopedist if pain persists. Return to the Emergency Dept for any worsening pain, swelling, numbness/tingling, weakness.

## 2017-07-30 NOTE — ED PROVIDER NOTES
HPI Comments: Corinne Rye is a 67 y.o. female with hx of HTN, HLD, DM, CKD, COPD, PNA, CHF, pacemaker, stent who presents ambulatory to the ED c/o L hand pain radiating up the L arm since 5:30 AM. Pt states her hand feels more \"funny\" than painful. She expresses concern for a blood clot. Pt states she is scheduled for dialysis TRS. She notes graft placement on her R arm and denies any complications. Pt admits taking plavix. She specifically denies any injuries, fever, chills, congestion, rhinorrhea, sore throat, cough, SOB, CP, abdominal pain, N/V/D, dysuria, hematuria, dizziness, HA, and rash. Social hx: -(former) Tobacco use, - EtOH use, - Illicit drug use    PCP: Lennon Halsted, MD    There are no other complaints, changes or physical findings at this time. The history is provided by the patient. No  was used. Past Medical History:   Diagnosis Date    Acute on chronic diastolic CHF (congestive heart failure), NYHA class 2 ([address] 2/19/2015    Adrenal mass, left (HCC)     2.4 Cm om Feb 2010    Anemia associated with chronic renal failure     Arthritis     right knee    Asthma     CKD (chronic kidney disease), stage IV (HCC)     ying,dialysis [facility]    COPD     Diabetes ([address] dx 1997~    type II    Generalized social phobia     HTN (hypertension)     Hypercholesteremia     Nausea & vomiting 11/11/2013    Other ill-defined conditions     fistula, R upper arm.  Pneumonia 12/10/2014    left lower lobe with sepsis    Presence of biventricular AICD 4/22/2016 4/20/16 Generaytor biventricular AICD implant    Renal calculus or stone     small Lt by US 3/2012.       S/P AV lv ablation 04/22/2016 4/20/16    Smokers' cough ([address]        Past Surgical History:   Procedure Laterality Date    COLONOSCOPY,DIAGNOSTIC  3/28/2016         HX HEENT      Lasik eye surgery    HX OPEN CHOLECYSTECTOMY  ~ 1985    HX OTHER SURGICAL adrenal mass removed    HX SHOULDER ARTHROSCOPY  2008    removal bone spur left shoulder    KNEE SCOPE,CLEAN/DRAIN      UPPER GI ENDOSCOPY,DIAGNOSIS  3/28/2016         VASCULAR SURGERY PROCEDURE UNLIST      dialysis in right arm         Family History:   Problem Relation Age of Onset    Diabetes Mother     Heart Disease Mother     Hypertension Mother     High Cholesterol Mother     Arthritis-osteo Mother     Cancer Father      Lung    Arthritis-osteo Father     Cancer Brother      x2 - lung cancer       Social History     Social History    Marital status:      Spouse name: N/A    Number of children: N/A    Years of education: N/A     Occupational History     Retired     Social History Main Topics    Smoking status: Former Smoker     Packs/day: 2.00     Years: 48.00     Types: Cigarettes     Quit date: 1/3/2012    Smokeless tobacco: Never Used    Alcohol use No    Drug use: No    Sexual activity: Not on file     Other Topics Concern    Not on file     Social History Narrative         ALLERGIES: Grapefruit; Orange juice; and Peach (prunus persica)    Review of Systems   Constitutional: Negative for chills and fever. HENT: Negative for congestion, rhinorrhea and sore throat. Respiratory: Negative for cough and shortness of breath. Cardiovascular: Negative for chest pain and palpitations. Gastrointestinal: Negative for abdominal pain, diarrhea, nausea and vomiting. Genitourinary: Negative for dysuria and hematuria. Musculoskeletal: Positive for myalgias (L hand and arm). Negative for neck pain and neck stiffness. Skin: Negative for rash and wound. Neurological: Negative for dizziness and headaches. Psychiatric/Behavioral: Negative for agitation and confusion.        Vitals:    07/30/17 1345   BP: 92/69   Pulse: 73   Resp: 16   Temp: 97.6 °F (36.4 °C)   SpO2: 100%   Weight: 65.3 kg (143 lb 15.4 oz)            Physical Exam   Constitutional: She is oriented to person, place, and time. She appears well-developed and well-nourished. No distress. WDWN elderly female, alert, in NAD   HENT:   Head: Normocephalic and atraumatic. Nose: Nose normal.   Mouth/Throat: No oropharyngeal exudate. Eyes: Conjunctivae and EOM are normal. Pupils are equal, round, and reactive to light. Right eye exhibits no discharge. Left eye exhibits no discharge. No scleral icterus. Neck: Normal range of motion. Neck supple. No JVD present. No tracheal deviation present. No thyromegaly present. No midline cervical spinal tenderness   Cardiovascular: Normal rate, regular rhythm and normal heart sounds. 2+ radial. Cap refill < 2   Pulmonary/Chest: Effort normal and breath sounds normal. No respiratory distress. She has no wheezes. Abdominal: Soft. There is no tenderness. Musculoskeletal: She exhibits tenderness. She exhibits no edema. Subjective tenderness reported to L hand and forearm. No deformity. NVI. Diffuse ecchymosis to BL upper extremities. Decreased A/P ROM noted to L hand,  strength R 5/5, L 3+/3. No reproducible tenderness. Lymphadenopathy:     She has no cervical adenopathy. Neurological: She is alert and oriented to person, place, and time. No cranial nerve deficit. She exhibits normal muscle tone. Coordination normal.   FNF, HTS neg bilat, no pronator drift   Skin: Skin is warm and dry. No rash noted. She is not diaphoretic. No erythema. No pallor. See MS exam   Psychiatric: She has a normal mood and affect. Her behavior is normal. Judgment normal.   Nursing note and vitals reviewed.        MDM  Number of Diagnoses or Management Options  Diagnosis management comments: DDx: strain, sprain, arthritis, fracture       Amount and/or Complexity of Data Reviewed  Tests in the radiology section of CPT®: reviewed and ordered  Review and summarize past medical records: yes    Patient Progress  Patient progress: stable    ED Course       Procedures    3:50 PM  Spoke with Dr. Iker Saba. Advised to order a CT neck. 4:55 PM  Pt states her BP has been controlled recently. Will give Medrol pack to help with numbness/tingling. LABORATORY TESTS:  No results found for this or any previous visit (from the past 12 hour(s)). IMAGING RESULTS:  CT Results  (Last 48 hours)               07/30/17 1631  CT SPINE CERV WO CONT Final result    Narrative:  EXAM:  CT CERVICAL SPINE WITHOUT CONTRAST       INDICATION:   L hand numbness, weakness. COMPARISON: None. CONTRAST:  None. TECHNIQUE: Multislice helical CT of the cervical spine was performed without   intravenous contrast administration. Sagittal and coronal reconstructions were   generated. CT dose reduction was achieved through use of a standardized   protocol tailored for this examination and automatic exposure control for dose   modulation. FINDINGS:       There is a moderate C-shaped curvature, the apex directed to the left. There is   no fracture or subluxation. The odontoid process is intact. The craniocervical   junction is within normal limits. The prevertebral soft tissues are within   normal limits. C1-2: Spurring. Elevation of the PLL but central canal measures 10.8 mm   anterior-posterior. C2-C3:  Asymmetric right-sided uncovertebral joint and facet arthropathy results   in moderate right-sided neural foraminal stenosis. Central canal measures a 8.9   mm anterior to posterior. C3-C4:  Disc osteophyte complex with left paracentral disc protrusion. Disc   desiccation is present. Central canal measures 9.2 mm anterior to posterior. Moderate right neural foraminal stenosis secondary to uncovertebral joint and   facet arthropathy (reference series 3, image 29). C4-C5:  Disc osteophyte complex with a vacuum phenomena. Central canal measures   8.1 mm anterior to posterior.  Moderate severe bilateral neural foraminal   stenosis secondary to uncovertebral joint hypertrophy and facet change. (Reference series 3, image 39). [place] C5-C6:  Disc osteophyte complex, asymmetric to the right. Central canal   difficult to evaluate because of streak artifact. Maximum of 7.7 mm anterior to   posterior severe right and mild left neural foraminal stenosis (reference series   3, image 42)       C6-C7:  Disc osteophyte complex. The greatest again by shoulder artifact. Maximal axial diameter is 8.4 mm and possibly less. Severe bilateral neural   foraminal stenosis secondary to uncovertebral joint hypertrophy (reference   series 3, image 47). C7-T1:  There is no spinal canal or neural foraminal stenosis. Incidental moderate right pleural effusion with a density measurement of the 0. IMPRESSION:       No acute fracture   Significant torticollis   Central canal stenosis, most marked at C5-6 but also present at C4-5 and C3-4. Moderate severe C4-5 and severe right C5-C6 and bilateral C6-7 bilateral neural   foraminal stenosis   Moderate neural foraminal stenosis on the right at C2-3 and C3-4   Incidental moderate right pleural effusion                        XR HAND LT MIN 3 V   Final Result   EXAM:  XR HAND LT MIN 3 V     INDICATION: Left hand pain and numbness since 0530 hours     COMPARISON: None.     FINDINGS: Three views of the left hand demonstrate no fracture, dislocation or  other acute osseous or articular abnormality. There is diffuse osteopenia. Mild  arthritis is noted in the lateral carpal compartment. Osteopenia is noted.     IMPRESSION: Osteopenic left hand. No fracture. MEDICATIONS GIVEN:  Medications   HYDROcodone-acetaminophen (NORCO) 5-325 mg per tablet 1 Tab (1 Tab Oral Given 7/30/17 9753)       IMPRESSION:  1. Hand pain, left    2. Long term (current) use of anticoagulants    3. ESRD on dialysis ([address]    4. Torticollis    5. Cervical stenosis of spinal canal    6. Osteopenia of left hand        PLAN: Discharge home  1.    Current Discharge Medication List START taking these medications    Details   !! HYDROcodone-acetaminophen (NORCO) 5-325 mg per tablet Take 1 Tab by mouth every six (6) hours as needed for Pain for up to 15 doses. Max Daily Amount: 4 Tabs. Qty: 15 Tab, Refills: 0      !! methocarbamol (ROBAXIN) 750 mg tablet Take 1 Tab by mouth three (3) times daily for 5 days. Qty: 15 Tab, Refills: 0       !! - Potential duplicate medications found. Please discuss with provider. CONTINUE these medications which have NOT CHANGED    Details   !! methocarbamol (ROBAXIN-750) 750 mg tablet Take 1 Tab by mouth four (4) times daily. Qty: 20 Tab, Refills: 0      !! HYDROcodone-acetaminophen (NORCO) 5-325 mg per tablet Take 1 Tab by mouth every four (4) hours as needed for Pain. Max Daily Amount: 6 Tabs. Qty: 30 Tab, Refills: 0       !! - Potential duplicate medications found. Please discuss with provider. 2.   Follow-up Information     Follow up With Details Comments [address], [place]  As needed Briana [address]  [address] [facility] [address]  [facility]  [phone]      [facility] EMERGENCY DEPT  If symptoms worsen [address] [place] [address]  [phone]        3. Return to ED if worse     DISCHARGE NOTE  4:50 PM  The patient has been re-evaluated and is ready for discharge. Reviewed available results with patient. Counseled pt on diagnosis and care plan. Pt has expressed understanding, and all questions have been answered. Pt agrees with plan and agrees to F/U as recommended, or return to the ED if their sxs worsen. Discharge instructions have been provided and explained to the pt, along with reasons to return to the ED. This note is prepared by Simon Edmonds, acting as Scribe for [facility]. JOSE LUIS Aguilar: The scribe's documentation has been prepared under my direction and personally reviewed by me in its entirety.  I confirm that the note above accurately reflects all work, treatment, procedures, and medical decision making performed by me.

## 2017-08-02 NOTE — PROGRESS NOTES
1900 - Bedside report from [place] RN's.  NSR. No complaints at rest in bed. VSS. 2130 - [place] for L leg pain and generalized aches. 0030 - Checking leads on tele, noted pt skin slightly moist, checked sugar 55, requested 2 ice cream cups. BS recheck at 0050 - 125.    0725 - [place] for L leg pain. Bedside report to RN.  NPO.  VSS.

## 2017-08-02 NOTE — PROGRESS NOTES
TRANSFER - OUT REPORT:    Verbal report given to Janell Boykin RN on Jennifer Acevedo  being transferred to [facility] for change in patient condition(wheezing, c/o back pain)       Report consisted of patients Situation, Background, Assessment and   Recommendations(SBAR). Information from the following report(s) SBAR, MAR and Cardiac Rhythm a fib was reviewed with the receiving nurse. Lines:   Peripheral IV 08/02/17 Left Antecubital (Active)   Site Assessment Clean, dry, & intact 8/2/2017  1:00 PM   Phlebitis Assessment 0 8/2/2017  1:00 PM   Infiltration Assessment 0 8/2/2017  1:00 PM   Dressing Status Clean, dry, & intact 8/2/2017  1:00 PM   Dressing Type Tape;Transparent 8/2/2017  1:00 PM   Hub Color/Line Status Blue;Capped 8/2/2017  1:00 PM        Opportunity for questions and clarification was provided.       Patient transported with:   Registered Nurse

## 2017-08-02 NOTE — PROGRESS NOTES
Patient was to have dialysis after LE angio today. Angio has been postponed. Plan HD tomorrow after procedure.

## 2017-08-02 NOTE — PROGRESS NOTES
Beto Herbert 50 notified of consult. 0656- Dr. Flora Zamarripa call Nephrologist notified of procedure delay. Will reschedule dialysis treatment for tomorrow post cath. [place] Call [phone] notified.

## 2017-08-02 NOTE — IP AVS SNAPSHOT
[address] [facility] 
[phone] Patient: Garo Love MRN: [id number] SHAHAB:[id number] Current Discharge Medication List  
  
CONTINUE these medications which have CHANGED Dose & Instructions Dispensing Information Comments Morning Noon Evening Bedtime  
 insulin lispro 100 unit/mL injection Commonly known as:  HUMALOG What changed:   
- how much to take 
- how to take this - when to take this 
- additional instructions Your last dose was: Your next dose is: INITIATE CORRECTIVE INSULIN PROTOCOL (KRYSTLE): RX KRYSTLE Normal Sensitivity (Average weight) For Blood Sugar (mg/dl) of:             111-150=2 units 151-200=4 units 201-250=5 units 251-300=8 units 301-350=10 units Over 350= Call MD  
 Quantity:  1 Vial  
Refills:  0  
     
   
   
   
  
 * insulin  unit/mL injection Commonly known as:  Jason Friend What changed:  when to take this Your last dose was: Your next dose is:    
   
   
 Dose:  20 Units 20 Units by SubCUTAneous route daily (with breakfast). Quantity:  1 Vial  
Refills:  1  
     
   
   
   
  
 * insulin  unit/mL injection Commonly known as:  Jason Friend What changed: You were already taking a medication with the same name, and this prescription was added. Make sure you understand how and when to take each. Your last dose was: Your next dose is:    
   
   
 Dose:  5 Units 5 Units by SubCUTAneous route daily (with dinner). Quantity:  1 Vial  
Refills:  0  
     
   
   
   
  
 * Notice: This list has 2 medication(s) that are the same as other medications prescribed for you. Read the directions carefully, and ask your doctor or other care provider to review them with you. CONTINUE these medications which have NOT CHANGED Dose & Instructions Dispensing Information Comments Morning Noon Evening Bedtime  
 acetaminophen 325 mg tablet Commonly known as:  TYLENOL Your last dose was: Your next dose is:    
   
   
 Dose:  650 mg Take 650 mg by mouth every six (6) hours as needed for Pain or Fever (Temp >100.5). Refills:  0  
     
   
   
   
  
 albuterol 90 mcg/actuation inhaler Commonly known as:  PROVENTIL HFA, VENTOLIN HFA, PROAIR HFA Your last dose was: Your next dose is:    
   
   
 Dose:  2 Puff Take 2 Puffs by inhalation every four (4) hours as needed for Wheezing. Quantity:  1 Inhaler Refills:  0  
     
   
   
   
  
 albuterol-ipratropium 2.5 mg-0.5 mg/3 ml Nebu Commonly known as:  Yvette Larisa Your last dose was: Your next dose is:    
   
   
 Dose:  3 mL  
3 mL by Nebulization route every six (6) hours as needed. Quantity:  100 Nebule Refills:  1  
     
   
   
   
  
 alcohol swabs Padm Commonly known as:  ALCOHOL PADS Your last dose was: Your next dose is:    
   
   
 Dose:  1 Each  
1 Each by Apply Externally route Multiple. Quantity:  100 Pad Refills:  1  
     
   
   
   
  
 aluminum-magnesium hydroxide 200-200 mg/5 mL susp 30 mL, diphenhydrAMINE 12.5 mg/5 mL elix 75 mg, lidocaine 2 % soln 30 mL, sucralfate 100 mg/mL susp 3 g oral solution (compounded) Your last dose was: Your next dose is:    
   
   
 Dose:  5 mL Take 5 mL by mouth Before breakfast, lunch, and dinner. Quantity:  100 mL Refills:  0  
     
   
   
   
  
 aspirin 81 mg tablet Your last dose was: Your next dose is:    
   
   
 Dose:  81 mg Take 81 mg by mouth every morning. Indications: MYOCARDIAL INFARCTION PREVENTION Refills:  0  
     
   
   
   
  
 calcium acetate 667 mg Cap Commonly known as:  PHOSLO Your last dose was: Your next dose is:    
   
   
 Dose:  2 Cap Take 2 Caps by mouth three (3) times daily (with meals). Refills:  0 clopidogrel 75 mg Tab Commonly known as:  PLAVIX Your last dose was: Your next dose is: TAKE 1 TABLET BY MOUTH DAILY Quantity:  30 Tab Refills:  0  
     
   
   
   
  
 DALIRESP Tab tablet Generic drug:  roflumilast  
   
Your last dose was: Your next dose is:    
   
   
 Dose:  500 mcg Take 500 mcg by mouth daily. Refills:  0  
     
   
   
   
  
 * HYDROcodone-acetaminophen 5-325 mg per tablet Commonly known as:  Mckenna Agee Your last dose was: Your next dose is:    
   
   
 Dose:  1 Tab Take 1 Tab by mouth every four (4) hours as needed for Pain. Max Daily Amount: 6 Tabs. Quantity:  30 Tab Refills:  0  
     
   
   
   
  
 * HYDROcodone-acetaminophen 5-325 mg per tablet Commonly known as:  Mckenna Agee Your last dose was: Your next dose is:    
   
   
 Dose:  1 Tab Take 1 Tab by mouth every six (6) hours as needed for Pain for up to 15 doses. Max Daily Amount: 4 Tabs. Quantity:  15 Tab Refills:  0 Insulin Syringe-Needle U-100 0.5 mL 29 gauge x 1/2\" Syrg Your last dose was: Your next dose is:    
   
   
 Dose:  1 Each  
1 Each by Does Not Apply route five (5) times daily. Quantity:  150 Syringe Refills:  1 LIPITOR 10 mg tablet Generic drug:  atorvastatin Your last dose was: Your next dose is:    
   
   
 Dose:  10 mg Take 10 mg by mouth nightly. Refills:  0  
     
   
   
   
  
 * methocarbamol 750 mg tablet Commonly known as:  BDSSTAM-085 Your last dose was: Your next dose is:    
   
   
 Dose:  750 mg Take 1 Tab by mouth four (4) times daily. Quantity:  20 Tab Refills:  0  
     
   
   
   
  
 metoprolol tartrate 50 mg tablet Commonly known as:  LOPRESSOR Your last dose was: Your next dose is: Take  by mouth two (2) times a day. Refills:  0 oxyCODONE-acetaminophen 5-325 mg per tablet Commonly known as:  PERCOCET Your last dose was: Your next dose is:    
   
   
 Dose:  1 Tab Take 1 Tab by mouth every six (6) hours as needed for Pain. Max Daily Amount: 4 Tabs. Quantity:  12 Tab Refills:  0 OXYGEN-AIR DELIVERY SYSTEMS Your last dose was: Your next dose is:    
   
   
 by Does Not Apply route. Refills:  0  
     
   
   
   
  
 predniSONE 5 mg dose pack Commonly known as:  STERAPRED Your last dose was: Your next dose is:    
   
   
 See administration instruction per 5mg dose pack Quantity:  21 Tab Refills:  0 SPIRIVA WITH HANDIHALER 18 mcg inhalation capsule Generic drug:  tiotropium Your last dose was: Your next dose is:    
   
   
 Dose:  1 Cap Take 1 Cap by inhalation daily. Refills:  0  
     
   
   
   
  
 SYMBICORT 160-4.5 mcg/actuation HFA inhaler Generic drug:  budesonide-formoterol Your last dose was: Your next dose is:    
   
   
 Dose:  2 Puff Take 2 Puffs by inhalation two (2) times a day. Patient takes at 0500 and 2100 Refills:  0  
     
   
   
   
  
 VITAMIN D3 2,000 unit Tab Generic drug:  cholecalciferol (vitamin D3) Your last dose was: Your next dose is:    
   
   
 Dose:  1 Tab Take 1 Tab by mouth daily. Refills:  0  
     
   
   
   
  
 * Notice: This list has 3 medication(s) that are the same as other medications prescribed for you. Read the directions carefully, and ask your doctor or other care provider to review them with you. ASK your doctor about these medications Dose & Instructions Dispensing Information Comments Morning Noon Evening Bedtime * methocarbamol 750 mg tablet Commonly known as:  ROBAXIN Ask about: Should I take this medication? Your last dose was:     
   
Your next dose is:    
   
   
 Dose:  750 mg  
 Take 1 Tab by mouth three (3) times daily for 5 days. Quantity:  15 Tab Refills:  0  
     
   
   
   
  
 * Notice: This list has 1 medication(s) that are the same as other medications prescribed for you. Read the directions carefully, and ask your doctor or other care provider to review them with you. Where to Get Your Medications These medications were sent to [address] [place] [id number] Phone:  [phone] insulin  unit/mL injection  
 insulin  unit/mL injection Information on where to get these meds will be given to you by the nurse or doctor. ! Ask your nurse or doctor about these medications  
  insulin lispro 100 unit/mL injection

## 2017-08-02 NOTE — IP AVS SNAPSHOT
[address] [facility] 
[phone] Patient: Juanis Starks MRN: [id number] KAVITA:[birth year] You are allergic to the following Allergen Reactions Grapefruit Hives Orange Juice Hives Peach (Prunus Persica) Hives Recent Documentation Height Weight BMI OB Status Smoking Status 1.575 m 66.5 kg 26.83 kg/m2 Postmenopausal Former Smoker Emergency Contacts Name Discharge Info Relation Home Work Mobile Dawna Woods [5] [phone] [facility] DISCHARGE CAREGIVER [3] Sister [23] [phone] [facility] CAREGIVER [3] Child [2] [phone] Maria D Rasmussen  Other Relative [6] [phone] About your hospitalization You were admitted on:  August 2, 2017 You last received care in the:  [facility] INTRVNTNL CARDIO You were discharged on:  August 8, 2017 Unit phone number:  [phone] Why you were hospitalized Your primary diagnosis was:  Pad (Peripheral Artery Disease) (Hcc) Your diagnoses also included:  Type 2 Diabetes Mellitus Without Complication (Hcc), Htn (Hypertension), Cad (Coronary Artery Disease), Acute On Chronic Diastolic Chf (Congestive Heart Failure), Nyha Class 2 (Hcc), Esrd (End Stage Renal Disease) ([facility]) Providers Seen During Your Hospitalizations Provider Role Specialty Primary office phone Destiny Mason MD Attending Provider Cardiology [phone] Your Primary Care Physician (PCP) Primary Care Physician Office Phone Office Fax Thad Chowdhury [phone] Follow-up Information Follow up With Details Comments Contact Info Adam Salcedo MD   [place] [address] 
[phone] Destiny Mason MD Schedule an appointment as soon as possible for a visit in 3 weeks  [address] [facility] [place] 
[phone] Current Discharge Medication List  
  
CONTINUE these medications which have CHANGED Dose & Instructions Dispensing Information Comments Morning Noon Evening Bedtime  
 insulin lispro 100 unit/mL injection Commonly known as:  HUMALOG What changed:   
- how much to take 
- how to take this - when to take this 
- additional instructions Your last dose was: Your next dose is: INITIATE CORRECTIVE INSULIN PROTOCOL (KRYSTLE): RX KRYSTLE Normal Sensitivity (Average weight) For Blood Sugar (mg/dl) of:             111-150=2 units 151-200=4 units 201-250=5 units 251-300=8 units 301-350=10 units Over 350= Call MD  
 Quantity:  1 Vial  
Refills:  0  
     
   
   
   
  
 * insulin  unit/mL injection Commonly known as:  Nigel Boucher What changed:  when to take this Your last dose was: Your next dose is:    
   
   
 Dose:  20 Units 20 Units by SubCUTAneous route daily (with breakfast). Quantity:  1 Vial  
Refills:  1  
     
   
   
   
  
 * insulin  unit/mL injection Commonly known as:  Nigel Boucher What changed: You were already taking a medication with the same name, and this prescription was added. Make sure you understand how and when to take each. Your last dose was: Your next dose is:    
   
   
 Dose:  5 Units 5 Units by SubCUTAneous route daily (with dinner). Quantity:  1 Vial  
Refills:  0  
     
   
   
   
  
 * Notice: This list has 2 medication(s) that are the same as other medications prescribed for you. Read the directions carefully, and ask your doctor or other care provider to review them with you. CONTINUE these medications which have NOT CHANGED Dose & Instructions Dispensing Information Comments Morning Noon Evening Bedtime  
 acetaminophen 325 mg tablet Commonly known as:  TYLENOL Your last dose was: Your next dose is:    
   
   
 Dose:  650 mg Take 650 mg by mouth every six (6) hours as needed for Pain or Fever (Temp >100.5). Refills:  0  
     
   
   
   
  
 albuterol 90 mcg/actuation inhaler Commonly known as:  PROVENTIL HFA, VENTOLIN HFA, PROAIR HFA Your last dose was: Your next dose is:    
   
   
 Dose:  2 Puff Take 2 Puffs by inhalation every four (4) hours as needed for Wheezing. Quantity:  1 Inhaler Refills:  0  
     
   
   
   
  
 albuterol-ipratropium 2.5 mg-0.5 mg/3 ml Nebu Commonly known as:  [place] Your last dose was: Your next dose is:    
   
   
 Dose:  3 mL  
3 mL by Nebulization route every six (6) hours as needed. Quantity:  100 Nebule Refills:  1  
     
   
   
   
  
 alcohol swabs Padm Commonly known as:  ALCOHOL PADS Your last dose was: Your next dose is:    
   
   
 Dose:  1 Each  
1 Each by Apply Externally route Multiple. Quantity:  100 Pad Refills:  1  
     
   
   
   
  
 aluminum-magnesium hydroxide 200-200 mg/5 mL susp 30 mL, diphenhydrAMINE 12.5 mg/5 mL elix 75 mg, lidocaine 2 % soln 30 mL, sucralfate 100 mg/mL susp 3 g oral solution (compounded) Your last dose was: Your next dose is:    
   
   
 Dose:  5 mL Take 5 mL by mouth Before breakfast, lunch, and dinner. Quantity:  100 mL Refills:  0  
     
   
   
   
  
 aspirin 81 mg tablet Your last dose was: Your next dose is:    
   
   
 Dose:  81 mg Take 81 mg by mouth every morning. Indications: MYOCARDIAL INFARCTION PREVENTION Refills:  0  
     
   
   
   
  
 calcium acetate 667 mg Cap Commonly known as:  PHOSLO Your last dose was: Your next dose is:    
   
   
 Dose:  2 Cap Take 2 Caps by mouth three (3) times daily (with meals). Refills:  0  
     
   
   
   
  
 clopidogrel 75 mg Tab Commonly known as:  PLAVIX Your last dose was: Your next dose is: TAKE 1 TABLET BY MOUTH DAILY Quantity:  30 Tab Refills:  0  
     
   
   
   
  
 DALIRESP Tab tablet Generic drug:  roflumilast  
   
Your last dose was: Your next dose is:    
   
   
 Dose:  500 mcg Take 500 mcg by mouth daily. Refills:  0  
     
   
   
   
  
 * HYDROcodone-acetaminophen 5-325 mg per tablet Commonly known as:  Noe Pare Your last dose was: Your next dose is:    
   
   
 Dose:  1 Tab Take 1 Tab by mouth every four (4) hours as needed for Pain. Max Daily Amount: 6 Tabs. Quantity:  30 Tab Refills:  0  
     
   
   
   
  
 * HYDROcodone-acetaminophen 5-325 mg per tablet Commonly known as:  Noe Pare Your last dose was: Your next dose is:    
   
   
 Dose:  1 Tab Take 1 Tab by mouth every six (6) hours as needed for Pain for up to 15 doses. Max Daily Amount: 4 Tabs. Quantity:  15 Tab Refills:  0 Insulin Syringe-Needle U-100 0.5 mL 29 gauge x 1/2\" Syrg Your last dose was: Your next dose is:    
   
   
 Dose:  1 Each  
1 Each by Does Not Apply route five (5) times daily. Quantity:  150 Syringe Refills:  1 LIPITOR 10 mg tablet Generic drug:  atorvastatin Your last dose was: Your next dose is:    
   
   
 Dose:  10 mg Take 10 mg by mouth nightly. Refills:  0  
     
   
   
   
  
 * methocarbamol 750 mg tablet Commonly known as:  [id number] Your last dose was: Your next dose is:    
   
   
 Dose:  750 mg Take 1 Tab by mouth four (4) times daily. Quantity:  20 Tab Refills:  0  
     
   
   
   
  
 metoprolol tartrate 50 mg tablet Commonly known as:  LOPRESSOR Your last dose was: Your next dose is: Take  by mouth two (2) times a day. Refills:  0  
     
   
   
   
  
 oxyCODONE-acetaminophen 5-325 mg per tablet Commonly known as:  PERCOCET Your last dose was: Your next dose is:    
   
   
 Dose:  1 Tab Take 1 Tab by mouth every six (6) hours as needed for Pain. Max Daily Amount: 4 Tabs. Quantity:  12 Tab Refills:  0 OXYGEN-AIR DELIVERY SYSTEMS Your last dose was: Your next dose is:    
   
   
 by Does Not Apply route. Refills:  0  
     
   
   
   
  
 predniSONE 5 mg dose pack Commonly known as:  STERAPRED Your last dose was: Your next dose is:    
   
   
 See administration instruction per 5mg dose pack Quantity:  21 Tab Refills:  0 SPIRIVA WITH HANDIHALER 18 mcg inhalation capsule Generic drug:  tiotropium Your last dose was: Your next dose is:    
   
   
 Dose:  1 Cap Take 1 Cap by inhalation daily. Refills:  0  
     
   
   
   
  
 SYMBICORT 160-4.5 mcg/actuation HFA inhaler Generic drug:  budesonide-formoterol Your last dose was: Your next dose is:    
   
   
 Dose:  2 Puff Take 2 Puffs by inhalation two (2) times a day. Patient takes at 0500 and 2100 Refills:  0  
     
   
   
   
  
 VITAMIN D3 2,000 unit Tab Generic drug:  cholecalciferol (vitamin D3) Your last dose was: Your next dose is:    
   
   
 Dose:  1 Tab Take 1 Tab by mouth daily. Refills:  0  
     
   
   
   
  
 * Notice: This list has 3 medication(s) that are the same as other medications prescribed for you. Read the directions carefully, and ask your doctor or other care provider to review them with you. ASK your doctor about these medications Dose & Instructions Dispensing Information Comments Morning Noon Evening Bedtime * methocarbamol 750 mg tablet Commonly known as:  ROBAXIN Ask about: Should I take this medication? Your last dose was: Your next dose is:    
   
   
 Dose:  750 mg Take 1 Tab by mouth three (3) times daily for 5 days. Quantity:  15 Tab Refills:  0 * Notice: This list has 1 medication(s) that are the same as other medications prescribed for you. Read the directions carefully, and ask your doctor or other care provider to review them with you. Where to Get Your Medications These medications were sent to [address] [place] [id number] Phone:  [phone] insulin  unit/mL injection  
 insulin  unit/mL injection Information on where to get these meds will be given to you by the nurse or doctor. ! Ask your nurse or doctor about these medications  
  insulin lispro 100 unit/mL injection Discharge Instructions [address]  [phone] CARDIOLOGY DISCHARGE INSTRUCTIONS Patient ID: Chance [place] [id number] 
77 y.o. 
1945 Admit Date: 8/2/2017 Discharge Date: 8/4/2017 Admitting Physician: Vanessa Shea MD  
 
Discharge Physician: Vanessa Shea MD 
 
Admission Diagnoses: PAD 
PAD (peripheral artery disease) ([facility] Bilateral lower extemity with potential angioplasty Discharge Diagnoses:  
Principal Problem: 
  PAD (peripheral artery disease) ([facility] (10/4/2016) Overview: R SFA intervention 3/6/17 Active Problems: 
  HTN (hypertension) (10/8/2010) ESRD (end stage renal disease) ([facility] (10/16/2012) Acute on chronic diastolic CHF (congestive heart failure), NYHA class 2 ([facility] (2/19/2015) CAD (coronary artery disease) (3/9/2015) Type 2 diabetes mellitus without complication ([facility] (5/40/5388) Discharge Condition: Good Cardiology Procedures this Admission:  left SFA stent Disposition: home Reference discharge instructions provided by nursing for diet and activity. Signed: Vanessa Shea MD 
8/4/2017 4:10 PM 
 PERIPHERAL CATHETERIZATION/PCI DISCHARGE INSTRUCTIONS It is normal to feel tired the first couple days. Take it easy and follow the physicians instructions. CHECK THE CATHETER INSERTION SITE DAILY: 
 
You may shower 24 hours after the procedure, remove the bandage during showering. Wash with soap and water and pat dry. Gentle cleaning of the site with soap and water is sufficient, cover with a dry clean dressing or bandage. Do not apply creams or powders to the area. Do not sit in a bathtub or pool of water for 7 days or until wound has completely healed. Temporary bruising and discomfort is normal and may last a few weeks. You may have a  formation of a small lump at the site which may last up to 6 weeks. CALL THE PHYSICIANS: 
 
If the site becomes red, swollen or feels warm to the touch If there is bleeding or drainage or if there is unusual pain at the groin or down the leg. If there is any bleeding, lie down, apply pressure or have someone apply pressure with a clean cloth until the bleeding stops. If the bleeding continues, call 911 to be transported to the hospital. 
DO  [facility] ACTIVITY: 
 
For the first 24-48 hours or as instructed by the physician: No lifting, pushing or pulling over 10 pounds and no straining the insertion site. Do not life grocery bags or the garbage can, do not run the vacuum  or  for 7 days. Start with short walks as in the hospital and gradually increase as tolerated each day. It is recommended to walk 30 minutes 5-7 days per week. Follow your physicians instructions on activity. Avoid walking outside in extremes of heat or cold. Walk inside when it is cold and windy or hot and humid. Things to keep in mind: 
No driving for at least 24 hours, or as designated by your physician. Limit the number of times you go up and down the stairs Take rests and pace yourself with activity. Be careful and do not strain with bowel movements. MEDICATIONS: 
 
Take all medications as prescribed Call your physician if you have any questions Keep an updated list of your medications with you at all times and give a list to your physician and pharmacist 
 
 
AFTER CARE: 
 
Follow up with your physician as instructed. Follow a heart healthy diet with proper portion control, daily stress management, daily exercise, blood pressure and cholesterol control , and smoking cessation. Discharge Orders None [facility] Announcement We are excited to announce that we are making your provider's discharge notes available to you in [employer]. You will see these notes when they are completed and signed by the physician that discharged you from your recent hospital stay. If you have any questions or concerns about any information you see in [employer], please call the Health Information Department where you were seen or reach out to your Primary Care Provider for more information about your plan of care. General Information Please provide this summary of care documentation to your next provider. Patient Signature:  ____________________________________________________________ Date:  ____________________________________________________________  
  
Bailey Varela Provider Signature:  ____________________________________________________________ Date:  ____________________________________________________________

## 2017-08-03 NOTE — PHYSICIAN ADVISORY
Letter of Status Determination:   Recommend hospitalization status upgraded from   Outpatient to Inpatient  Status     Pt Name:  Tasha Stafford   MR#  [id number]   [facility]#   [id number]   [address] [place] [address]  2163/01  @ [facility]   Hospitalization date  8/2/2017 12:49 PM   Current Attending Physician  Arlette Pacheco diagnosis  PAD (peripheral artery disease) [facility])      Clinicals  67 y.o. y.o  female hospitalized with above diagnosis   This pt has rather complex medical history. She suffers from COPD, HTN, CAD, DM2; she went through thoracentesis less than 30 days ago. She is now hospitalized for PAD related procedure. She needs coordinated care between various services and she is at very high risk of deterioration. [place] ([facility]) criteria   Does  NOT apply    STATUS DETERMINATION  This patient is at high risk of  high risk of adverse events and deterioration based on documented clinical data, comorbid conditions and current acute care course. Ms. Tasha Stafford is expected to meet Inpatient Admission status criteria in accordance with CMS regulation Section 43 .3. Specifically, due to medical necessity the patient's stay is expected to exceed Two Midnights. It is our recommendation that this patient's hospitalization status should be upgraded from  OUTPATIENT to INPATIENT status. The final decision of the patient's hospitalization status depends on the attending physician's judgment.          Additional comments     Payor: Aston Cronin / Plan: [address] y / Product Type: Medicare /         Jessenia Grimm MD MPH FACP     Physician Advisor    [address]   [employer], [address] [place] [address]    Cell  [phone] Dariana Dockery

## 2017-08-03 NOTE — ANESTHESIA PREPROCEDURE EVALUATION
Anesthetic History     PONV          Review of Systems / Medical History  Patient summary reviewed, nursing notes reviewed and pertinent labs reviewed    Pulmonary    COPD: moderate      Smoker  Asthma : well controlled       Neuro/Psych       CVA  TIA     Cardiovascular    Hypertension: poorly controlled        Dysrhythmias : atrial fibrillation  CAD    Exercise tolerance: <4 METS     GI/Hepatic/Renal     GERD: well controlled    Renal disease: dialysis and ESRD       Endo/Other    Diabetes: poorly controlled, type 1, using insulin    Obesity, arthritis, cancer and anemia     Other Findings              Physical Exam    Airway  Mallampati: II  TM Distance: 4 - 6 cm  Neck ROM: normal range of motion, short neck   Mouth opening: Normal     Cardiovascular    Rhythm: regular  Rate: normal    Murmur     Dental    Dentition: Full upper dentures  Comments: No lower teeth   Pulmonary      Decreased breath sounds: bilateral           Abdominal  GI exam deferred       Other Findings            Anesthetic Plan    ASA: 4  Anesthesia type: MAC and total IV anesthesia    Monitoring Plan: BIS      Induction: Intravenous  Anesthetic plan and risks discussed with: Patient

## 2017-08-03 NOTE — PROGRESS NOTES
[facility] Progress Note        NAME: Tina Flores       :  1945       MRN:  [id number]     Date/Time: 8/3/2017    Risk of deterioration: low       Assessment:    Plan:  ESRD  PAD for angio  Recurrent pleural effusion  COPD  ANemia HD after cath today  HD T//S  Consider thoracentesis again-last tap removed 850cc of fluid, cytology (-)  Significant cervical disc disease         Subjective:     Chief Complaint:  My hand is numb    Review of Systems: No n/v/cp/sob (+) numb/painful hand    Objective:     VITALS:   Last 24hrs VS reviewed since prior progress note. Most recent are:  Visit Vitals    /53 (BP 1 Location: Left arm, BP Patient Position: At rest)    Pulse 75    Temp 98.2 °F (36.8 °C)    Resp 16    Ht 5' 2\" (1.575 m)    Wt 65.8 kg (145 lb)    SpO2 97%    BMI 26.52 kg/m2     SpO2 Readings from Last 6 Encounters:   17 97%   17 100%   17 95%   17 95%   07/10/17 96%   17 95%          Intake/Output Summary (Last 24 hours) at 17 1415  Last data filed at 17 2200   Gross per 24 hour   Intake              300 ml   Output                0 ml   Net              300 ml        PHYSICAL EXAM:    General   well developed, well nourished, appears stated age, in no acute distress  Respiratory   Clear To Auscultation bilaterally   Cardiology  Regular Rate and Rythmn  -  Extremities  No clubbing, cyanosis, or edema. Pulses intact.       (+) T/B        Lab Data Reviewed: (see below)    Medications Reviewed: (see below)    PMH/SH reviewed - no change compared to H&P  _________________________________________  ___________________________________________________    Attending Physician: Ania Mayorga MD     ____________________________________________________  MEDICATIONS:  Current Facility-Administered Medications   Medication Dose Route Frequency    aspirin chewable tablet 81 mg  81 mg Oral 7am    acetaminophen (TYLENOL) tablet 325 mg  325 mg Oral Q6H PRN    albuterol (PROVENTIL HFA, VENTOLIN HFA, PROAIR HFA) inhaler 2 Puff  2 Puff Inhalation Q4H PRN    albuterol-ipratropium (DUO-NEB) 2.5 MG-0.5 MG/3 ML  3 mL Nebulization Q6H PRN    atorvastatin (LIPITOR) tablet 10 mg  10 mg Oral QHS    fluticasone-vilanterol (BREO ELLIPTA) 100mcg-25mcg/puff   Inhalation DAILY    clopidogrel (PLAVIX) tablet 75 mg  75 mg Oral DAILY    insulin lispro (HUMALOG) injection 5 Units  5 Units SubCUTAneous TIDAC    insulin NPH (NOVOLIN N, HUMULIN N) injection 20 Units  20 Units SubCUTAneous BID WITH MEALS    HYDROcodone-acetaminophen (NORCO) 5-325 mg per tablet 1 Tab  1 Tab Oral Q6H PRN    metoprolol tartrate (LOPRESSOR) tablet 50 mg  50 mg Oral BID    methocarbamol (ROBAXIN) tablet 750 mg  750 mg Oral QID    umeclidinium (INCRUSE ELLIPTA) 62.5 mcg/actuation  1 Puff Inhalation DAILY    roflumilast (DALIRESP) tablet 500 mcg  500 mcg Oral DAILY    insulin lispro (HUMALOG) injection   SubCUTAneous AC&HS    glucose chewable tablet 16 g  4 Tab Oral PRN    glucagon (GLUCAGEN) injection 1 mg  1 mg IntraMUSCular PRN    dextrose 10% infusion 125-250 mL  125-250 mL IntraVENous PRN        LABS:  No results for input(s): WBC, HGB, HCT, PLT, HGBEXT, HCTEXT, PLTEXT in the last 72 hours. Recent Labs      08/03/17   0423   NA  137   K  4.8   CL  100   CO2  29   BUN  41*   CREA  5.83*   GLU  124*   CA  8.7     No results for input(s): SGOT, GPT, ALT, AP, TBIL, TBILI, TP, ALB, GLOB, GGT, AML, LPSE in the last 72 hours. No lab exists for component: AMYP, HLPSE  No results for input(s): INR, PTP, APTT in the last 72 hours. No lab exists for component: INREXT   No results for input(s): FE, TIBC, PSAT, FERR in the last 72 hours. No results for input(s): PH, PCO2, PO2 in the last 72 hours. No results for input(s): CPK, CKNDX, TROIQ in the last 72 hours.     No lab exists for component: CPKMB  Lab Results   Component Value Date/Time    Glucose (POC) 160 08/03/2017 11:32 AM    Glucose (POC) 165 08/03/2017 07:12 AM    Glucose (POC) 125 08/03/2017 12:49 AM    Glucose (POC) 55 08/03/2017 12:29 AM    Glucose (POC) 160 08/02/2017 09:20 PM

## 2017-08-03 NOTE — DIALYSIS
CALLED AND SPOKE WITH NURSE. INFORMED THEM I WOULD ARRIVE FOR HEMODIALYSIS AT APPROXIMATELY AROUND [phone].

## 2017-08-03 NOTE — ANESTHESIA POSTPROCEDURE EVALUATION
Post-Anesthesia Evaluation and Assessment    Patient: Corinne Rye MRN: [id number]  SSN: [id number]    YOB: 1945  Age: 67 y.o. Sex: female       Cardiovascular Function/Vital Signs  Visit Vitals    /56    Pulse 75    Temp 36.6 °C (97.8 °F)    Resp 14    Ht 5' 2\" (1.575 m)    Wt 65.8 kg (145 lb)    SpO2 92%    BMI 26.52 kg/m2       Patient is status post MAC, total IV anesthesia anesthesia for * No procedures listed *. Nausea/Vomiting: None    Postoperative hydration reviewed and adequate. Pain:  Pain Scale 1: Numeric (0 - 10) (08/03/17 1450)  Pain Intensity 1: 9 (08/03/17 1450)   Managed    Neurological Status:   Neuro  Orientation Level: Appropriate for age (08/03/17 0800)  Cognition: Appropriate decision making; Appropriate for age attention/concentration; Appropriate safety awareness (08/03/17 0800)  Speech: Clear (08/03/17 0800)  LUE Motor Response: Floppy (08/03/17 0800)  RUE Motor Response: Floppy; Purposeful (08/03/17 0800)   At baseline    Mental Status and Level of Consciousness: Arousable    Pulmonary Status:   O2 Device: Room air (08/03/17 1729)   Adequate oxygenation and airway patent    Complications related to anesthesia: None    Post-anesthesia assessment completed.  No concerns    Signed By: Jose Luis Liang MD     August 3, 2017

## 2017-08-03 NOTE — DIABETES MGMT
[facility] Progress Note    Recommendations/ Comments: If appropriate, please consider decreasing NPH dose to 16 units two times daily before meals. Also may consider discontinuing prandial insulin if pt not consuming at least 50% of meals. Chart reviewed on Judith Riojas for hypoglycemia. Patient is a 67 y.o. female with known history of Type 2 Diabetes on Humalog 5 units ac tid and NPH 20 units bid at home. A1c:   Lab Results   Component Value Date/Time    Hemoglobin A1c 11.6 11/15/2016 10:46 AM    Hemoglobin A1c 11.7 11/14/2016 10:53 PM       Recent Glucose Results:   Lab Results   Component Value Date/Time     (H) 08/03/2017 04:23 AM    GLUCPOC 165 (H) 08/03/2017 07:12 AM    GLUCPOC 125 (H) 08/03/2017 12:49 AM    GLUCPOC 55 (L) 08/03/2017 12:29 AM        Lab Results   Component Value Date/Time    Creatinine 5.83 08/03/2017 04:23 AM     Estimated Creatinine Clearance: 7.7 mL/min (based on Cr of 5.83). Active Orders   Diet    DIET CARDIAC Regular        PO intake: No data found. Current hospital DM medication:   -Humalog high sensitivity correction + 5 units ac tid  -NPH 20 units two times daily     Will continue to follow as needed.     Thank you    Ba Doty, [address] [place] [address]  Office: [phone]

## 2017-08-03 NOTE — PROGRESS NOTES
[address] [place] [address]  [phone]      Cardiology Progress Note      8/3/2017 9:08 AM    Admit Date: 8/2/2017    Admit Diagnosis:   PAD    Subjective: Derick Kc is admitted for elective LE PAD angio procedure. No c/o CP, SOB, laying flat in bed. Of note, patient had R thoracentesis on 7/10/17 with 850cc out. CT scan of spine on 7/30/17 showed mod RLE pl effusion.       Visit Vitals    /55    Pulse 78    Temp 98.1 °F (36.7 °C)    Resp 18    Ht 5' 2\" (1.575 m)    Wt 65.8 kg (145 lb)    SpO2 97%    BMI 26.52 kg/m2       Current Facility-Administered Medications   Medication Dose Route Frequency    aspirin chewable tablet 81 mg  81 mg Oral 7am    acetaminophen (TYLENOL) tablet 325 mg  325 mg Oral Q6H PRN    albuterol (PROVENTIL HFA, VENTOLIN HFA, PROAIR HFA) inhaler 2 Puff  2 Puff Inhalation Q4H PRN    albuterol-ipratropium (DUO-NEB) 2.5 MG-0.5 MG/3 ML  3 mL Nebulization Q6H PRN    atorvastatin (LIPITOR) tablet 10 mg  10 mg Oral QHS    fluticasone-vilanterol (BREO ELLIPTA) 100mcg-25mcg/puff   Inhalation DAILY    clopidogrel (PLAVIX) tablet 75 mg  75 mg Oral DAILY    insulin lispro (HUMALOG) injection 5 Units  5 Units SubCUTAneous TIDAC    insulin NPH (NOVOLIN N, HUMULIN N) injection 20 Units  20 Units SubCUTAneous BID WITH MEALS    HYDROcodone-acetaminophen (NORCO) 5-325 mg per tablet 1 Tab  1 Tab Oral Q6H PRN    metoprolol tartrate (LOPRESSOR) tablet 50 mg  50 mg Oral BID    methocarbamol (ROBAXIN) tablet 750 mg  750 mg Oral QID    umeclidinium (INCRUSE ELLIPTA) 62.5 mcg/actuation  1 Puff Inhalation DAILY    roflumilast (DALIRESP) tablet 500 mcg  500 mcg Oral DAILY    insulin lispro (HUMALOG) injection   SubCUTAneous AC&HS    glucose chewable tablet 16 g  4 Tab Oral PRN    glucagon (GLUCAGEN) injection 1 mg  1 mg IntraMUSCular PRN    dextrose 10% infusion 125-250 mL  125-250 mL IntraVENous PRN       Objective:      Physical Exam:  General Appearance:  elderly  female in no acute distress  Chest:   destiny wheezing, decreased BS on RLL  Cardiovascular:  Regular rate and rhythm, no murmur.   Abdomen:   Soft, non-tender, bowel sounds are active.   Extremities: warm, no edema  Skin:  Warm and dry.     Data Review:   No results for input(s): WBC, HGB, HCT, PLT, HGBEXT, HCTEXT, PLTEXT, HGBEXT, HCTEXT, PLTEXT in the last 72 hours. Recent Labs      08/03/17   0423   NA  137   K  4.8   CL  100   CO2  29   GLU  124*   BUN  41*   CREA  5.83*   CA  8.7       No results for input(s): TROIQ, CPK, CKMB in the last 72 hours. Intake/Output Summary (Last 24 hours) at 08/03/17 0910  Last data filed at 08/02/17 2200   Gross per 24 hour   Intake              300 ml   Output                0 ml   Net              300 ml        Telemetry: paced      Assessment:     Principal Problem:    PAD (peripheral artery disease) ([facility] (10/4/2016)      Overview: R SFA intervention 3/6/17     Active Problems:    HTN (hypertension) (10/8/2010)      ESRD (end stage renal disease) ([facility] (10/16/2012)      Acute on chronic diastolic CHF (congestive heart failure), NYHA class 2 ([facility] (2/19/2015)      CAD (coronary artery disease) (3/9/2015)      Type 2 diabetes mellitus without complication ([facility] (1/14/6471)        Plan:       PAD: hx of b/l LE intervention. Angio procedure this afternoon with anesthesia support due to her extensive COPD hx  Continue on Plavix, ASA, BB      CAD, Cardiomyopathy:  Continue on aspirin, BB and statin.       COPD: on home O2. ESRD:    HD this AM before cath procedure      Pt seen and examined in details. Agree with NP A&P.      Andreas Smith MD

## 2017-08-04 NOTE — PROGRESS NOTES
1925 - Bedside report from [facility]. HD is progress at bedside. Paced. BS have been running low while on HD despite pt eating and drinking a good amount. FSBS Recheck is 61, pt looks and 'feels fine'. Will give D10 bolus since oral supplement has not been effective. 1933 - D10 bolus of 250cc initiated. 2000 - D10 bolus complete. BS now 187. Pt resting in bed, HD continues. VSS.    2120 - repeat BS - 128    2200 - Pt consented for am procedure. U [address]24 for L leg pain 8/10 w voiced relief. Pt dtr called [facility] - [phone]). States pt's blood sugars have very stable and have not been up and down like this. She would like a hospitalist consulted to address this before discharge. 0030 -  checked by pt request out of own concern, ate 2 ice cream per request, now NPO.    0345 - Norco w sip of water for L upper leg pain.    0700 - NPO for 0730 case. Bedside report to RN. Paced.

## 2017-08-04 NOTE — ANESTHESIA PREPROCEDURE EVALUATION
Anesthetic History     PONV          Review of Systems / Medical History  Patient summary reviewed, nursing notes reviewed and pertinent labs reviewed    Pulmonary    COPD: moderate      Smoker  Asthma : well controlled       Neuro/Psych       CVA  TIA     Cardiovascular    Hypertension: poorly controlled        Dysrhythmias : atrial fibrillation  CAD    Exercise tolerance: <4 METS     GI/Hepatic/Renal     GERD: well controlled    Renal disease: dialysis and ESRD       Endo/Other    Diabetes: poorly controlled, type 1, using insulin    Obesity, arthritis, cancer and anemia     Other Findings              Physical Exam    Airway  Mallampati: II  TM Distance: 4 - 6 cm  Neck ROM: normal range of motion, short neck   Mouth opening: Normal     Cardiovascular    Rhythm: regular  Rate: normal    Murmur     Dental    Dentition: Full upper dentures  Comments: No lower teeth   Pulmonary      Decreased breath sounds: bilateral           Abdominal  GI exam deferred       Other Findings            Anesthetic Plan    ASA: 4  Anesthesia type: MAC and total IV anesthesia    Monitoring Plan: BIS      Induction: Intravenous  Anesthetic plan and risks discussed with: Patient      No interval change since my evaluation yesterday.

## 2017-08-04 NOTE — PROCEDURES
[place] [facility], [facility]   PERIPHERAL ANGIOPLASTY       Name:  Jo Anthony   MR#:  [id number]   :  1945   Account #:  [de-identified]        Date of Adm:  2017       ESTIMATED BLOOD LOSS: Minimal.     SPECIMENS REMOVED: None. ANESTHESIA:  Per anesthesia service. PROCEDURES:    1. Third-order catheter placement from right common femoral artery   access site into left common femoral artery. 2. Ultrasound-guided central line access. 3. Intravascular ultrasound analysis of left superficial femoral and   popliteal artery. 4. Left superficial femoral artery rotational atherectomy, drug-coated   balloon dilatation provisional stent for flow-limiting dissection. BRIEF PROCEDURE NOTE:  Right groin was prepped and draped. Left foot and lower leg area was also prepped for ultrasound-guided   access of left anterior tibial artery. Left anterior tibial artery access was   obtained. A 5-Gibraltarian sheath was placed in left anterior tibial artery. Afterwards, RIM catheter and a stiff angled Glidewire was used to   cross into contralateral left common femoral artery. A 45 cm 7-Gibraltarian   Destination sheath was placed. The patient was given IV heparin for   anticoagulation. The lesion was crossed using both antegrade and   retrograde approach using a stiff angled Glidewire and angled Glide   catheter from the proximal and distal direction. After intraluminal   placement of wire, both distal and proximal [place] catheters were   removed. Long grandslam was placed through a Quick-Cross catheter. Afterwards, a #5 Spider filter was placed in left popliteal artery. IVUS   analysis of left SFA was performed after confirming intraluminal   position of wire proceeded with rotational atherectomy using a   Jetstream 2.1 x 3.0 catheter. Multiple runs were performed 5 x 150  balloon   was used for pre-dilatation.   Afterwards, a 5 x 150 Lutonix drug-coated   balloon in the mid to distal segment and a 6 x 150 Lutonix drug-coated   balloon in the proximal and ostial segment was deployed without any   difficulty. At the end of POBA there was still evidence of a flow-limiting   dissection at the ostium of left SFA so decision was made to proceed with   stent placement. A 6 x 60 Innova self-expanding stent was placed in   ostial and proximal SFA location. A final angiogram was performed,   which revealed excellent flow. No evidence of distal embolization. Filter was retrieved. Left anterior tibial sheath was removed. Heparin   was reversed using protamine. CONCLUSION:  Successful rotational atherectomy, drug-coated   balloon dilatation and stent placement of left superficial femoral artery   chronic total occlusion. Pre-procedure stenosis of 100%, post-  procedure stenosis of 0% with excellent flow. COMPLICATIONS:  None.          Tara Silva MD      [address] [place] [address] / Abdelrahman Hall   D:  08/04/2017   10:00   T:  08/04/2017   12:30   Job #:  [id number]

## 2017-08-04 NOTE — DISCHARGE INSTRUCTIONS
[address]  [phone]      CARDIOLOGY DISCHARGE INSTRUCTIONS      Patient ID:  Anel Zavala  [id number]  15 y.o.  1945    Admit Date: 8/2/2017    Discharge Date: 8/4/2017     Admitting Physician: Jahaira Carlos MD     Discharge Physician: Jahaira Carlos MD    Admission Diagnoses:   PAD  PAD (peripheral artery disease) ([facility]  Bilateral lower extemity with potential angioplasty    Discharge Diagnoses:   Principal Problem:    PAD (peripheral artery disease) ([facility] (10/4/2016)      Overview: R SFA intervention 3/6/17     Active Problems:    HTN (hypertension) (10/8/2010)      ESRD (end stage renal disease) ([facility] (10/16/2012)      Acute on chronic diastolic CHF (congestive heart failure), NYHA class 2 ([facility] (2/19/2015)      CAD (coronary artery disease) (3/9/2015)      Type 2 diabetes mellitus without complication ([facility] (7/63/2970)        Discharge Condition: Good    Cardiology Procedures this Admission:  left SFA stent    Disposition: home    Reference discharge instructions provided by nursing for diet and activity. Signed: Jahaira Carlos MD  8/4/2017  4:10 PM  PERIPHERAL CATHETERIZATION/PCI DISCHARGE INSTRUCTIONS    It is normal to feel tired the first couple days. Take it easy and follow the physicians instructions. CHECK THE CATHETER INSERTION SITE DAILY:    You may shower 24 hours after the procedure, remove the bandage during showering. Wash with soap and water and pat dry. Gentle cleaning of the site with soap and water is sufficient, cover with a dry clean dressing or bandage. Do not apply creams or powders to the area. Do not sit in a bathtub or pool of water for 7 days or until wound has completely healed. Temporary bruising and discomfort is normal and may last a few weeks. You may have a  formation of a small lump at the site which may last up to 6 weeks.     CALL THE PHYSICIANS:    If the site becomes red, swollen or feels warm to the touch  If there is bleeding or drainage or if there is unusual pain at the groin or down the leg. If there is any bleeding, lie down, apply pressure or have someone apply pressure with a clean cloth until the bleeding stops. If the bleeding continues, call 911 to be transported to the hospital.  DO NOT DRIVE YOURSELF, [phone]. ACTIVITY:    For the first 24-48 hours or as instructed by the physician:  No lifting, pushing or pulling over 10 pounds and no straining the insertion site. Do not life grocery bags or the garbage can, do not run the vacuum  or  for 7 days. Start with short walks as in the hospital and gradually increase as tolerated each day. It is recommended to walk 30 minutes 5-7 days per week. Follow your physicians instructions on activity. Avoid walking outside in extremes of heat or cold. Walk inside when it is cold and windy or hot and humid. Things to keep in mind:  No driving for at least 24 hours, or as designated by your physician. Limit the number of times you go up and down the stairs  Take rests and pace yourself with activity. Be careful and do not strain with bowel movements. MEDICATIONS:    Take all medications as prescribed  Call your physician if you have any questions  Keep an updated list of your medications with you at all times and give a list to your physician and pharmacist      AFTER CARE:    Follow up with your physician as instructed. Follow a heart healthy diet with proper portion control, daily stress management, daily exercise, blood pressure and cholesterol control , and smoking cessation.

## 2017-08-04 NOTE — DIABETES MGMT
[facility]        Recommendations/ Comments: If appropriate, please consider adding a new A1C test to next lab draw to assess home management. A1c:   Lab Results   Component Value Date/Time    Hemoglobin A1c 11.6 11/15/2016 10:46 AM    Hemoglobin A1c 11.7 11/14/2016 10:53 PM       Will continue to follow as needed. Thank you.     Adalberto Pires, [facility]  Office: [phone]

## 2017-08-04 NOTE — PROGRESS NOTES
[facility] Progress Note        NAME: Lauren Flores       :  1945       MRN:  [id number]     Date/Time: 2017    Risk of deterioration: low       Assessment:    Plan:  ESRD  PAD for angio--s/p SFA intervention  Recurrent pleural effusion  COPD  ANemia HD after cath today (had hd yesterday too)  HD T//S  Consider thoracentesis again-last tap removed 850cc of fluid, cytology (-)  Significant cervical disc disease         Subjective:     Chief Complaint: in cath lab at time of rounds    Review of Systems:     Objective:     VITALS:   Last 24hrs VS reviewed since prior progress note. Most recent are:  Visit Vitals    /63 (BP 1 Location: Left arm, BP Patient Position: At rest)    Pulse 75    Temp 96.8 °F (36 °C)    Resp 16    Ht 5' 2\" (1.575 m)    Wt 65.8 kg (144 lb 15.9 oz)    SpO2 100%    BMI 26.52 kg/m2     SpO2 Readings from Last 6 Encounters:   17 100%   17 100%   17 95%   17 95%   07/10/17 96%   17 95%    O2 Flow Rate (L/min): 2 l/min       Intake/Output Summary (Last 24 hours) at 17 1201  Last data filed at 17 0945   Gross per 24 hour   Intake             1330 ml   Output             2146 ml   Net             -816 ml        PHYSICAL EXAM:    General   well developed, well nourished  Respiratory   Clear To Auscultation bilaterally   Cardiology  Regular Rate and Rythmn  -  Extremities  No clubbing, cyanosis, or edema. Pulses intact.       (+) T/B        Lab Data Reviewed: (see below)    Medications Reviewed: (see below)    PMH/SH reviewed - no change compared to H&P  _________________________________________  ___________________________________________________    Attending Physician: Florentino Ibanez MD     ____________________________________________________  MEDICATIONS:  Current Facility-Administered Medications   Medication Dose Route Frequency    iodixanol (VISIPAQUE) 320 mg iodine/mL contrast injection 0-200 mL  0-200 mL IntraarTERial Multiple    fentaNYL citrate (PF) 50 mcg/mL injection        heparinized saline 2 units/mL 1,000 unit/500 mL infusion        sodium chloride (NS) flush 5-10 mL  5-10 mL IntraVENous Q8H    sodium chloride (NS) flush 5-10 mL  5-10 mL IntraVENous PRN    aspirin chewable tablet 81 mg  81 mg Oral 7am    midazolam (VERSED) 1 mg/mL injection        acetaminophen (TYLENOL) tablet 325 mg  325 mg Oral Q6H PRN    albuterol (PROVENTIL HFA, VENTOLIN HFA, PROAIR HFA) inhaler 2 Puff  2 Puff Inhalation Q4H PRN    albuterol-ipratropium (DUO-NEB) 2.5 MG-0.5 MG/3 ML  3 mL Nebulization Q6H PRN    atorvastatin (LIPITOR) tablet 10 mg  10 mg Oral QHS    fluticasone-vilanterol (BREO ELLIPTA) 100mcg-25mcg/puff   Inhalation DAILY    clopidogrel (PLAVIX) tablet 75 mg  75 mg Oral DAILY    insulin lispro (HUMALOG) injection 5 Units  5 Units SubCUTAneous TIDAC    insulin NPH (NOVOLIN N, HUMULIN N) injection 20 Units  20 Units SubCUTAneous BID WITH MEALS    HYDROcodone-acetaminophen (NORCO) 5-325 mg per tablet 1 Tab  1 Tab Oral Q6H PRN    metoprolol tartrate (LOPRESSOR) tablet 50 mg  50 mg Oral BID    methocarbamol (ROBAXIN) tablet 750 mg  750 mg Oral QID    umeclidinium (INCRUSE ELLIPTA) 62.5 mcg/actuation  1 Puff Inhalation DAILY    roflumilast (DALIRESP) tablet 500 mcg  500 mcg Oral DAILY    insulin lispro (HUMALOG) injection   SubCUTAneous AC&HS    glucose chewable tablet 16 g  4 Tab Oral PRN    glucagon (GLUCAGEN) injection 1 mg  1 mg IntraMUSCular PRN    dextrose 10% infusion 125-250 mL  125-250 mL IntraVENous PRN        LABS:  No results for input(s): WBC, HGB, HCT, PLT, HGBEXT, HCTEXT, PLTEXT, HGBEXT, HCTEXT, PLTEXT in the last 72 hours. Recent Labs      08/03/17   0423   NA  137   K  4.8   CL  100   CO2  29   BUN  41*   CREA  5.83*   GLU  124*   CA  8.7     No results for input(s): SGOT, GPT, ALT, AP, TBIL, TBILI, TP, ALB, GLOB, GGT, AML, LPSE in the last 72 hours.     No lab exists for component: AMYP, HLPSE  No results for input(s): INR, PTP, APTT in the last 72 hours. No lab exists for component: INREXT, INREXT   No results for input(s): FE, TIBC, PSAT, FERR in the last 72 hours. No results for input(s): PH, PCO2, PO2 in the last 72 hours. No results for input(s): CPK, CKNDX, TROIQ in the last 72 hours.     No lab exists for component: CPKMB  Lab Results   Component Value Date/Time    Glucose (POC) 110 08/04/2017 11:31 AM    Glucose (POC) 127 08/04/2017 06:43 AM    Glucose (POC) 117 08/04/2017 12:25 AM    Glucose (POC) 128 08/03/2017 09:21 PM    Glucose (POC) 187 08/03/2017 07:59 PM

## 2017-08-04 NOTE — PROGRESS NOTES
Ms. Karina Silva is s/p angioplasty to LE.   Experiencing significant pain at left groin and LE  To receive Fentanyl 25mcg now and if does not relieve will give Dilaudid

## 2017-08-04 NOTE — PROGRESS NOTES
TRANSFER - OUT REPORT:    Verbal report given to Aurora Isaacs RN(name) on Nancy Coleman  being transferred to IVCU(unit) for routine progression of care       Report consisted of patients Situation, Background, Assessment and   Recommendations(SBAR). Information from the following report(s) Procedure Summary and MAR was reviewed with the receiving nurse. Lines:   Peripheral IV 08/02/17 Left Antecubital (Active)   Site Assessment Clean, dry, & intact 8/3/2017  7:30 PM   Phlebitis Assessment 0 8/3/2017  7:30 PM   Infiltration Assessment 0 8/3/2017  7:30 PM   Dressing Status Clean, dry, & intact 8/3/2017  7:30 PM   Dressing Type Transparent 8/3/2017  7:30 PM   Hub Color/Line Status Blue;Capped;Flushed;Patent 8/3/2017  7:30 PM        Opportunity for questions and clarification was provided.       Patient transported with:   Registered Nurse

## 2017-08-04 NOTE — DIALYSIS
[address]                         [phone]  Vitals Pre Post Assessment Pre Post   /82 115/45 LOC A&OX3 A&OX3   HR 75 75 Lungs CTA CTA   Temp 97.8 98.7 Cardiac NSR NSR   Resp 18 18 Skin WARM/DRY WARM/DRY   Weight 65.8KG 63.7KG Edema GENERALIZED GENERALIZED      Pain DENIES DENIES     Orders   Duration: Start: 1900 End: 2115 Total: 2.25 HRS   Dialyzer: REVACLEAR   K Bath: 3   Ca Bath: 2.5   Na / Bicarb: 140/35   Target Fluid Removal: 3500     Access   Type & Location: [facility] AVF   Comments:     +BRUIT/+THRILL CANNULATED WITH 15G/1\"NEEDLES WITHOUT DIFFICULTY. ACCESS VISIBLE AND LINES SECURED. Labs   Hep B status / date: IMMUNE 01/12/17   Obtained/Reviewed  Critical Results Called REVIEWED  NA     Meds Given   Name Dose Route   NONE                 Total Liters Process: 45.8 LITERS   Net Fluid Removed: 2146      Comments   Time Out Done: 0061   Primary Nurse Rpt Pre: JAKI WIGGINS   Primary Nurse Rpt Post: Jose Avilez RN   Pt Education: PROCEDURAL   Care Plan: CONTINUE WITH HD AS ORDERED   Tx Summary: PT TOLERATED TX POORLY. 2.5 HOUR INTO TX PT BECAME RESTLESS, WOULDN'T LAY STILL, AND MOANING. PT REQUESTED TO COME OFF TREATMENT AT 2115. I TRIED TO EXPLAIN THE IMPORTANCE OF COMPLETING ENTIRE TREATMENT. SHE SAID, \"I'VE HAD ENOUGH FOR ONE DAY\"  ALL POSSIBLE BLOOD RINSED BACK TO PATIENT. NEEDLES REMOVED AND HANDHELD PRESSURE APPLIED UNTIL HEMOSTASIS ACHIEVED. DRESSINGS APPLIED. REPORT GIVEN TO PRIMARY NURSE. DR FAY NOTIFIED OF SHORTENED TREATMENT.

## 2017-08-04 NOTE — PROGRESS NOTES
TRANSFER - IN REPORT:    Verbal report received from Lindsey Moss RN(name) on Jennifer Acevedo  being received from [facility](unit) for routine progression of care      Report consisted of patients Situation, Background, Assessment and   Recommendations(SBAR). Information from the following report(s) Procedure Summary and MAR was reviewed with the receiving nurse. Opportunity for questions and clarification was provided. Assessment completed upon patients arrival to unit and care assumed.

## 2017-08-04 NOTE — ANESTHESIA POSTPROCEDURE EVALUATION
Post-Anesthesia Evaluation and Assessment    Patient: Eve López MRN: [id number]  SSN: [id number]    YOB: 1945  Age: 67 y.o. Sex: female       Cardiovascular Function/Vital Signs  Visit Vitals    /75 (BP 1 Location: Left arm, BP Patient Position: At rest)    Pulse 75    Temp 36 °C (96.8 °F)    Resp 15    Ht 5' 2\" (1.575 m)    Wt 65.8 kg (144 lb 15.9 oz)    SpO2 100%    BMI 26.52 kg/m2       Patient is status post MAC, total IV anesthesia anesthesia for * No procedures listed *. Nausea/Vomiting: None    Postoperative hydration reviewed and adequate. Pain:  Pain Scale 1: Numeric (0 - 10) (08/04/17 1012)  Pain Intensity 1: 0 (08/04/17 1012)   Managed    Neurological Status:   Neuro  Neurologic State: Alert (08/04/17 0758)  Orientation Level: Oriented X4 (08/04/17 0758)  Cognition: Appropriate for age attention/concentration (08/03/17 1930)  Speech: Clear (08/03/17 1930)  LUE Motor Response: Purposeful;Spontaneous  (normal movement at present Bilat arms) (08/03/17 1930)  RUE Motor Response: Purposeful;Spontaneous  (08/03/17 1930)   At baseline    Mental Status and Level of Consciousness: Arousable    Pulmonary Status:   O2 Device: Non-rebreather mask (08/04/17 1012)   Adequate oxygenation and airway patent    Complications related to anesthesia: None    Post-anesthesia assessment completed.  No concerns    Signed By: Abel Carmona MD     August 4, 2017

## 2017-08-04 NOTE — PROGRESS NOTES
SHEATH PULL NOTE:    Patient informed of procedure with questions answered with review. Sheath site prepped with Chloraprep swab. 7 fr destination sheath in rfa pulled by Tank Gusman. Hand hold and quick clot, with manual compression to site. No bleeding, no hematoma, no pain at site. Hemostasis obtained with hand hold/manual compression at site. Patient tolerated well. No change in status. Handhold for 20 minutes. No change at site. Occlusive dressing applied to site. No bleeding, no hematoma, no pain/discomfort at site. Groin instructions provided with review. Continue to monitor procedure site and patient status. *Advised patient to keep head flat and extremity flat to decrease risk of bleeding. *Recommended that patient not drink for ONE HOUR post sheath pull completion. *Recommended that patient not eat for TWO HOURS post sheath pull completion. *Instructed patient on rationale for delay of PO products to decrease risk for aspiration and if additional treatment to procedure site is required. Patient verbalized understanding of instructions with review.

## 2017-08-04 NOTE — PROGRESS NOTES
[facility]  Preliminary Report:  Arterial Duplex Groin - Pseudoaneurysm. Left groin arterial duplex was performed. No obvious pseudoaneurysm, aneurysm or hematoma noted left groin. Some calcific plaquing noted. Multiphasic Doppler signals noted in the Common Femoral and Femoral Arteries. Patent left femoral artery stent noted proximally. Dr. Ceci Banks present for examination. Final report to follow.

## 2017-08-04 NOTE — PROCEDURES
[facility]  *** FINAL REPORT ***    Name: Dale Bravo  MRN: [id number]    Inpatient  : 25 May 1945  HIS Order #: [id number]  [id number] [facility] Visit #: [id number]  Date: 04 Aug 2017    TYPE OF TEST: Extremity Arterial Duplex    REASON FOR TEST  Pseudoaneurysm (left side)                            Right                     Left  Artery               PSV   Finding             PSV   Finding  ------------------  -----  ---------------    -----  ---------------  External iliac:  Common femoral:                               127.0  Mild stenosis  Profunda femoris:  Proximal SFA:                             77.0  Mid SFA:                                   Mild stenosis  Distal SFA:  Popliteal:  Anterior tibial:  Posterior tibial:    Pressures               Right     Left               -----     -----     Brachial:           DP:           PT:            DESTINY:            Toe: INTERPRETATION/FINDINGS  PROCEDURE:  Arterial duplex examination using B-mode, color flow and  spectral Doppler of the lower extremity arteries. Left leg :  1. No evidence of an hematoma, aneurysm or pseudoaneurysm in the left  leg.  2. <50% stenosis of the common femoral and superficial femoral  arteries with calcific plaque. 3. Patent left femoral artery stent. ADDITIONAL COMMENTS    I have personally reviewed the data relevant to the interpretation of  this  study.     TECHNOLOGIST: Yadira Mcdonald RVT  Signed: 2017 02:57 PM    PHYSICIAN: Tremayne Mchugh MD  Signed: 2017 09:06 AM

## 2017-08-04 NOTE — PROGRESS NOTES
8/4/2017 7:29 AM    Admit Date: 8/2/2017    Admit Diagnosis: PAD;PAD (peripheral artery disease) ([address] Bilateral lower e*    Subjective: Gerldine David c/o left leg pain. Unchanged.      Visit Vitals    /58 (BP 1 Location: Left arm, BP Patient Position: At rest)    Pulse 75    Temp 98.7 °F (37.1 °C)    Resp 18    Ht 5' 2\" (1.575 m)    Wt 144 lb 15.9 oz (65.8 kg)    SpO2 96%    BMI 26.52 kg/m2     Current Facility-Administered Medications   Medication Dose Route Frequency    fentaNYL citrate (PF) injection 25-50 mcg  25-50 mcg IntraVENous Multiple    heparinized saline 2 units/mL infusion 1,000 Units  500 mL Irrigation ONCE    heparinized saline 2 units/mL infusion 1,000 Units  500 mL Irrigation ONCE    heparinized saline 2 units/mL infusion 1,000 Units  500 mL Irrigation ONCE    lidocaine (XYLOCAINE) 10 mg/mL (1 %) injection 1-20 mL  1-20 mL IntraDERMal ONCE    midazolam (VERSED) injection 0.5-2 mg  0.5-2 mg IntraVENous Multiple    heparin (porcine) 1,000 unit/mL injection 1,000-10,000 Units  1,000-10,000 Units IntraVENous Multiple    iodixanol (VISIPAQUE) 320 mg iodine/mL contrast injection 0-100 mL  0-100 mL IntraarTERial Multiple    lidocaine (XYLOCAINE) 10 mg/mL (1 %) injection        heparin (porcine) 1,000 unit/mL injection        iodixanol (VISIPAQUE) 320 mg iodine/mL contrast injection        heparinized saline 2 units/mL 1,000 unit/500 mL infusion        aspirin chewable tablet 81 mg  81 mg Oral 7am    lactated Ringers infusion  25 mL/hr IntraVENous CONTINUOUS    sodium chloride (NS) flush 5-10 mL  5-10 mL IntraVENous PRN    fentaNYL citrate (PF) injection 25 mcg  25 mcg IntraVENous Multiple    morphine injection 2 mg  2 mg IntraVENous Multiple    HYDROmorphone (PF) (DILAUDID) injection 0.2-0.5 mg  0.2-0.5 mg IntraVENous Multiple    diphenhydrAMINE (BENADRYL) injection 12.5 mg  12.5 mg IntraVENous ONCE PRN    midazolam (VERSED) 1 mg/mL injection        acetaminophen (TYLENOL) tablet 325 mg  325 mg Oral Q6H PRN    albuterol (PROVENTIL HFA, VENTOLIN HFA, PROAIR HFA) inhaler 2 Puff  2 Puff Inhalation Q4H PRN    albuterol-ipratropium (DUO-NEB) 2.5 MG-0.5 MG/3 ML  3 mL Nebulization Q6H PRN    atorvastatin (LIPITOR) tablet 10 mg  10 mg Oral QHS    fluticasone-vilanterol (BREO ELLIPTA) 100mcg-25mcg/puff   Inhalation DAILY    clopidogrel (PLAVIX) tablet 75 mg  75 mg Oral DAILY    insulin lispro (HUMALOG) injection 5 Units  5 Units SubCUTAneous TIDAC    insulin NPH (NOVOLIN N, HUMULIN N) injection 20 Units  20 Units SubCUTAneous BID WITH MEALS    HYDROcodone-acetaminophen (NORCO) 5-325 mg per tablet 1 Tab  1 Tab Oral Q6H PRN    metoprolol tartrate (LOPRESSOR) tablet 50 mg  50 mg Oral BID    methocarbamol (ROBAXIN) tablet 750 mg  750 mg Oral QID    umeclidinium (INCRUSE ELLIPTA) 62.5 mcg/actuation  1 Puff Inhalation DAILY    roflumilast (DALIRESP) tablet 500 mcg  500 mcg Oral DAILY    insulin lispro (HUMALOG) injection   SubCUTAneous AC&HS    glucose chewable tablet 16 g  4 Tab Oral PRN    glucagon (GLUCAGEN) injection 1 mg  1 mg IntraMUSCular PRN    dextrose 10% infusion 125-250 mL  125-250 mL IntraVENous PRN         Objective:      Visit Vitals    /58 (BP 1 Location: Left arm, BP Patient Position: At rest)    Pulse 75    Temp 98.7 °F (37.1 °C)    Resp 18    Ht 5' 2\" (1.575 m)    Wt 144 lb 15.9 oz (65.8 kg)    SpO2 96%    BMI 26.52 kg/m2       Physical Exam:  Abdomen: soft, non-tender. Bowel sounds normal.  Extremities: no cyanosis or edema  Heart: regular rate and rhythm, S1, S2 normal, no murmur, click, rub or gallop  Lungs: clear to auscultation bilaterally  Neurologic: Grossly normal  Pulses: NP DP and PT.      Data Review:   Labs:    Recent Results (from the past 24 hour(s))   GLUCOSE, POC    Collection Time: 08/03/17 11:32 AM   Result Value Ref Range    Glucose (POC) 160 (H) 65 - 100 mg/dL    Performed by FOSTER Sandhu Collection Time: 08/03/17  6:35 PM   Result Value Ref Range    Glucose (POC) 57 (L) 65 - 100 mg/dL    Performed by Kemal Stable, POC    Collection Time: 08/03/17  6:57 PM   Result Value Ref Range    Glucose (POC) 78 65 - 100 mg/dL    Performed by Cosmerestine Stable, POC    Collection Time: 08/03/17  7:25 PM   Result Value Ref Range    Glucose (POC) 60 (L) 65 - 100 mg/dL    Performed by Kemal Stable, POC    Collection Time: 08/03/17  7:59 PM   Result Value Ref Range    Glucose (POC) 187 (H) 65 - 100 mg/dL    Performed by Willian Leiva. GLUCOSE, POC    Collection Time: 08/03/17  9:21 PM   Result Value Ref Range    Glucose (POC) 128 (H) 65 - 100 mg/dL    Performed by Willian Leiva. GLUCOSE, POC    Collection Time: 08/04/17 12:25 AM   Result Value Ref Range    Glucose (POC) 117 (H) 65 - 100 mg/dL    Performed by Willian Leiva. GLUCOSE, POC    Collection Time: 08/04/17  6:43 AM   Result Value Ref Range    Glucose (POC) 127 (H) 65 - 100 mg/dL    Performed by Willian Leiva. Telemetry: paced      Assessment:     Principal Problem:    PAD (peripheral artery disease) ([facility] (10/4/2016)      Overview: R SFA intervention 3/6/17     Active Problems:    HTN (hypertension) (10/8/2010)      ESRD (end stage renal disease) ([facility] (10/16/2012)      Acute on chronic diastolic CHF (congestive heart failure), NYHA class 2 ([facility] (2/19/2015)      CAD (coronary artery disease) (3/9/2015)      Type 2 diabetes mellitus without complication ([facility] (9/39/4932)        Plan:     PAD: hx of b/l LE intervention. For left SFA intervention today via ante & retrograde approach.    Continue on Plavix, ASA, BB      CAD, Cardiomyopathy:  Continue on aspirin, BB and statin.       COPD: on home O2.      ESRD:   HD after procedure today.

## 2017-08-04 NOTE — PROGRESS NOTES
Spoke to dialysis nurse. Unable to provide ETA but verified patient is on dialysis schedule for nir.

## 2017-08-05 NOTE — PROGRESS NOTES
Patient lost IV access. Multiple unsuccessful attempts by 3 nurses. Spoke to Dr. Kinga Au who gave [facility] orders that patient may be without IV access until discharge if remains stable.

## 2017-08-05 NOTE — CARDIO/PULMONARY
C/P rehab note- chart reviewed. Sp Peripheral angioplasty. HX includes CHF class 2,ICD. LVEF 4/19/2017 27-17% grade 2 diastolic dysfunction. Met with family member who stated pt gets dialysis and she is well aware of her condition and what she needs to do for CHF. New sodium booklet and how to read a nutritional label reviewed. Pt follows nephrologist and they keep her weight in check,diet and labwork. No new questions for me.

## 2017-08-05 NOTE — PROGRESS NOTES
[facility] Progress Note        NAME: Chance Montgomery       :  1945       MRN:  [id number]     Date/Time: 2017    Risk of deterioration: low       Assessment:    Plan:  ESRD  PAD   S/p L SFA atherectomy/stent (17)  Recurrent pleural effusion  COPD  ANemia  Hyperkalemia-> 2K bath       Abbreviated HD today to get her back on her T//S schedule    Consider thoracentesis again-last tap removed 850cc of fluid, cytology (-)  Significant cervical disc disease         Subjective:     Chief Complaint: S/p left superficial fem artery atherectomy/stent yesterday  -> HD not done till overnight. Seen on HD at 110pm. Tolerating well. BP low at the start-> got pain meds earlier. Currently resting. Review of Systems:     Objective:     VITALS:   Last 24hrs VS reviewed since prior progress note. Most recent are:  Visit Vitals    /40    Pulse 75    Temp 98.5 °F (36.9 °C)    Resp 22    Ht 5' 2\" (1.575 m)    Wt 66 kg (145 lb 9.6 oz)    SpO2 100%    BMI 26.63 kg/m2     SpO2 Readings from Last 6 Encounters:   17 100%   17 100%   17 95%   17 95%   07/10/17 96%   17 95%    O2 Flow Rate (L/min): 1.5 l/min       Intake/Output Summary (Last 24 hours) at 17 1312  Last data filed at 17 0845   Gross per 24 hour   Intake              360 ml   Output                0 ml   Net              360 ml        PHYSICAL EXAM:    General   well developed, well nourished  Respiratory   Clear To Auscultation bilaterally   Cardiology  Regular Rate and Rythmn  -  Extremities  No clubbing, cyanosis, or edema. Pulses intact.              Lab Data Reviewed: (see below)    Medications Reviewed: (see below)    PMH/SH reviewed - no change compared to H&P  _________________________________________  ___________________________________________________    Attending Physician: Bandar Parra, MD     ____________________________________________________  MEDICATIONS:  Current Facility-Administered Medications   Medication Dose Route Frequency    albumin human 25% (BUMINATE) solution 12.5 g  12.5 g IntraVENous DIALYSIS PRN    iodixanol (VISIPAQUE) 320 mg iodine/mL contrast injection 0-200 mL  0-200 mL IntraarTERial Multiple    heparinized saline 2 units/mL 1,000 unit/500 mL infusion        sodium chloride (NS) flush 5-10 mL  5-10 mL IntraVENous Q8H    sodium chloride (NS) flush 5-10 mL  5-10 mL IntraVENous PRN    fentaNYL citrate (PF) injection 25 mcg  25 mcg IntraVENous Q4H PRN    HYDROmorphone (PF) (DILAUDID) injection 1 mg  1 mg IntraVENous Q3H PRN    aspirin chewable tablet 81 mg  81 mg Oral 7am    midazolam (VERSED) 1 mg/mL injection        acetaminophen (TYLENOL) tablet 325 mg  325 mg Oral Q6H PRN    albuterol (PROVENTIL HFA, VENTOLIN HFA, PROAIR HFA) inhaler 2 Puff  2 Puff Inhalation Q4H PRN    albuterol-ipratropium (DUO-NEB) 2.5 MG-0.5 MG/3 ML  3 mL Nebulization Q6H PRN    atorvastatin (LIPITOR) tablet 10 mg  10 mg Oral QHS    fluticasone-vilanterol (BREO ELLIPTA) 100mcg-25mcg/puff   Inhalation DAILY    clopidogrel (PLAVIX) tablet 75 mg  75 mg Oral DAILY    insulin lispro (HUMALOG) injection 5 Units  5 Units SubCUTAneous TIDAC    insulin NPH (NOVOLIN N, HUMULIN N) injection 20 Units  20 Units SubCUTAneous BID WITH MEALS    HYDROcodone-acetaminophen (NORCO) 5-325 mg per tablet 1 Tab  1 Tab Oral Q6H PRN    metoprolol tartrate (LOPRESSOR) tablet 50 mg  50 mg Oral BID    methocarbamol (ROBAXIN) tablet 750 mg  750 mg Oral QID    umeclidinium (INCRUSE ELLIPTA) 62.5 mcg/actuation  1 Puff Inhalation DAILY    roflumilast (DALIRESP) tablet 500 mcg  500 mcg Oral DAILY    insulin lispro (HUMALOG) injection   SubCUTAneous AC&HS    glucose chewable tablet 16 g  4 Tab Oral PRN    glucagon (GLUCAGEN) injection 1 mg  1 mg IntraMUSCular PRN    dextrose 10% infusion 125-250 mL  125-250 mL IntraVENous PRN        LABS:  Recent Labs      08/05/17   0033   WBC  12.5*   HGB  11.4*   HCT  35.3   PLT 153     Recent Labs      08/05/17   0033  08/03/17   0423   NA  135*  137   K  5.5*  4.8   CL  100  100   CO2  27  29   BUN  26*  41*   CREA  3.89*  5.83*   GLU  198*  124*   CA  8.9  8.7   MG  2.1   --    PHOS  2.6   --      No results for input(s): SGOT, GPT, ALT, AP, TBIL, TBILI, TP, ALB, GLOB, GGT, AML, LPSE in the last 72 hours. No lab exists for component: AMYP, HLPSE  No results for input(s): INR, PTP, APTT in the last 72 hours. No lab exists for component: INREXT, INREXT   No results for input(s): FE, TIBC, PSAT, FERR in the last 72 hours. No results for input(s): PH, PCO2, PO2 in the last 72 hours. No results for input(s): CPK, CKNDX, TROIQ in the last 72 hours.     No lab exists for component: CPKMB  Lab Results   Component Value Date/Time    Glucose (POC) 159 08/05/2017 12:04 PM    Glucose (POC) 109 08/05/2017 07:19 AM    Glucose (POC) 207 08/04/2017 11:47 PM    Glucose (POC) 129 08/04/2017 09:52 PM    Glucose (POC) 74 08/04/2017 09:36 PM

## 2017-08-05 NOTE — PROGRESS NOTES
HYPOGLYCEMIC EPISODE DOCUMENTATION    Patient with hypoglycemic episode(s) at 2120 on 08/04/17    BG value(s) pre-treatment 64    Was patient symptomatic? [] yes, [x] no  Patient was treated with the following rescue medications/treatments: [] D50                [] Glucose tablets                [] Glucagon                [x] 4oz juice                [] 6oz reg soda                [] 8oz low fat milk  BG value post-treatment: 74    BG value(s) pre-treatment 76    Was patient symptomatic?  [] yes, [x] no  Patient was treated with the following rescue medications/treatments: [] D50                [] Glucose tablets                [] Glucagon                [x] 4oz juice                [] 6oz reg soda                [] 8oz low fat milk  BG value post-treatment: 129  Once BG treated and value greater than 80mg/dl, pt was provided with the following:  [x] snack  [] meal

## 2017-08-05 NOTE — DIALYSIS
[facility]                         [phone]  Vitals Pre Post Assessment Pre Post   /46 92/44 LOC AXOx3 AXOx3   HR 75 75 Lungs CTA CTA   Temp 98.3 98.1 Cardiac Monitored Monitored   Resp 21 17 Skin Warm and dry Warm and dry   Weight 65.8kg -3.5k Edema Generalized Generalized      Pain Denies Denies     Orders   Duration: Start: 8106 End: 0145 Total: 2hrs ( 3hrs Ordered)   Dialyzer: Revaclear   K Bath: 3   Ca Bath: 2.5   Na / Bicarb: 140/35   Target Fluid Removal: 2.5k     Access   Type & Location: (R) UE AVF   Comments:                            + Thrill and bruit. Cannulated AVF with 15 G needles x 2 sites. Site cover with bandage upon assessment, warm and dry. Labs   Hep B status / date: /Immune01/12/2017   Obtained/Reviewed  Critical Results Called Reviewed  N/A     Meds Given   Name Dose Route   None                 Total Liters Process: 45.4   Net Fluid Removed: 2660 mL + prime and rinse      Comments   Time Out Done: Yes   Primary Nurse Rpt Pre: Denny Osullivan RN   Primary Nurse Rpt Post: Denny Osullivan RN   Pt Education: Procedural   Care Plan: Continue HD as prescribed. Tx Summary: Patient tolerated treatment well for 2 hoours. 3 hours of HD ordered, patient sated that she wanted to stop treatment right now (0145)! Explained to patient the benefits of completing her treatments as prescribed by MD, Patient stated that she had a treatment two days in a row and she wants to stop. Bp remained stable throughout treatment. All possible blood returned back to patient without any problems. 15 G needles pulled x 2 sites. Hemostasis achieved through handheld pressure. Left patient in bed in stable condition and reported off to Nurse Robert Wilson at bedside. Ask primary nurse to inform Dr. Gonzalo El of patient request to stop treatment around 0700.    Dialyzer lot#- [id number]  Tubing lot#- [id number]

## 2017-08-05 NOTE — PROCEDURES
[facility]  [phone]    Vitals   Pre   Post   Assessment   Pre   Post     Temp  Temp: 98.5 °F (36.9 °C) (08/05/17 1211)  98.5 LOC  Oriented x4 but very drowsy, received pain medication at 1100 same   HR   Pulse (Heart Rate): 75 (08/05/17 1211) 75 Lungs   Diminished on room air  same   B/P   BP: (!) 98/38 (08/05/17 1211) 114/45 Cardiac   B/p low, will give albumin  b/p wnl   Resp   Resp Rate: 15 (08/05/17 1211) 16 Skin   intact  same   Pain level  Pt drowsy from  pain medication same Edema  None noted     same   Orders:    Duration:   Start:    2671 End:    1410 Total:   2hrs   Dialyzer:   Dialyzer/Set Up Inspection: Revaclear (08/05/17 1211)   K Bath:   Dialysate K (mEq/L): 2 (08/05/17 1211)   Ca Bath:   Dialysate CA (mEq/L): 2.5 (08/05/17 1211)   Na/Bicarb:   Dialysate NA (mEq/L): 140 (08/05/17 1211)   Target Fluid Removal:   Goal/Amount of Fluid to Remove (mL): 2500 mL (08/05/17 1211)   Access     Type & Location:   Rt arm fistula, good thrill and bruit. Are cleaned and accessed with 15g needles x2 with some discomfort from patient due to area being sore. Good blood flow present.  No signs of infection noted   Labs     Obtained/Reviewed   Critical Results Called     HGB   Date Value Ref Range Status   08/05/2017 11.4 (L) 11.5 - 16.0 g/dL Final     K-    Potassium   Date Value Ref Range Status   08/05/2017 5.5 (H) 3.5 - 5.1 mmol/L Final     Ca-   Calcium   Date Value Ref Range Status   08/05/2017 8.9 8.5 - 10.1 MG/DL Final     Bun-   BUN   Date Value Ref Range Status   08/05/2017 26 (H) 6 - 20 MG/DL Final     Creat-   Creatinine   Date Value Ref Range Status   08/05/2017 3.89 (H) 0.55 - 1.02 MG/DL Final     Comment:     INVESTIGATED PER DELTA CHECK PROTOCOL        Medications/ Blood Products Given     Name   Dose   Route and Time     albumin 12.5g\50ml Given at 1215             Blood Volume Processed (BVP):    43.5 Net Fluid   Removed:  .5kg   Comments   Time Out Done: 1110  Primary Nurse Rpt Pre: Pradeep Galicia RN  Primary Nurse Rpt Post:same nurse  Pt Education:improtance of running full dialysis treatment time  Care Plan:chronic renal failure  Tx Summary:  1115 Per previous dialysis note, pt received 2 hours of dialysis late last night into this morning and finished around 0200. Due to short list of dialysis patient's, patient will have to receive normal scheduled dialysis treatment at this time. Patient informed about dialysis schedule and patient states she cant run a full treatment but she can do 2 hours because she is very tired and \"ready to go home\"  3078-1361 upon assessment before cannulation, pt b/p low 84/41, b/p recheck after cuff repositioned 87/35. Primary nurse notified. After checking MAR, emma received norco at 1102. On call nephrologist paged and informed about pt's current status. Verbal order received to give albumin with treatment and run patient as long as patient will do dialysis since patient's K is 5.5 today. 1210 Treatment started but Uf off. Awaiting albumin verification from pharmacy. Patient oriented but drowsy at present  1215 b/p 103/39 and Albumin given to help maintain b/p  1300 Will attempt to pull fluid at this time since b/p is 109/40, Uf turned on  1315 Deep Dupont at bedside and updated on pt's status. Verbal ordered received to pull fluid as tolerated and stop fluid pull if b/p drops again  1410 Dialysis completed since patient stated that she will only run for 2 hours today, blood rinsed back and needles removed. Pressure held till bleeding stopped and dressing applied to each site. Report given to nurse  Admiting Diagnosis:Peripheal Artery Disease  Pt's previous clinic-  Consent signed - Informed Consent Verified: Yes (08/05/17 1204)  [facility] Consent - on file  Hepatitis Status- AB 77 as of 1/12/17  Machine #- Machine Number: [id number] (08/05/17 1211)  Telemetry status-NSR  Pre-dialysis wt. - Pre-Dialysis Weight: 65.8 kg (145 lb 1 oz) (08/04/17 0985)

## 2017-08-05 NOTE — PROGRESS NOTES
[id number] [address]  [phone]      Cardiology Progress Note      8/5/2017 2:18 PM    Admit Date: 8/2/2017    Admit Diagnosis:   PAD;PAD (peripheral artery disease) ([address] Bilateral lower e*    Subjective:      Denia Ratel     Visit Vitals    /40    Pulse 75    Temp 98.5 °F (36.9 °C)    Resp 22    Ht 5' 2\" (1.575 m)    Wt 145 lb 9.6 oz (66 kg)    SpO2 100%    BMI 26.63 kg/m2       Current Facility-Administered Medications   Medication Dose Route Frequency    albumin human 25% (BUMINATE) solution 12.5 g  12.5 g IntraVENous DIALYSIS PRN    iodixanol (VISIPAQUE) 320 mg iodine/mL contrast injection 0-200 mL  0-200 mL IntraarTERial Multiple    heparinized saline 2 units/mL 1,000 unit/500 mL infusion        sodium chloride (NS) flush 5-10 mL  5-10 mL IntraVENous Q8H    sodium chloride (NS) flush 5-10 mL  5-10 mL IntraVENous PRN    fentaNYL citrate (PF) injection 25 mcg  25 mcg IntraVENous Q4H PRN    HYDROmorphone (PF) (DILAUDID) injection 1 mg  1 mg IntraVENous Q3H PRN    aspirin chewable tablet 81 mg  81 mg Oral 7am    midazolam (VERSED) 1 mg/mL injection        acetaminophen (TYLENOL) tablet 325 mg  325 mg Oral Q6H PRN    albuterol (PROVENTIL HFA, VENTOLIN HFA, PROAIR HFA) inhaler 2 Puff  2 Puff Inhalation Q4H PRN    albuterol-ipratropium (DUO-NEB) 2.5 MG-0.5 MG/3 ML  3 mL Nebulization Q6H PRN    atorvastatin (LIPITOR) tablet 10 mg  10 mg Oral QHS    fluticasone-vilanterol (BREO ELLIPTA) 100mcg-25mcg/puff   Inhalation DAILY    clopidogrel (PLAVIX) tablet 75 mg  75 mg Oral DAILY    insulin lispro (HUMALOG) injection 5 Units  5 Units SubCUTAneous TIDAC    insulin NPH (NOVOLIN N, HUMULIN N) injection 20 Units  20 Units SubCUTAneous BID WITH MEALS    HYDROcodone-acetaminophen (NORCO) 5-325 mg per tablet 1 Tab  1 Tab Oral Q6H PRN    metoprolol tartrate (LOPRESSOR) tablet 50 mg  50 mg Oral BID    methocarbamol (ROBAXIN) tablet 750 mg  750 mg Oral QID    umeclidinium (INCRUSE ELLIPTA) 62.5 mcg/actuation  1 Puff Inhalation DAILY    roflumilast (DALIRESP) tablet 500 mcg  500 mcg Oral DAILY    insulin lispro (HUMALOG) injection   SubCUTAneous AC&HS    glucose chewable tablet 16 g  4 Tab Oral PRN    glucagon (GLUCAGEN) injection 1 mg  1 mg IntraMUSCular PRN    dextrose 10% infusion 125-250 mL  125-250 mL IntraVENous PRN       Objective:      Physical Exam:  General Appearance:    Chest:   Clear  Cardiovascular:  Regular rate and rhythm, no murmur.   Abdomen:   Soft, non-tender, bowel sounds are active.   Extremities:   Skin:  Warm and dry.     Data Review:   Recent Labs      08/05/17   0033   WBC  12.5*   HGB  11.4*   HCT  35.3   PLT  153     Recent Labs      08/05/17 0033 08/03/17   0423   NA  135*  137   K  5.5*  4.8   CL  100  100   CO2  27  29   GLU  198*  124*   BUN  26*  41*   CREA  3.89*  5.83*   CA  8.9  8.7   MG  2.1   --    PHOS  2.6   --        No results for input(s): TROIQ, CPK, CKMB in the last 72 hours. Intake/Output Summary (Last 24 hours) at 08/05/17 1418  Last data filed at 08/05/17 0845   Gross per 24 hour   Intake              360 ml   Output                0 ml   Net              360 ml        Telemetry:   EKG:  Cxray:    Assessment:     Principal Problem:    PAD (peripheral artery disease) ([facility] (10/4/2016)      Overview: R SFA intervention 3/6/17     Active Problems:    HTN (hypertension) (10/8/2010)      ESRD (end stage renal disease) ([facility] (10/16/2012)      Acute on chronic diastolic CHF (congestive heart failure), NYHA class 2 ([facility] (2/19/2015)      CAD (coronary artery disease) (3/9/2015)      Type 2 diabetes mellitus without complication ([facility] (1/24/3274)        Plan:     Stable post procedure. She cannot care for herself at home and usual home care has fallen through.   NHP underway

## 2017-08-06 NOTE — PROGRESS NOTES
[facility] Progress Note        NAME: Corinne Rye       :  1945       MRN:  [id number]     Date/Time: 2017    Risk of deterioration: low       Assessment:    Plan:  ESRD  PAD   S/p L SFA atherectomy/stent (17)  Recurrent pleural effusion  COPD  ANemia  Hyperkalemia-> 2K bath       Abbreviated HD yesterday to get her back on her //S schedule    Place on low K diet    Awaiting NH placement    Am labs       Subjective:     Chief Complaint: Left leg . No events overnight. HD yesterday. 0.5L UF    Review of Systems:     Objective:     VITALS:   Last 24hrs VS reviewed since prior progress note. Most recent are:  Visit Vitals    /42    Pulse 75    Temp 98.5 °F (36.9 °C)    Resp 16    Ht 5' 2\" (1.575 m)    Wt 65.3 kg (144 lb)    SpO2 95%    BMI 26.34 kg/m2     SpO2 Readings from Last 6 Encounters:   17 95%   17 100%   17 95%   17 95%   07/10/17 96%   17 95%    O2 Flow Rate (L/min): 1.5 l/min       Intake/Output Summary (Last 24 hours) at 17 1503  Last data filed at 17 1474   Gross per 24 hour   Intake              360 ml   Output                0 ml   Net              360 ml        PHYSICAL EXAM:    General   well developed, well nourished  Respiratory   Clear To Auscultation bilaterally   Cardiology  Regular Rate and Rythmn  -  Extremities  No clubbing, cyanosis, or edema. Pulses intact.              Lab Data Reviewed: (see below)    Medications Reviewed: (see below)    PMH/SH reviewed - no change compared to H&P  _________________________________________  ___________________________________________________    Attending Physician: Solo Allred MD     ____________________________________________________  MEDICATIONS:  Current Facility-Administered Medications   Medication Dose Route Frequency    albumin human 25% (BUMINATE) solution 12.5 g  12.5 g IntraVENous DIALYSIS PRN    iodixanol (VISIPAQUE) 320 mg iodine/mL contrast injection 0-200 mL 0-200 mL IntraarTERial Multiple    heparinized saline 2 units/mL 1,000 unit/500 mL infusion        sodium chloride (NS) flush 5-10 mL  5-10 mL IntraVENous Q8H    sodium chloride (NS) flush 5-10 mL  5-10 mL IntraVENous PRN    fentaNYL citrate (PF) injection 25 mcg  25 mcg IntraVENous Q4H PRN    HYDROmorphone (PF) (DILAUDID) injection 1 mg  1 mg IntraVENous Q3H PRN    aspirin chewable tablet 81 mg  81 mg Oral 7am    midazolam (VERSED) 1 mg/mL injection        acetaminophen (TYLENOL) tablet 325 mg  325 mg Oral Q6H PRN    albuterol (PROVENTIL HFA, VENTOLIN HFA, PROAIR HFA) inhaler 2 Puff  2 Puff Inhalation Q4H PRN    albuterol-ipratropium (DUO-NEB) 2.5 MG-0.5 MG/3 ML  3 mL Nebulization Q6H PRN    atorvastatin (LIPITOR) tablet 10 mg  10 mg Oral QHS    fluticasone-vilanterol (BREO ELLIPTA) 100mcg-25mcg/puff   Inhalation DAILY    clopidogrel (PLAVIX) tablet 75 mg  75 mg Oral DAILY    insulin lispro (HUMALOG) injection 5 Units  5 Units SubCUTAneous TIDAC    insulin NPH (NOVOLIN N, HUMULIN N) injection 20 Units  20 Units SubCUTAneous BID WITH MEALS    HYDROcodone-acetaminophen (NORCO) 5-325 mg per tablet 1 Tab  1 Tab Oral Q6H PRN    metoprolol tartrate (LOPRESSOR) tablet 50 mg  50 mg Oral BID    methocarbamol (ROBAXIN) tablet 750 mg  750 mg Oral QID    umeclidinium (INCRUSE ELLIPTA) 62.5 mcg/actuation  1 Puff Inhalation DAILY    roflumilast (DALIRESP) tablet 500 mcg  500 mcg Oral DAILY    insulin lispro (HUMALOG) injection   SubCUTAneous AC&HS    glucose chewable tablet 16 g  4 Tab Oral PRN    glucagon (GLUCAGEN) injection 1 mg  1 mg IntraMUSCular PRN    dextrose 10% infusion 125-250 mL  125-250 mL IntraVENous PRN        LABS:  Recent Labs      08/06/17 0518 08/05/17 0033   WBC  8.8  12.5*   HGB  9.8*  11.4*   HCT  31.6*  35.3   PLT  144*  153     Recent Labs      08/06/17 0518 08/05/17 0033   NA  136  135*   K  4.9  5.5*   CL  99  100   CO2  30  27   BUN  21*  26*   CREA  3.92*  3.89* GLU  92  198*   CA  8.1*  8.9   MG  2.1  2.1   PHOS  3.3  2.6     Recent Labs      08/06/17   0518   SGOT  15   ALT  11*   AP  91   TBILI  0.6   TP  6.5   ALB  2.8*   GLOB  3.7     No results for input(s): INR, PTP, APTT in the last 72 hours. No lab exists for component: INREXT, INREXT   No results for input(s): FE, TIBC, PSAT, FERR in the last 72 hours. No results for input(s): PH, PCO2, PO2 in the last 72 hours. No results for input(s): CPK, CKNDX, TROIQ in the last 72 hours.     No lab exists for component: CPKMB  Lab Results   Component Value Date/Time    Glucose (POC) 82 08/06/2017 12:32 PM    Glucose (POC) 60 08/06/2017 12:11 PM    Glucose (POC) 60 08/06/2017 11:56 AM    Glucose (POC) 88 08/06/2017 07:31 AM    Glucose (POC) 87 08/05/2017 11:19 PM

## 2017-08-06 NOTE — PROGRESS NOTES
[address] [place], [place] [phone]      Cardiology Progress Note      8/6/2017 5:55 PM    Admit Date: 8/2/2017    Admit Diagnosis:   PAD;PAD (peripheral artery disease) ([address] Bilateral lower e*    Subjective:      Zachariah Bob     Visit Vitals    /59    Pulse 75    Temp 98 °F (36.7 °C)    Resp 17    Ht 5' 2\" (1.575 m)    Wt 144 lb (65.3 kg)    SpO2 97%    BMI 26.34 kg/m2       Current Facility-Administered Medications   Medication Dose Route Frequency    albumin human 25% (BUMINATE) solution 12.5 g  12.5 g IntraVENous DIALYSIS PRN    iodixanol (VISIPAQUE) 320 mg iodine/mL contrast injection 0-200 mL  0-200 mL IntraarTERial Multiple    heparinized saline 2 units/mL 1,000 unit/500 mL infusion        sodium chloride (NS) flush 5-10 mL  5-10 mL IntraVENous Q8H    sodium chloride (NS) flush 5-10 mL  5-10 mL IntraVENous PRN    fentaNYL citrate (PF) injection 25 mcg  25 mcg IntraVENous Q4H PRN    HYDROmorphone (PF) (DILAUDID) injection 1 mg  1 mg IntraVENous Q3H PRN    aspirin chewable tablet 81 mg  81 mg Oral 7am    midazolam (VERSED) 1 mg/mL injection        acetaminophen (TYLENOL) tablet 325 mg  325 mg Oral Q6H PRN    albuterol (PROVENTIL HFA, VENTOLIN HFA, PROAIR HFA) inhaler 2 Puff  2 Puff Inhalation Q4H PRN    albuterol-ipratropium (DUO-NEB) 2.5 MG-0.5 MG/3 ML  3 mL Nebulization Q6H PRN    atorvastatin (LIPITOR) tablet 10 mg  10 mg Oral QHS    fluticasone-vilanterol (BREO ELLIPTA) 100mcg-25mcg/puff   Inhalation DAILY    clopidogrel (PLAVIX) tablet 75 mg  75 mg Oral DAILY    insulin lispro (HUMALOG) injection 5 Units  5 Units SubCUTAneous TIDAC    insulin NPH (NOVOLIN N, HUMULIN N) injection 20 Units  20 Units SubCUTAneous BID WITH MEALS    HYDROcodone-acetaminophen (NORCO) 5-325 mg per tablet 1 Tab  1 Tab Oral Q6H PRN    metoprolol tartrate (LOPRESSOR) tablet 50 mg  50 mg Oral BID    methocarbamol (ROBAXIN) tablet 750 mg  750 mg Oral QID    umeclidinium (INCRUSE ELLIPTA) 62.5 mcg/actuation  1 Puff Inhalation DAILY    roflumilast (DALIRESP) tablet 500 mcg  500 mcg Oral DAILY    insulin lispro (HUMALOG) injection   SubCUTAneous AC&HS    glucose chewable tablet 16 g  4 Tab Oral PRN    glucagon (GLUCAGEN) injection 1 mg  1 mg IntraMUSCular PRN    dextrose 10% infusion 125-250 mL  125-250 mL IntraVENous PRN       Objective:      Physical Exam:  General Appearance:    Chest:   Clear  Cardiovascular:  Regular rate and rhythm, no murmur.   Abdomen:   Soft, non-tender, bowel sounds are active.   Extremities:   Skin:  Warm and dry.     Data Review:   Recent Labs      08/06/17   0518  08/05/17   0033   WBC  8.8  12.5*   HGB  9.8*  11.4*   HCT  31.6*  35.3   PLT  144*  153     Recent Labs      08/06/17 0518  08/05/17 0033   NA  136  135*   K  4.9  5.5*   CL  99  100   CO2  30  27   GLU  92  198*   BUN  21*  26*   CREA  3.92*  3.89*   CA  8.1*  8.9   MG  2.1  2.1   PHOS  3.3  2.6   ALB  2.8*   --    TBILI  0.6   --    SGOT  15   --    ALT  11*   --        No results for input(s): TROIQ, CPK, CKMB in the last 72 hours.       Intake/Output Summary (Last 24 hours) at 08/06/17 1755  Last data filed at 08/06/17 1352   Gross per 24 hour   Intake              600 ml   Output                0 ml   Net              600 ml        Telemetry:   EKG:  Cxray:    Assessment:     Principal Problem:    PAD (peripheral artery disease) ([facility] (10/4/2016)      Overview: R SFA intervention 3/6/17     Active Problems:    HTN (hypertension) (10/8/2010)      ESRD (end stage renal disease) ([facility] (10/16/2012)      Acute on chronic diastolic CHF (congestive heart failure), NYHA class 2 ([facility] (2/19/2015)      CAD (coronary artery disease) (3/9/2015)      Type 2 diabetes mellitus without complication ([facility] (7/64/7775)        Plan:       Stable awaiting placement

## 2017-08-07 NOTE — DISCHARGE SUMMARY
[address] [place] [address]  [phone]     Cardiology Discharge Summary     Patient ID:  Dinh Grant  [id number]  20 y.o.  1945    Admit Date: 8/2/2017    Discharge Date: 8/7/2017     Admitting Physician: Marry Holland MD     Discharge Physician: Racheal Keller NP    Admission Diagnoses:   PAD  PAD (peripheral artery disease) ([facility]  Bilateral lower extemity with potential angioplasty    Discharge Diagnoses:   Principal Problem:    PAD (peripheral artery disease) ([facility] (10/4/2016)      Overview: R SFA intervention 3/6/17     Active Problems:    HTN (hypertension) (10/8/2010)      ESRD (end stage renal disease) ([address] (10/16/2012)      Acute on chronic diastolic CHF (congestive heart failure), NYHA class 2 ([facility] (2/19/2015)      CAD (coronary artery disease) (3/9/2015)      Type 2 diabetes mellitus without complication ([facility] (9/97/4409)        Discharge Condition: Good    Cardiology Procedures this Admission:  PAD    Patient Active Problem List    Diagnosis Date Noted    Chest pain 05/24/2017    COPD exacerbation ([facility] 11/14/2016    PAD (peripheral artery disease) ([facility] 10/04/2016    AICD (automatic cardioverter/defibrillator) present 06/30/2016    Late effects of CVA (cerebrovascular accident) 06/30/2016    Type 2 diabetes mellitus without complication ([facility] 97/54/4143    Iron deficiency anemia 06/30/2016    Chronic systolic heart failure ([address] 05/01/2016    ICD (implantable cardioverter-defibrillator) in place 05/01/2016    Hypoglycemia 04/29/2016    Leucocytosis 04/29/2016    Presence of biventricular AICD 04/22/2016    S/P AV lv ablation 04/22/2016    Cardiomyopathy ([address] 04/19/2016    Chronic systolic congestive heart failure ([address] 04/19/2016    Acute respiratory failure with hypoxia ([address] 04/16/2016    PAF (paroxysmal atrial fibrillation) ([facility] 04/16/2016    Fracture of right humerus 03/23/2016    Debility 03/23/2016  Weakness 03/23/2016    Hypoalbuminemia 03/23/2016    Right arm pain 03/23/2016    Goals of care, counseling/discussion 03/23/2016    Shock ([address] 03/21/2016    Acute upper GI bleed 03/21/2016    Weakness of right side of body 03/08/2016    ASHD (arteriosclerotic heart disease) 07/03/2015    Elevated troponin 07/03/2015    Type II or unspecified type diabetes mellitus without mention of complication, uncontrolled 03/15/2015    S/P PTCA (percutaneous transluminal coronary angioplasty) 03/09/2015    CAD (coronary artery disease) 03/09/2015    Myocardial ischemia 02/21/2015    Angina, class III ([address] 02/21/2015    Acute-on-chronic respiratory failure (HCC) 02/19/2015    Acute on chronic diastolic CHF (congestive heart failure), NYHA class 2 ([address] 02/19/2015    Left-sided chest wall pain 01/02/2015    Pneumonia, organism unspecified 04/21/2014    Obesity 03/06/2014     Class: Chronic    ASVD (arteriosclerotic vascular disease) 03/06/2014     Class: Chronic    History of CVA (cerebrovascular accident) 03/06/2014     Class: Present on Admission    GERD (gastroesophageal reflux disease) 09/09/2013    COPD (chronic obstructive pulmonary disease) ([address] 09/05/2013    SOB (shortness of breath) 05/26/2013    DM (diabetes mellitus) type II controlled with renal manifestation ([address] 10/17/2012    AVF (arteriovenous fistula) ([address] 10/17/2012    ESRD (end stage renal disease) ([address] 10/16/2012    Anemia of chronic kidney failure 10/16/2012    Adrenal tumor 10/08/2010    History of tobacco abuse 10/08/2010     Class: Present on Admission    HTN (hypertension) 10/08/2010      Past Medical History:   Diagnosis Date    Acute on chronic diastolic CHF (congestive heart failure), NYHA class 2 ([address] 2/19/2015    Adrenal mass, left (HCC)     2.4 Cm om Feb 2010    Anemia associated with chronic renal failure     Arthritis     right knee    Asthma     CKD (chronic kidney disease), stage IV ([address] [facility]    COPD     Diabetes ([facility] dx 1997~    type II    Generalized social phobia     HTN (hypertension)     Hypercholesteremia     Nausea & vomiting 11/11/2013    Other ill-defined conditions     fistula, R upper arm.  Pneumonia 12/10/2014    left lower lobe with sepsis    Presence of biventricular AICD 4/22/2016 4/20/16 [employer] biventricular AICD implant    Renal calculus or stone     small Lt by US 3/2012.  S/P AV lv ablation 04/22/2016 4/20/16    Smokers' cough ([facility]       Social History     Social History    Marital status:      Spouse name: N/A    Number of children: N/A    Years of education: N/A     Occupational History     Retired     Social History Main Topics    Smoking status: Former Smoker     Packs/day: 2.00     Years: 48.00     Types: Cigarettes     Quit date: 1/3/2012    Smokeless tobacco: Never Used    Alcohol use No    Drug use: No    Sexual activity: Not Asked     Other Topics Concern    None     Social History Narrative     Allergies   Allergen Reactions    Grapefruit Hives    Orange Juice Hives    Peach (Prunus Persica) Hives      Family History   Problem Relation Age of Onset    Diabetes Mother     Heart Disease Mother     Hypertension Mother     High Cholesterol Mother     Arthritis-osteo Mother     Cancer Father      Lung    Arthritis-osteo Father     Cancer Brother      x2 - lung cancer        Hospital Course: Derick Kc is admitted for elective LE PAD angio procedure. No c/o CP, SOB, laying flat in bed. Of note, patient had R thoracentesis on 7/10/17 with 850cc out. CT scan of spine on 7/30/17 showed mod RLE pl effusion. PAD: hx of b/l LE intervention. S/p left SFA intervention via ante & retrograde approach.    Continue on Plavix, ASA, BB  Manage LE discomfort with [place]  PT/OT to work with patient  Due to LE PAD and inactivity, recommend SND  Left LE post procedure stable from vascular standpoint. +ve distal pulses. Left foot is warm. Sensations intact, left groin looks ok, on US left SFA stent and groin looks ok. If pain in left LE does not resolve then will looks into non vascular etiologies as out pt. May need neuropathy evaluation.       CAD, Cardiomyopathy:  Diastolic HF  Continue on aspirin, BB and statin.       COPD:   home O2 as needed  Sats on RA 97%      ESRD:   HD T/TH/S  Consider thoracentesis again-last tap removed 850cc of fluid, cytology (-)     Pleural effusion:  Patient had R thoracentesis on 7/10/17 with 850cc out.  CT scan of spine on 7/30/17 showed mod RLE pl effusion.        Debilitation:  Recommending SNF  Rehab due to inability to care for herself at this time       Consults: Nephrology    Visit Vitals    /43    Pulse 80    Temp 98.2 °F (36.8 °C)    Resp 18    Ht 5' 2\" (1.575 m)    Wt 65.4 kg (144 lb 2.9 oz)    SpO2 97%    BMI 26.37 kg/m2       Physical Exam  General:  Elderly  female in no acute distress  Abdomen: soft, non-tender. Bowel sounds normal.   Extremities: extremities normal, no edema  Heart: regular rate and rhythm, no murmur, S3/JVD  Lungs: clear to auscultation bilaterally    Labs:   Recent Labs      08/07/17   0126  08/06/17   0518  08/05/17   0033   WBC  9.9  8.8  12.5*   HGB  10.2*  9.8*  11.4*   HCT  33.0*  31.6*  35.3   PLT  133*  144*  153     Recent Labs      08/07/17   0126  08/06/17   0518  08/05/17   0033   NA  137  136  135*   K  5.6*  4.9  5.5*   CL  99  99  100   CO2  33*  30  27   GLU  60*  92  198*   BUN  32*  21*  26*   CREA  5.09*  3.92*  3.89*   CA  8.7  8.1*  8.9   MG  2.1  2.1  2.1   PHOS  3.8  3.3  2.6   ALB   --   2.8*   --    TBILI   --   0.6   --    SGOT   --   15   --    ALT   --   11*   --        No results for input(s): TROIQ, CPK, CKMB in the last 72 hours.     Disposition: SNF    Patient Instructions:   Current Discharge Medication List      CONTINUE these medications which have NOT CHANGED    Details   predniSONE (STERAPRED) 5 mg dose pack See administration instruction per 5mg dose pack  Qty: 21 Tab, Refills: 0      OXYGEN-AIR DELIVERY SYSTEMS by Does Not Apply route. clopidogrel (PLAVIX) 75 mg tab TAKE 1 TABLET BY MOUTH DAILY  Qty: 30 Tab, Refills: 0      metoprolol tartrate (LOPRESSOR) 50 mg tablet Take  by mouth two (2) times a day. albuterol-ipratropium (DUO-NEB) 2.5 mg-0.5 mg/3 ml nebu 3 mL by Nebulization route every six (6) hours as needed. Qty: 100 Nebule, Refills: 1      insulin NPH (NOVOLIN N, HUMULIN N) 100 unit/mL injection 20 Units by SubCUTAneous route two (2) times daily (with meals). Qty: 1 Vial, Refills: 1      alcohol swabs (ALCOHOL PADS) padm 1 Each by Apply Externally route Multiple. Qty: 100 Pad, Refills: 1      albuterol (PROVENTIL HFA, VENTOLIN HFA, PROAIR HFA) 90 mcg/actuation inhaler Take 2 Puffs by inhalation every four (4) hours as needed for Wheezing. Qty: 1 Inhaler, Refills: 0      calcium acetate (PHOSLO) 667 mg cap Take 2 Caps by mouth three (3) times daily (with meals). acetaminophen (TYLENOL) 325 mg tablet Take 650 mg by mouth every six (6) hours as needed for Pain or Fever (Temp >100.5). budesonide-formoterol (SYMBICORT) 160-4.5 mcg/actuation HFA inhaler Take 2 Puffs by inhalation two (2) times a day. Patient takes at 0500 and 2100      roflumilast (DALIRESP) tab tablet Take 500 mcg by mouth daily. atorvastatin (LIPITOR) 10 mg tablet Take 10 mg by mouth nightly. cholecalciferol, vitamin D3, (VITAMIN D3) 2,000 unit tab Take 1 Tab by mouth daily. tiotropium (SPIRIVA WITH HANDIHALER) 18 mcg inhalation capsule Take 1 Cap by inhalation daily. aspirin 81 mg tablet Take 81 mg by mouth every morning. Indications: MYOCARDIAL INFARCTION PREVENTION      !! HYDROcodone-acetaminophen (NORCO) 5-325 mg per tablet Take 1 Tab by mouth every six (6) hours as needed for Pain for up to 15 doses. Max Daily Amount: 4 Tabs.   Qty: 15 Tab, Refills: 0      oxyCODONE-acetaminophen (PERCOCET) 5-325 mg per tablet Take 1 Tab by mouth every six (6) hours as needed for Pain. Max Daily Amount: 4 Tabs. Qty: 12 Tab, Refills: 0      methocarbamol (ROBAXIN-750) 750 mg tablet Take 1 Tab by mouth four (4) times daily. Qty: 20 Tab, Refills: 0      !! HYDROcodone-acetaminophen (NORCO) 5-325 mg per tablet Take 1 Tab by mouth every four (4) hours as needed for Pain. Max Daily Amount: 6 Tabs. Qty: 30 Tab, Refills: 0      insulin lispro (HUMALOG) 100 unit/mL injection 5 Units by SubCUTAneous route Before breakfast, lunch, and dinner. Qty: 1 Vial, Refills: 1      aluminum-magnesium hydroxide 200-200 mg/5 mL susp 30 mL, diphenhydrAMINE 12.5 mg/5 mL elix 75 mg, lidocaine 2 % soln 30 mL, sucralfate 100 mg/mL susp 3 g oral solution (compounded) Take 5 mL by mouth Before breakfast, lunch, and dinner. Qty: 100 mL, Refills: 0      Insulin Syringe-Needle U-100 0.5 mL 29 gauge x 1/2\" syrg 1 Each by Does Not Apply route five (5) times daily. Qty: 150 Syringe, Refills: 1       !! - Potential duplicate medications found. Please discuss with provider. Referenced discharge instructions provided by nursing for diet and activity. Follow up with Dr. Analy Bowman. Signed:  Lawyer Demario NP  8/7/2017  4:05 PM       Pt seen and examined in details. Agree with NP A&P.       Flora Vu MD

## 2017-08-07 NOTE — PROGRESS NOTES
NAME: Lara Lefort        :          MRN:  [id number]        Assessment :    Plan:  --[facility]  PAD   S/p L SFA atherectomy/stent (17)  Recurrent pleural effusion  COPD  Anemia  Hyperkalemia-> 2K bath --No acute need for HD today. Plan HD in AM.    Kayexalate today. 2K bath in AM.    Start EPO       Subjective:     Chief Complaint:  \" My leg is killing me! \"  No N/V. No dyspnea. No HA      Review of Systems:    Symptom Y/N Comments  Symptom Y/N Comments   Fever/Chills    Chest Pain     Poor Appetite    Edema     Cough    Abdominal Pain     Sputum    Joint Pain     SOB/LORENZO    Pruritis/Rash     Nausea/vomit    Tolerating PT/OT     Diarrhea    Tolerating Diet     Constipation    Other       Could not obtain due to:      Objective:     VITALS:   Last 24hrs VS reviewed since prior progress note.  Most recent are:  Visit Vitals    /43 (BP 1 Location: Left arm)    Pulse 75    Temp 98 °F (36.7 °C)    Resp 14    Ht 5' 2\" (1.575 m)    Wt 65.4 kg (144 lb 2.9 oz)    SpO2 96%    BMI 26.37 kg/m2       Intake/Output Summary (Last 24 hours) at 17 0958  Last data filed at 17 1920   Gross per 24 hour   Intake              840 ml   Output                0 ml   Net              840 ml      Telemetry Reviewed:     PHYSICAL EXAM:  General: NAD  CTA  RRR  abd soft  No edema      Lab Data Reviewed: (see below)    Medications Reviewed: (see below)    PMH/SH reviewed - no change compared to H&P  ________________________________________________________________________  Care Plan discussed with:  Patient     Family      RN     Care Manager                    Consultant:          Comments   >50% of visit spent in counseling and coordination of care       ________________________________________________________________________  Chris Mckeon MD     Procedures: see electronic medical records for all procedures/Xrays and details which  were not copied into this note but were reviewed prior to creation of Plan. LABS:  Recent Labs      08/07/17 0126 08/06/17 0518   WBC  9.9  8.8   HGB  10.2*  9.8*   HCT  33.0*  31.6*   PLT  133*  144*     Recent Labs      08/07/17   0126  08/06/17 0518 08/05/17   0033   NA  137  136  135*   K  5.6*  4.9  5.5*   CL  99  99  100   CO2  33*  30  27   BUN  32*  21*  26*   CREA  5.09*  3.92*  3.89*   GLU  60*  92  198*   CA  8.7  8.1*  8.9   MG  2.1  2.1  2.1   PHOS  3.8  3.3  2.6     Recent Labs      08/06/17 0518   SGOT  15   AP  91   TP  6.5   ALB  2.8*   GLOB  3.7     No results for input(s): INR, PTP, APTT in the last 72 hours. No lab exists for component: INREXT   No results for input(s): FE, TIBC, PSAT, FERR in the last 72 hours. No results found for: FOL, RBCF   No results for input(s): PH, PCO2, PO2 in the last 72 hours. No results for input(s): CPK, CKMB in the last 72 hours.     No lab exists for component: TROPONINI  No components found for: [place]  Lab Results   Component Value Date/Time    Color DARK YELLOW 04/29/2016 02:40 AM    Appearance HAZY 04/29/2016 02:40 AM    Specific gravity 1.025 04/29/2016 02:40 AM    Specific gravity 1.018 07/27/2015 08:02 PM    pH (UA) 5.0 04/29/2016 02:40 AM    Protein 300 04/29/2016 02:40 AM    Glucose NEGATIVE  04/29/2016 02:40 AM    Ketone TRACE 04/29/2016 02:40 AM    Bilirubin NEGATIVE  04/29/2016 02:40 AM    Urobilinogen 0.2 04/29/2016 02:40 AM    Nitrites NEGATIVE  04/29/2016 02:40 AM    Leukocyte Esterase NEGATIVE  04/29/2016 02:40 AM    Epithelial cells FEW 04/29/2016 02:40 AM    Bacteria 2+ 04/29/2016 02:40 AM    WBC 0-4 04/29/2016 02:40 AM    RBC 0-5 04/29/2016 02:40 AM       MEDICATIONS:  Current Facility-Administered Medications   Medication Dose Route Frequency    sodium polystyrene (KAYEXALATE) 15 gram/60 mL oral suspension 30 g  30 g Oral NOW    [START ON 8/8/2017] epoetin cornelio (EPOGEN;PROCRIT) injection 8,000 Units  8,000 Units SubCUTAneous DIALYSIS TUE, THU & SAT    albumin human 25% (BUMINATE) solution 12.5 g  12.5 g IntraVENous DIALYSIS PRN    iodixanol (VISIPAQUE) 320 mg iodine/mL contrast injection 0-200 mL  0-200 mL IntraarTERial Multiple    heparinized saline 2 units/mL 1,000 unit/500 mL infusion        sodium chloride (NS) flush 5-10 mL  5-10 mL IntraVENous Q8H    sodium chloride (NS) flush 5-10 mL  5-10 mL IntraVENous PRN    fentaNYL citrate (PF) injection 25 mcg  25 mcg IntraVENous Q4H PRN    HYDROmorphone (PF) (DILAUDID) injection 1 mg  1 mg IntraVENous Q3H PRN    aspirin chewable tablet 81 mg  81 mg Oral 7am    midazolam (VERSED) 1 mg/mL injection        acetaminophen (TYLENOL) tablet 325 mg  325 mg Oral Q6H PRN    albuterol (PROVENTIL HFA, VENTOLIN HFA, PROAIR HFA) inhaler 2 Puff  2 Puff Inhalation Q4H PRN    albuterol-ipratropium (DUO-NEB) 2.5 MG-0.5 MG/3 ML  3 mL Nebulization Q6H PRN    atorvastatin (LIPITOR) tablet 10 mg  10 mg Oral QHS    fluticasone-vilanterol (BREO ELLIPTA) 100mcg-25mcg/puff   Inhalation DAILY    clopidogrel (PLAVIX) tablet 75 mg  75 mg Oral DAILY    insulin lispro (HUMALOG) injection 5 Units  5 Units SubCUTAneous TIDAC    insulin NPH (NOVOLIN N, HUMULIN N) injection 20 Units  20 Units SubCUTAneous BID WITH MEALS    HYDROcodone-acetaminophen (NORCO) 5-325 mg per tablet 1 Tab  1 Tab Oral Q6H PRN    metoprolol tartrate (LOPRESSOR) tablet 50 mg  50 mg Oral BID    methocarbamol (ROBAXIN) tablet 750 mg  750 mg Oral QID    umeclidinium (INCRUSE ELLIPTA) 62.5 mcg/actuation  1 Puff Inhalation DAILY    roflumilast (DALIRESP) tablet 500 mcg  500 mcg Oral DAILY    insulin lispro (HUMALOG) injection   SubCUTAneous AC&HS    glucose chewable tablet 16 g  4 Tab Oral PRN    glucagon (GLUCAGEN) injection 1 mg  1 mg IntraMUSCular PRN    dextrose 10% infusion 125-250 mL  125-250 mL IntraVENous PRN

## 2017-08-07 NOTE — PROGRESS NOTES
Problem: Self Care Deficits Care Plan (Adult)  Goal: *Acute Goals and Plan of Care (Insert Text)  Occupational Therapy Goals  Initiated 8/7/2017  1. Patient will perform grooming in standing with supervision/set-up within 7 day(s). 2. Patient will perform bathing with contact guard assist within 7 day(s). 3. Patient will perform upper body dressing and lower body dressing with contact guard assist, using adaptive aids prn, within 7 day(s). 4. Patient will perform toilet transfers , using best equipment, with supervision/set-up within 7 day(s). 5. Patient will perform all aspects of toileting with supervision/set-up within 7 day(s). 6. Patient will participate in upper extremity therapeutic exercise/activities with supervision/set-up for 10 minutes within 7 day(s). 7. Patient will perform standing adls for at least 8 minutes with supervision within 7 day(s). OCCUPATIONAL THERAPY EVALUATION  Patient: Chance Montgomery (72 y.o. female)  Date: 8/7/2017  Primary Diagnosis: PAD  PAD (peripheral artery disease) (HCC)  Bilateral lower extemity with potential angioplasty  Procedure(s) (LRB):  PACU/RECOVERY (N/A)     Precautions: fall         ASSESSMENT :  Based on the objective data described below, the patient presents with baseline LUE numbness (for several weeks PTA), pain, decreased balance, generalized weakness and decreased endurance impairing adls/IADLs  And functional mobility. Pt is functioning at set up to minimal assistance for self care and is dependent for IADLS. Pt ambulated to and from the bathroom with minimal assistance HHA. Pt would benefit from 24 hour assistance at home. Don't feel that she will be able to manage her usual routine without assistance. Recommend short term snf rehab at this time because pt reports that her sister, whom she lives with, cannot assist her with anything at this time and does not want anyone in her home (i.e. Caregiver and [facility] therapists).      Patient will benefit from skilled intervention to address the above impairments. Patients rehabilitation potential is considered to be Good  Factors which may influence rehabilitation potential include:   [ ]             None noted  [ ]             Mental ability/status  [X]             Medical condition  [X]             Home/family situation and support systems  [ ]             Safety awareness  [ ]             Pain tolerance/management  [ ]             Other:        PLAN :  Recommendations and Planned Interventions:  [X]               Self Care Training                  [X]        Therapeutic Activities  [X]               Functional Mobility Training    [ ]        Cognitive Retraining  [X]               Therapeutic Exercises           [X]        Endurance Activities  [X]               Balance Training                   [X]        Neuromuscular Re-Education  [ ]               Visual/Perceptual Training     [X]   Home Safety Training  [X]               Patient Education                 [X]        Family Training/Education  [ ]               Other (comment):     Frequency/Duration: Patient will be followed by occupational therapy 4 times a week to address goals. Discharge Recommendations: Rehab and Skilled Nursing Facility  Further Equipment Recommendations for Discharge: possibly a RW       SUBJECTIVE:   Patient stated I live with my sister. ... It is her house; she is old and set in her ways, and she doesn't want anyone in her house.   (re: option of 24 hour caregiver and HH therapies for pt)      OBJECTIVE DATA SUMMARY:   HISTORY:   Past Medical History:   Diagnosis Date    Acute on chronic diastolic CHF (congestive heart failure), NYHA class 2 ([facility] [address] 2/19/2015    Adrenal mass, left (HCC)       2.4 Cm om Feb 2010    Anemia associated with chronic renal failure      Arthritis       right knee    Asthma      CKD (chronic kidney disease), stage IV ([facility] [address]       ying,dialysis tues,thur sat  [facility]    COPD      Diabetes [facility]) dx 1997~     type II    Generalized social phobia      HTN (hypertension)      Hypercholesteremia      Nausea & vomiting 11/11/2013    Other ill-defined conditions       fistula, R upper arm.  Pneumonia 12/10/2014     left lower lobe with sepsis    Presence of biventricular AICD 4/22/2016 4/20/16 Human Demand biventricular AICD implant    Renal calculus or stone       small Lt by US 3/2012.  S/P AV lv ablation 04/22/2016 4/20/16    Smokers' cough ([address]       Past Surgical History:   Procedure Laterality Date    COLONOSCOPY,DIAGNOSTIC   3/28/2016          HX HEENT         Lasik eye surgery    HX OPEN CHOLECYSTECTOMY   ~ 1985    HX OTHER SURGICAL         adrenal mass removed    HX SHOULDER ARTHROSCOPY   2008     removal bone spur left shoulder    KNEE SCOPE,CLEAN/DRAIN        UPPER GI ENDOSCOPY,DIAGNOSIS   3/28/2016          VASCULAR SURGERY PROCEDURE UNLIST         dialysis in right arm        Prior Level of Function/Home Situation: Pt reports that she lives with her sister (her sister's home). Her sister is currently having back issues (happens 2 to 3 X per year per pt report) and will not be able to assist pt. At discharge. Pt reports that she is independent in self care at baseline and performs light housekeeping chores. Pt drives herself to dialysis T,Th, Sat. Pt has a straight cane that she uses--she also uses her sister's rollator at home, when her sister is not using it. Pt has O2 at home (2L)that she consistently uses at night. Pt uses the O2 during adls when she needs it/exerts herself. O2 sats unable to be monitored via pulse ox and monitor in room due to cold hands--unable to get a reading.   ..  Expanded or extensive additional review of patient history:      Home Situation  Home Environment: Private residence  # Steps to Enter: 3  Hand Rails : Bilateral  Wheelchair Ramp: Yes  One/Two Story Residence: One story  Living Alone: No  Support Systems: Family member(s) (daughter assists)  Patient Expects to be Discharged to[de-identified] Private residence  Current DME Used/Available at Home: Abran Cramp, straight, Shower chair, Walker, rolling  Tub or Shower Type: Tub/Shower combination  [X]  Right hand dominant             [ ]  Left hand dominant     EXAMINATION OF PERFORMANCE DEFICITS:  Cognitive/Behavioral Status:  Neurologic State: Alert  Orientation Level: Oriented to person;Oriented to place;Oriented to situation  Cognition: Follows commands  Perception: Appears intact  Perseveration: No perseveration noted  Safety/Judgement: Awareness of environment; Fall prevention;Home safety; Insight into deficits     Skin: darkened and bruising noted, RUE dialysis site     Edema: none observed     Hearing: Auditory  Auditory Impairment: None     Vision/Perceptual:    Tracking: Able to track stimulus in all quadrants w/o difficulty                            wears reading glasses  Able to read board in room and can see TV     Range of Motion:  BUEs:  Generally decreased functional                              Strength:  BUEs:  Generally decreased functional   strength is generally equal, slightly less on L hand  (sensory-numbness impairment)                    Coordination:     Fine Motor Skills-Upper: Left Impaired;Right Intact (due to numbness)    Gross Motor Skills-Upper: Left Impaired;Right Intact (due to numbness)     Tone & Sensation: Tone is normal  Sensation is impaired-numbness in LUE                             Balance:  Sitting: Intact  Standing: Impaired  Standing - Static: Constant support; Fair  Standing - Dynamic : Fair (needs constant support)     Functional Mobility and Transfers for ADLs:  Bed Mobility:  Rolling:  (pt seated EOB upon arrival, not tested)  Scooting: Independent     Transfers:  Sit to Stand: Contact guard assistance  Stand to Sit: Contact guard assistance  Bed to Chair: Minimum assistance (HHA-would benefit from RW trial)  Toilet Transfer : Contact guard assistance (ambulated to bathroom;performed urgently due to diarrhea)     ADL Assessment:  Feeding: Independent     Oral Facial Hygiene/Grooming: Stand-by assistance;Setup     Bathing: Minimum assistance     Upper Body Dressing: Minimum assistance     Lower Body Dressing: Minimum assistance     Toileting: Setup                 ADL Intervention and task modifications:     Pt was seated EOB upon arrival and ambulated to the bathroom urgently due to diarrhea with Samuel hand held support. Pt able to perform toileting with set up, performed standing grooming at sink briefly and ambulated to the chair. Pt seemed SOB, but unable to get a reading via pulse oximetry nor on the monitor due to cold fingers. Pt reported that she was fine and calmed at rest.  Pt would benefit from trying a RW. Cognitive Retraining  Safety/Judgement: Awareness of environment; Fall prevention;Home safety; Insight into deficits     Therapeutic Exercise:  Encouraged pt to be up OOB. Functional Measure:  Barthel Index:      Bathin  Bladder: 10  Bowels: 10  Groomin  Dressin  Feeding: 10  Mobility: 0  Stairs: 0  Toilet Use: 5  Transfer (Bed to Chair and Back): 10  Total: 55         Barthel and G-code impairment scale:  Percentage of impairment CH  0% CI  1-19% CJ  20-39% CK  40-59% CL  60-79% CM  80-99% CN  100%   Barthel Score 0-100 100 99-80 79-60 59-40 20-39 1-19    0   Barthel Score 0-20 20 17-19 13-16 9-12 5-8 1-4 0      The Barthel ADL Index: Guidelines  1. The index should be used as a record of what a patient does, not as a record of what a patient could do. 2. The main aim is to establish degree of independence from any help, physical or verbal, however minor and for whatever reason. 3. The need for supervision renders the patient not independent. 4. A patient's performance should be established using the best available evidence.  Asking the patient, friends/relatives and nurses are the usual sources, but direct observation and common sense are also important. However direct testing is not needed. 5. Usually the patient's performance over the preceding 24-48 hours is important, but occasionally longer periods will be relevant. 6. Middle categories imply that the patient supplies over 50 per cent of the effort. 7. Use of aids to be independent is allowed. Liss Boykin., Barthel, D.W. (0990). Functional evaluation: the Barthel Index. [address] [facility] (14)2. Kajal Katz jessica GREGORY Clemens, Lucia Armendariz., Francia Licona., David, [address] (1999). Measuring the change indisability after inpatient rehabilitation; comparison of the responsiveness of the Barthel Index and Functional [place] Measure. Journal of Neurology, Neurosurgery, and Psychiatry, 66(4), 333-621. JING Dudley, FIONA Carballo, & Alavro Almazan M.A. (2004.) Assessment of post-stroke quality of life in cost-effectiveness studies: The usefulness of the Barthel Index and the EuroQoL-5D. Quality of Life Research, 13, 719-96         G codes: In compliance with CMSs Claims Based Outcome Reporting, the following G-code set was chosen for this patient based on their primary functional limitation being treated: The outcome measure chosen to determine the severity of the functional limitation was the Barthel Index with a score of 55/100 which was correlated with the impairment scale.       · Self Care:               - CURRENT STATUS:    CK - 40%-59% impaired, limited or restricted               - GOAL STATUS:           CJ - 20%-39% impaired, limited or restricted               - D/C STATUS:                       ---------------To be determined---------------      Occupational Therapy Evaluation Charge Determination   History Examination Decision-Making   LOW Complexity : Brief history review  MEDIUM Complexity : 3-5 performance deficits relating to physical, cognitive , or psychosocial skils that result in activity limitations and / or participation restrictions MEDIUM Complexity : Patient may present with comorbidities that affect occupational performnce. Miniml to moderate modification of tasks or assistance (eg, physical or verbal ) with assesment(s) is necessary to enable patient to complete evaluation       Based on the above components, the patient evaluation is determined to be of the following complexity level: LOW   Pain:  Pain Scale 1: Numeric (0 - 10)  Pain Intensity 1: 8  Pain Location 1: Leg  Pain Orientation 1: Left  Pain Description 1: Aching;Burning  Pain Intervention(s) 1: Medication (see MAR) informed nursing that pt is requesting medication. Activity Tolerance:   BP stable during tx session preand post activity. Unable to get an O2 reading due to pt's cold fingers. After treatment:   [X] Patient left in no apparent distress sitting up in chair  [ ] Patient left in no apparent distress in bed  [X] Call bell left within reach  [X] Nursing notified  [ ] Caregiver present  [ ] Bed alarm activated      COMMUNICATION/EDUCATION:   The patients plan of care was discussed with: Registered Nurse. [ ] Home safety education was provided and the patient/caregiver indicated understanding. [X] Patient/family have participated as able in goal setting and plan of care. [ ] Patient/family agree to work toward stated goals and plan of care. [ ] Patient understands intent and goals of therapy, but is neutral about his/her participation. [ ] Patient is unable to participate in goal setting and plan of care. This patients plan of care is appropriate for delegation to [facility].      Thank you for this referral.  Viraj Haynes OTR/L  Time Calculation: 36 mins

## 2017-08-07 NOTE — CARDIO/PULMONARY
Cardiopulmonary Rehab:    Chart reviewed. Pt is a 67 y.o. female S/p Peripheral angioplasty.     HX includes CHF class 2, ICD, CKD on HD.  LVEF 4/19/2017 80-16% grade 2 diastolic dysfunction. Pending SNF placement. Pt visited, resting in bed, awakened with knock on the door. This was a follow-up visit to answer questions and reinforce prior teaching re: CHF, S&Ss, medication management, Low NA diet, daily weights and when to call the doctor. Pt weighs with dialysis. Reminded pt to alert staff with s&s of fluid overload. Pt correctly identified signs. Pt verbalized understanding/denied questions at this time.

## 2017-08-07 NOTE — PROGRESS NOTES
[address] [place] [address], [address] [facility]  [phone]      Cardiology Progress Note      8/7/2017 10:00AM    Admit Date: 8/2/2017    Admit Diagnosis:   PAD  PAD (peripheral artery disease) ([address]  Bilateral lower extemity with potential angioplasty    Subjective: Saeed Whitlock continues with LLE discomfort, weakness and fatigue. Onset of \"skin Itching\", treating with Benadryl. Awaiting SNF placement.       Visit Vitals    /61    Pulse 76    Temp 98.2 °F (36.8 °C)    Resp 16    Ht 5' 2\" (1.575 m)    Wt 65.4 kg (144 lb 2.9 oz)    SpO2 97%    BMI 26.37 kg/m2       Current Facility-Administered Medications   Medication Dose Route Frequency    [START ON 8/8/2017] epoetin cornelio (EPOGEN;PROCRIT) injection 8,000 Units  8,000 Units SubCUTAneous DIALYSIS TUE, THU & SAT    diphenhydrAMINE (BENADRYL) capsule 25 mg  25 mg Oral Q6H PRN    albumin human 25% (BUMINATE) solution 12.5 g  12.5 g IntraVENous DIALYSIS PRN    iodixanol (VISIPAQUE) 320 mg iodine/mL contrast injection 0-200 mL  0-200 mL IntraarTERial Multiple    heparinized saline 2 units/mL 1,000 unit/500 mL infusion        sodium chloride (NS) flush 5-10 mL  5-10 mL IntraVENous Q8H    sodium chloride (NS) flush 5-10 mL  5-10 mL IntraVENous PRN    fentaNYL citrate (PF) injection 25 mcg  25 mcg IntraVENous Q4H PRN    HYDROmorphone (PF) (DILAUDID) injection 1 mg  1 mg IntraVENous Q3H PRN    aspirin chewable tablet 81 mg  81 mg Oral 7am    midazolam (VERSED) 1 mg/mL injection        acetaminophen (TYLENOL) tablet 325 mg  325 mg Oral Q6H PRN    albuterol (PROVENTIL HFA, VENTOLIN HFA, PROAIR HFA) inhaler 2 Puff  2 Puff Inhalation Q4H PRN    albuterol-ipratropium (DUO-NEB) 2.5 MG-0.5 MG/3 ML  3 mL Nebulization Q6H PRN    atorvastatin (LIPITOR) tablet 10 mg  10 mg Oral QHS    fluticasone-vilanterol (BREO ELLIPTA) 100mcg-25mcg/puff   Inhalation DAILY    clopidogrel (PLAVIX) tablet 75 mg  75 mg Oral DAILY    insulin lispro (HUMALOG) injection 5 Units  5 Units SubCUTAneous TIDAC    insulin NPH (NOVOLIN N, HUMULIN N) injection 20 Units  20 Units SubCUTAneous BID WITH MEALS    HYDROcodone-acetaminophen (NORCO) 5-325 mg per tablet 1 Tab  1 Tab Oral Q6H PRN    metoprolol tartrate (LOPRESSOR) tablet 50 mg  50 mg Oral BID    methocarbamol (ROBAXIN) tablet 750 mg  750 mg Oral QID    umeclidinium (INCRUSE ELLIPTA) 62.5 mcg/actuation  1 Puff Inhalation DAILY    roflumilast (DALIRESP) tablet 500 mcg  500 mcg Oral DAILY    insulin lispro (HUMALOG) injection   SubCUTAneous AC&HS    glucose chewable tablet 16 g  4 Tab Oral PRN    glucagon (GLUCAGEN) injection 1 mg  1 mg IntraMUSCular PRN    dextrose 10% infusion 125-250 mL  125-250 mL IntraVENous PRN       Objective:      Physical Exam:  General Appearance:  elderly  female in no acute distress  Chest:   diminished, wheezing slight  Cardiovascular:  Regular rate and rhythm, no murmur.   Abdomen:   Soft, non-tender, bowel sounds are active.   Extremities: +DP pulses, warm, no edema  Skin:  Warm and dry.     Data Review:   Recent Labs      08/07/17   0126  08/06/17   0518  08/05/17   0033   WBC  9.9  8.8  12.5*   HGB  10.2*  9.8*  11.4*   HCT  33.0*  31.6*  35.3   PLT  133*  144*  153     Recent Labs      08/07/17   0126  08/06/17   0518  08/05/17   0033   NA  137  136  135*   K  5.6*  4.9  5.5*   CL  99  99  100   CO2  33*  30  27   GLU  60*  92  198*   BUN  32*  21*  26*   CREA  5.09*  3.92*  3.89*   CA  8.7  8.1*  8.9   MG  2.1  2.1  2.1   PHOS  3.8  3.3  2.6   ALB   --   2.8*   --    TBILI   --   0.6   --    SGOT   --   15   --    ALT   --   11*   --        No results for input(s): TROIQ, CPK, CKMB in the last 72 hours. Intake/Output Summary (Last 24 hours) at 08/07/17 1314  Last data filed at 08/06/17 1920   Gross per 24 hour   Intake              840 ml   Output                0 ml   Net              840 ml        Telemetry: SR    Left leg US:  1.  No evidence of an hematoma, aneurysm or pseudoaneurysm in the left leg.  2. <50% stenosis of the common femoral and superficial femoral arteries with calcific plaque. 3. Patent left femoral artery stent.         Assessment:     Principal Problem:    PAD (peripheral artery disease) ([facility] (10/4/2016)      Overview: R SFA intervention 3/6/17     Active Problems:    HTN (hypertension) (10/8/2010)      ESRD (end stage renal disease) ([facility] (10/16/2012)      Acute on chronic diastolic CHF (congestive heart failure), NYHA class 2 ([address] (2/19/2015)      CAD (coronary artery disease) (3/9/2015)      Type 2 diabetes mellitus without complication ([facility] (2/90/7163)        Plan:     PAD: hx of b/l LE intervention. S/p left SFA intervention via ante & retrograde approach. Continue on Plavix, ASA, BB  Manage LE discomfort with [place]  PT/OT to work with patient  Due to LE PAD and inactivity, recommend SND      CAD, Cardiomyopathy:  Diastolic HF  Continue on aspirin, BB and statin.       COPD:   home O2 as needed  Sats on RA 97%     ESRD:   HD T/TH/S  Consider thoracentesis again-last tap removed 850cc of fluid, cytology (-)    Pleural effusion:  Patient had R thoracentesis on 7/10/17 with 850cc out. CT scan of spine on 7/30/17 showed mod RLE pl effusion.         Pt seen and examined in details. Agree with NP A&P. Left LE post procedure stable from vascular standpoint. +ve distal pulses. Left foot is warm. Sensations intact, left groin looks ok, on US left SFA stent and groin looks ok. If pain in left LE does not resolve then will looks into non vascular etiologies as out pt. May need neuropathy evaluation. D/w pt.      Flora Vu MD

## 2017-08-07 NOTE — PROGRESS NOTES
The patient is a 67year old female who was admitted for PAD bilateral lower extremities and to have   angioplasty to LE. She has a history of HTN, ESRD, Acute on chronic diastolic CHF, CAD and Type 2 DM controlled. She last saw her pcp 2 months ago. She receives dialysis TuesThur-Sat at [facility] in [place], [place] She uses [address] on [email or web address]. She lives in a one story home with no steps to enter, has a rolling walker and straight cane that she did not use for a while. Care Management Interventions  PCP Verified by CM: Yes  Mode of Transport at Discharge: Other (see comment) (family by car or wheelchair Matt Persaud)  Transition of Care Consult (CM Consult): SNF [facility])  Partner SNF: No  Reason Why Partner SNF Not Chosen: Positive previous encounter  Physical Therapy Consult: Yes (pending)  Occupational Therapy Consult: Yes (OT note done)  Current Support Network: Lives Alone (she was inependent and driving prior to admission-her daughter lives nearby and works)  Confirm Follow Up Transport: Family (or wheel Mariah Lyle)  Plan discussed with Pt/Family/Caregiver: Yes (using freedom of choice she chose [facility])  Freedom of Choice Offered: Yes (in chart)   She will probably going to a snf at discharge per OT. Pt is pending. CM will follow for discharge planning.  Flora Kilgore RTN [id number]

## 2017-08-08 NOTE — CONSULTS
Hospitalist Consultation Note    NAME:  David Pierre   :      MRN:   [id number]     ATTENDING: Flora Vu MD  PCP:  Lindy Valdez MD    Date/Time:  2017 10:45 AM      Recommendations/Plan:       DM2 with episode of symptomatic hypoglycemia  -patient reports that she typically skips her afternoon dose of NPH and she also has stopped giving herself novolog with meals. Her BS at home is usually 150s with just NPH 20units in the morning and nothing else. After some discussion, I have adjusted down her insulin and updated the discharge med rec to reflect on her AVS.    -will change her NPH 20 units AM and 5 units PM  -continue with premeal novolog following a sliding scale  -she should be stable to go to rehab with these changes. Dr Noreen Melendez will review her BS tomorrow if she is still here. Code Status:  Full          Subjective:   REQUESTING PHYSICIAN:  Dr Norma Bradley:  Assist with blood sugar managment  Kamini Ward is a 67 y.o.  female with ESRD, DM, PAD who was admitted for elective left SFA intervention. Based on her PTA med list, she was restarted on NPH 20 BID + Humalog 5 TID with meals + SSI correction. Her BS trended down over the last 24 hours, with the lowest 69, associated with lightheadedness. Past Medical History:   Diagnosis Date    Acute on chronic diastolic CHF (congestive heart failure), NYHA class 2 ([facility] 2015    Adrenal mass, left (HCC)     2.4 Cm om 2010    Anemia associated with chronic renal failure     Arthritis     right knee    Asthma     CKD (chronic kidney disease), stage IV (HCC)     ying,ike amin [facility]    COPD     Diabetes ([facility] dx ~    type II    Generalized social phobia     HTN (hypertension)     Hypercholesteremia     Nausea & vomiting 2013    Other ill-defined conditions     fistula, R upper arm.     Pneumonia 12/10/2014    left lower lobe with sepsis    Presence of biventricular AICD 4/22/2016 4/20/16 [employer] biventricular AICD implant    Renal calculus or stone     small Lt by US 3/2012.  S/P AV lv ablation 04/22/2016 4/20/16    Smokers' cough ([address]       Past Surgical History:   Procedure Laterality Date    COLONOSCOPY,DIAGNOSTIC  3/28/2016         HX HEENT      Lasik eye surgery    HX OPEN CHOLECYSTECTOMY  ~ 1985    HX OTHER SURGICAL      adrenal mass removed    HX SHOULDER ARTHROSCOPY  2008    removal bone spur left shoulder    KNEE SCOPE,CLEAN/DRAIN      UPPER GI ENDOSCOPY,DIAGNOSIS  3/28/2016         VASCULAR SURGERY PROCEDURE UNLIST      dialysis in right arm     Social History   Substance Use Topics    Smoking status: Former Smoker     Packs/day: 2.00     Years: 48.00     Types: Cigarettes     Quit date: 1/3/2012    Smokeless tobacco: Never Used    Alcohol use No      Family History   Problem Relation Age of Onset    Diabetes Mother     Heart Disease Mother     Hypertension Mother     High Cholesterol Mother     Arthritis-osteo Mother     Cancer Father      Lung    Arthritis-osteo Father     Cancer Brother      x2 - lung cancer       Allergies   Allergen Reactions    Grapefruit Hives    Orange Juice Hives    Peach (Prunus Persica) Hives      Prior to Admission medications    Medication Sig Start Date End Date Taking? Authorizing Provider   insulin NPH (NOVOLIN N, HUMULIN N) 100 unit/mL injection 20 Units by SubCUTAneous route daily (with breakfast). 8/8/17  Yes Fallon Alcantar MD   insulin NPH (NOVOLIN N, HUMULIN N) 100 unit/mL injection 5 Units by SubCUTAneous route daily (with dinner).  8/8/17  Yes Fallon Alcantar MD   insulin lispro (HUMALOG) 100 unit/mL injection INITIATE CORRECTIVE INSULIN PROTOCOL (KRYSTLE):  RX KRYSTLE Normal Sensitivity (Average weight)  For Blood Sugar (mg/dl) of:              111-150=2 units  151-200=4 units  201-250=5 units  251-300=8 units  301-350=10 units  Over 350= Call MD 8/8/17 Yes Lidya Burgos MD   predniSONE (STERAPRED) 5 mg dose pack See administration instruction per 5mg dose pack 7/30/17  Yes Raciel Carpenter   OXYGEN-AIR DELIVERY SYSTEMS by Does Not Apply route. Yes Historical Provider   clopidogrel (PLAVIX) 75 mg tab TAKE 1 TABLET BY MOUTH DAILY 6/22/17  Yes Flora Vu MD   metoprolol tartrate (LOPRESSOR) 50 mg tablet Take  by mouth two (2) times a day. Yes Historical Provider   albuterol-ipratropium (DUO-NEB) 2.5 mg-0.5 mg/3 ml nebu 3 mL by Nebulization route every six (6) hours as needed. 11/19/16  Yes Armin Dinero MD   alcohol swabs (ALCOHOL PADS) padm 1 Each by Apply Externally route Multiple. 11/19/16  Yes Armin Dinero MD   albuterol (PROVENTIL HFA, VENTOLIN HFA, PROAIR HFA) 90 mcg/actuation inhaler Take 2 Puffs by inhalation every four (4) hours as needed for Wheezing. 11/8/16  Yes Mar Costello MD   calcium acetate (PHOSLO) 667 mg cap Take 2 Caps by mouth three (3) times daily (with meals). 8/20/16  Yes Historical Provider   acetaminophen (TYLENOL) 325 mg tablet Take 650 mg by mouth every six (6) hours as needed for Pain or Fever (Temp >100.5). Yes Historical Provider   budesonide-formoterol (SYMBICORT) 160-4.5 mcg/actuation HFA inhaler Take 2 Puffs by inhalation two (2) times a day. Patient takes at 0500 and 2100   Yes Alvin Mejia MD   roflumilast (DALIRESP) tab tablet Take 500 mcg by mouth daily. Yes Alvin Mejia MD   atorvastatin (LIPITOR) 10 mg tablet Take 10 mg by mouth nightly. Yes Alvin Mejia MD   cholecalciferol vitamin D3, (VITAMIN D3) 2,000 unit tab Take 1 Tab by mouth daily. Yes Alvin Mejia MD   tiotropium (SPIRIVA WITH HANDIHALER) 18 mcg inhalation capsule Take 1 Cap by inhalation daily. Yes Historical Provider   aspirin 81 mg tablet Take 81 mg by mouth every morning.  Indications: MYOCARDIAL INFARCTION PREVENTION 11/29/10  Yes Historical Provider   HYDROcodone-acetaminophen (NORCO) 5-325 mg per tablet Take 1 Tab by mouth every six (6) hours as needed for Pain for up to 15 doses. Max Daily Amount: 4 Tabs. 7/30/17   VEENA Emerson   oxyCODONE-acetaminophen (PERCOCET) 5-325 mg per tablet Take 1 Tab by mouth every six (6) hours as needed for Pain. Max Daily Amount: 4 Tabs. 6/3/17   Rella Cushing, PA   methocarbamol (ROBAXIN-750) 750 mg tablet Take 1 Tab by mouth four (4) times daily. 6/3/17   Rella Cushing, PA   HYDROcodone-acetaminophen [facility]) 5-325 mg per tablet Take 1 Tab by mouth every four (4) hours as needed for Pain. Max Daily Amount: 6 Tabs. 11/19/16   Gumaro Kyle MD   insulin lispro (HUMALOG) 100 unit/mL injection 5 Units by SubCUTAneous route Before breakfast, lunch, and dinner. 11/19/16   Gumaro Kyle MD   aluminum-magnesium hydroxide 200-200 mg/5 mL susp 30 mL, diphenhydrAMINE 12.5 mg/5 mL elix 75 mg, lidocaine 2 % soln 30 mL, sucralfate 100 mg/mL susp 3 g oral solution (compounded) Take 5 mL by mouth Before breakfast, lunch, and dinner. 11/19/16   Gumaro Kyle MD   Insulin Syringe-Needle U-100 0.5 mL 29 gauge x 1/2\" syrg 1 Each by Does Not Apply route five (5) times daily.  11/19/16   Gumaro Kyle MD       REVIEW OF SYSTEMS:     Total of 12 systems reviewed as follows:      POSITIVE= underlined text  Negative = text not underlined  General:  fever, chills, sweats, generalized weakness, weight loss/gain,      loss of appetite   Eyes:    blurred vision, eye pain, loss of vision, double vision  ENT:    rhinorrhea, pharyngitis   Respiratory:   cough, sputum production, SOB, wheezing, LORENZO, pleuritic pain   Cardiology:   chest pain, palpitations, orthopnea, PND, edema, syncope   Gastrointestinal:  abdominal pain , N/V, dysphagia, diarrhea, constipation, bleeding   Genitourinary:  frequency, urgency, dysuria, hematuria, incontinence   Muskuloskeletal :  arthralgia, myalgia   Hematology:  easy bruising, nose or gum bleeding, lymphadenopathy   Dermatological: rash, ulceration, pruritis Endocrine:   hot flashes or polydipsia   Neurological:  headache, dizziness, confusion, focal weakness, paresthesia,     Speech difficulties, memory loss, gait disturbance  Psychological: Feelings of anxiety, depression, agitation    Objective:   VITALS:    Visit Vitals    /49 (BP 1 Location: Left arm, BP Patient Position: At rest)    Pulse 75    Temp 97.9 °F (36.6 °C)    Resp 18    Ht 5' 2\" (1.575 m)    Wt 66.5 kg (146 lb 11.2 oz)    SpO2 100%    BMI 26.83 kg/m2     Temp (24hrs), Av.2 °F (36.8 °C), Min:97.9 °F (36.6 °C), Max:98.3 °F (36.8 °C)      PHYSICAL EXAM:   General:    Alert, cooperative, no distress, appears stated age. HEENT: Atraumatic, anicteric sclerae, pink conjunctivae     No oral ulcers, mucosa moist, throat clear  Neck:  Supple, symmetrical,  thyroid: non tender  Lungs:   Clear to auscultation bilaterally. No Wheezing or Rhonchi. No rales. Chest wall:  No tenderness  No Accessory muscle use. Heart:   Regular  rhythm,  No edema   Abdomen:   Soft, non-tender. Not distended. Bowel sounds normal  Extremities: No cyanosis. No clubbing  Skin:     Not pale. Not Jaundiced  No rashes   Psych:  Good insight. Not depressed. Not anxious or agitated. Neurologic: EOMs intact. No facial asymmetry. No aphasia or slurred speech.  Symmetrical strength, Alert and oriented X 4.     _______________________________________________________________________  Care Plan discussed with:    Comments   Patient x    Family      RN     Care Manager                    Consultant:      ____________________________________________________________________  TOTAL TIME:  35 mins    Comments    x Reviewed previous records   >50% of visit spent in counseling and coordination of care  Discussion with patient and/or family and questions answered       Critical Care Provided     Minutes non procedure based  ________________________________________________________________________  Signed: Broderick , MD      Procedures: see electronic medical records for all procedures/Xrays and details which were not copied into this note but were reviewed prior to creation of Plan.     LAB DATA REVIEWED:    Recent Results (from the past 24 hour(s))   GLUCOSE, POC    Collection Time: 08/07/17 11:29 AM   Result Value Ref Range    Glucose (POC) 98 65 - 100 mg/dL    Performed by [address], POC    Collection Time: 08/07/17  4:02 PM   Result Value Ref Range    Glucose (POC) 98 65 - 100 mg/dL    Performed by [address], POC    Collection Time: 08/07/17 10:17 PM   Result Value Ref Range    Glucose (POC) 83 65 - 100 mg/dL    Performed by [employer]    GLUCOSE, POC    Collection Time: 08/08/17  2:21 AM   Result Value Ref Range    Glucose (POC) 69 65 - 100 mg/dL    Performed by [employer]    METABOLIC PANEL, BASIC    Collection Time: 08/08/17  2:30 AM   Result Value Ref Range    Sodium 138 136 - 145 mmol/L    Potassium 5.6 (H) 3.5 - 5.1 mmol/L    Chloride 99 97 - 108 mmol/L    CO2 29 21 - 32 mmol/L    Anion gap 10 5 - 15 mmol/L    Glucose 89 65 - 100 mg/dL    BUN 38 (H) 6 - 20 MG/DL    Creatinine 6.58 (H) 0.55 - 1.02 MG/DL    BUN/Creatinine ratio 6 (L) 12 - 20      GFR est AA 7 (L) >60 ml/min/1.73m2    GFR est non-AA 6 (L) >60 ml/min/1.73m2    Calcium 8.7 8.5 - 10.1 MG/DL   CBC W/O DIFF    Collection Time: 08/08/17  2:30 AM   Result Value Ref Range    WBC 10.5 3.6 - 11.0 K/uL    RBC 3.45 (L) 3.80 - 5.20 M/uL    HGB 10.6 (L) 11.5 - 16.0 g/dL    HCT 34.0 (L) 35.0 - 47.0 %    MCV 98.6 80.0 - 99.0 FL    MCH 30.7 26.0 - 34.0 PG    MCHC 31.2 30.0 - 36.5 g/dL    RDW 15.4 (H) 11.5 - 14.5 %    PLATELET 900 (L) 379 - 400 K/uL   GLUCOSE, POC    Collection Time: 08/08/17  2:37 AM   Result Value Ref Range    Glucose (POC) 86 65 - 100 mg/dL    Performed by Bishop Mcknight    GLUCOSE, POC    Collection Time: 08/08/17  7:43 AM   Result Value Ref Range    Glucose (POC) 240 (H) 65 - 100 mg/dL    Performed by Mojgan Lock        _____________________________  Hospitalist: Fallon Alcantar MD

## 2017-08-08 NOTE — PROGRESS NOTES
PT saw the patient this am and recommends snf. Cm sent referral to [facility] as requested by the patient. If accepted will go today after dialysis.  Alexander Wright RN #[id number]

## 2017-08-08 NOTE — PROGRESS NOTES
Problem: Mobility Impaired (Adult and Pediatric)  Goal: *Acute Goals and Plan of Care (Insert Text)  Physical Therapy Goals  Initiated 8/8/2017  1. Patient will move from supine to sit and sit to supine in bed with independence within 7 day(s). 2. Patient will transfer from bed to chair and chair to bed with supervision/set-up using the least restrictive device within 7 day(s). 3. Patient will perform sit to stand with independence within 7 day(s). 4. Patient will ambulate with supervision/set-up for 100 feet with the least restrictive device within 7 day(s). PHYSICAL THERAPY EVALUATION  Patient: Chance Montgomery (81 y.o. female)  Date: 8/8/2017  Primary Diagnosis: PAD  PAD (peripheral artery disease) (HCC)  Bilateral lower extemity with potential angioplasty  Procedure(s) (LRB):  PACU/RECOVERY (N/A)     Precautions: fall        ASSESSMENT :  Based on the objective data described below, the patient presents with generalized weakness, decreased activity tolerance, impaired balance, decreased mobility skills. Bed mobility with modified independence. Sit to stand with CGA. Patient ambulated 27' with HHA x 1. Patient slightly unsteady and fatigues quickly. Patient with 4-5 minute rest break, then ambulated additional 30' with rolling walker and CGA. Walker improves ambulation, but patient requires cues to stay inside walker, turn around prior to sitting. Prior to admission, patient was living with sister and used a cane for ambulation. Recommend short term SNF to improve endurance, strength, mobility skills prior to returning home. Patient will benefit from skilled intervention to address the above impairments.   Patients rehabilitation potential is considered to be Good  Factors which may influence rehabilitation potential include:   [X]         None noted  [ ]         Mental ability/status  [ ]         Medical condition  [ ]         Home/family situation and support systems  [ ]         Safety awareness  [ ]         Pain tolerance/management  [ ]         Other:        PLAN :  Recommendations and Planned Interventions:  [X]           Bed Mobility Training             [ ]    Neuromuscular Re-Education  [X]           Transfer Training                   [ ]    Orthotic/Prosthetic Training  [X]           Gait Training                         [ ]    Modalities  [X]           Therapeutic Exercises           [ ]    Edema Management/Control  [X]           Therapeutic Activities            [ ]    Patient and Family Training/Education  [ ]           Other (comment):     Frequency/Duration: Patient will be followed by physical therapy  4 times a week to address goals. Discharge Recommendations: [facility]  Further Equipment Recommendations for Discharge: none       SUBJECTIVE:   Patient stated My leg starts hurting.       OBJECTIVE DATA SUMMARY:   HISTORY:    Past Medical History:   Diagnosis Date    Acute on chronic diastolic CHF (congestive heart failure), NYHA class 2 ([facility] [address] 2/19/2015    Adrenal mass, left (HCC)       2.4 Cm om Feb 2010    Anemia associated with chronic renal failure      Arthritis       right knee    Asthma      CKD (chronic kidney disease), stage IV (HCC)       ying[facility] tues,thur sat  [facility]    COPD      Diabetes ([facility] [address] dx 1997~     type II    Generalized social phobia      HTN (hypertension)      Hypercholesteremia      Nausea & vomiting 11/11/2013    Other ill-defined conditions       fistula, R upper arm.  Pneumonia 12/10/2014     left lower lobe with sepsis    Presence of biventricular AICD 4/22/2016 4/20/16 [email or web address] biventricular AICD implant    Renal calculus or stone       small Lt by US 3/2012.       S/P AV lv ablation 04/22/2016 4/20/16    Smokers' cough [facility])       Past Surgical History:   Procedure Laterality Date    COLONOSCOPY,DIAGNOSTIC   3/28/2016          HX HEENT         Lasik eye surgery    HX OPEN CHOLECYSTECTOMY   ~ 1985    HX OTHER SURGICAL         adrenal mass removed    HX SHOULDER ARTHROSCOPY   2008     removal bone spur left shoulder    KNEE SCOPE,CLEAN/DRAIN        UPPER GI ENDOSCOPY,DIAGNOSIS   3/28/2016          VASCULAR SURGERY PROCEDURE UNLIST         dialysis in right arm     Prior Level of Function/Home Situation: Lives with sister, independent with cane  Personal factors and/or comorbidities impacting plan of care:      Home Situation  Home Environment: Private residence  # Steps to Enter: 3  Hand Rails : Bilateral  Wheelchair Ramp: Yes  One/Two Story Residence: One story  Living Alone: No  Support Systems: Family member(s) (daughter assists)  Patient Expects to be Discharged to[de-identified] Private residence  Current DME Used/Available at Home: Cherylynn Dings, straight, Shower chair, Walker, rolling  Tub or Shower Type: Tub/Shower combination     EXAMINATION/PRESENTATION/DECISION MAKING:   Critical Behavior:  Neurologic State: Alert  Orientation Level: Oriented X4  Cognition: Appropriate decision making  Safety/Judgement: Awareness of environment  Hearing:   Auditory  Auditory Impairment: None  Skin:  Bruising noted  Edema: slight amount left LE  Range Of Motion:  AROM: Generally decreased, functional           PROM: Within functional limits           Strength:    Strength: Generally decreased, functional                    Tone & Sensation:   Tone: Normal              Sensation: Intact               Coordination:  Coordination: Within functional limits  Vision:      Functional Mobility:  Bed Mobility:  Rolling: Modified independent  Supine to Sit: Modified independent     Scooting: Independent  Transfers:  Sit to Stand: Contact guard assistance  Stand to Sit: Contact guard assistance        Bed to Chair: Contact guard assistance              Balance:   Sitting: Intact  Standing: Impaired  Standing - Static: Fair  Standing - Dynamic : Fair  Ambulation/Gait Training:  Distance (ft): 60 Feet (ft) (30' x 2)  Assistive Device: Gait belt;Walker, rolling  Ambulation - Level of Assistance: Contact guard assistance        Gait Abnormalities: Decreased step clearance              Speed/Audrey: Pace decreased (<100 feet/min)  Step Length: Left shortened;Right shortened                                                       Functional Measure:     Elder Mobility Scale      12/20            EMS and G-code impairment scale:  Percentage of impairment CH  0% CI  1-19% CJ  20-39% CK  40-59% CL  60-79% CM  80-99% CN  100%   EMS Score 0-20 20 17-19 13-16 9-12 5-8 1-4 0      Scores under 10  generally these patients are dependent in mobility maneuvers; require help with  basic ADL, such as transfers, toileting and dressing. Scores between 10  13  generally these patients are borderline in terms of safe mobility and  independence in ADL i.e. they require some help with some mobility maneuvers. Scores over 14  Generally these patients are able to perform mobility maneuvers alone and safely  and are independent in basic ADL. G codes: In compliance with CMSs Claims Based Outcome Reporting, the following G-code set was chosen for this patient based on their primary functional limitation being treated: The outcome measure chosen to determine the severity of the functional limitation was the Elderly Mobility Scale with a score of 12/20 which was correlated with the impairment scale.       · Mobility - Walking and Moving Around:               - CURRENT STATUS:    CK - 40%-59% impaired, limited or restricted               - GOAL STATUS:           CJ - 20%-39% impaired, limited or restricted               - D/C STATUS:                       ---------------To be determined---------------      Physical Therapy Evaluation Charge Determination   History Examination Presentation Decision-Making   MEDIUM  Complexity : 1-2 comorbidities / personal factors will impact the outcome/ POC  LOW Complexity : 1-2 Standardized tests and measures addressing body structure, function, activity limitation and / or participation in recreation  LOW Complexity : Stable, uncomplicated  LOW Complexity : FOTO score of       Based on the above components, the patient evaluation is determined to be of the following complexity level: LOW      Pain:  Pain Scale 1: Numeric (0 - 10)  Pain Intensity 1: 10  Pain Location 1: Leg  Pain Orientation 1: Left  Pain Description 1: Aching  Pain Intervention(s) 1: Medication (see MAR)  Activity Tolerance:   fair  Please refer to the flowsheet for vital signs taken during this treatment. After treatment:   [X]         Patient left in no apparent distress sitting up in chair  [ ]         Patient left in no apparent distress in bed  [X]         Call bell left within reach  [X]         Nursing notified  [ ]         Caregiver present  [ ]         Bed alarm activated      COMMUNICATION/EDUCATION:   The patients plan of care was discussed with: Registered Nurse.  [X]         Fall prevention education was provided and the patient/caregiver indicated understanding. [ ]         Patient/family have participated as able in goal setting and plan of care. [X]         Patient/family agree to work toward stated goals and plan of care. [ ]         Patient understands intent and goals of therapy, but is neutral about his/her participation. [ ]         Patient is unable to participate in goal setting and plan of care.      Thank you for this referral.  Johnathan Cruz, PT   Time Calculation: 16 mins

## 2017-08-08 NOTE — PROCEDURES
[facility]  [phone]    Vitals   Pre   Post   Assessment   Pre   Post     Temp  Temp: 98.7 °F (37.1 °C) (08/08/17 1345)  98.4 LOC  A&Ox4 A&OX4   HR   Pulse (Heart Rate): 75 (08/08/17 1345) 75 Lungs   Diminished  diminished   B/P   BP: 115/43 (08/08/17 1345) 130/50 Cardiac   Paced on Tele; BP WDL  paced   Resp   Resp Rate: 20 (08/08/17 1345) 18 Skin   Ecchymotic  same   Pain level  Pain Intensity 1: 5 (08/08/17 1111) 0 Edema  Trace in BLE     none   Orders:    Duration:   Start:    9632 End:    1655 Total:   3 hours   Dialyzer:   Dialyzer/Set Up Inspection: Revaclear (08/08/17 1345)   K Bath:   Dialysate K (mEq/L): 2 (08/08/17 1345)   Ca Bath:   Dialysate CA (mEq/L): 2.5 (08/08/17 1345)   Na/Bicarb:   Dialysate NA (mEq/L): 140 (08/08/17 1345)   Target Fluid Removal:   Goal/Amount of Fluid to Remove (mL): 3500 mL (08/08/17 1345)   Access     Type & Location:   RUE AVF, 15 gauge needles used. Post tx: Sites held 10m minutes, bleeding stopped.    Labs     Obtained/Reviewed   Critical Results Called   Date when labs were drawn-  Hgb-    HGB   Date Value Ref Range Status   08/08/2017 10.6 (L) 11.5 - 16.0 g/dL Final     K-    Potassium   Date Value Ref Range Status   08/08/2017 5.6 (H) 3.5 - 5.1 mmol/L Final     Ca-   Calcium   Date Value Ref Range Status   08/08/2017 8.7 8.5 - 10.1 MG/DL Final     Bun-   BUN   Date Value Ref Range Status   08/08/2017 38 (H) 6 - 20 MG/DL Final     Creat-   Creatinine   Date Value Ref Range Status   08/08/2017 6.58 (H) 0.55 - 1.02 MG/DL Final        Medications/ Blood Products Given     Name   Dose   Route and Time     none                Blood Volume Processed (BVP):    59.4 liters Net Fluid   Removed:  3500 ml   Comments   Time Out Done: Yes  Primary Nurse Rpt Pre: Pema Thacker, RN   Primary Nurse Rpt Post: Pema Thacker RN  Pt Education: fluid control  Care Plan: Dialysis as ordered  Tx Summary: 3 hours 2 K 2.5 Ca removed 3500 ml    1348: LUE AVF +bruit/thrill. Site disinfected. Access cannulated without difficulty using 15g needles x2. Access patent and secured. Treatment initiated at 1348. Patient tolerated tx well. She had leg pain in middle of tx, but was medicated by [facility] nurse. Report called to Gordy Zafar, using SBAR. Admiting Diagnosis: Elective LLE Angiogram  Pt's previous clinic-[facility]  Consent signed - Informed Consent Verified: Yes (08/08/17 1345)  Oscarita Consent - Yes  Hepatitis Status- Neg/Immune  Machine #- Machine Number: [id number] (08/08/17 1345)  Telemetry status-Tele (Paced)  Pre-dialysis wt. - Pre-Dialysis Weight: 65.8 kg (145 lb 1 oz) (08/04/17 3581)

## 2017-08-08 NOTE — PROGRESS NOTES
NAME: Bhavya Vu        :  89        MRN:  [id number]        Assessment :    Plan:  --PYDD-AIH-CRS-[place]  PAD   S/p L SFA atherectomy/stent (17)  Recurrent pleural effusion  COPD  Anemia  Hyperkalemia-> 2K bath --HD today. 2K bath. Pull fluid as tolerated    Continue EPO       Subjective:     Chief Complaint:  \" My leg feels a little better. \"  No N/V. No dyspnea. No HA      Review of Systems:    Symptom Y/N Comments  Symptom Y/N Comments   Fever/Chills    Chest Pain     Poor Appetite    Edema     Cough    Abdominal Pain     Sputum    Joint Pain     SOB/LORENZO    Pruritis/Rash     Nausea/vomit    Tolerating PT/OT     Diarrhea    Tolerating Diet     Constipation    Other       Could not obtain due to:      Objective:     VITALS:   Last 24hrs VS reviewed since prior progress note.  Most recent are:  Visit Vitals    /49 (BP 1 Location: Left arm, BP Patient Position: At rest)    Pulse 75    Temp 97.9 °F (36.6 °C)    Resp 18    Ht 5' 2\" (1.575 m)    Wt 66.5 kg (146 lb 11.2 oz)    SpO2 100%    BMI 26.83 kg/m2       Intake/Output Summary (Last 24 hours) at 17 0826  Last data filed at 17 1910   Gross per 24 hour   Intake              480 ml   Output                0 ml   Net              480 ml      Telemetry Reviewed:     PHYSICAL EXAM:  General: NAD  CTA  RRR  abd soft  No edema      Lab Data Reviewed: (see below)    Medications Reviewed: (see below)    PMH/SH reviewed - no change compared to H&P  ________________________________________________________________________  Care Plan discussed with:  Patient     Family      RN     Care Manager                    Consultant:          Comments   >50% of visit spent in counseling and coordination of care       ________________________________________________________________________  Gisella Scanlon MD     Procedures: see electronic medical records for all procedures/Xrays and details which  were not copied into this note but were reviewed prior to creation of Plan. LABS:  Recent Labs      08/08/17   0230  08/07/17   0126   WBC  10.5  9.9   HGB  10.6*  10.2*   HCT  34.0*  33.0*   PLT  136*  133*     Recent Labs      08/08/17   0230  08/07/17 0126  08/06/17   0518   NA  138  137  136   K  5.6*  5.6*  4.9   CL  99  99  99   CO2  29  33*  30   BUN  38*  32*  21*   CREA  6.58*  5.09*  3.92*   GLU  89  60*  92   CA  8.7  8.7  8.1*   MG   --   2.1  2.1   PHOS   --   3.8  3.3     Recent Labs      08/06/17   0518   SGOT  15   AP  91   TP  6.5   ALB  2.8*   GLOB  3.7     No results for input(s): INR, PTP, APTT in the last 72 hours. No lab exists for component: INREXT, INREXT   No results for input(s): FE, TIBC, PSAT, FERR in the last 72 hours. No results found for: FOL, RBCF   No results for input(s): PH, PCO2, PO2 in the last 72 hours. No results for input(s): CPK, CKMB in the last 72 hours.     No lab exists for component: TROPONINI  No components found for: [place]  Lab Results   Component Value Date/Time    Color DARK YELLOW 04/29/2016 02:40 AM    Appearance HAZY 04/29/2016 02:40 AM    Specific gravity 1.025 04/29/2016 02:40 AM    Specific gravity 1.018 07/27/2015 08:02 PM    pH (UA) 5.0 04/29/2016 02:40 AM    Protein 300 04/29/2016 02:40 AM    Glucose NEGATIVE  04/29/2016 02:40 AM    Ketone TRACE 04/29/2016 02:40 AM    Bilirubin NEGATIVE  04/29/2016 02:40 AM    Urobilinogen 0.2 04/29/2016 02:40 AM    Nitrites NEGATIVE  04/29/2016 02:40 AM    Leukocyte Esterase NEGATIVE  04/29/2016 02:40 AM    Epithelial cells FEW 04/29/2016 02:40 AM    Bacteria 2+ 04/29/2016 02:40 AM    WBC 0-4 04/29/2016 02:40 AM    RBC 0-5 04/29/2016 02:40 AM       MEDICATIONS:  Current Facility-Administered Medications   Medication Dose Route Frequency    epoetin cornelio (EPOGEN;PROCRIT) injection 8,000 Units  8,000 Units SubCUTAneous DIALYSIS TUE, THU & SAT    diphenhydrAMINE (BENADRYL) capsule 25 mg  25 mg Oral Q6H PRN    albumin human 25% (BUMINATE) solution 12.5 g  12.5 g IntraVENous DIALYSIS PRN    iodixanol (VISIPAQUE) 320 mg iodine/mL contrast injection 0-200 mL  0-200 mL IntraarTERial Multiple    heparinized saline 2 units/mL 1,000 unit/500 mL infusion        sodium chloride (NS) flush 5-10 mL  5-10 mL IntraVENous Q8H    sodium chloride (NS) flush 5-10 mL  5-10 mL IntraVENous PRN    fentaNYL citrate (PF) injection 25 mcg  25 mcg IntraVENous Q4H PRN    HYDROmorphone (PF) (DILAUDID) injection 1 mg  1 mg IntraVENous Q3H PRN    aspirin chewable tablet 81 mg  81 mg Oral 7am    midazolam (VERSED) 1 mg/mL injection        acetaminophen (TYLENOL) tablet 325 mg  325 mg Oral Q6H PRN    albuterol (PROVENTIL HFA, VENTOLIN HFA, PROAIR HFA) inhaler 2 Puff  2 Puff Inhalation Q4H PRN    albuterol-ipratropium (DUO-NEB) 2.5 MG-0.5 MG/3 ML  3 mL Nebulization Q6H PRN    atorvastatin (LIPITOR) tablet 10 mg  10 mg Oral QHS    fluticasone-vilanterol (BREO ELLIPTA) 100mcg-25mcg/puff   Inhalation DAILY    clopidogrel (PLAVIX) tablet 75 mg  75 mg Oral DAILY    insulin lispro (HUMALOG) injection 5 Units  5 Units SubCUTAneous TIDAC    insulin NPH (NOVOLIN N, HUMULIN N) injection 20 Units  20 Units SubCUTAneous BID WITH MEALS    HYDROcodone-acetaminophen (NORCO) 5-325 mg per tablet 1 Tab  1 Tab Oral Q6H PRN    metoprolol tartrate (LOPRESSOR) tablet 50 mg  50 mg Oral BID    methocarbamol (ROBAXIN) tablet 750 mg  750 mg Oral QID    umeclidinium (INCRUSE ELLIPTA) 62.5 mcg/actuation  1 Puff Inhalation DAILY    roflumilast (DALIRESP) tablet 500 mcg  500 mcg Oral DAILY    insulin lispro (HUMALOG) injection   SubCUTAneous AC&HS    glucose chewable tablet 16 g  4 Tab Oral PRN    glucagon (GLUCAGEN) injection 1 mg  1 mg IntraMUSCular PRN    dextrose 10% infusion 125-250 mL  125-250 mL IntraVENous PRN

## 2017-08-08 NOTE — PROGRESS NOTES
OT note:  Chart reviewed and pt was JAYDEN for dialysis at this time and per nursing pt to be discharged to SNF after her dialysis.

## 2017-08-08 NOTE — PROGRESS NOTES
1830-Reviewed discharge instructions with patient and daughter, no further questions. Report to be called to [facility]    200- attempted to call report to [facility], no answer. 2753- report called to [facility] at [facility].

## 2017-08-08 NOTE — PROGRESS NOTES
Nutrition Services      Nutrition Screen:  Wt Readings from Last 20 Encounters:   08/08/17 66.5 kg (146 lb 11.2 oz)   07/30/17 65.3 kg (143 lb 15.4 oz)   07/25/17 63.4 kg (139 lb 12.8 oz)   07/13/17 64 kg (141 lb)   07/10/17 59.5 kg (131 lb 2.8 oz)   07/07/17 68 kg (150 lb)   06/03/17 66.6 kg (146 lb 13.2 oz)   05/27/17 69.3 kg (152 lb 12.5 oz)   04/13/17 65.7 kg (144 lb 12.8 oz)   03/06/17 68.5 kg (151 lb)   02/28/17 68.7 kg (151 lb 6.4 oz)   12/13/16 64.4 kg (142 lb)   11/19/16 60 kg (132 lb 3.2 oz)   11/01/16 72.2 kg (159 lb 2.8 oz)   10/24/16 65.8 kg (145 lb)   10/13/16 65.8 kg (145 lb)   10/04/16 65.9 kg (145 lb 4.8 oz)   08/23/16 66.9 kg (147 lb 6.4 oz)   05/10/16 64.4 kg (142 lb)   05/01/16 74.4 kg (164 lb)     Body mass index is 26.83 kg/(m^2). Supplements:                        _____ ordered ______  declined. __ __  Pt is nutritionally stable at this time, will rescreen in 7 days. _x __    Pt is at nutritional risk and will be rescreened in 3-5 days. __ __  Pt is at moderate or high nutritional risk, will refer to RD for assessment.        Tmoás Sapp  Dietetic Technician, Registered

## 2017-08-08 NOTE — PROGRESS NOTES
Care Management Interventions  PCP Verified by CM: Yes  Mode of Transport at Discharge:  Other (see comment) (friend by car)  Transition of Care Consult (CM Consult): SNF [facility])  Partner SNF: No  Reason Why Partner SNF Not Chosen: Positive previous encounter  Physical Therapy Consult: Yes (pending)  Occupational Therapy Consult: Yes (OT note done)  Current Support Network: Lives Alone (she was inependent and driving prior to admission-her daughter lives nearby and works)  Confirm Follow Up Transport: Family (by car)  Plan discussed with Pt/Family/Caregiver: Yes (using freedom of choice she chose [facility])  Freedom of Choice Offered: Yes (in chart)  Discharge Location  Discharge Placement: Skilled nursing facility

## 2017-08-08 NOTE — PROGRESS NOTES
The nurse can call report to [phone]. The patient will be going to [facility] and into room 914. TRACI Holman RN #[id number]

## 2017-08-08 NOTE — PROGRESS NOTES
Bedside shift change report given to [address] [place] [address] (oncoming nurse) by Donnell Hill (offgoing nurse). Report included the following information SBAR, Kardex, Procedure Summary, Intake/Output, MAR, Recent Results and Cardiac Rhythm Paced. 46- Hospitalist consult called to Dr. Alessio Zaidi.

## 2017-08-08 NOTE — PROGRESS NOTES
8/8/2017 7:35 AM    Admit Date: 8/2/2017    Admit Diagnosis: PAD;PAD (peripheral artery disease) ([address] Bilateral lower e*    Subjective: Lawence Sandra waiting for SNF tx. Blood glucose has been running low. Left leg feels better, still has pain, but improved than previously.      Visit Vitals    /49 (BP 1 Location: Left arm, BP Patient Position: At rest)    Pulse 75    Temp 97.9 °F (36.6 °C)    Resp 18    Ht 5' 2\" (1.575 m)    Wt 146 lb 11.2 oz (66.5 kg)    SpO2 100%    BMI 26.83 kg/m2     Current Facility-Administered Medications   Medication Dose Route Frequency    epoetin cornelio (EPOGEN;PROCRIT) injection 8,000 Units  8,000 Units SubCUTAneous DIALYSIS TUE, THU & SAT    diphenhydrAMINE (BENADRYL) capsule 25 mg  25 mg Oral Q6H PRN    albumin human 25% (BUMINATE) solution 12.5 g  12.5 g IntraVENous DIALYSIS PRN    iodixanol (VISIPAQUE) 320 mg iodine/mL contrast injection 0-200 mL  0-200 mL IntraarTERial Multiple    heparinized saline 2 units/mL 1,000 unit/500 mL infusion        sodium chloride (NS) flush 5-10 mL  5-10 mL IntraVENous Q8H    sodium chloride (NS) flush 5-10 mL  5-10 mL IntraVENous PRN    fentaNYL citrate (PF) injection 25 mcg  25 mcg IntraVENous Q4H PRN    HYDROmorphone (PF) (DILAUDID) injection 1 mg  1 mg IntraVENous Q3H PRN    aspirin chewable tablet 81 mg  81 mg Oral 7am    midazolam (VERSED) 1 mg/mL injection        acetaminophen (TYLENOL) tablet 325 mg  325 mg Oral Q6H PRN    albuterol (PROVENTIL HFA, VENTOLIN HFA, PROAIR HFA) inhaler 2 Puff  2 Puff Inhalation Q4H PRN    albuterol-ipratropium (DUO-NEB) 2.5 MG-0.5 MG/3 ML  3 mL Nebulization Q6H PRN    atorvastatin (LIPITOR) tablet 10 mg  10 mg Oral QHS    fluticasone-vilanterol (BREO ELLIPTA) 100mcg-25mcg/puff   Inhalation DAILY    clopidogrel (PLAVIX) tablet 75 mg  75 mg Oral DAILY    insulin lispro (HUMALOG) injection 5 Units  5 Units SubCUTAneous TIDAC    insulin NPH (NOVOLIN N, HUMULIN N) injection 20 Units  20 Units SubCUTAneous BID WITH MEALS    HYDROcodone-acetaminophen (NORCO) 5-325 mg per tablet 1 Tab  1 Tab Oral Q6H PRN    metoprolol tartrate (LOPRESSOR) tablet 50 mg  50 mg Oral BID    methocarbamol (ROBAXIN) tablet 750 mg  750 mg Oral QID    umeclidinium (INCRUSE ELLIPTA) 62.5 mcg/actuation  1 Puff Inhalation DAILY    roflumilast (DALIRESP) tablet 500 mcg  500 mcg Oral DAILY    insulin lispro (HUMALOG) injection   SubCUTAneous AC&HS    glucose chewable tablet 16 g  4 Tab Oral PRN    glucagon (GLUCAGEN) injection 1 mg  1 mg IntraMUSCular PRN    dextrose 10% infusion 125-250 mL  125-250 mL IntraVENous PRN         Objective:      Visit Vitals    /49 (BP 1 Location: Left arm, BP Patient Position: At rest)    Pulse 75    Temp 97.9 °F (36.6 °C)    Resp 18    Ht 5' 2\" (1.575 m)    Wt 146 lb 11.2 oz (66.5 kg)    SpO2 100%    BMI 26.83 kg/m2       Physical Exam:  Abdomen: soft, non-tender.  Bowel sounds normal.  Extremities: no cyanosis or edema  Heart: regular rate and rhythm, S1, S2 normal, no murmur, click, rub or gallop  Lungs: clear to auscultation bilaterally  Neurologic: Grossly normal    Data Review:   Labs:    Recent Results (from the past 24 hour(s))   GLUCOSE, POC    Collection Time: 08/07/17 11:29 AM   Result Value Ref Range    Glucose (POC) 98 65 - 100 mg/dL    Performed by [address], POC    Collection Time: 08/07/17  4:02 PM   Result Value Ref Range    Glucose (POC) 98 65 - 100 mg/dL    Performed by [address], POC    Collection Time: 08/07/17 10:17 PM   Result Value Ref Range    Glucose (POC) 83 65 - 100 mg/dL    Performed by Randy Carreon    GLUCOSE, POC    Collection Time: 08/08/17  2:21 AM   Result Value Ref Range    Glucose (POC) 69 65 - 100 mg/dL    Performed by Randy Carreon    METABOLIC PANEL, BASIC    Collection Time: 08/08/17  2:30 AM   Result Value Ref Range    Sodium 138 136 - 145 mmol/L    Potassium 5.6 (H) 3.5 - 5.1 mmol/L    Chloride 99 97 - 108 mmol/L    CO2 29 21 - 32 mmol/L    Anion gap 10 5 - 15 mmol/L    Glucose 89 65 - 100 mg/dL    BUN 38 (H) 6 - 20 MG/DL    Creatinine 6.58 (H) 0.55 - 1.02 MG/DL    BUN/Creatinine ratio 6 (L) 12 - 20      GFR est AA 7 (L) >60 ml/min/1.73m2    GFR est non-AA 6 (L) >60 ml/min/1.73m2    Calcium 8.7 8.5 - 10.1 MG/DL   CBC W/O DIFF    Collection Time: 08/08/17  2:30 AM   Result Value Ref Range    WBC 10.5 3.6 - 11.0 K/uL    RBC 3.45 (L) 3.80 - 5.20 M/uL    HGB 10.6 (L) 11.5 - 16.0 g/dL    HCT 34.0 (L) 35.0 - 47.0 %    MCV 98.6 80.0 - 99.0 FL    MCH 30.7 26.0 - 34.0 PG    MCHC 31.2 30.0 - 36.5 g/dL    RDW 15.4 (H) 11.5 - 14.5 %    PLATELET 208 (L) 500 - 400 K/uL   GLUCOSE, POC    Collection Time: 08/08/17  2:37 AM   Result Value Ref Range    Glucose (POC) 86 65 - 100 mg/dL    Performed by Gio Albarado        Telemetry: paced      Assessment:     Principal Problem:    PAD (peripheral artery disease) ([facility] (10/4/2016)      Overview: R SFA intervention 3/6/17     Active Problems:    HTN (hypertension) (10/8/2010)      ESRD (end stage renal disease) ([facility] (10/16/2012)      Acute on chronic diastolic CHF (congestive heart failure), NYHA class 2 (HCC) (2/19/2015)      CAD (coronary artery disease) (3/9/2015)      Type 2 diabetes mellitus without complication ([facility] (8/02/1637)        Plan:     S/p left SFA  intervention. Clinically left LE much better. Continue current  Meds. Awaiting placement. ESRD: on HD.    For DM will consult hospitalist.

## 2017-08-10 NOTE — PROGRESS NOTES
History of Present Illness:  Angelita Robert is a 67 y.o. black lady who was admitted to [facility] after hospitalization for an elective left lower extremity angiogram and stent placement. She has a number of chronic medical issues including PAD. The patient has had increased pain in the left leg. I have reviewed the records from the hospital.  She continues to have pain in the leg. She tells me the pain is not a whole lot better. She has had previous vascular procedures done as well. She is denying headaches, dizziness, chest pain, dyspnea. The staff do not report any significant problems. She is on dialysis, has known heart disease and diabetes. PMH:  PAD, DM, HTN, CAD, ESRD on hemodialysis, CHF, COPD, recurrent pleural effusions. PSH:  Right upper extremity dialysis fistula, previous vascular procedures, carotid stents. Allergies:  Grapefruit, orange juice and peaches. SH:  Former smoker, denies ETOH use. FH:  Noncontributory. .   Medications:  See NH list including Humalog sliding scale, NPH, Tylenol, Albuterol, DuoNeb, aspirin, calcium acetate, Plavix, Dulera, Lipitor, Robaxin, Metoprolol, Prednisone Oscar, Spiriva, Symbicort, Vitamin D, Sorbitol. Physical Examination:   GENERAL:  Vital signs stable. Afebrile per nurse's notes. Pleasant lady in chair, NAD and answering questions properly. HEENT:  Unremarkable. NECK:  Supple, no JVD or bruits. HEART:  Regular, distant sounds. LUNGS:  Diminished sounds, but mostly clear. ABDOMEN:  Soft, nontender. Normal bowel sounds. EXTREMITIES:  Bilateral pedal edema. Tenderness in left leg with decreased ROM. Right arm fistula with good bruit. NEURO:  Generalized weakness, otherwise nonfocal.    Impression:  PAD. Recent left lower extremity angioplasty with stent. DM.  HTN.  CAD. ESRD on hemodialysis. CHF. COPD. Recurrent pleural effusions. Plan:  Continue current medications.   PT and OT as tolerated. Baseline labs. Start Norco 5/325 mg one q6h prn pain. Continue hemodialysis. Follow up with vascular MD as scheduled. Full code.     Med[employer]/teodoro

## 2017-08-15 NOTE — ED NOTES
Bedside and Verbal shift change report given to [facility] (oncoming nurse) by Spencer Moulton (offgoing nurse). Report included the following information SBAR, ED Summary, Intake/Output, MAR and Recent Results. Pt on monitor x 3. Call bell in reach. Family at bedside. Pt at baseline. Pt eating ice chips without difficulty. Pt up for re-evaluation.

## 2017-08-15 NOTE — ED NOTES
Dr. Lavon Connolly at bedside updating pt and pt's family on results and plan of care. Pt placed on oxygen via NC at 2 L per pt request. Pt requesting pain medication. Discussed with Dr. Lavon Connolly.

## 2017-08-15 NOTE — ED NOTES
Discharge instructions reviewed with pt. Discharge instructions given to pt per Dr. López Heading. Pt able to return/verbalize discharge instructions. Copy of discharge instructions given. RX given to pt and given to [facility] transport. Pt condition stable, respiratory status within normal limits, neuro status intact. Pt out of ER via stretcher, accompanied by [facility] medics. All of pt's belongings and clothing, discharge papers and discharge RXs given to [address] [place] [address] medics.

## 2017-08-15 NOTE — ED PROVIDER NOTES
[address]  EMERGENCY DEPARTMENT HISTORY AND PHYSICAL EXAM       Date of Service: 8/15/2017   Patient Name: Rj Arceo   YOB: 1945  Medical Record Number: [id number]    History of Presenting Illness     Chief Complaint   Patient presents with    Chest Pain     pt started having chest pain with nausea during dialysis today. Pt had recent angioplasty in left leg with stent placement. Swelling and brusing noted to left lower leg.  Nausea        History Provided By:  patient and her daughter. Additional History:   Rj Arceo is a 67 y.o. female with PMhx significant for HTN, hypercholesteremia, T2 DM, CKD (dialysis M/W/F), asthma, COPD, CHF, AICD placement, who presents via EMS to the ED with cc of upper back pain that has been constant since yesterday. Pt denies any radiation of pain; pain is worse with movement. Daughter notes presence of bruising to the pt's left lower extremity s/p recent angioplasty with LLE stent placement on 8/10/17 for PAD and states that the pt has been \"moaning and groaning\" due to pain. She states the pt currently lives at a rehab center. She states since then the pt has had increased left lower extremity swelling and bruising. Pt states she was sent in for the leg swelling and bruising from her dialysis center with daughter noting she was called and informed pt was complaining of CP at her dialysis appointment today. During exam, daughter called and spoke with dialysis center staff who states the pt complained of CP during her session for which she was given NTG and ASA prior to calling EMS for transport. The pt is currently on Plavix. Pt specifically denies any numbness/tingling, leg pain, SOB, CP at time of exam.     Social Hx: - Tobacco (former smoker, quit in 2012, 2ppd for 48 years), - EtOH, - Illicit Drugs    There are no other complaints, changes or physical findings at this time.     Primary Care Provider: Shruti Hayward MD Alexx   Specialist:    Past History     Past Medical History:   Past Medical History:   Diagnosis Date    Acute on chronic diastolic CHF (congestive heart failure), NYHA class 2 ([address] 2/19/2015    Adrenal mass, left (HCC)     2.4 Cm om Feb 2010    Anemia associated with chronic renal failure     Arthritis     right knee    Asthma     CKD (chronic kidney disease), stage IV (HCC)     ying,dialysis tues,thur sat  [facility]    COPD     Diabetes ([facility] [address] dx 1997~    type II    Generalized social phobia     HTN (hypertension)     Hypercholesteremia     Nausea & vomiting 11/11/2013    Other ill-defined conditions     fistula, R upper arm.  Pneumonia 12/10/2014    left lower lobe with sepsis    Presence of biventricular AICD 4/22/2016 4/20/16 [facility] biventricular AICD implant    Renal calculus or stone     small Lt by US 3/2012.       S/P AV lv ablation 04/22/2016 4/20/16    Smokers' cough ([facility] [address]         Past Surgical History:   Past Surgical History:   Procedure Laterality Date    COLONOSCOPY,DIAGNOSTIC  3/28/2016         HX HEENT      Lasik eye surgery    HX OPEN CHOLECYSTECTOMY  ~ 1985    HX OTHER SURGICAL      adrenal mass removed    HX SHOULDER ARTHROSCOPY  2008    removal bone spur left shoulder    KNEE SCOPE,CLEAN/DRAIN      UPPER GI ENDOSCOPY,DIAGNOSIS  3/28/2016         VASCULAR SURGERY PROCEDURE UNLIST      dialysis in right arm        Family History:   Family History   Problem Relation Age of Onset    Diabetes Mother     Heart Disease Mother     Hypertension Mother     High Cholesterol Mother     Arthritis-osteo Mother     Cancer Father      Lung    Arthritis-osteo Father     Cancer Brother      x2 - lung cancer        Social History:   Social History   Substance Use Topics    Smoking status: Former Smoker     Packs/day: 2.00     Years: 48.00     Types: Cigarettes     Quit date: 1/3/2012    Smokeless tobacco: Never Used    Alcohol use No Allergies: Allergies   Allergen Reactions    Grapefruit Hives    Orange Juice Hives    Peach (Prunus Persica) Hives         Review of Systems   Review of Systems   Constitutional: Negative for fatigue and fever. Eyes: Negative. Respiratory: Negative for shortness of breath and wheezing. Cardiovascular: Negative for chest pain. Gastrointestinal: Negative for blood in stool, constipation, diarrhea, nausea and vomiting. Endocrine: Negative. Genitourinary: Negative for difficulty urinating and dysuria. Musculoskeletal: Positive for back pain. Negative for myalgias (no leg pain). + LLE swelling   Skin: Positive for color change (LLE). Negative for rash. Allergic/Immunologic: Negative. Neurological: Negative for weakness and numbness. Hematological: Negative. Psychiatric/Behavioral: Negative. Physical Exam  Vitals:    08/15/17 1652 08/15/17 1700 08/15/17 1800 08/15/17 1831   BP:  126/44 130/48    Pulse: 75 75 75 75   Resp: 19 23 24 19   Temp:       SpO2: 100% 100% 96% 99%   Weight:       Height:           Physical Exam   Constitutional: She is oriented to person, place, and time. She appears well-developed and well-nourished. No distress. HENT:   Head: Normocephalic and atraumatic. Mouth/Throat: Oropharynx is clear and moist.   Eyes: Conjunctivae and EOM are normal.   Neck: Neck supple. No JVD present. No tracheal deviation present. Cardiovascular: Normal rate, regular rhythm and intact distal pulses. Exam reveals no gallop and no friction rub. Pulses:       Dorsalis pedis pulses are 1+ on the right side, and 1+ on the left side. RUE dialysis fistula, 2/6 murmur   Pulmonary/Chest: Effort normal. No stridor. No respiratory distress. She has no wheezes. She has rales (R lower lobe). Abdominal: Soft. Bowel sounds are normal. She exhibits no distension and no mass. There is no tenderness. There is no guarding. Musculoskeletal: Normal range of motion.  She exhibits no tenderness. No deformity, 3+ pitting BL lower extremity edema. Neurological: She is alert and oriented to person, place, and time. She has normal strength. No focal deficits, moving all extremities spontaneously. Sensation intact. Skin: Skin is warm, dry and intact. No rash noted. Ecchymosis over LLE distal to the knee over left lower extremity and foot. Psychiatric: She has a normal mood and affect. Her behavior is normal. Judgment and thought content normal.   Nursing note and vitals reviewed. Diagnostic Study Results     Labs -      Recent Results (from the past 12 hour(s))   EKG, 12 LEAD, INITIAL    Collection Time: 08/15/17 12:09 PM   Result Value Ref Range    Ventricular Rate 75 BPM    Atrial Rate 70 BPM    QRS Duration 156 ms    Q-T Interval 454 ms    QTC Calculation (Bezet) 506 ms    Calculated R Axis -57 degrees    Calculated T Axis 92 degrees    Diagnosis       Ventricular-paced rhythm  Biventricular pacemaker detected  Abnormal ECG  When compared with ECG of 13-JUL-2017 08:40,  No significant change was found     CBC WITH AUTOMATED DIFF    Collection Time: 08/15/17 12:38 PM   Result Value Ref Range    WBC 21.3 (H) 3.6 - 11.0 K/uL    RBC 3.45 (L) 3.80 - 5.20 M/uL    HGB 10.9 (L) 11.5 - 16.0 g/dL    HCT 33.0 (L) 35.0 - 47.0 %    MCV 95.7 80.0 - 99.0 FL    MCH 31.6 26.0 - 34.0 PG    MCHC 33.0 30.0 - 36.5 g/dL    RDW 15.1 (H) 11.5 - 14.5 %    PLATELET 681 589 - 414 K/uL    NEUTROPHILS 90 %    BAND NEUTROPHILS 4 %    LYMPHOCYTES 1 %    MONOCYTES 5 %    EOSINOPHILS 0 %    BASOPHILS 0 %    ABS. NEUTROPHILS 20.0 K/UL    ABS. LYMPHOCYTES 0.2 K/UL    ABS. MONOCYTES 1.1 K/UL    ABS. EOSINOPHILS 0.0 K/UL    ABS.  BASOPHILS 0.0 K/UL    RBC COMMENTS ANISOCYTOSIS  1+        DF MANUAL     METABOLIC PANEL, COMPREHENSIVE    Collection Time: 08/15/17 12:38 PM   Result Value Ref Range    Sodium 136 136 - 145 mmol/L    Potassium 3.9 3.5 - 5.1 mmol/L    Chloride 100 97 - 108 mmol/L    CO2 26 21 - 32 mmol/L    Anion gap 10 5 - 15 mmol/L    Glucose 115 (H) 65 - 100 mg/dL    BUN 21 (H) 6 - 20 MG/DL    Creatinine 2.72 (H) 0.55 - 1.02 MG/DL    BUN/Creatinine ratio 8 (L) 12 - 20      GFR est AA 21 (L) >60 ml/min/1.73m2    GFR est non-AA 17 (L) >60 ml/min/1.73m2    Calcium 9.4 8.5 - 10.1 MG/DL    Bilirubin, total 0.7 0.2 - 1.0 MG/DL    ALT (SGPT) 14 12 - 78 U/L    AST (SGOT) 9 (L) 15 - 37 U/L    Alk. phosphatase 151 (H) 45 - 117 U/L    Protein, total 7.7 6.4 - 8.2 g/dL    Albumin 2.8 (L) 3.5 - 5.0 g/dL    Globulin 4.9 (H) 2.0 - 4.0 g/dL    A-G Ratio 0.6 (L) 1.1 - 2.2     TROPONIN I    Collection Time: 08/15/17 12:38 PM   Result Value Ref Range    Troponin-I, Qt. 0.04 <0.05 ng/mL   CK W/ REFLX CKMB    Collection Time: 08/15/17 12:38 PM   Result Value Ref Range    CK 22 (L) 26 - 192 U/L   NUCLEATED RBC    Collection Time: 08/15/17 12:38 PM   Result Value Ref Range    NRBC 0.3 (H) 0  WBC    ABSOLUTE NRBC 0.06 (H) 0.00 - 0.01 K/uL    WBC CORRECTED FOR NR ADJUSTED FOR NUCLEATED RBC'S     LACTIC ACID    Collection Time: 08/15/17  2:28 PM   Result Value Ref Range    Lactic acid 1.4 0.4 - 2.0 MMOL/L       Radiologic Studies -  The following have been ordered and reviewed:  DUPLEX LOWER EXT VENOUS LEFT   Final Result   INDICATION: Left leg swelling and ecchymosis     COMPARISON: April 29, 2016     FINDINGS: Duplex Doppler sonography of the left lower extremity was performed  from the groin to the calf. The left common femoral, femoral and popliteal veins  are compressible with normal color-flow and wave forms and response to  augmentation. 4 cm complex Baker's cyst is similar in appearance to the prior  study.     IMPRESSION  IMPRESSION:  No deep venous thrombosis identified. Complex Carrillo's cyst is not  significantly changed since the prior study. CT Results  (Last 48 hours)               08/15/17 1644  CT HEAD WO CONT Final result    Impression:  IMPRESSION: No acute intracranial hemorrhage, mass or infarct. Narrative:  INDICATION: Altered mental status. Chest pain, nausea. Exam: Noncontrast CT of the brain is performed with 5 mm collimation. CT dose reduction was achieved with the use of the standardized protocol   tailored for this examination and automatic exposure control for dose   modulation. Direct comparison is made to prior CT dated March 2016. FINDINGS: There is no acute intracranial hemorrhage, mass, mass effect or   herniation. Ventricular system is normal. The gray-white matter differentiation   is well-preserved. The mastoid air cells are well pneumatized. The visualized   paranasal sinuses are normal.               CXR Results  (Last 48 hours)               08/15/17 1336  XR CHEST PA LAT Final result    Impression:  IMPRESSION:     Cardiomegaly with AICD          Minimal left basilar atelectasis/possible airspace disease           Narrative:  INDICATION:  Chest pain. COMPARISON:  7/13/2017. FINDINGS:  PA and lateral views were obtained of the chest.     The heart is enlarged with left AICD. The aorta is atherosclerotic. The lungs are hyperinflated. Minimal left basilar opacity suggesting atelectasis/possible airspace disease is   seen. No pulmonary edema is evident. Degenerative change of the spine is noted. Medical Decision Making   I am the first provider for this patient. I reviewed the vital signs, available nursing notes, past medical history, past surgical history, family history and social history. Provider Notes: Pt presenting with bruising and swelling over LLE, has had a recent stent placement. Pt was also noted to have chest pain during dialysis, which is now resolved. WIll get dopplers of LLE to eval for dvt. No s/s consistent with cellulitis. Will check labs, cardiac enzymes, ekg, CXR to eval for acs, pneumonia, arrhythmia, pulmonary edema. Vital Signs-Reviewed the patient's vital signs.    Patient Vitals for the past 12 hrs:   Temp Pulse Resp BP SpO2   08/15/17 1831 - 75 19 - 99 %   08/15/17 1800 - 75 24 130/48 96 %   08/15/17 1700 - 75 23 126/44 100 %   08/15/17 1652 - 75 19 - 100 %   08/15/17 1650 - - - 131/43 -   08/15/17 1630 - 76 24 - 100 %   08/15/17 1604 - - - - 98 %   08/15/17 1600 - 75 21 119/50 -   08/15/17 1545 - 76 30 134/44 96 %   08/15/17 1530 - 75 20 128/45 -   08/15/17 1523 - 75 19 123/52 100 %   08/15/17 1500 - 75 25 - 96 %   08/15/17 1245 - 76 26 138/46 96 %   08/15/17 1206 99.8 °F (37.7 °C) 75 19 138/40 100 %       Medications Given in the ED:  Medications   ketorolac (TORADOL) injection 15 mg (15 mg IntraVENous Given 8/15/17 1631)   ondansetron (ZOFRAN) injection 4 mg (4 mg IntraVENous Given 8/15/17 1801)   morphine injection 4 mg (2 mg IntraVENous Given 8/15/17 1802)       Pulse Oximetry Analysis - Normal 96% on RA     Cardiac Monitor:   Rate: 75    EKG interpretation: (Preliminary)  Rhythm: paced rhythm; and regular . Rate (approx.): 75; Axis: normal; QRS interval: prolonged; ST/T wave: normal;   Written by Kaye Madera ED Scribe, as dictated by Miley Riojas DO    Procedures:   Procedures    ED Course:  1:04 PM Initial assessment performed. The patients presenting problems have been discussed, and they are in agreement with the care plan formulated and outlined with them. I have encouraged them to ask questions as they arise throughout their visit. Progress Notes:  3:42 PM  Pt has been re-evaluated. She is feeling better but continues to appear slightly distracted. Family updated on lab and imaging results. Pt noted to have leukocytosis, but lactic acid is normal, pt is afebrile and hemodynamically stable. Pt does not make urine. Will get head ct, provide further analgesia for back pain. SIGN OUT NOTE:  4:58 PM  Patient's presentation, labs/imaging and plan of care was reviewed with Alida Lindquist MD as part of sign out.   They will follow up on results of imaging and dispo accordingly as part of the plan discussed with the patient. Mady Castillo MD's assistance in completion of this plan is greatly appreciated but it should be noted that I will be the provider of record for this patient. Attestation: This note is prepared by Joshua Feliciano, acting as Scribe for Shannan Goodrich DO. Shannan Goodrich DO: The scribe's documentation has been prepared under my direction and personally reviewed by me in its entirety. I confirm that the note above accurately reflects all work, treatment, procedures, and medical decision making performed by me. Progress Note:  5:30 PM  Patient now c/o returning pain. Additional analgesic ordered. Written by Brenden Dey, ED Scribe, as dictated by Mady Castillo MD.     Disposition Note:  Discharge Note:  5:58 PM  The patient has been re-evaluated and is ready for discharge. Reviewed available results with patient. Counseled patient on diagnosis and care plan. Patient has expressed understanding, and all questions have been answered. Patient agrees with plan and agrees to follow up as recommended, or return to the ED if their symptoms worsen. Discharge instructions have been provided and explained to the patient, along with reasons to return to the ED. Diagnosis   Clinical Impression:   1. Acute bilateral thoracic back pain    2. Bilateral edema of lower extremity         Follow-up Information     Follow up With Details Comments Contact Info    Adam Salcedo MD Schedule an appointment as soon as possible for a visit  [facility]  [phone]      [facility] EMERGENCY DEPT  As needed, If symptoms worsen [address] [place] [address]  [phone]          Discharge Medication List as of 8/15/2017  5:57 PM      START taking these medications    Details   traMADol (ULTRAM) 50 mg tablet Take 1 Tab by mouth every six (6) hours as needed for Pain for up to 10 days.  Max Daily Amount: 200 mg., Print, Disp-20 Tab, R-0         CONTINUE these medications which have NOT CHANGED    Details   !! insulin NPH (NOVOLIN N, HUMULIN N) 100 unit/mL injection 20 Units by SubCUTAneous route daily (with breakfast). , Normal, Disp-1 Vial, R-1      !! insulin NPH (NOVOLIN N, HUMULIN N) 100 unit/mL injection 5 Units by SubCUTAneous route daily (with dinner). , Normal, Disp-1 Vial, R-0      insulin lispro (HUMALOG) 100 unit/mL injection INITIATE CORRECTIVE INSULIN PROTOCOL (KRYSTLE):  RX KRYSTLE Normal Sensitivity (Average weight)  For Blood Sugar (mg/dl) of:              111-150=2 units  151-200=4 units  201-250=5 units  251-300=8 units  301-350=10 units  Over 350= Call MD, No Print, Disp-1 Vi al, R-0      predniSONE (STERAPRED) 5 mg dose pack See administration instruction per 5mg dose pack, Print, Disp-21 Tab, R-0      OXYGEN-AIR DELIVERY SYSTEMS by Does Not Apply route., Historical Med      clopidogrel (PLAVIX) 75 mg tab TAKE 1 TABLET BY MOUTH DAILY, Normal, Disp-30 Tab, R-0      methocarbamol (ROBAXIN-750) 750 mg tablet Take 1 Tab by mouth four (4) times daily. , Normal, Disp-20 Tab, R-0      metoprolol tartrate (LOPRESSOR) 50 mg tablet Take  by mouth two (2) times a day., Historical Med      albuterol-ipratropium (DUO-NEB) 2.5 mg-0.5 mg/3 ml nebu 3 mL by Nebulization route every six (6) hours as needed. , Print, Disp-100 Nebule, R-1      aluminum-magnesium hydroxide 200-200 mg/5 mL susp 30 mL, diphenhydrAMINE 12.5 mg/5 mL elix 75 mg, lidocaine 2 % soln 30 mL, sucralfate 100 mg/mL susp 3 g oral solution (compounded) Take 5 mL by mouth Before breakfast, lunch, and dinner., Print, Disp-100 mL, R-0      Insulin Syringe-Needle U-100 0.5 mL 29 gauge x 1/2\" syrg 1 Each by Does Not Apply route five (5) times daily. , Print, Disp-150 Syringe, R-1      alcohol swabs (ALCOHOL PADS) padm 1 Each by Apply Externally route Multiple., Print, Disp-100 Pad, R-1      albuterol (PROVENTIL HFA, VENTOLIN HFA, PROAIR HFA) 90 mcg/actuation inhaler Take 2 Puffs by inhalation every four (4) hours as needed for Wheezing., Normal, Disp-1 Inhaler, R-0      calcium acetate (PHOSLO) 667 mg cap Take 2 Caps by mouth three (3) times daily (with meals). , Historical Med      acetaminophen (TYLENOL) 325 mg tablet Take 650 mg by mouth every six (6) hours as needed for Pain or Fever (Temp >100.5). , Historical Med      budesonide-formoterol (SYMBICORT) 160-4.5 mcg/actuation HFA inhaler Take 2 Puffs by inhalation two (2) times a day. Patient takes at 0500 and 2100, Historical Med      roflumilast (DALIRESP) tab tablet Take 500 mcg by mouth daily. , Historical Med      atorvastatin (LIPITOR) 10 mg tablet Take 10 mg by mouth nightly., Historical Med      cholecalciferol, vitamin D3, (VITAMIN D3) 2,000 unit tab Take 1 Tab by mouth daily. , Historical Med      tiotropium (SPIRIVA WITH HANDIHALER) 18 mcg inhalation capsule Take 1 Cap by inhalation daily. , Historical Med      aspirin 81 mg tablet Take 81 mg by mouth every morning. Indications: MYOCARDIAL INFARCTION PREVENTION, Historical Med       !! - Potential duplicate medications found. Please discuss with provider. STOP taking these medications       HYDROcodone-acetaminophen (NORCO) 5-325 mg per tablet Comments:   Reason for Stopping:         oxyCODONE-acetaminophen (PERCOCET) 5-325 mg per tablet Comments:   Reason for Stopping:         HYDROcodone-acetaminophen (NORCO) 5-325 mg per tablet Comments:   Reason for Stopping:               _______________________________     Attestation: This note is prepared by Linda Clemente, acting as Scribe for Kenny Fraire MD.    Kenny Fraire MD: The scribe's documentation has been prepared under my direction and personally reviewed by me in its entirety.  I confirm that the note above accurately reflects all work, treatment, procedures, and medical decision making performed by me.  _______________________________

## 2017-08-15 NOTE — ED NOTES
[facility] to [facility] nurse, Steve Wallace. Per [facility] staff, pt is assigned to room 914, bed 1 at [facility].

## 2017-08-15 NOTE — PROGRESS NOTES
physical Therapy Emergency Department EVALUATION with CMS G codes    Patient: Bhavya Vu (89 y.o. female)  Date: 8/15/2017  Primary Diagnosis: There are no admission diagnoses documented for this encounter. Precautions: wears O2 prn at home     ASSESSMENT :  Based on the objective data described below, the patient presents with c/o L periscapular pain, tender to palpation and with movement of LUE and with functional mobility. Asked by Dr. Helen Vaughan to eval mobility. Pt is currently in rehab after d/c from [facility] s/p stent L leg. She states yesterday the L upper back pain started. She is currently moaning at rest and screaming with any movement. Pt requires max A sit <> supine due to pain c/o. In sitting, she is able to maintain good sitting balance but is flexed and moaning and c/o pain in left upper back, especially medial to scapula and over rib cage. Small spasm palpable. Pt is able to complete sit <> stand at RW with CGA only. She does WB with BUE while doing so. She amb a few steps forward but states she has to sit due to the pain. She describes no pain in LEs. She is able to stand a second time and side step with use of RW to readjust position toward head of bed for 4-5 steps with vcs and CGA. She was returned to bed and repositioned with PT and nursing assist, able to bridge to assist.  Call bell in reach. At this time, the pt shows good ability to complete transfers and amb with RW with only guarding assist but her tolerance to OOB activity is decreased by the pain which limits any distance. Her bed mobility tolerance and ability is diminished greatly by her pain. Rounded above with Dr. Helen Vaughan. If no acute issues are identified warranting admission, pt would benefit from continuation of rehab at [facility]. Should she be admitted, she would benefit from PT in acute setting followed by return to rehab.        PLAN : If admitted:  Frequency/Duration: Pending admission, the patient will be followed by physical therapy 4 times a week to address goals. If admitted, Recommendations and Planned Interventions:  [x]           Bed Mobility Training             []    Neuromuscular Re-Education  [x]           Transfer Training                   []    Orthotic/Prosthetic Training  [x]           Gait Training                         []    Modalities  [x]           Therapeutic Exercises           []    Edema Management/Control  [x]           Therapeutic Activities            [x]    Patient and Family Training/Education  []           Other (comment):    Discharge Recommendations:     []   Home with family  [x]   Skilled nursing facility/rehab  []   Admission to hospital with rehab likely needed  []   Inpatient rehab referral  []   Outpatient physical therapy referral  []   Other:    Further Equipment Recommendations for Discharge:   []   Rolling walker with 5\" wheels  []   Crutches   []   Thalia Hedges   []   Wheelchair   [x]   Other: Progression at facility    COMMUNICATION/EDUCATION:   Communication/Collaboration:  []   Fall prevention education was provided and the patient/caregiver indicated understanding. [x]   Patient/family have participated as able and agree with findings and recommendations. []   Patient is unable to participate in plan of care at this time. Findings and recommendations were discussed with: MD/DO physician and Registered Nurse       SUBJECTIVE:   Patient stated Oh it hurts! Yessica Obrien    OBJECTIVE DATA SUMMARY:     Past Medical History:   Diagnosis Date    Acute on chronic diastolic CHF (congestive heart failure), NYHA class 2 ([facility] [address] 2/19/2015    Adrenal mass, left (HCC)     2.4 Cm om Feb 2010    Anemia associated with chronic renal failure     Arthritis     right knee    Asthma     CKD (chronic kidney disease), stage IV (HCC)     ying[facility]ialysis tues,thur sat  [facility]    COPD     Diabetes ([facility] [address] dx 1997~    type II    Generalized social phobia     HTN (hypertension)     Hypercholesteremia     Nausea & vomiting 11/11/2013    Other ill-defined conditions     fistula, R upper arm.  Pneumonia 12/10/2014    left lower lobe with sepsis    Presence of biventricular AICD 4/22/2016 4/20/16 [employer] biventricular AICD implant    Renal calculus or stone     small Lt by US 3/2012.  S/P AV lv ablation 04/22/2016 4/20/16    Smokers' cough ([address]      Past Surgical History:   Procedure Laterality Date    COLONOSCOPY,DIAGNOSTIC  3/28/2016         HX HEENT      Lasik eye surgery    HX OPEN CHOLECYSTECTOMY  ~ 1985    HX OTHER SURGICAL      adrenal mass removed    HX SHOULDER ARTHROSCOPY  2008    removal bone spur left shoulder    KNEE SCOPE,CLEAN/DRAIN      UPPER GI ENDOSCOPY,DIAGNOSIS  3/28/2016         VASCULAR SURGERY PROCEDURE UNLIST      dialysis in right arm     Prior Level of Function/Home Situation: [place] [facility] did previously function independently with use of [address]  Home Situation  Home Environment: Rehabilitation facility  Support Systems: Skilled nursing facility ([place] [facility] [place] )  Patient Expects to be Discharged to[de-identified] Skilled nursing facility (for rehab)  Critical Behavior:  Neurologic State: Alert  Orientation Level: Oriented to person, Oriented to place, Oriented to situation  Cognition: Follows commands     Skin:  Swelling LLE s/p surgery  Strength:    Strength: Generally decreased, functional                      Tone & Sensation:   Tone: Normal                                Range Of Motion:  AROM: Within functional limits (does not tolerate trunk ROM/LUE ROM d/t L periscapular pn)           PROM: Within functional limits                    Functional Mobility:  Bed Mobility:  Rolling:  Moderate assistance  Supine to Sit: Maximum assistance (due to pain c/o )  Sit to Supine: Maximum assistance (due to pain c/o)     Transfers:  Sit to Stand: Contact guard assistance  Stand to Sit: Contact guard assistance                       Balance:   Sitting: Impaired  Sitting - Static: Fair (occasional)  Sitting - Dynamic:  (NT due to pain c/o)  Standing: Impaired  Standing - Static: Fair; Other (comment) (with RW)  Standing - Dynamic : Fair (with RW)  Ambulation/Gait Training:  Distance (ft): 5 Feet (ft)  Assistive Device: Walker, rolling  Ambulation - Level of Assistance: Contact guard assistance        Gait Abnormalities: Decreased step clearance              Speed/Audrey: Slow  Step Length: Right shortened;Left shortened              Special Tests:  Barthel Index:    Bathin  Bladder: 10  Bowels: 10  Groomin  Dressin  Feedin  Mobility: 0  Stairs: 0  Toilet Use: 5  Transfer (Bed to Chair and Back): 10  Total: 35       Barthel and G-code impairment scale:  Percentage of impairment CH  0% CI  1-19% CJ  20-39% CK  40-59% CL  60-79% CM  80-99% CN  100%   Barthel Score 0-100 100 99-80 79-60 59-40 20-39 1-19   0   Barthel Score 0-20 20 17-19 13-16 9-12 5-8 1-4 0      The Barthel ADL Index: Guidelines  1. The index should be used as a record of what a patient does, not as a record of what a patient could do. 2. The main aim is to establish degree of independence from any help, physical or verbal, however minor and for whatever reason. 3. The need for supervision renders the patient not independent. 4. A patient's performance should be established using the best available evidence. Asking the patient, friends/relatives and nurses are the usual sources, but direct observation and common sense are also important. However direct testing is not needed. 5. Usually the patient's performance over the preceding 24-48 hours is important, but occasionally longer periods will be relevant. 6. Middle categories imply that the patient supplies over 50 per cent of the effort. 7. Use of aids to be independent is allowed. Nevin Flower., Barthel, D.W. (2204). Functional evaluation: the Barthel Index.  [address] [facility] ([address], J.J.M.F, Belle Gregg., Joana Osler., Maya Hernandez. (1999). Measuring the change indisability after inpatient rehabilitation; comparison of the responsiveness of the Barthel Index and Functional [place] Measure. Journal of Neurology, Neurosurgery, and Psychiatry, 66(4), 816-911. JING Conway, FIONA Carballo, & Rosario Carreno M.A. (2004.) Assessment of post-stroke quality of life in cost-effectiveness studies: The usefulness of the Barthel Index and the EuroQoL-5D. Quality of Life Research, 13, 427-43        In compliance with CMSs Claims Based Outcome Reporting, the following G-code set was chosen for this patient based on their primary functional limitation being treated: The outcome measure chosen to determine the severity of the functional limitation was the barthel with a score of 35/100 which was correlated with the impairment scale. ? Mobility - Walking and Moving Around:     - CURRENT STATUS: CL - 60%-79% impaired, limited or restricted    - GOAL STATUS: CL - 60%-79% impaired, limited or restricted    - D/C STATUS:  CL - 60%-79% impaired, limited or restricted       Pain:Pain Scale 1: Numeric (0 - 10)  Pain Intensity 1: 10. See above description in narrative     Activity Tolerance:   Poor for session due to c/o pain in L upper back/periscapular region  Please refer to the flowsheet for vital signs taken during this treatment.   After treatment:   []   Patient left in no apparent distress sitting up in chair  [x]   Patient left in no apparent distress in bed  [x]   Call bell left within reach  [x]   Nursing notified  []   Caregiver present  []   Bed alarm activated        Thank you for this referral.  Belinda Gross, PT   Time Calculation: 20 mins

## 2017-08-15 NOTE — ED NOTES
[facility] contacted for transportation back to [facility]. ETA \"within 1 hour\". Pt updated on plan of care.

## 2017-08-15 NOTE — DISCHARGE INSTRUCTIONS
Leg and Ankle Edema: Care Instructions  Your Care Instructions  Swelling in the legs, ankles, and feet is called edema. It is common after you sit or stand for a while. Long plane flights or car rides often cause swelling in the legs and feet. You may also have swelling if you have to stand for long periods of time at your job. Problems with the veins in the legs (varicose veins) and changes in hormones can also cause swelling. Sometimes the swelling in the ankles and feet is caused by a more serious problem, such as heart failure, infection, blood clots, or liver or kidney disease. Follow-up care is a key part of your treatment and safety. Be sure to make and go to all appointments, and call your doctor if you are having problems. Its also a good idea to know your test results and keep a list of the medicines you take. How can you care for yourself at home? · If your doctor gave you medicine, take it as prescribed. Call your doctor if you think you are having a problem with your medicine. · Whenever you are resting, raise your legs up. Try to keep the swollen area higher than the level of your heart. · Take breaks from standing or sitting in one position. ¨ Walk around to increase the blood flow in your lower legs. ¨ Move your feet and ankles often while you stand, or tighten and relax your leg muscles. · Wear support stockings. Put them on in the morning, before swelling gets worse. · Eat a balanced diet. Lose weight if you need to. · Limit the amount of salt (sodium) in your diet. Salt holds fluid in the body and may increase swelling. When should you call for help? Call 911 anytime you think you may need emergency care. For example, call if:  · You have symptoms of a blood clot in your lung (called a pulmonary embolism). These may include:  ¨ Sudden chest pain. ¨ Trouble breathing. ¨ Coughing up blood.   Call your doctor now or seek immediate medical care if:  · You have signs of a blood clot, such as:  ¨ Pain in your calf, back of the knee, thigh, or groin. ¨ Redness and swelling in your leg or groin. · You have symptoms of infection, such as:  ¨ Increased pain, swelling, warmth, or redness. ¨ Red streaks or pus. ¨ A fever. Watch closely for changes in your health, and be sure to contact your doctor if:  · Your swelling is getting worse. · You have new or worsening pain in your legs. · You do not get better as expected. Where can you learn more? Go to http://chiqui-[place].info/. Enter N378 in the search box to learn more about \"Leg and Ankle Edema: Care Instructions. \"  Current as of: March 20, 2017  Content Version: 11.3  © 5505-3057 [employer]. Care instructions adapted under license by [employer] (which disclaims liability or warranty for this information). If you have questions about a medical condition or this instruction, always ask your healthcare professional. [place] [address] any warranty or liability for your use of this information. Back Pain: Care Instructions  Your Care Instructions    Back pain has many possible causes. It is often related to problems with muscles and ligaments of the back. It may also be related to problems with the nerves, discs, or bones of the back. Moving, lifting, standing, sitting, or sleeping in an awkward way can strain the back. Sometimes you don't notice the injury until later. Arthritis is another common cause of back pain. Although it may hurt a lot, back pain usually improves on its own within several weeks. Most people recover in 12 weeks or less. Using good home treatment and being careful not to stress your back can help you feel better sooner. Follow-up care is a key part of your treatment and safety. Be sure to make and go to all appointments, and call your doctor if you are having problems.  Its also a good idea to know your test results and keep a list of the medicines you take. How can you care for yourself at home? · Sit or lie in positions that are most comfortable and reduce your pain. Try one of these positions when you lie down:  ¨ Lie on your back with your knees bent and supported by large pillows. ¨ Lie on the floor with your legs on the seat of a sofa or chair. Mary Jane Preeti on your side with your knees and hips bent and a pillow between your legs. ¨ Lie on your stomach if it does not make pain worse. · Do not sit up in bed, and avoid soft couches and twisted positions. Bed rest can help relieve pain at first, but it delays healing. Avoid bed rest after the first day of back pain. · Change positions every 30 minutes. If you must sit for long periods of time, take breaks from sitting. Get up and walk around, or lie in a comfortable position. · Try using a heating pad on a low or medium setting for 15 to 20 minutes every 2 or 3 hours. Try a warm shower in place of one session with the heating pad. · You can also try an ice pack for 10 to 15 minutes every 2 to 3 hours. Put a thin cloth between the ice pack and your skin. · Take pain medicines exactly as directed. ¨ If the doctor gave you a prescription medicine for pain, take it as prescribed. ¨ If you are not taking a prescription pain medicine, ask your doctor if you can take an over-the-counter medicine. · Take short walks several times a day. You can start with 5 to 10 minutes, 3 or 4 times a day, and work up to longer walks. Walk on level surfaces and avoid hills and stairs until your back is better. · Return to work and other activities as soon as you can. Continued rest without activity is usually not good for your back. · To prevent future back pain, do exercises to stretch and strengthen your back and stomach. Learn how to use good posture, safe lifting techniques, and proper body mechanics. When should you call for help?   Call your doctor now or seek immediate medical care if:  · You have new or worsening numbness in your legs. · You have new or worsening weakness in your legs. (This could make it hard to stand up.)  · You lose control of your bladder or bowels. Watch closely for changes in your health, and be sure to contact your doctor if:  · Your pain gets worse. · You are not getting better after 2 weeks. Where can you learn more? Go to http://chiqui-[place].info/. Enter Q468 in the search box to learn more about \"Back Pain: Care Instructions. \"  Current as of: March 21, 2017  Content Version: 11.3  © 6677-6954 "[employer] Care instructions adapted under license by [employer] (which disclaims liability or warranty for this information). If you have questions about a medical condition or this instruction, always ask your healthcare professional. [address] any warranty or liability for your use of this information.

## 2017-08-16 PROBLEM — R41.82 ALTERED MENTAL STATUS: Status: ACTIVE | Noted: 2017-01-01

## 2017-08-16 PROBLEM — I95.9 HYPOTENSION: Status: ACTIVE | Noted: 2017-01-01

## 2017-08-16 PROBLEM — R65.10 SIRS (SYSTEMIC INFLAMMATORY RESPONSE SYNDROME) (HCC): Status: ACTIVE | Noted: 2017-01-01

## 2017-08-16 PROBLEM — D72.829 LEUKOCYTOSIS: Status: ACTIVE | Noted: 2017-01-01

## 2017-08-16 PROBLEM — R50.9 FEVER: Status: ACTIVE | Noted: 2017-01-01

## 2017-08-16 NOTE — CONSULTS
Cardiology Consult Note      Patient Name: Nancy Coleman  : [id number] MRN: [id number]  Date: 2017  Time: 1:45 PM    Admit Diagnosis: Altered mental status; Fever;SIRS (systemic inflammatory res*    Primary Cardiologist: Dr. Derian Vera Cardiologist: Keila Alexander M.D.    Reason for Consult: Elevated troponin level    Requesting MD: Leighann Mederos MD    HPI:  Nancy Coleman is a 67 y.o. female admitted on 2017  for Altered mental status, Fever, SIRS (systemic inflammatory response syndrome) ([facility] [address] Leukocytosis, Elevated troponin, Hypotension, ESRD (end stage renal disease) ([facility] [address] 66 yo female admitted for the above, after presenting to the ED from nursing home with reported fevers, dyspnea and AMS. She originally presented to [facility] with c/o chest and back pain with dialysis. Her daughter is present at the bedside for discussion. She notes her mother recently underwent stent placement in her left LE for PAD and has some bruising on her left shin. Subjective:  Patient received lying in bed in ICU bed 1. She seems more alert than by report on admission. She states she has no CP or SOB. Feeling better since admission. Assessment and Plan     1. Elevated troponin - 0.14   - No evidence for ACS on EKG   - Review of prior troponin levels reveals chronic, low elevation   - Non specific in this setting of sepsis, ESRD on HD and mild anemia   - Echo 2017 EF 55-60%, Left ventricle: Moderate septal and severe posterior wall hypertrophy. Severely dilated LA, mild MR, moderate AS, mild TR.  2. CAD   - s/p DYLAN to Cx 2015   - On Plavix, Statin and ASA  3. H/o systolic HF - EF as low as 15-20%   - EF recovered to 55-60% with last echo   - BiV AICD placed on 2016  4. Afib    - S/p AV node ablation 2016   - Lopressor 50 mg BID   - OAC with ASA only  5. Sepsis   - per primary team   - No clear source  6.  ESRD   - HD via right UE brachial fistula   - TTS  7. PAD   - recent stent placement left SFA   - ASA and Plavix  8. COPD   - Pulmonary on board    Admitted with sepsis, without clear source. ESRD on HD via right UE fistula, possible source. Small elevation in troponin which is non specific in this setting of sepsis, ESRD and mild anemia. Continue current cardiac meds and will check echo for vegetations  I have seen and examined the patient and agree with PA assessment. Troponin minimally elevated , this may be non specific in the setting of CRF and current clinical situation  Continue to follow eventual trend  ecg v paced therefore not very helpful   Obtain echo  She seems to deny any cp or sob at this time  Shoulder back pain reported much worse with motion      Review of Symptoms:  A comprehensive review of systems was negative except for that written in the HPI. Previous treatment/evaluation includes Percutaneous Coronary Intervention, echocardiogram and persantine thallium . Cardiac risk factors: sedentary life style, post-menopausal.    Past Medical History:   Diagnosis Date    Acute on chronic diastolic CHF (congestive heart failure), NYHA class 2 ([facility] [address] 2/19/2015    Adrenal mass, left (HCC)     2.4 Cm om Feb 2010    Anemia associated with chronic renal failure     Arthritis     right knee    Asthma     CKD (chronic kidney disease), stage IV (HCC)     ying,dialysis tues,thur [facility]    COPD     Diabetes ([facility] [address] dx 1997~    type II    Generalized social phobia     HTN (hypertension)     Hypercholesteremia     Nausea & vomiting 11/11/2013    Other ill-defined conditions     fistula, R upper arm.  Pneumonia 12/10/2014    left lower lobe with sepsis    Presence of biventricular AICD 4/22/2016 4/20/16 Swapferit biventricular AICD implant    Renal calculus or stone     small Lt by US 3/2012.       S/P AV lv ablation 04/22/2016 4/20/16    Smokers' cough (HCC)      Past Surgical History:   Procedure Laterality Date    COLONOSCOPY,DIAGNOSTIC  3/28/2016         HX HEENT      Lasik eye surgery    HX OPEN CHOLECYSTECTOMY  ~ 1985    HX OTHER SURGICAL      adrenal mass removed    HX SHOULDER ARTHROSCOPY  2008    removal bone spur left shoulder    KNEE SCOPE,CLEAN/DRAIN      UPPER GI ENDOSCOPY,DIAGNOSIS  3/28/2016         VASCULAR SURGERY PROCEDURE UNLIST      dialysis in right arm     Current Facility-Administered Medications   Medication Dose Route Frequency    Vancomycin Pharmacy Dosing- Dialysis Pt   Other Rx Dosing/Monitoring    vancomycin (VANCOCIN) 500 mg in 0.9% sodium chloride (MBP/ADV) 100 mL  500 mg IntraVENous DIALYSIS PRN    sodium chloride (NS) flush 5-10 mL  5-10 mL IntraVENous Q8H    sodium chloride (NS) flush 5-10 mL  5-10 mL IntraVENous PRN    [START ON 8/17/2017] levoFLOXacin (LEVAQUIN) 500 mg in D5W IVPB  500 mg IntraVENous Q48H    cefepime (MAXIPIME) 1 g in 0.9% sodium chloride (MBP/ADV) 50 mL  1 g IntraVENous Q24H    budesonide (PULMICORT) 500 mcg/2 ml nebulizer suspension  500 mcg Nebulization BID RT    albuterol-ipratropium (DUO-NEB) 2.5 MG-0.5 MG/3 ML  3 mL Nebulization QID RT    albuterol (PROVENTIL VENTOLIN) nebulizer solution 2.5 mg  2.5 mg Nebulization Q4H PRN    famotidine (PF) (PEPCID) 20 mg in sodium chloride 0.9 % 10 mL injection  20 mg IntraVENous Q24H    heparin (porcine) injection 5,000 Units  5,000 Units SubCUTAneous Q12H    ondansetron (ZOFRAN) injection 4 mg  4 mg IntraVENous Q6H PRN    insulin lispro (HUMALOG) injection   SubCUTAneous AC&HS       Allergies   Allergen Reactions    Grapefruit Hives    Orange Juice Hives    Peach (Prunus Persica) Hives      Family History   Problem Relation Age of Onset    Diabetes Mother     Heart Disease Mother     Hypertension Mother     High Cholesterol Mother     Arthritis-osteo Mother     Cancer Father      Lung    Arthritis-osteo Father     Cancer Brother      x2 - lung cancer Social History     Social History    Marital status:      Spouse name: N/A    Number of children: N/A    Years of education: N/A     Occupational History     Retired     Social History Main Topics    Smoking status: Former Smoker     Packs/day: 2.00     Years: 48.00     Types: Cigarettes     Quit date: 1/3/2012    Smokeless tobacco: Never Used    Alcohol use No    Drug use: No    Sexual activity: Not Asked     Other Topics Concern    None     Social History Narrative       Objective:    Physical Exam    Vitals:   Vitals:    08/16/17 1200 08/16/17 1300 08/16/17 1301 08/16/17 1400   BP: 103/42 (!) 108/35  117/47   Pulse: 75 75  75   Resp: 23 25  19   Temp: 99.1 °F (37.3 °C)      SpO2: 100% 99% 100% 100%   Weight:       Height:           General:    Alert, cooperative, no distress, appears stated age. Neck:   Supple, no carotid bruit and no JVD. Back:     Not assessed. Lungs:     Rhonchorous to auscultation bilaterally. Heart[de-identified]    Regular rate and rhythm, S1, S2 normal, no murmur, click, rub or gallop. Abdomen:     Soft, non-tender. Bowel sounds normal.    Extremities:   Extremities normal, atraumatic, no cyanosis or edema. Vascular:   Pulses - 2+ radials   Skin:   Skin color normal. No rashes or lesions   Neurologic:   CN II-XII grossly intact. Telemetry: normal sinus rhythm    ECG: BiV paced. Data Review:     Radiology: Left base ATx on CXR.     Recent Labs      08/16/17   1023  08/16/17   0435  08/15/17   1238   TROIQ  0.14*  0.08*  0.07*  0.04     Recent Labs      08/16/17   0435  08/15/17   1238   NA  135*  136   K  4.0  3.9   CL  98  100   CO2  25  26   BUN  33*  21*   CREA  3.92*  2.72*   GLU  125*  115*   CA  9.6  9.4     Recent Labs      08/16/17   0435  08/15/17   1238   WBC  19.6*  21.3*   HGB  10.1*  10.9*   HCT  31.6*  33.0*   PLT  291  263     Recent Labs      08/16/17   0435  08/15/17   1238   PTP  12.5*   --    INR  1.2*   --    SGOT  12*  9*   AP 145*  151*     Recent Labs      08/16/17   0435   CHOL  107   LDLC  36.4     No results for input(s): CRP, TSH, TSHEXT, TSHEXT in the last 72 hours.     No lab exists for component: Olaf Dubois MD         [facility] [address] [place] [address],[address]     [facility]     [phone]    Mariella Newby MD

## 2017-08-16 NOTE — H&P
History & Physical    Date of admission: 8/16/2017    Patient name: Aranza Cates  MRN: [id number]  YOB: 1945  Age: 67 y.o. Primary care provider:  Wilmar Olivares MD     Source of Information:  ED and medical records                             Chief complaint:  Patient does not provide    History of present illness  Aranza Cates is a 67 y.o. white female with past medical history of ESRD on hemodialysis on Tuesday-Thursday- Saturday, chronic diastolic CHF NYHA Class 2, anemia, adrenal mass, asthma, arthritis, CKD, COPD, type 2 diabetes mellitus, social phobia, anxiety, hypertension, hyperlipidemia, pneumonia, kidney stone, peripheral arterial disease presented to the ED from nursing home with reported fever, dyspnea, and altered mental status (AMS). Patient is very confused and a non-historian. As such, history was obtained per my review of ED and medical records. Per the collective reports, patient was hospitalized from 8/2/2017 - 8/7/2017 with diagnosis of peripheral arterial disease (PAD) and she reportedly underwent LLE angio with stent placement on 8/10/2017. Patient was notedly seen in the ER at [facility] with complaints of back and left lower extremity pain. On arrival in the ED tonight, initial recorded vital signs were   T= 103.2 F, BP= 120/39, HR= 75, RR= 24, O2sat= 96% on 4 liters O2 NC.  WBC= 19,600, N= 95%. CT head was negative for acute process. LLE venous duplex was negative for DVT. Blood cultures were sent. Patient was started on Levofloxacin, Cefepime, and Vancomycin for IV antibiotic coverage. Patient reportedly was very anxious and agitated in the ER. Patient is now seen for admission to the hospitalist service for continued evaluation and treatments.       Past Medical History:   Diagnosis Date    Acute on chronic diastolic CHF (congestive heart failure), NYHA class 2 ([facility] [address] 2/19/2015    Adrenal mass, left (HCC)     2.4 Cm om Feb 2010    Anemia associated with chronic renal failure     Arthritis     right knee    Asthma     CKD (chronic kidney disease), stage IV (HCC)     ying,ike amin sat  [facility]    COPD     Diabetes ([facility] [address] dx 1997~    type II    Generalized social phobia     HTN (hypertension)     Hypercholesteremia     Nausea & vomiting 11/11/2013    Other ill-defined conditions     fistula, R upper arm.  Pneumonia 12/10/2014    left lower lobe with sepsis    Presence of biventricular AICD 4/22/2016 4/20/16 [employer] biventricular AICD implant    Renal calculus or stone     small Lt by US 3/2012.  S/P AV lv ablation 04/22/2016 4/20/16    Smokers' cough ([facility] [address]       Past Surgical History:   Procedure Laterality Date    COLONOSCOPY,DIAGNOSTIC  3/28/2016         HX HEENT      Lasik eye surgery    HX OPEN CHOLECYSTECTOMY  ~ 1985    HX OTHER SURGICAL      adrenal mass removed    HX SHOULDER ARTHROSCOPY  2008    removal bone spur left shoulder    KNEE SCOPE,CLEAN/DRAIN      UPPER GI ENDOSCOPY,DIAGNOSIS  3/28/2016         VASCULAR SURGERY PROCEDURE UNLIST      dialysis in right arm     Prior to Admission medications    Medication Sig Start Date End Date Taking? Authorizing Provider   traMADol (ULTRAM) 50 mg tablet Take 1 Tab by mouth every six (6) hours as needed for Pain for up to 10 days. Max Daily Amount: 200 mg. 8/15/17 8/25/17  Carmelia Duverney, DO   insulin NPH (NOVOLIN N, HUMULIN N) 100 unit/mL injection 20 Units by SubCUTAneous route daily (with breakfast). 8/8/17   Kendrick Amaya MD   insulin NPH (NOVOLIN N, HUMULIN N) 100 unit/mL injection 5 Units by SubCUTAneous route daily (with dinner).  8/8/17   Kendrick Amaya MD   insulin lispro (HUMALOG) 100 unit/mL injection INITIATE CORRECTIVE INSULIN PROTOCOL (KRYSTLE):  RX KRYSTLE Normal Sensitivity (Average weight)  For Blood Sugar (mg/dl) of: 111-150=2 units  151-200=4 units  201-250=5 units  251-300=8 units  301-350=10 units  Over 350= Call MD 8/8/17   Julia Segovia MD   predniSONE (STERAPRED) 5 mg dose pack See administration instruction per 5mg dose pack 7/30/17   Sonali Farias, [place]   OXYGEN-AIR DELIVERY SYSTEMS by Does Not Apply route. Historical Provider   clopidogrel (PLAVIX) 75 mg tab TAKE 1 TABLET BY MOUTH DAILY 6/22/17   Kajal Cooper MD   methocarbamol (ROBAXIN-750) 750 mg tablet Take 1 Tab by mouth four (4) times daily. 6/3/17   VEENA Cummings   metoprolol tartrate (LOPRESSOR) 50 mg tablet Take  by mouth two (2) times a day. Historical Provider   albuterol-ipratropium (DUO-NEB) 2.5 mg-0.5 mg/3 ml nebu 3 mL by Nebulization route every six (6) hours as needed. 11/19/16   Shiloh De Jesus MD   aluminum-magnesium hydroxide 200-200 mg/5 mL susp 30 mL, diphenhydrAMINE 12.5 mg/5 mL elix 75 mg, lidocaine 2 % soln 30 mL, sucralfate 100 mg/mL susp 3 g oral solution (compounded) Take 5 mL by mouth Before breakfast, lunch, and dinner. 11/19/16   Shiloh De Jesus MD   Insulin Syringe-Needle U-100 0.5 mL 29 gauge x 1/2\" syrg 1 Each by Does Not Apply route five (5) times daily. 11/19/16   Shiloh De Jesus MD   alcohol swabs (ALCOHOL PADS) padm 1 Each by Apply Externally route Multiple. 11/19/16   Shiloh De Jesus MD   albuterol (PROVENTIL HFA, VENTOLIN HFA, PROAIR HFA) 90 mcg/actuation inhaler Take 2 Puffs by inhalation every four (4) hours as needed for Wheezing. 11/8/16   Guy Cavanaugh MD   calcium acetate (PHOSLO) 667 mg cap Take 2 Caps by mouth three (3) times daily (with meals). 8/20/16   Historical Provider   acetaminophen (TYLENOL) 325 mg tablet Take 650 mg by mouth every six (6) hours as needed for Pain or Fever (Temp >100.5). Historical Provider   budesonide-formoterol (SYMBICORT) 160-4.5 mcg/actuation HFA inhaler Take 2 Puffs by inhalation two (2) times a day.  Patient takes at 0500 and 2100    Phys Other, MD   roflumilast (DALIRESP) tab tablet Take 500 mcg by mouth daily. Alvin Mejia MD   atorvastatin (LIPITOR) 10 mg tablet Take 10 mg by mouth nightly. Alvin Mejia MD   cholecalciferol, vitamin D3, (VITAMIN D3) 2,000 unit tab Take 1 Tab by mouth daily. Alvin Mejia MD   tiotropium (SPIRIVA WITH HANDIHALER) 18 mcg inhalation capsule Take 1 Cap by inhalation daily. Historical Provider   aspirin 81 mg tablet Take 81 mg by mouth every morning. Indications: MYOCARDIAL INFARCTION PREVENTION 11/29/10   Historical Provider     Allergies   Allergen Reactions    Grapefruit Hives    Orange Juice Hives    Peach (Prunus Persica) Hives      Family History   Problem Relation Age of Onset    Diabetes Mother     Heart Disease Mother     Hypertension Mother     High Cholesterol Mother     Arthritis-osteo Mother     Cancer Father      Lung    Arthritis-osteo Father     Cancer Brother      x2 - lung cancer         Social history  Patient resides              x  SNF           Alcohol history     unknown           Smoking history      x  Former smoker         History   Smoking Status    Former Smoker    Packs/day: 2.00    Years: 48.00    Types: Cigarettes    Quit date: 1/3/2012   Smokeless Tobacco    Never Used       Code status  x  Full code       Review of systems  I performed a ten systems review; pertinent positives were as noted in HPI, otherwise negative. Physical Examination   Visit Vitals    BP 91/74    Pulse 76    Temp (!) 100.9 °F (38.3 °C)    Resp 28    Ht 5' 2\" (1.575 m)    Wt 62.6 kg (138 lb)    SpO2 98%    BMI 25.24 kg/m2      O2 Flow Rate (L/min): 4 l/min   O2 Device: Nasal cannula    General:  Ill appearing female in mild respiratory distress   Head:  Normocephalic, without obvious abnormality, atraumatic   Eyes:  Conjunctivae/corneas clear. Pupils 2 mm reactive bilateral.   E/N/M/T: Nares normal. Septum midline.  No nasal drainage or sinus tenderness  Tongue midline/ non-edematous  Clear oropharynx   Neck: Normal appearance and movements, symmetrical, trachea midline  No palpable adenopathy  No thyroid enlargement, tenderness or nodules  No carotid bruit   Normal JVD   Lungs:   Symmetrical chest expansion and respiratory effort  Fine rales to auscultation bilaterally   Chest wall:  No tenderness or deformity   Heart:  Regular rate and rhythm   Sounds normal; no murmur, click, rub or gallop   Abdomen:   Soft, no tenderness  No rebound, guarding, or rigidity  Non-distended  Bowel sounds normal  No masses or hepatosplenomegaly  No hernias present   Back: No CVA tenderness   Extremities: LUE has bandage wrap in place (to prevent patient from pulling out IV)  3+ pitting pedal edema L foot with ecchymosis  No cyanosis     Pulses/  Vascular: 2+ radial/ 1+ DP bilateral pulses   Skin: Multi-stage ecchymosis BUE/ BLE   Musculo-      skeletal: Gait not tested  No calf tenderness   Neuro: GCS 12 (E4 V3 M5). Moves all extremities x 4. Generalized weakness. No slurred speech. No facial droop. Sensation grossly intact. Psych: Awake, disoriented, very anxious, agitated, uncooperative trying to remove devices     Geniturinary:  Shaw catheter in place     Data Review    24 Hour Results:  Recent Results (from the past 24 hour(s))   EKG, 12 LEAD, INITIAL    Collection Time: 08/15/17 12:09 PM   Result Value Ref Range    Ventricular Rate 75 BPM    Atrial Rate 70 BPM    QRS Duration 156 ms    Q-T Interval 454 ms    QTC Calculation (Bezet) 506 ms    Calculated R Axis -57 degrees    Calculated T Axis 92 degrees    Diagnosis       Ventricular-paced rhythm  When compared with ECG of 13-JUL-2017 08:40,  No significant change was found  Confirmed by Edson Ordoñez ([id number]) on 8/15/2017 10:58:27 PM     CBC WITH AUTOMATED DIFF    Collection Time: 08/15/17 12:38 PM   Result Value Ref Range    WBC 21.3 (H) 3.6 - 11.0 K/uL    RBC 3.45 (L) 3.80 - 5.20 M/uL    HGB 10.9 (L) 11.5 - 16.0 g/dL    HCT 33.0 (L) 35.0 - 47.0 %    MCV 95.7 80.0 - 99.0 FL    MCH 31.6 26.0 - 34.0 PG    MCHC 33.0 30.0 - 36.5 g/dL    RDW 15.1 (H) 11.5 - 14.5 %    PLATELET 358 015 - 631 K/uL    NEUTROPHILS 90 %    BAND NEUTROPHILS 4 %    LYMPHOCYTES 1 %    MONOCYTES 5 %    EOSINOPHILS 0 %    BASOPHILS 0 %    ABS. NEUTROPHILS 20.0 K/UL    ABS. LYMPHOCYTES 0.2 K/UL    ABS. MONOCYTES 1.1 K/UL    ABS. EOSINOPHILS 0.0 K/UL    ABS. BASOPHILS 0.0 K/UL    RBC COMMENTS ANISOCYTOSIS  1+        DF MANUAL     METABOLIC PANEL, COMPREHENSIVE    Collection Time: 08/15/17 12:38 PM   Result Value Ref Range    Sodium 136 136 - 145 mmol/L    Potassium 3.9 3.5 - 5.1 mmol/L    Chloride 100 97 - 108 mmol/L    CO2 26 21 - 32 mmol/L    Anion gap 10 5 - 15 mmol/L    Glucose 115 (H) 65 - 100 mg/dL    BUN 21 (H) 6 - 20 MG/DL    Creatinine 2.72 (H) 0.55 - 1.02 MG/DL    BUN/Creatinine ratio 8 (L) 12 - 20      GFR est AA 21 (L) >60 ml/min/1.73m2    GFR est non-AA 17 (L) >60 ml/min/1.73m2    Calcium 9.4 8.5 - 10.1 MG/DL    Bilirubin, total 0.7 0.2 - 1.0 MG/DL    ALT (SGPT) 14 12 - 78 U/L    AST (SGOT) 9 (L) 15 - 37 U/L    Alk.  phosphatase 151 (H) 45 - 117 U/L    Protein, total 7.7 6.4 - 8.2 g/dL    Albumin 2.8 (L) 3.5 - 5.0 g/dL    Globulin 4.9 (H) 2.0 - 4.0 g/dL    A-G Ratio 0.6 (L) 1.1 - 2.2     TROPONIN I    Collection Time: 08/15/17 12:38 PM   Result Value Ref Range    Troponin-I, Qt. 0.04 <0.05 ng/mL   CK W/ REFLX CKMB    Collection Time: 08/15/17 12:38 PM   Result Value Ref Range    CK 22 (L) 26 - 192 U/L   NUCLEATED RBC    Collection Time: 08/15/17 12:38 PM   Result Value Ref Range    NRBC 0.3 (H) 0  WBC    ABSOLUTE NRBC 0.06 (H) 0.00 - 0.01 K/uL    WBC CORRECTED FOR NR ADJUSTED FOR NUCLEATED RBC'S     LACTIC ACID    Collection Time: 08/15/17  2:28 PM   Result Value Ref Range    Lactic acid 1.4 0.4 - 2.0 MMOL/L   CBC WITH AUTOMATED DIFF    Collection Time: 08/16/17  4:35 AM   Result Value Ref Range    WBC 19.6 (H) 3.6 - 11.0 K/uL    RBC 3.33 (L) 3.80 - 5.20 M/uL    HGB 10.1 (L) 11.5 - 16.0 g/dL    HCT 31.6 (L) 35.0 - 47.0 %    MCV 94.9 80.0 - 99.0 FL    MCH 30.3 26.0 - 34.0 PG    MCHC 32.0 30.0 - 36.5 g/dL    RDW 15.1 (H) 11.5 - 14.5 %    PLATELET 711 524 - 552 K/uL    NEUTROPHILS 95 (H) 32 - 75 %    BAND NEUTROPHILS 3 0 - 6 %    LYMPHOCYTES 1 (L) 12 - 49 %    MONOCYTES 1 (L) 5 - 13 %    EOSINOPHILS 0 0 - 7 %    BASOPHILS 0 0 - 1 %    ABS. NEUTROPHILS 19.2 (H) 1.8 - 8.0 K/UL    ABS. LYMPHOCYTES 0.2 (L) 0.8 - 3.5 K/UL    ABS. MONOCYTES 0.2 0.0 - 1.0 K/UL    ABS. EOSINOPHILS 0.0 0.0 - 0.4 K/UL    ABS. BASOPHILS 0.0 0.0 - 0.1 K/UL    DF MANUAL      RBC COMMENTS ANISOCYTOSIS  1+        RBC COMMENTS POLYCHROMASIA  PRESENT       METABOLIC PANEL, COMPREHENSIVE    Collection Time: 08/16/17  4:35 AM   Result Value Ref Range    Sodium 135 (L) 136 - 145 mmol/L    Potassium 4.0 3.5 - 5.1 mmol/L    Chloride 98 97 - 108 mmol/L    CO2 25 21 - 32 mmol/L    Anion gap 12 5 - 15 mmol/L    Glucose 125 (H) 65 - 100 mg/dL    BUN 33 (H) 6 - 20 MG/DL    Creatinine 3.92 (H) 0.55 - 1.02 MG/DL    BUN/Creatinine ratio 8 (L) 12 - 20      GFR est AA 14 (L) >60 ml/min/1.73m2    GFR est non-AA 11 (L) >60 ml/min/1.73m2    Calcium 9.6 8.5 - 10.1 MG/DL    Bilirubin, total 0.7 0.2 - 1.0 MG/DL    ALT (SGPT) 12 12 - 78 U/L    AST (SGOT) 12 (L) 15 - 37 U/L    Alk.  phosphatase 145 (H) 45 - 117 U/L    Protein, total 7.0 6.4 - 8.2 g/dL    Albumin 2.4 (L) 3.5 - 5.0 g/dL    Globulin 4.6 (H) 2.0 - 4.0 g/dL    A-G Ratio 0.5 (L) 1.1 - 2.2     TROPONIN I    Collection Time: 08/16/17  4:35 AM   Result Value Ref Range    Troponin-I, Qt. 0.07 (H) <0.05 ng/mL   CK W/ REFLX CKMB    Collection Time: 08/16/17  4:35 AM   Result Value Ref Range    CK 78 26 - 192 U/L   LACTIC ACID    Collection Time: 08/16/17  4:35 AM   Result Value Ref Range    Lactic acid 1.7 0.4 - 2.0 MMOL/L   PROTHROMBIN TIME + INR    Collection Time: 08/16/17  4:35 AM   Result Value Ref Range    INR 1.2 (H) 0.9 - 1.1      Prothrombin time 12.5 (H) 9.0 - 11.1 sec   PTT    Collection Time: 08/16/17  4:35 AM   Result Value Ref Range    aPTT 36.0 (H) 22.1 - 32.5 sec    aPTT, therapeutic range     58.0 - 77.0 SECS   LIPASE    Collection Time: 08/16/17  4:35 AM   Result Value Ref Range    Lipase 53 (L) 73 - 393 U/L   AMYLASE    Collection Time: 08/16/17  4:35 AM   Result Value Ref Range    Amylase 16 (L) 25 - 115 U/L   AMMONIA    Collection Time: 08/16/17  4:35 AM   Result Value Ref Range    Ammonia <10 <32 UMOL/L   NUCLEATED RBC    Collection Time: 08/16/17  4:35 AM   Result Value Ref Range    NRBC 0.6 (H) 0  WBC    ABSOLUTE NRBC 0.11 (H) 0.00 - 0.01 K/uL    WBC CORRECTED FOR NR ADJUSTED FOR NUCLEATED RBC'S     EKG, 12 LEAD, INITIAL    Collection Time: 08/16/17  4:43 AM   Result Value Ref Range    Ventricular Rate 75 BPM    Atrial Rate 75 BPM    QRS Duration 166 ms    Q-T Interval 438 ms    QTC Calculation (Bezet) 489 ms    Calculated R Axis 3 degrees    Calculated T Axis 98 degrees    Diagnosis       Ventricular-paced rhythm  Biventricular pacemaker detected  When compared with ECG of 15-AUG-2017 12:09,  No significant change was found       Recent Labs      08/16/17   0435  08/15/17   1238   WBC  19.6*  21.3*   HGB  10.1*  10.9*   HCT  31.6*  33.0*   PLT  291  263     Recent Labs      08/16/17   0435  08/15/17   1238   NA  135*  136   K  4.0  3.9   CL  98  100   CO2  25  26   GLU  125*  115*   BUN  33*  21*   CREA  3.92*  2.72*   CA  9.6  9.4   ALB  2.4*  2.8*   TBILI  0.7  0.7   SGOT  12*  9*   ALT  12  14   INR  1.2*   --        Imaging  Chest xray portable:  COMPARISON:  Chest x-ray 8/15/2017     FINDINGS: A portable AP radiograph of the chest was obtained at 04:25 hours. The  patient is on a cardiac monitor. Pacemaker generator body projects over the  left chest wall with intact appearing leads traversing in expected course. The  lungs are clear. The cardiac and mediastinal contours and pulmonary vascularity  are stable with cardiomegaly.   The bones and soft tissues are stable with  degenerative spine show changes and diffuse osteopenia noted. .      IMPRESSION: No acute findings. CT head without contrast:    FINDINGS:  The ventricles and sulci are normal in size, shape and configuration and  midline. There is no significant white matter disease. There is no intracranial  hemorrhage, extra-axial collection, mass, mass effect or midline shift. The  basilar cisterns are open. No acute infarct is identified. Atherosclerotic  calcifications affect the carotid siphons and vertebrobasilar system. The bone  windows demonstrate no abnormalities. The visualized portions of the paranasal  sinuses and mastoid air cells are clear.     IMPRESSION: No acute findings. Intracranial atherosclerosis. Assessment and Plan   1. SIRS (systemic inflammatory response syndrome) - likely sepsis. Blood culture sent and results pending. Already started on empiric IV antibiotics with Levofloxacin, Cefepime, and Vancomycin. Repeat lactic acid levels q 6 hours. Patient remains hypotensive and her clinical status is critical.  I have spoken with patient's daughter Jj Dutta [phone] and informed her of the same. Patient is full code. Admit to ICU for further evaluation and treatments. 2.  Shock- likely secondary to sepsis and hypovolemia. Patient has ESRD but she is still hypotensive. Will give 0.9%  ml IVF bolus x 1. Order central venous line insertion. Order vasopressor support. 3.  Altered mental status- likely metabolic encephalopathy. Will place on neurovascular checks and fall precautions. 4.  ESRD (end stage renal disease). Consult nephrologist.    5.  Elevated troponin. Ordered repeat/ serial troponin levels. 6.  Leukocytosis. Repeat CBC. 7.  Fever. No definite source of infection but consider possible sources, ie, LLE stent. Check blood cultures. LP was attempted but was unsuccessful. Will consult ID.   Continue broad spectrum IV antibiotics. Will order Tylenol suppository prn. Order ice packs and cooling blanket prn.    8.  Type 2 diabetes mellitus. Order Humalog insulin correctional coverage, scheduled blood glucose checks and check hemoglobin A1c level. 9.  Asthma. Order Duoneb nebulizer treatments. 10.  Acute on chronic diastolic CHF. Chest xray portable shows no overt pulmonary edema. Patient is at risk for volume overload as she required some intravascular IV fluids replacement therapy. Monitor closely. 11.  PAD s/p LLE stent. Continue plan as above. 12.  LE edema. Monitor fluid status closely. 13.  Complex Baker's cyst LLE. Consider for ortho consultation. 14. VTE prophylaxis. No SCDS with noted LLE stent.              Signed by: Kelsey Owen MD    August 16, 2017 at 7:27 AM

## 2017-08-16 NOTE — WOUND CARE
WOUND CARE CONSULT by nurse request for blanchable and non-blanchable area to sacrum. Attempted to see patient, lowered patient's head down and then patient screamed out, \"Don't move me\". She requested pain medication. Staff nurse was notified and will medicate. Advised there is blanchable purple skin on her sacrum; staff nurse will request Venelex ointment from physician. Wound care will see patient tomorrow.     Brayan Del Toro" Kris RN, BSN, Wound Care Nurse  Office   Pager x [phone]

## 2017-08-16 NOTE — CONSULTS
Initial Pulmonary / Critical Care Consultation    Assessment / Plan:      1. Sepsis with fever and leukocytosis without clear source - marginal bp on presentation but with normal lactic acid level  2. ESRD - dialysis TTS  3. PVD s/p stent placement  4. Diastolic CHF / biventricular pacer AICD in place  5. CAD s/p stent  6. COPD - some increased work of breathing and rhonchi on exam  7. DM    · Continue broad abx and follow up cultures  · Renal to see  · Nebs with duoneb, pulmicort, and as needed albuterol  · Ok to move out of ICU if remains off of pressors later today and if resp status OK      History / Subjective:  Reason:  Sepsis  Requesting Provider:   Jimmie Kendy is a 67 y.o.  female who  has a past medical history of Acute on chronic diastolic CHF (congestive heart failure), NYHA class 2 ([address] (2/19/2015); Adrenal mass, left ([address] Anemia associated with chronic renal failure; Arthritis; Asthma; CKD (chronic kidney disease), stage IV ([address] COPD; Diabetes ([address] (dx 1997~); Generalized social phobia; HTN (hypertension); Hypercholesteremia; Nausea & vomiting (11/11/2013); Other ill-defined conditions; Pneumonia (12/10/2014); Presence of biventricular AICD (4/22/2016); Renal calculus or stone; S/P AV lv ablation (04/22/2016); and Smokers' cough ([address] She also has no past medical history of Difficult intubation; Malignant hyperthermia due to anesthesia; Pseudocholinesterase deficiency; or Unspecified adverse effect of anesthesia. admitted 8/16/2017 with AMS and fever from SNF. Pt has ESRD on HD also has a h/o CHF (diastolic dysfunction), has an aicd in place and has a h/o vascular disease with LE stent placed. Former smoker and by her meds appears to have copd. Currently on NC O2  Mental status was poor in ER with marginal bp and fever. Blood cultures drawn, started on broad abx with vanc cefipime and levaquin and has been admitted to the ICU. CXR showed no obvious asdz.   She denies cough, chest pain or abdominal pain. Lactic acid was normal and she has not required pressors at this time. Allergies   Allergen Reactions    Grapefruit Hives    Orange Juice Hives    Peach (Prunus Persica) Hives     Past Medical History:   Diagnosis Date    Acute on chronic diastolic CHF (congestive heart failure), NYHA class 2 ([facility] 2/19/2015    Adrenal mass, left (HCC)     2.4 Cm om Feb 2010    Anemia associated with chronic renal failure     Arthritis     right knee    Asthma     CKD (chronic kidney disease), stage IV (HCC)     ying[facility]    COPD     Diabetes ([facility] dx 1997~    type II    Generalized social phobia     HTN (hypertension)     Hypercholesteremia     Nausea & vomiting 11/11/2013    Other ill-defined conditions     fistula, R upper arm.  Pneumonia 12/10/2014    left lower lobe with sepsis    Presence of biventricular AICD 4/22/2016 4/20/16 [employer] biventricular AICD implant    Renal calculus or stone     small Lt by US 3/2012.  S/P AV lv ablation 04/22/2016 4/20/16    Smokers' cough ([facility]       Past Surgical History:   Procedure Laterality Date    COLONOSCOPY,DIAGNOSTIC  3/28/2016         HX HEENT      Lasik eye surgery    HX OPEN CHOLECYSTECTOMY  ~ 1985    HX OTHER SURGICAL      adrenal mass removed    HX SHOULDER ARTHROSCOPY  2008    removal bone spur left shoulder    KNEE SCOPE,CLEAN/DRAIN      UPPER GI ENDOSCOPY,DIAGNOSIS  3/28/2016         VASCULAR SURGERY PROCEDURE UNLIST      dialysis in right arm      Prior to Admission medications    Medication Sig Start Date End Date Taking? Authorizing Provider   insulin lispro (HUMALOG) 100 unit/mL injection by SubCUTAneous route.  INITIATE CORRECTIVE INSULIN PROTOCOL (KRYSTLE):  RX KRYSTLE Normal Sensitivity (Average weight)  For Blood Sugar (mg/dl) of:              111-150=2 units  151-200=4 units  201-250=6 units  251-300=8 units  301-350=10 units  Over 350= Call MD Yes Historical Provider   sorbitol 70 % solution Take 30 mL by mouth daily as needed (constipation). Yes Historical Provider   HYDROcodone-acetaminophen (NORCO) 5-325 mg per tablet Take 1 Tab by mouth every six (6) hours as needed for Pain. Yes Historical Provider   HEPARIN SODIUM,PORCINE/PF (HEPARIN, PORCINE, PF, INJECTION) 3,000 Units by Injection route DIALYSIS PRN. Yes Historical Provider   darbepoetin ocrnelio in polysorbat (ARANESP, IN POLYSORBATE,) 10 mcg/0.4 mL syrg 20 mcg by Injection route Once every 2 weeks. With dialysis session   Yes Historical Provider   calcitRIOL (ROCALTROL) 0.25 mcg capsule Take 0.25 mcg by mouth two (2) days a week. With dialysis    Historical Provider   iron sucrose (VENOFER) 100 mg iron/5 mL injection 100 mg by IntraVENous route Once every 2 weeks. With dialysis    Historical Provider   traMADol (ULTRAM) 50 mg tablet Take 1 Tab by mouth every six (6) hours as needed for Pain for up to 10 days. Max Daily Amount: 200 mg. 8/15/17 8/25/17  Rian Peterson DO   insulin NPH (NOVOLIN N, HUMULIN N) 100 unit/mL injection 20 Units by SubCUTAneous route daily (with breakfast). 8/8/17   Della Paredes MD   insulin NPH (NOVOLIN N, HUMULIN N) 100 unit/mL injection 5 Units by SubCUTAneous route daily (with dinner). 8/8/17   Della Paredes MD   clopidogrel (PLAVIX) 75 mg tab TAKE 1 TABLET BY MOUTH DAILY 6/22/17   Rosendo Espinal MD   methocarbamol (ROBAXIN-750) 750 mg tablet Take 1 Tab by mouth four (4) times daily. 6/3/17   VEENA Gage   metoprolol tartrate (LOPRESSOR) 50 mg tablet Take 50 mg by mouth two (2) times a day. Historical Provider   albuterol-ipratropium (DUO-NEB) 2.5 mg-0.5 mg/3 ml nebu 3 mL by Nebulization route every six (6) hours as needed.  11/19/16   Arnie Baca MD   aluminum-magnesium hydroxide 200-200 mg/5 mL susp 30 mL, diphenhydrAMINE 12.5 mg/5 mL elix 75 mg, lidocaine 2 % soln 30 mL, sucralfate 100 mg/mL susp 3 g oral solution (compounded) Take 5 mL by mouth Before breakfast, lunch, and dinner. 11/19/16   Sharyle Kief, MD   albuterol (PROVENTIL HFA, VENTOLIN HFA, PROAIR HFA) 90 mcg/actuation inhaler Take 2 Puffs by inhalation every four (4) hours as needed for Wheezing. 11/8/16   Alba Vasquez MD   calcium acetate (PHOSLO) 667 mg cap Take 1 Cap by mouth three (3) times daily (with meals). 8/20/16   Historical Provider   acetaminophen (TYLENOL) 325 mg tablet Take 650 mg by mouth every six (6) hours as needed for Pain or Fever (Temp >100.5). Historical Provider   budesonide-formoterol (SYMBICORT) 160-4.5 mcg/actuation HFA inhaler Take 2 Puffs by inhalation two (2) times a day. Patient takes at 0500 and 2100    Alvin Mejia MD   roflumilast (DALIRESP) tab tablet Take 500 mcg by mouth daily. Alvin Mejia MD   atorvastatin (LIPITOR) 10 mg tablet Take 10 mg by mouth nightly. Alvin Mejia MD   cholecalciferol, vitamin D3, (VITAMIN D3) 2,000 unit tab Take 1 Tab by mouth daily. Alvin Mejia MD   tiotropium (SPIRIVA WITH HANDIHALER) 18 mcg inhalation capsule Take 1 Cap by inhalation daily. Historical Provider   aspirin 81 mg tablet Take 81 mg by mouth every morning. Indications: MYOCARDIAL INFARCTION PREVENTION 11/29/10   Historical Provider      Family History   Problem Relation Age of Onset    Diabetes Mother     Heart Disease Mother     Hypertension Mother     High Cholesterol Mother     Arthritis-osteo Mother     Cancer Father      Lung    Arthritis-osteo Father     Cancer Brother      x2 - lung cancer     Social History   Substance Use Topics    Smoking status: Former Smoker     Packs/day: 2.00     Years: 48.00     Types: Cigarettes     Quit date: 1/3/2012    Smokeless tobacco: Never Used    Alcohol use No      ROS:  A comprehensive review of systems was negative except for that written in the HPI.     Objective:  Patient Vitals for the past 4 hrs:   BP Temp Pulse Resp SpO2 Height Weight   08/16/17 0900 (!) 96/39 - 75 21 99 % - -   17 0854 105/44 100 °F (37.8 °C) 75 29 99 % - -   17 0819 - - - - 99 % - -   17 0817 (!) 97/37 100 °F (37.8 °C) 78 24 98 % - -   17 0737 (!) 88/36 (!) 100.6 °F (38.1 °C) 75 23 95 % - -   17 0723 - (!) 100.9 °F (38.3 °C) - - - - -   17 0701 91/74 (!) 101.3 °F (38.5 °C) 76 28 98 % 5' 2\" (1.575 m) 62.6 kg (138 lb)   17 0535 (!) 112/38 - - (!) 35 95 % - -     Temp (24hrs), Av.8 °F (38.2 °C), Min:99.8 °F (37.7 °C), Max:103.2 °F (39.6 °C)    CVP:        No intake or output data in the 24 hours ending 17 0933  Blood Sugar:  Glucose   Date Value Ref Range Status   2017 125 (H) 65 - 100 mg/dL Final   08/15/2017 115 (H) 65 - 100 mg/dL Final   2017 89 65 - 100 mg/dL Final   2017 60 (L) 65 - 100 mg/dL Final   2017 92 65 - 100 mg/dL Final     Exam:  Alert appearing  MM dry  Mild accessory use  Coarse bs bilaterally  RRR  Protuberant soft NT, no rebound  Warm and dry without edema    Lab data was reviewed. Radiology images were independently viewed and available reports were reviewed. CXR - CM, AICD, no acute process    Lab:  Recent Labs      17   0435  08/15/17   1428  08/15/17   1238   WBC  19.6*   --   21.3*   HGB  10.1*   --   10.9*   PLT  291   --   263   NA  135*   --   136   K  4.0   --   3.9   CL  98   --   100   CO2  25   --   26   BUN  33*   --   21*   CREA  3.92*   --   2.72*   GLU  125*   --   115*   CA  9.6   --   9.4   INR  1.2*   --    --    TROIQ  0.07*   --   0.04   TBILI  0.7   --   0.7   SGOT  12*   --   9*   LAC  1.7  1.4   --      ABG:  No results for input(s): PHI, PCO2I, PO2I, HCO3I, SO2I, FIO2I in the last 72 hours.   All Micro Results     Procedure Component Value Units Date/Time    CULTURE, BLOOD, PAIRED [[id number]] Collected:  17 0435    Order Status:  Completed Specimen:  Blood Updated:  17 0600            Elliot Bell MD

## 2017-08-16 NOTE — PROGRESS NOTES
TRANSFER - IN REPORT:    Verbal report received from Pako (name) on Eve Star  being received from ED (unit) for routine progression of care      Report consisted of patients Situation, Background, Assessment and   Recommendations(SBAR). Information from the following report(s) SBAR, Kardex, ED Summary, Intake/Output, MAR, Recent Results and Cardiac Rhythm SR was reviewed with the receiving nurse. Opportunity for questions and clarification was provided. Assessment completed upon patients arrival to unit and care assumed. 5066 Patient arrived to unit from ED. Alert & oriented, moaning however denies pain. HR 75, /44, 99% on 2LNC. Coarse breath sounds, nonproductive cough. Right AV fistula w/ bruit/thrill, dressing intact. Temp-sensing hebert (100F) in place however patient & family both say she is anuric for the most part. LLE shin bruising (previous stent placement). 0930 Nephrology & ID consults called. Primary Nurse Damon Hernández, RN and Tawanda Ibanez RN performed a dual skin assessment on this patient Impairment noted- see wound doc flow sheet  Todd score is 15- Non-blanching & blanching erythema to sacrum. 0930 Dr. Tigre Mcclendon at bedside to evaluate patient. 46 Dr. Gildardo Soria (Hospitalist) at bedside to see patient. Can give Heparin, not doing any further dx/puncture today. Cleared for diet if STAND passed. Notified of Troponin 0.14, Cardiology consult ordered. [id number] Cardiology consult called. [address] [place], [place] (Cardiology) at bedside. No new orders. 1500 Dr. Gurdeep Wong at bedside. Wants to remove Hebert. 1530 Wound care RN at bedside. 36 Spoke with Dr. Gurdeep Wong regarding contrast in CT ordered by Dr. Calvin Escalera. OK to receive. Matthew 53 of paired BC report. [phone] with CT after clarifying CT orders w/ Dr. Power Bergeron. Will call when patient can come downstairs, backed up.     5998 Patient very agitated, refusing to go to CT until her pain is better controlled. Educated on effects of some pain medicine on BP. Spoke with Hospitalist who ordered Tylenol. 1910 Given PRN Tylenol, patient remains agitated not wanting to go to CT until more comfortable. CT made aware. 1930 Bedside and Verbal shift change report given to Erickson Badillo RN (oncoming nurse) by Dhara Garcia RN (offgoing nurse).  Report included the following information SBAR, Kardex, ED Summary, Intake/Output, MAR, Recent Results and Cardiac Rhythm SR.

## 2017-08-16 NOTE — ED NOTES
Pt 20G IV L hand is patent and infusing vanc @250cc/hr. Pt 20G IV in L forearm is patent and saline locked. Both site's are clean dry and intact. VSS. O2 sats 97% on NC 2L. /39. A&O x4. Pt reports pain 0/10. Pt in stable condition at the time of transfer inpatient.  Pt transported to ICU-01 on monitor with RN

## 2017-08-16 NOTE — ED TRIAGE NOTES
Patient arrived via EMS from [facility] with reports of shortness of breath, low O2 saturations on O2 and fever. Patient was discharged from this facility about 36 hours ago for unknown complaint. Facility reported patient had dialysis today, unknown if she had full dialysis. Patient arrived groaning. EMS reports patient maintaining O2 saturations with O2.

## 2017-08-16 NOTE — PROGRESS NOTES
Admission Medication Reconciliation:    Information obtained from: external medical records     Significant PMH/Disease States:   Past Medical History:   Diagnosis Date    Acute on chronic diastolic CHF (congestive heart failure), NYHA class 2 ([facility] 2/19/2015    Adrenal mass, left (HCC)     2.4 Cm om Feb 2010    Anemia associated with chronic renal failure     Arthritis     right knee    Asthma     CKD (chronic kidney disease), stage IV (HCC)     ying,dialysis [employer]  [facility]    COPD     Diabetes ([facility] dx 1997~    type II    Generalized social phobia     HTN (hypertension)     Hypercholesteremia     Nausea & vomiting 11/11/2013    Other ill-defined conditions     fistula, R upper arm.  Pneumonia 12/10/2014    left lower lobe with sepsis    Presence of biventricular AICD 4/22/2016 4/20/16 [email or web address] biventricular AICD implant    Renal calculus or stone     small Lt by US 3/2012.  S/P AV lv ablation 04/22/2016 4/20/16    Smokers' cough [facility])        Chief Complaint for this Admission:    Chief Complaint   Patient presents with    Shortness of Breath         Allergies:  Grapefruit; Orange juice; and Peach (prunus persica)    Prior to Admission Medications:   Prior to Admission Medications   Prescriptions Last Dose Informant Patient Reported? Taking? HEPARIN SODIUM,PORCINE/PF (HEPARIN, PORCINE, PF, INJECTION) 8/15/2017  Yes Yes   Sig: 3,000 Units by Injection route DIALYSIS PRN. HYDROcodone-acetaminophen (NORCO) 5-325 mg per tablet   Yes Yes   Sig: Take 1 Tab by mouth every six (6) hours as needed for Pain. acetaminophen (TYLENOL) 325 mg tablet   Yes No   Sig: Take 650 mg by mouth every six (6) hours as needed for Pain or Fever (Temp >100.5). albuterol (PROVENTIL HFA, VENTOLIN HFA, PROAIR HFA) 90 mcg/actuation inhaler   No No   Sig: Take 2 Puffs by inhalation every four (4) hours as needed for Wheezing.    albuterol-ipratropium (DUO-NEB) 2.5 mg-0.5 mg/3 ml nebu   No No   Sig: 3 mL by Nebulization route every six (6) hours as needed. aluminum-magnesium hydroxide 200-200 mg/5 mL susp 30 mL, diphenhydrAMINE 12.5 mg/5 mL elix 75 mg, lidocaine 2 % soln 30 mL, sucralfate 100 mg/mL susp 3 g oral solution (compounded)   No No   Sig: Take 5 mL by mouth Before breakfast, lunch, and dinner. aspirin 81 mg tablet   Yes No   Sig: Take 81 mg by mouth every morning. Indications: MYOCARDIAL INFARCTION PREVENTION   atorvastatin (LIPITOR) 10 mg tablet   Yes No   Sig: Take 10 mg by mouth nightly. budesonide-formoterol (SYMBICORT) 160-4.5 mcg/actuation HFA inhaler   Yes No   Sig: Take 2 Puffs by inhalation two (2) times a day. Patient takes at 0500 and 2100   calcitRIOL (ROCALTROL) 0.25 mcg capsule Unknown  Yes No   Sig: Take 0.25 mcg by mouth two (2) days a week. With dialysis   calcium acetate (PHOSLO) 667 mg cap   Yes No   Sig: Take 1 Cap by mouth three (3) times daily (with meals). cholecalciferol, vitamin D3, (VITAMIN D3) 2,000 unit tab   Yes No   Sig: Take 1 Tab by mouth daily. clopidogrel (PLAVIX) 75 mg tab   No No   Sig: TAKE 1 TABLET BY MOUTH DAILY   darbepoetin cornelio in polysorbat (ARANESP, IN POLYSORBATE,) 10 mcg/0.4 mL syrg 8/15/2017  Yes Yes   Si mcg by Injection route Once every 2 weeks. With dialysis session   insulin NPH (NOVOLIN N, HUMULIN N) 100 unit/mL injection   No No   Si Units by SubCUTAneous route daily (with breakfast). insulin NPH (NOVOLIN N, HUMULIN N) 100 unit/mL injection   No No   Si Units by SubCUTAneous route daily (with dinner). insulin lispro (HUMALOG) 100 unit/mL injection   Yes Yes   Sig: by SubCUTAneous route.  INITIATE CORRECTIVE INSULIN PROTOCOL (KRYSTLE):  RX KRYSTLE Normal Sensitivity (Average weight)  For Blood Sugar (mg/dl) of:              111-150=2 units  151-200=4 units  201-250=6 units  251-300=8 units  301-350=10 units  Over 350= Call MD   iron sucrose (VENOFER) 100 mg iron/5 mL injection Unknown  Yes No   Si mg by IntraVENous route Once every 2 weeks. With dialysis   methocarbamol (ROBAXIN-750) 750 mg tablet   No No   Sig: Take 1 Tab by mouth four (4) times daily. metoprolol tartrate (LOPRESSOR) 50 mg tablet   Yes No   Sig: Take 50 mg by mouth two (2) times a day. roflumilast (DALIRESP) tab tablet   Yes No   Sig: Take 500 mcg by mouth daily. sorbitol 70 % solution   Yes Yes   Sig: Take 30 mL by mouth daily as needed (constipation). tiotropium (SPIRIVA WITH HANDIHALER) 18 mcg inhalation capsule   Yes No   Sig: Take 1 Cap by inhalation daily. traMADol (ULTRAM) 50 mg tablet   No No   Sig: Take 1 Tab by mouth every six (6) hours as needed for Pain for up to 10 days. Max Daily Amount: 200 mg. Facility-Administered Medications: None         Comments/Recommendations: This medication history was obtained from external medical records from [facility] dated 8/15/17 and dialysis notes dated 8/15/17     Medications added: Sorbitol, norco and meds for dialysis (heparin 3000 bolus q dialysis, aranesp 20mcg q 2 weeks (given 8/15), calcitriol 0.25 mcg with 2nd and 3rd dialysis treatment weekly, venofer 100mg with 2nd dialysis q 2 weeks (last given unknown))  Medications deleted: Prednisone dose pack (should be completed)  Medications amended: humalog sliding scale, phoslo from 2 tid with meals to 1 tid with meals    Last doses of medication: not noted on rx profile   Review of Allergies: not completed    Also of note: HD on Tues, Thursday, Saturday at [facility]    Thank you for allowing me to participate in the care of this patient. Please contact the pharmacy () or the medication reconciliation pharmacy () with any questions.     Thalia Aquino, CrisD

## 2017-08-16 NOTE — CONSULTS
Infectious Disease Consult    Today's Date: 8/16/2017   Admit Date: 8/16/2017    Impression:   · Back pain  · Fevers/leukocytosis  · ESRD    Plan:   · Needs imaging of back  · Continue IV antibiotics  · Follow up cultures    Anti-infectives:     Inpat Anti-Infectives     Start     Ordered Stop    08/17/17 2000  levoFLOXacin (LEVAQUIN) 500 mg in D5W IVPB  500 mg,   IntraVENous,   EVERY 48 HOURS      08/16/17 0854 --    08/16/17 2300  cefepime (MAXIPIME) 1 g in 0.9% sodium chloride (MBP/ADV) 50 mL  1 g,   IntraVENous,   EVERY 24 HOURS      08/16/17 0856 --    08/16/17 1400  vancomycin (VANCOCIN) 500 mg in 0.9% sodium chloride (MBP/ADV) 100 mL  500 mg,   IntraVENous,   PRN (DIALYSIS)      08/16/17 0651 --    08/16/17 0557  Vancomycin Pharmacy Dosing- Dialysis Pt  Other,   RX DOSING/MONITORING      08/16/17 0557 --          Subjective:   Date of Consultation:  August 16, 2017  Referring Physician: Dr Dominique Tejeda    Patient is a 67 y.o. female admitted with AMS and fever. She apparently has been having problems with back pain for some time and she did get an epidural injection within the past couple of weeks that did not help her symptoms. She has been febrile over the past 24 hours, cultures are pending and IV antibiotics have been started. She was seen at an outside hospital for the back pain yesterday and was managed symptomatically.       Patient Active Problem List   Diagnosis Code    Adrenal tumor D49.7    History of tobacco abuse Z87.891    HTN (hypertension) I10    ESRD (end stage renal disease) ([facility] [address] N18.6    Anemia of chronic kidney failure N18.9, D63.1    DM (diabetes mellitus) type II controlled with renal manifestation ([facility]) E11.29    AVF (arteriovenous fistula) ([facility]) I77.0    SOB (shortness of breath) R06.02    COPD (chronic obstructive pulmonary disease) ([facility]) J44.9    GERD (gastroesophageal reflux disease) K21.9    Obesity E66.9    ASVD (arteriosclerotic vascular disease) I70.90    History of CVA (cerebrovascular accident) Z86.73    Pneumonia, organism unspecified J18.9    Left-sided chest wall pain R07.89    Acute-on-chronic respiratory failure ([facility]) J96.20    Acute on chronic diastolic CHF (congestive heart failure), NYHA class 2 ([facility]) I50.33    Myocardial ischemia I25.9    Angina, class III ([facility]) I20.9    S/P PTCA (percutaneous transluminal coronary angioplasty) Z98.61    CAD (coronary artery disease) I25.10    Type II or unspecified type diabetes mellitus without mention of complication, uncontrolled E11.65    ASHD (arteriosclerotic heart disease) I25.10    Elevated troponin R74.8    Weakness of right side of body R53.1    Shock ([facility]) R57.9    Acute upper GI bleed K92.2    Fracture of right humerus S42.301A    Debility R53.81    Weakness R53.1    Hypoalbuminemia E88.09    Right arm pain M79.601    Goals of care, counseling/discussion Z71.89    Acute respiratory failure with hypoxia ([facility]) J96.01    PAF (paroxysmal atrial fibrillation) ([facility]) I48.0    Cardiomyopathy ([facility]) I42.9    Chronic systolic congestive heart failure ([facility]) I50.22    Presence of biventricular AICD Z95.810    S/P AV lv ablation Z98.890    Hypoglycemia E16.2    Leucocytosis D72.829    Chronic systolic heart failure ([facility]) I50.22    ICD (implantable cardioverter-defibrillator) in place Z95.810    AICD (automatic cardioverter/defibrillator) present Z95.810    Late effects of CVA (cerebrovascular accident) I69.90    Type 2 diabetes mellitus without complication ([facility]) C60.3    Iron deficiency anemia D50.9    PAD (peripheral artery disease) ([facility]) I73.9    COPD exacerbation ([facility]) J44.1    Chest pain R07.9    Leukocytosis D72.829    Altered mental status R41.82    Hypotension I95.9    Fever R50.9    SIRS (systemic inflammatory response syndrome) ([facility]) R65.10     Past Medical History:   Diagnosis Date    Acute on chronic diastolic CHF (congestive heart failure), NYHA class 2 ([facility] [address] 2/19/2015    Adrenal mass, left (HCC)     2.4 Cm om Feb 2010    Anemia associated with chronic renal failure     Arthritis     right knee    Asthma     CKD (chronic kidney disease), stage IV (HCC)     ying[facility] tues[facility]    COPD     Diabetes ([facility] [address] dx 1997~    type II    Generalized social phobia     HTN (hypertension)     Hypercholesteremia     Nausea & vomiting 11/11/2013    Other ill-defined conditions     fistula, R upper arm.  Pneumonia 12/10/2014    left lower lobe with sepsis    Presence of biventricular AICD 4/22/2016 4/20/16 [employer] biventricular AICD implant    Renal calculus or stone     small Lt by US 3/2012.  S/P AV lv ablation 04/22/2016 4/20/16    Smokers' cough ([facility] [address]       Family History   Problem Relation Age of Onset    Diabetes Mother     Heart Disease Mother     Hypertension Mother     High Cholesterol Mother     Arthritis-osteo Mother     Cancer Father      Lung    Arthritis-osteo Father     Cancer Brother      x2 - lung cancer      Social History   Substance Use Topics    Smoking status: Former Smoker     Packs/day: 2.00     Years: 48.00     Types: Cigarettes     Quit date: 1/3/2012    Smokeless tobacco: Never Used    Alcohol use No     Past Surgical History:   Procedure Laterality Date    COLONOSCOPY,DIAGNOSTIC  3/28/2016         HX HEENT      Lasik eye surgery    HX OPEN CHOLECYSTECTOMY  ~ 1985    HX OTHER SURGICAL      adrenal mass removed    HX SHOULDER ARTHROSCOPY  2008    removal bone spur left shoulder    KNEE SCOPE,CLEAN/DRAIN      UPPER GI ENDOSCOPY,DIAGNOSIS  3/28/2016         VASCULAR SURGERY PROCEDURE UNLIST      dialysis in right arm      Prior to Admission medications    Medication Sig Start Date End Date Taking? Authorizing Provider   insulin lispro (HUMALOG) 100 unit/mL injection by SubCUTAneous route.  INITIATE CORRECTIVE INSULIN PROTOCOL (KRYSTLE):  RX KRYSTLE Normal Sensitivity (Average weight)  For Blood Sugar (mg/dl) of:              111-150=2 units  151-200=4 units  201-250=6 units  251-300=8 units  301-350=10 units  Over 350= Call MD   Yes Historical Provider   sorbitol 70 % solution Take 30 mL by mouth daily as needed (constipation). Yes Historical Provider   HYDROcodone-acetaminophen (NORCO) 5-325 mg per tablet Take 1 Tab by mouth every six (6) hours as needed for Pain. Yes Historical Provider   HEPARIN SODIUM,PORCINE/PF (HEPARIN, PORCINE, PF, INJECTION) 3,000 Units by Injection route DIALYSIS PRN. Yes Historical Provider   darbepoetin cornelio in polysorbat (ARANESP, IN POLYSORBATE,) 10 mcg/0.4 mL syrg 20 mcg by Injection route Once every 2 weeks. With dialysis session   Yes Historical Provider   calcitRIOL (ROCALTROL) 0.25 mcg capsule Take 0.25 mcg by mouth two (2) days a week. With dialysis    Historical Provider   iron sucrose (VENOFER) 100 mg iron/5 mL injection 100 mg by IntraVENous route Once every 2 weeks. With dialysis    Historical Provider   traMADol (ULTRAM) 50 mg tablet Take 1 Tab by mouth every six (6) hours as needed for Pain for up to 10 days. Max Daily Amount: 200 mg. 8/15/17 8/25/17  Ruiz Ryan DO   insulin NPH (NOVOLIN N, HUMULIN N) 100 unit/mL injection 20 Units by SubCUTAneous route daily (with breakfast). 8/8/17   Bryan Rogers MD   insulin NPH (NOVOLIN N, HUMULIN N) 100 unit/mL injection 5 Units by SubCUTAneous route daily (with dinner). 8/8/17   Bryan Rogers MD   clopidogrel (PLAVIX) 75 mg tab TAKE 1 TABLET BY MOUTH DAILY 6/22/17   Ridge Amaro MD   methocarbamol (ROBAXIN-750) 750 mg tablet Take 1 Tab by mouth four (4) times daily. 6/3/17   VEENA Medrano   metoprolol tartrate (LOPRESSOR) 50 mg tablet Take 50 mg by mouth two (2) times a day. Historical Provider   albuterol-ipratropium (DUO-NEB) 2.5 mg-0.5 mg/3 ml nebu 3 mL by Nebulization route every six (6) hours as needed.  11/19/16   Domingo Peterson MD   aluminum-magnesium hydroxide 200-200 mg/5 mL susp 30 mL, diphenhydrAMINE 12.5 mg/5 mL elix 75 mg, lidocaine 2 % soln 30 mL, sucralfate 100 mg/mL susp 3 g oral solution (compounded) Take 5 mL by mouth Before breakfast, lunch, and dinner. 16   Chel Dominguez MD   albuterol (PROVENTIL HFA, VENTOLIN HFA, PROAIR HFA) 90 mcg/actuation inhaler Take 2 Puffs by inhalation every four (4) hours as needed for Wheezing. 16   Kyle Herrera MD   calcium acetate (PHOSLO) 667 mg cap Take 1 Cap by mouth three (3) times daily (with meals). 16   Historical Provider   acetaminophen (TYLENOL) 325 mg tablet Take 650 mg by mouth every six (6) hours as needed for Pain or Fever (Temp >100.5). Historical Provider   budesonide-formoterol (SYMBICORT) 160-4.5 mcg/actuation HFA inhaler Take 2 Puffs by inhalation two (2) times a day. Patient takes at 0500 and 2100    Alvin Mejia MD   roflumilast (DALIRESP) tab tablet Take 500 mcg by mouth daily. Alvin Mejia MD   atorvastatin (LIPITOR) 10 mg tablet Take 10 mg by mouth nightly. Alvin Mejia MD   cholecalciferol, vitamin D3, (VITAMIN D3) 2,000 unit tab Take 1 Tab by mouth daily. Alvin Mejia MD   tiotropium (SPIRIVA WITH HANDIHALER) 18 mcg inhalation capsule Take 1 Cap by inhalation daily. Historical Provider   aspirin 81 mg tablet Take 81 mg by mouth every morning. Indications: MYOCARDIAL INFARCTION PREVENTION 11/29/10   Historical Provider       Allergies   Allergen Reactions    Grapefruit Hives    Orange Juice Hives    Peach (Prunus Persica) Hives        Review of Systems:  Review of systems not obtained due to patient factors.     Objective:     Visit Vitals    /47    Pulse 75    Temp 99.1 °F (37.3 °C)    Resp 19    Ht 5' 2\" (1.575 m)    Wt 62.6 kg (138 lb)    SpO2 100%    BMI 25.24 kg/m2     Temp (24hrs), Av.6 °F (38.1 °C), Min:99.1 °F (37.3 °C), Max:103.2 °F (39.6 °C)       Lines:  Peripheral IV:            Physical Exam:  Neurologic:  Mildly confused  Lungs:  clear to auscultation bilaterally  Heart:  regular rate and rhythm  Abdomen:  soft, non-tender.  Bowel sounds normal. No masses,  no organomegaly  Skin:  no rash or abnormalities    Data Review:     CBC:  Recent Labs      08/16/17   0435  08/15/17   1238   WBC  19.6*  21.3*   GRANS  95*  90   MONOS  1*  5   EOS  0  0   ANEU  19.2*  20.0   ABL  0.2*  0.2   HGB  10.1*  10.9*   HCT  31.6*  33.0*   PLT  291  263       BMP:  Recent Labs      08/16/17   0435  08/15/17   1238   CREA  3.92*  2.72*   BUN  33*  21*   NA  135*  136   K  4.0  3.9   CL  98  100   CO2  25  26   AGAP  12  10   GLU  125*  115*       LFTS:  Recent Labs      08/16/17   0435  08/15/17   1238   TBILI  0.7  0.7   ALT  12  14   SGOT  12*  9*   AP  145*  151*   TP  7.0  7.7   ALB  2.4*  2.8*       Microbiology:     All Micro Results     Procedure Component Value Units Date/Time    CULTURE, BLOOD, PAIRED [[id number]] Collected:  08/16/17 0435    Order Status:  Completed Specimen:  Blood Updated:  08/16/17 0600          Imaging:   Results noted    Signed By: Graham Hart MD     August 16, 2017

## 2017-08-16 NOTE — ED NOTES
Inserted Shaw Catheter. Scant amount of urine drained from bladder, but volume not adequate to obtain urine sample.

## 2017-08-16 NOTE — ROUTINE PROCESS
TRANSFER - OUT REPORT:    Verbal report given to Gil Tenorio RN on Sonny Ortiz  being transferred to Room 7101 (unit) for routine progression of care       Report consisted of patients Situation, Background, Assessment and   Recommendations(SBAR). Information from the following report(s) SBAR, ED Summary, [facility] and Recent Results was reviewed with the receiving nurse. Lines:   Peripheral IV 08/16/17 Left Hand (Active)   Site Assessment Clean, dry, & intact 8/16/2017  4:41 AM   Phlebitis Assessment 0 8/16/2017  4:41 AM   Infiltration Assessment 0 8/16/2017  4:41 AM   Dressing Status Clean, dry, & intact 8/16/2017  4:41 AM   Dressing Type Tape;Transparent; Topical skin adhesive 8/16/2017  4:41 AM   Hub Color/Line Status Pink;Flushed;Patent 8/16/2017  4:41 AM       Peripheral IV 08/16/17 Left Arm (Active)   Site Assessment Clean, dry, & intact 8/16/2017  4:48 AM   Phlebitis Assessment 0 8/16/2017  4:48 AM   Infiltration Assessment 0 8/16/2017  4:48 AM   Dressing Status Clean, dry, & intact 8/16/2017  4:48 AM   Dressing Type Tape;Topical skin adhesive;Transparent 8/16/2017  4:48 AM   Hub Color/Line Status Pink;Flushed;Patent 8/16/2017  4:48 AM        Opportunity for questions and clarification was provided.       Patient transported with:   Monitor   Oxygen 2L  RN

## 2017-08-16 NOTE — PROGRESS NOTES
Day #1 of Cefepime  Indication:  SIRS likely 2/2 sepsis, unclear etiology   Current regimen:  2 gm IV Q 24 hr  Abx regimen: Cefepime + Levaquin + Vancomycin  Recent Labs      17   0435  08/15/17   1238   WBC  19.6*  21.3*   CREA  3.92*  2.72*   BUN  33*  21*     Est CrCl: ESRD on HD  Temp (24hrs), Av °F (38.3 °C), Min:99.8 °F (37.7 °C), Max:103.2 °F (39.6 °C)    Cultures:    Blood: NGTD    Plan: Change to 1 gm IV Q 24 hr per [facility] P&T Committee Protocol with respect to renal function. Pharmacy will continue to monitor patient daily and will make dosage adjustments based upon changing renal function.

## 2017-08-16 NOTE — CONSULTS
Assessment:  ESRD: TTS  Fevers/SIRS: source? COPD  HTN: stable  CAD: mild troponin bump  Anemia: hgb at goal  PAD: s/p recent Left SFA stent    Plan/Recommendations:  HD tomorrow. Alberto notified  F/u Bld Cx  Broad spectrum Abx  Remove hebert  Renally adjust new meds  Am labs      Discussed with patient/family and RN    Thanks for the consultation. Renal service will follow patient with you. Please contact me with any questions or concerns. Initial Consult note         Patient name: Zachariah Bob  MR no: [id number]  Date of admission: 8/16/2017  Date of consultation: 8/16/2017  Requested by: Dr. Gomez Cotto  Reason for consult: ESRD    Patient seen and examined. History obtained from patient and chart review. Relevant labs, data and notes reviewed. HPI: Zachariah Bob is a 67 y.o. female with PMH significant for ESRD on chronic HD [facility], HTN, DM2, CAD, AICD, PAD presented today with AMS/Fevers. Patient was recently admitted at [facility] from 8/2/17- 8/7/17 s/p L SFA stent by Dr. Arcadio Cat. Daughter at bedside states she completed HD yesterday and then sent to ER due to AMS. Sent home-> overnight noted worsening AMS/Fevers. Temp 103.2 to ED here. Admitted to ICU. Pt denies any n/v/d. +C/o b/l knee and leg pains. PMH:  Past Medical History:   Diagnosis Date    Acute on chronic diastolic CHF (congestive heart failure), NYHA class 2 ([address] 2/19/2015    Adrenal mass, left (HCC)     2.4 Cm om Feb 2010    Anemia associated with chronic renal failure     Arthritis     right knee    Asthma     CKD (chronic kidney disease), stage IV (HCC)     ying,d[facility]    COPD     Diabetes ([facility] [address] dx 1997~    type II    Generalized social phobia     HTN (hypertension)     Hypercholesteremia     Nausea & vomiting 11/11/2013    Other ill-defined conditions     fistula, R upper arm.     Pneumonia 12/10/2014    left lower lobe with sepsis    Presence of biventricular AICD 4/22/2016 4/20/16 [employer] biventricular AICD implant    Renal calculus or stone     small Lt by US 3/2012.       S/P AV lv ablation 04/22/2016 4/20/16    Smokers' cough (HCC)      PSH:  Past Surgical History:   Procedure Laterality Date    COLONOSCOPY,DIAGNOSTIC  3/28/2016         HX HEENT      Lasik eye surgery    HX OPEN CHOLECYSTECTOMY  ~ 1985    HX OTHER SURGICAL      adrenal mass removed    HX SHOULDER ARTHROSCOPY  2008    removal bone spur left shoulder    KNEE SCOPE,CLEAN/DRAIN      UPPER GI ENDOSCOPY,DIAGNOSIS  3/28/2016         VASCULAR SURGERY PROCEDURE UNLIST      dialysis in right arm       Social history:   Social History   Substance Use Topics    Smoking status: Former Smoker     Packs/day: 2.00     Years: 48.00     Types: Cigarettes     Quit date: 1/3/2012    Smokeless tobacco: Never Used    Alcohol use No       Family history:  Not contributory    Allergies   Allergen Reactions    Grapefruit Hives    Orange Juice Hives    Peach (Prunus Persica) Hives       Current Facility-Administered Medications   Medication Dose Route Frequency Last Dose    Vancomycin Pharmacy Dosing- Dialysis Pt   Other Rx Dosing/Monitoring      vancomycin (VANCOCIN) 500 mg in 0.9% sodium chloride (MBP/ADV) 100 mL  500 mg IntraVENous DIALYSIS PRN      sodium chloride (NS) flush 5-10 mL  5-10 mL IntraVENous Q8H 10 mL at 08/16/17 1056    sodium chloride (NS) flush 5-10 mL  5-10 mL IntraVENous PRN      [START ON 8/17/2017] levoFLOXacin (LEVAQUIN) 500 mg in D5W IVPB  500 mg IntraVENous Q48H      cefepime (MAXIPIME) 1 g in 0.9% sodium chloride (MBP/ADV) 50 mL  1 g IntraVENous Q24H      budesonide (PULMICORT) 500 mcg/2 ml nebulizer suspension  500 mcg Nebulization BID RT Stopped at 08/16/17 1000    albuterol-ipratropium (DUO-NEB) 2.5 MG-0.5 MG/3 ML  3 mL Nebulization QID RT 3 mL at 08/16/17 1300    albuterol (PROVENTIL VENTOLIN) nebulizer solution 2.5 mg  2.5 mg Nebulization Q4H PRN      famotidine (PF) (PEPCID) 20 mg in sodium chloride 0.9 % 10 mL injection  20 mg IntraVENous Q24H 20 mg at 08/16/17 1056    heparin (porcine) injection 5,000 Units  5,000 Units SubCUTAneous Q12H 5,000 Units at 08/16/17 1225    ondansetron (ZOFRAN) injection 4 mg  4 mg IntraVENous Q6H PRN      insulin lispro (HUMALOG) injection   SubCUTAneous AC&HS 2 Units at 08/16/17 1331       ROS (besides HPI):  AMS    Objective   Visit Vitals    /47    Pulse 75    Temp 99.1 °F (37.3 °C)    Resp 19    Ht 5' 2\" (1.575 m)    Wt 62.6 kg (138 lb)    SpO2 100%    BMI 25.24 kg/m2       Physical Exam:    Gen: Chronically ill appearing    HEENT: AT/NC, EOMI, moist mucous membrane, no scleral icterus    Neck: no JVD, no cervical lymphadenopathy, no carotid bruit    Lungs/Chest wall: B/l Rhonchi    Cardiovascular:  S1/S2, normal rate, regular rhythm. Abdomen: soft, NT, ND, BS+, no HSM    Ext: R AVF. No edema    Skin: warm and dry. No rashes    : Shaw    CNS: alert awake. Answers appropriately.      Labs/Data:    Lab Results   Component Value Date/Time    Sodium 135 08/16/2017 04:35 AM    Potassium 4.0 08/16/2017 04:35 AM    Chloride 98 08/16/2017 04:35 AM    CO2 25 08/16/2017 04:35 AM    Anion gap 12 08/16/2017 04:35 AM    Glucose 125 08/16/2017 04:35 AM    BUN 33 08/16/2017 04:35 AM    Creatinine 3.92 08/16/2017 04:35 AM    BUN/Creatinine ratio 8 08/16/2017 04:35 AM    GFR est AA 14 08/16/2017 04:35 AM    GFR est non-AA 11 08/16/2017 04:35 AM    Calcium 9.6 08/16/2017 04:35 AM       Lab Results   Component Value Date/Time    WBC 19.6 08/16/2017 04:35 AM    Hemoglobin (POC) 11.6 08/16/2017 04:37 AM    HGB 10.1 08/16/2017 04:35 AM    Hematocrit (POC) 34 08/16/2017 04:37 AM    HCT 31.6 08/16/2017 04:35 AM    PLATELET 825 40/22/0230 04:35 AM    MCV 94.9 08/16/2017 04:35 AM           No components found for: SPEP, UPEP  No results found for: PUQ, PROTU2, PROTU1, BJP1, CPE1, IMEL1, MET2  No results found for: MCACR, MCA1, MCA2, MCA3, MCAU, MCAU2, MCALPOCT      Intake/Output Summary (Last 24 hours) at 08/16/17 1507  Last data filed at 08/16/17 1200   Gross per 24 hour   Intake                0 ml   Output               10 ml   Net              -10 ml       Wt Readings from Last 3 Encounters:   08/16/17 62.6 kg (138 lb)   08/15/17 65.7 kg (144 lb 13.5 oz)   08/08/17 66.5 kg (146 lb 11.2 oz)       Signed by:  Alycia Heath MD  Nephrology and Hypertension  [facility]

## 2017-08-16 NOTE — ED PROVIDER NOTES
HPI Comments: 69yF with ESRD, CHF, HTN, high xol, COPD, PAD presents with AMS, fever and SOB from NH. Per EMS and nursing home recent admission here and then visit to ED [facility] ER yesterday for back pain. Unknown last dialysis session or completion. Pt unable to give any history. Records-8/2/2017-8/7/2017 for PAD for LLE angio 8/10/17 stent in place. R thoracentesis on 7/10/17 with 850cc out. CT scan of spine on 7/30/17 showed mod RLE pl effusion. 8/15/2017 ED [facility] ER visit for back pain and LLE pain. The history is provided by the nursing home, medical records and the EMS personnel. Past Medical History:   Diagnosis Date    Acute on chronic diastolic CHF (congestive heart failure), NYHA class 2 ([address] 2/19/2015    Adrenal mass, left (HCC)     2.4 Cm om Feb 2010    Anemia associated with chronic renal failure     Arthritis     right knee    Asthma     CKD (chronic kidney disease), stage IV (HCC)     [facility]    COPD     Diabetes ([address] dx 1997~    type II    Generalized social phobia     HTN (hypertension)     Hypercholesteremia     Nausea & vomiting 11/11/2013    Other ill-defined conditions     fistula, R upper arm.  Pneumonia 12/10/2014    left lower lobe with sepsis    Presence of biventricular AICD 4/22/2016 4/20/16 [employer] biventricular AICD implant    Renal calculus or stone     small Lt by US 3/2012.       S/P AV lv ablation 04/22/2016 4/20/16    Smokers' cough ([address]        Past Surgical History:   Procedure Laterality Date    COLONOSCOPY,DIAGNOSTIC  3/28/2016         HX HEENT      Lasik eye surgery    HX OPEN CHOLECYSTECTOMY  ~ 1985    HX OTHER SURGICAL      adrenal mass removed    HX SHOULDER ARTHROSCOPY  2008    removal bone spur left shoulder    KNEE SCOPE,CLEAN/DRAIN      UPPER GI ENDOSCOPY,DIAGNOSIS  3/28/2016         VASCULAR SURGERY PROCEDURE UNLIST      dialysis in right arm         Family History: Problem Relation Age of Onset    Diabetes Mother     Heart Disease Mother     Hypertension Mother     High Cholesterol Mother     Arthritis-osteo Mother     Cancer Father      Lung    Arthritis-osteo Father     Cancer Brother      x2 - lung cancer       Social History     Social History    Marital status:      Spouse name: N/A    Number of children: N/A    Years of education: N/A     Occupational History     Retired     Social History Main Topics    Smoking status: Former Smoker     Packs/day: 2.00     Years: 48.00     Types: Cigarettes     Quit date: 1/3/2012    Smokeless tobacco: Never Used    Alcohol use No    Drug use: No    Sexual activity: Not on file     Other Topics Concern    Not on file     Social History Narrative         ALLERGIES: Grapefruit; Orange juice; and Peach (prunus persica)    Review of Systems   Unable to perform ROS: Mental status change       Vitals:    08/16/17 0418   BP: (!) 120/39   Pulse: 75   Resp: 24   Temp: 99.8 °F (37.7 °C)   SpO2: 96%            Physical Exam   HENT:   Head: Normocephalic and atraumatic. Dry mm   Eyes: Pupils are equal, round, and reactive to light. Scleral icterus is present. Neck: Normal range of motion. No tracheal deviation present. Cardiovascular: Normal rate, regular rhythm, normal heart sounds and intact distal pulses. Pulmonary/Chest: No stridor. She is in respiratory distress. She has wheezes. She has rales. She exhibits no tenderness. Abdominal: Soft. She exhibits no distension. There is no tenderness. There is no rebound. Musculoskeletal: She exhibits edema (LLE). She exhibits no tenderness. Bruising LLE, RUE graft +thrill   Neurological: She is alert. No cranial nerve deficit. GCS eye subscore is 2. GCS verbal subscore is 2. GCS motor subscore is 5. Skin: Skin is warm. No rash noted. No pallor. jaundice   Psychiatric: She has a normal mood and affect. Nursing note and vitals reviewed. MDM  Number of Diagnoses or Management Options  Sepsis, due to unspecified organism [facility]):   Diagnosis management comments: 72yF presents from NH with AMS and fever and SOB with increased WOB and rales. 103.8 rectal temp, tachypnea with rales. Fever control, monitor, sepsis w/u, iv abx. Recent stent placement and chronic dialysis-likely bacteremia causing fever and ams. Labs remarkable for wbc 19.6, 3% bands       Amount and/or Complexity of Data Reviewed  Clinical lab tests: ordered and reviewed  Tests in the radiology section of CPT®: ordered and reviewed  Obtain history from someone other than the patient: yes  Review and summarize past medical records: yes  Discuss the patient with other providers: yes  Independent visualization of images, tracings, or specimens: yes    Risk of Complications, Morbidity, and/or Mortality  Presenting problems: high  Diagnostic procedures: high  Management options: high    Critical Care  Total time providing critical care: 30-74 minutes    Patient Progress  Patient progress: stable    ED Course       Procedures      5:53 AM  Pt still moaning. Responds to pain. 5:53 AM  Alma Rosa Bueno MD spoke with Dr. Troy Rascon, Consult for Hospitalist. Discussed available diagnostic tests and clinical findings. He/She is in agreement with care plans as outlined. He/she will see and admit patient. Discussed possibility of meningitis. Pt uncooperative and on aspirin/plavix now. 6:44 AM  Attempted LP-unsuccessful.

## 2017-08-16 NOTE — PROGRESS NOTES
Patient was discharged from [facility] ER 36 hrs ago. She came in today by EMS due to fever and SOB with decreased saturation. Her daughter is her POA Shlomo Mckeon. Her number is [phone]. It is believed that patient is septic. Patient will probably return to [facility] upon discharge. Neelam Martinez RN CRM  Care Management Interventions  PCP Verified by CM:  Yes (Dr. Cate Gleason)  Mode of Transport at Discharge: BLS  Transition of Care Consult (CM Consult): SNF  Partner SNF: Yes  MyChart Signup: No  Discharge Durable Medical Equipment: No  Physical Therapy Consult: No  Occupational Therapy Consult: No  Speech Therapy Consult: No  Current Support Network: Nursing Facility  Confirm Follow Up Transport: Other (see comment) (TBD)  Plan discussed with Pt/Family/Caregiver:  (Patient came by EMS [facility])  Discharge Location  Discharge Placement: Skilled nursing facility

## 2017-08-16 NOTE — PROGRESS NOTES
Day #1 of Levaquin  Indication:  SIRS likely 2/2 sepsis, unclear etiology   Current regimen:  750 mg IV Q 24 hr  Abx regimen: Cefepime + Levaquin + Vancomycin  Recent Labs      17   0435  08/15/17   1238   WBC  19.6*  21.3*   CREA  3.92*  2.72*   BUN  33*  21*     Est CrCl: ESRD on HD  Temp (24hrs), Av °F (38.3 °C), Min:99.8 °F (37.7 °C), Max:103.2 °F (39.6 °C)    Cultures:   17 Blood: pending    Plan: Change to 500 mg IV Q 48 hr per [facility] P&T Committee Protocol with respect to renal function. Pharmacy will continue to monitor patient daily and will make dosage adjustments based upon changing renal function.

## 2017-08-17 NOTE — PROGRESS NOTES
Attended interdisciplinary rounds in ICU. : Rev. Salma Pineda.  Murali Sandhu; [facility]; to contact [phone] [address] call: [phone]

## 2017-08-17 NOTE — PROGRESS NOTES
1930: Bedside and Verbal shift change report given to Charlotte Zaman, RN (oncoming nurse) by Tran Flaherty RN (offgoing nurse). Report included the following information SBAR, Kardex, ED Summary, Procedure Summary, Intake/Output, MAR, Recent Results, Cardiac Rhythm Paced and Alarm Parameters . 2000: Assumed care of patient. Patient A&Ox3. Follows commands. Pupils brisk and round. Patient denies pain. Family at bedside. Call light within reach. 2020: Went in room to reassess patient. Patient found moaning and grabbing at gown. Patient denied pain. Patient was not able to answer orientation questions as compared to previous assessment. Nurse assessed pupils and they were found to be irregular. L pupil: round, sluggish, and 4; R pupil: round, sluggish, and 2. Decreased command following. 36: Paged hospitalist.  0100: Hospital returned call. Nurse updated Dr. Kevin Martinez on patient's change in neuro status. Nurse ordered to check BG and inform RT to obtain STAT ABG. BG:160.  0110: Dr. Kevin Martinez at bedside to assess patient. Order placed for STAT CT.  0130: Nurse and tech transport patient to CT.  0145: Arrived back on the unit. Patient still not answering orientation questions. Pupils remain the same. CT results pending. Will continue to monitor patients. 0400: Reassessed patient. A&Ox3. Follow commands. Pupils remain irregular. L pupil: round, sluggish, and 3; R pupil: round, sluggish, and 2. Will continue to monitor patient. 0630: Renal at bedside. 0730: Bedside and Verbal shift change report given to Selene Mcknight (oncoming nurse) by Charlotte Zaman, ROSALIE (offgoing nurse). Report included the following information SBAR, Kardex, ED Summary, Procedure Summary, Intake/Output, MAR, Recent Results, Cardiac Rhythm Paced and Alarm Parameters .

## 2017-08-17 NOTE — CONSULTS
Palliative Medicine Consult  Robbie: [phone] ([phone])    Patient Name: Howard Rodriguez  YOB: 1945    Date of Initial Consult: August 17, 2017  Reason for Consult: Care Decisions; family requested  Requesting Provider: Dr. Juwan Harmon   Primary Care Physician: Loc Garner MD      SUMMARY:     Dano Le is a 67y.o. year old with a past history of HTN, dyslipidemia, DM, asthma, COPD, ESRD- on hemodialysis, anemia, adrenal mass, arthritis right knee, generalized social phobia, pneumonia- 12/14, acute on chronic diastolic CHF,right arm fracture- 3/10/16, PAD s/p stent 8/10/17, who was admitted on 8/16/2017  with a diagnosis of SIRS, shock, AMS, Elevated troponin,leukocytosis, fever, hypotension (pressor support). Current medical issues leading to Palliative Medicine involvement include: daughter has met with out team in the past so she requested the consult. The pt has been declining in health for several months. Quality of life consists of in and out of the hospital or SNF for rehab. Pt has been less tolerant of dialysis sessions also. Reports of the pt saying she is tired and no longer wants to live. PALLIATIVE DIAGNOSES:   1. Generalized Pain  2. Cough  3. Hypoalbuminemia  4. Shortness of breath  5. Goals of care  6. Debility  7. Care Giver [place]     PLAN:   1. Met with the pt, daughter, and friends along with Edson Jones NP  2. The pt declined opportunity to address care goals today as she does not feel well enough  3. Engaged in a lengthy conversation with daughter about concerns  4. Daughter overwhelmed with caring for mother and other responsibilities. She feels her mother has not had a good quality of life for a long time. She is seeking support in discussing what her mother's wishes are. She will honor whatever her mother chooses to do. 5. Explained that the patient's prognosis is poor and she remains at risk for further decline.   Daughter feels she cannot care for her mother if she returns back home  6. Listening presence provided and encouragement to take time for oneself while the pt is admitted  7. We agreed to re-visit care goals tomorrow if the pt is able  8. Continue current care  9. Initial consult note routed to primary continuity provider  10.  Communicated plan of care with: Palliative IDT       GOALS OF CARE / TREATMENT PREFERENCES:   [====Goals of Care====]  GOALS OF CARE:  Patient / health care proxy stated goals: continue current care      TREATMENT PREFERENCES:   Code Status: Full Code    Advance Care Planning:  Advance Care Planning 8/17/2017   Patient's Healthcare Decision Maker is: Named in scanned ACP document   Primary Decision Maker Name Jailene Corbett   Primary Decision Maker Phone Number [phone]   Primary Decision Maker Relationship to Patient Adult child   Secondary Decision Maker Name -   Secondary Decision [address] Phone Number -   Secondary Decision Maker Relationship to Patient -   Confirm Advance Directive Yes, on file   Patient Would Like to Complete Advance Directive -       Other:    The palliative care team has discussed with patient / health care proxy about goals of care / treatment preferences for patient.  [====Goals of Care====]         HISTORY:     History obtained from: chart and family    CHIEF COMPLAINT: \"I'm ok\"    HPI/SUBJECTIVE:    The patient is:   [x] Verbal and participatory but limited due to condition  [] Non-participatory due to:      Clinical Pain Assessment (nonverbal scale for severity on nonverbal patients):   [++++ Clinical Pain Assessment++++]  [++++Pain Severity++++]: Pain: 0  [++++Pain Character++++]:   [++++Pain Duration++++]:   [++++Pain Effect++++]:   [++++Pain Factors++++]:   [++++Pain Frequency++++]:   [++++Pain Location++++]:   [++++ Clinical Pain Assessment++++]  Duration: for how long has pt been experiencing pain (e.g., 2 days, 1 month, years)  Frequency: how often pain is an issue (e.g., several times per day, once every few days, constant)     FUNCTIONAL ASSESSMENT:     Palliative Performance Scale (PPS):  PPS: 30       PSYCHOSOCIAL/SPIRITUAL SCREENING:     Advance Care Planning:  Advance Care Planning 8/17/2017   Patient's Healthcare Decision Maker is: Named in scanned ACP document   Primary Decision Maker Name Melvin Carranza   Primary Decision Maker Phone Number [phone]   Primary Decision Maker Relationship to Patient Adult child   Secondary Decision Maker Name -   Secondary Decision [address] Phone Number -   Secondary Decision Maker Relationship to Patient -   Confirm Advance Directive Yes, on file   Patient Would Like to Complete Advance Directive -        Any spiritual / Spiritism concerns:  [] Yes /  [x] No    Caregiver Burnout:  [x] Yes /  [] No /  [] No Caregiver Present      Anticipatory grief assessment:   [x] Normal  / [] Maladaptive       ESAS Anxiety: Anxiety: 3    ESAS Depression:          REVIEW OF SYSTEMS:     Positive and pertinent negative findings in ROS are noted above in HPI. The following systems were [x] reviewed / [] unable to be reviewed as noted in HPI  Other findings are noted below. Systems: constitutional, ears/nose/mouth/throat, respiratory, gastrointestinal, genitourinary, musculoskeletal, integumentary, neurologic, psychiatric, endocrine. Positive findings noted below. Modified ESAS Completed by: provider   Fatigue: 7 Drowsiness: 3     Pain: 0   Anxiety: 3 Nausea: 0   Anorexia: 5 Dyspnea: 4                    PHYSICAL EXAM:     From RN flowsheet:  Wt Readings from Last 3 Encounters:   08/17/17 147 lb 0.8 oz (66.7 kg)   08/15/17 144 lb 13.5 oz (65.7 kg)   08/08/17 146 lb 11.2 oz (66.5 kg)     Blood pressure 103/69, pulse 75, temperature 97.9 °F (36.6 °C), temperature source Oral, resp. rate 25, height 5' 2\" (1.575 m), weight 147 lb 0.8 oz (66.7 kg), SpO2 100 %.     Pain Scale 1: Adult Nonverbal Pain Scale  Pain Intensity 1: 0  Pain Onset 1:  (patient has chronic back pain)  Pain Location 1: Back     Pain Description 1: Aching  Pain Intervention(s) 1: Medication (see MAR)  Last bowel movement, if known:     Constitutional: frail, ill appearing, uncomfortable  Eyes: pupils equal, anicteric  ENMT: no nasal discharge, moist mucous membranes  Cardiovascular: regular rhythm, distal pulses intact  Respiratory: breathing labored, symmetric  Gastrointestinal: soft non-tender, +bowel sounds  Musculoskeletal: no deformity, no tenderness to palpation  Skin: warm, dry, PAD  Neurologic: following commands, moving all extremities  Psychiatric: full affect, no hallucinations  Other:       HISTORY:     Active Problems:    ESRD (end stage renal disease) ([facility]) (10/16/2012)      Elevated troponin (7/3/2015)      Leukocytosis (8/16/2017)      Altered mental status (8/16/2017)      Hypotension (8/16/2017)      Fever (8/16/2017)      SIRS (systemic inflammatory response syndrome) ([facility] (8/16/2017)      Past Medical History:   Diagnosis Date    Acute on chronic diastolic CHF (congestive heart failure), NYHA class 2 ([facility] 2/19/2015    Adrenal mass, left ([facility])     2.4 Cm om Feb 2010    Anemia associated with chronic renal failure     Arthritis     right knee    Asthma     CKD (chronic kidney disease), stage IV ([facility])     ying,[facility]    COPD     Diabetes ([facility] dx 1997~    type II    Generalized social phobia     HTN (hypertension)     Hypercholesteremia     Nausea & vomiting 11/11/2013    Other ill-defined conditions     fistula, R upper arm.  Pneumonia 12/10/2014    left lower lobe with sepsis    Presence of biventricular AICD 4/22/2016 4/20/16 [employer] biventricular AICD implant    Renal calculus or stone     small Lt by US 3/2012.       S/P AV lv ablation 04/22/2016 4/20/16    Smokers' cough [facility])       Past Surgical History:   Procedure Laterality Date    COLONOSCOPY,DIAGNOSTIC  3/28/2016         HX HEENT      Lasik eye surgery    HX OPEN CHOLECYSTECTOMY  ~ 1985    HX OTHER SURGICAL      adrenal mass removed    HX SHOULDER ARTHROSCOPY  2008    removal bone spur left shoulder    KNEE SCOPE,CLEAN/DRAIN      UPPER GI ENDOSCOPY,DIAGNOSIS  3/28/2016         VASCULAR SURGERY PROCEDURE UNLIST      dialysis in right arm      Family History   Problem Relation Age of Onset    Diabetes Mother     Heart Disease Mother     Hypertension Mother     High Cholesterol Mother     Arthritis-osteo Mother     Cancer Father      Lung    Arthritis-osteo Father     Cancer Brother      x2 - lung cancer      History reviewed, no pertinent family history.   Social History   Substance Use Topics    Smoking status: Former Smoker     Packs/day: 2.00     Years: 48.00     Types: Cigarettes     Quit date: 1/3/2012    Smokeless tobacco: Never Used    Alcohol use No     Allergies   Allergen Reactions    Grapefruit Hives    Orange Juice Hives    Peach (Prunus Persica) Hives      Current Facility-Administered Medications   Medication Dose Route Frequency    epoetin cornelio (EPOGEN;PROCRIT) injection 8,000 Units  8,000 Units SubCUTAneous DIALYSIS TUE, THU & SAT    famotidine (PEPCID) tablet 20 mg  20 mg Oral DAILY    aspirin chewable tablet 81 mg  81 mg Oral DAILY    clopidogrel (PLAVIX) tablet 75 mg  75 mg Oral DAILY    atorvastatin (LIPITOR) tablet 10 mg  10 mg Oral QHS    traMADol (ULTRAM) tablet 25 mg  25 mg Oral Q12H PRN    HYDROmorphone (PF) (DILAUDID) injection 0.5-1 mg  0.5-1 mg IntraVENous Q4H PRN    cefTRIAXone (ROCEPHIN) 1 g in 0.9% sodium chloride (MBP/ADV) 50 mL  1 g IntraVENous Q24H    oxyCODONE-acetaminophen (PERCOCET) 5-325 mg per tablet 1 Tab  1 Tab Oral Q6H PRN    sodium chloride (NS) flush 5-10 mL  5-10 mL IntraVENous Q8H    sodium chloride (NS) flush 5-10 mL  5-10 mL IntraVENous PRN    levoFLOXacin (LEVAQUIN) 500 mg in D5W IVPB  500 mg IntraVENous Q48H    budesonide (PULMICORT) 500 mcg/2 ml nebulizer suspension  500 mcg Nebulization BID RT    albuterol-ipratropium (DUO-NEB) 2.5 MG-0.5 MG/3 ML  3 mL Nebulization QID RT    albuterol (PROVENTIL VENTOLIN) nebulizer solution 2.5 mg  2.5 mg Nebulization Q4H PRN    heparin (porcine) injection 5,000 Units  5,000 Units SubCUTAneous Q12H    ondansetron (ZOFRAN) injection 4 mg  4 mg IntraVENous Q6H PRN    insulin lispro (HUMALOG) injection   SubCUTAneous AC&HS    acetaminophen (TYLENOL) tablet 650 mg  650 mg Oral Q6H PRN    PHENYLephrine (NEOSYNEPHRINE) 30,000 mcg in 0.9% sodium chloride 250 mL infusion   mcg/min IntraVENous TITRATE          LAB AND IMAGING FINDINGS:     Lab Results   Component Value Date/Time    WBC 19.2 08/17/2017 05:08 AM    HGB 9.3 08/17/2017 05:08 AM    PLATELET 128 30/06/4950 05:08 AM     Lab Results   Component Value Date/Time    Sodium 132 08/17/2017 05:08 AM    Potassium 4.6 08/17/2017 05:08 AM    Chloride 97 08/17/2017 05:08 AM    CO2 22 08/17/2017 05:08 AM    BUN 46 08/17/2017 05:08 AM    Creatinine 4.80 08/17/2017 05:08 AM    Calcium 9.9 08/17/2017 05:08 AM    Magnesium 2.2 08/17/2017 05:08 AM    Phosphorus 3.5 08/17/2017 05:08 AM      Lab Results   Component Value Date/Time    AST (SGOT) 10 08/17/2017 05:08 AM    Alk.  phosphatase 123 08/17/2017 05:08 AM    Protein, total 6.9 08/17/2017 05:08 AM    Albumin 2.7 08/17/2017 05:08 AM    Globulin 4.2 08/17/2017 05:08 AM     Lab Results   Component Value Date/Time    INR 1.2 08/16/2017 04:35 AM    Prothrombin time 12.5 08/16/2017 04:35 AM    aPTT 36.0 08/16/2017 04:35 AM      Lab Results   Component Value Date/Time    Iron 67 04/12/2015 04:50 AM    TIBC 233 04/12/2015 04:50 AM    Iron % saturation 29 04/12/2015 04:50 AM    Ferritin 700 04/12/2015 04:50 AM      Lab Results   Component Value Date/Time    pH 7.38 11/01/2016 05:31 AM    PCO2 42 11/01/2016 05:31 AM    PO2 102 11/01/2016 05:31 AM     No components found for: [place]   Lab Results   Component Value Date/Time    CK 53 07/10/2017 08:47 AM    CK - MB 2.7 07/10/2017 08:47 AM                Total time: 70 minutes  Counseling / coordination time, spent as noted above: 50 minutes  > 50% counseling / coordination?: y    Prolonged service was provided for  []30 min   []75 min in face to face time in the presence of the patient, spent as noted above. Time Start:   Time End:   Note: this can only be billed with 75331 (initial) or 11735 (follow up). If multiple start / stop times, list each separately.

## 2017-08-17 NOTE — PROGRESS NOTES
Patient name: Maria D King  MRN: [id number]    Nephrology Progress note:    Assessment:  ESRD: TTS  Sepsis:GPC Bacteremia: source? COPD  HTN: hypotensive  CAD: mild troponin bump  Anemia: hgb below goal  PAD: s/p recent Left SFA stent    Plan/Recommendations:  Draw repeat Bld Cx on HD  F/u Bld Cx ID/Sens  Will need echo   IV Abx per ID  Renally adjust new meds  Am labs      Subjective:  Seen at the start of HD. Changed to 2K bath and decreased UF goal.  Overnight started on IV Nicholas    ROS:   UTO    Exam:  Visit Vitals    /58 (BP 1 Location: Left arm, BP Patient Position: At rest)    Pulse 75    Temp 98.2 °F (36.8 °C)    Resp 17    Ht 5' 2\" (1.575 m)    Wt 62.6 kg (138 lb)    SpO2 94%    BMI 25.24 kg/m2     Wt Readings from Last 3 Encounters:   08/16/17 62.6 kg (138 lb)   08/15/17 65.7 kg (144 lb 13.5 oz)   08/08/17 66.5 kg (146 lb 11.2 oz)       Intake/Output Summary (Last 24 hours) at 08/17/17 6355  Last data filed at 08/17/17 0500   Gross per 24 hour   Intake            52.46 ml   Output               10 ml   Net            42.46 ml       Gen: NAD  HEENT: No icterus, mmm, no oral exudate, AT/NC  Lungs/Chest wall: Coarse b/l  Cardiovascular: Regular rate, normal rhythm. Palpable peripheral pulses. Abdomen/: Soft, NT, ND, BS+. Ext: R AVF +b/t No peripheral edema  CNS: alert and awake.       Current Facility-Administered Medications   Medication Dose Route Frequency Last Dose    epoetin cornelio (EPOGEN;PROCRIT) injection 8,000 Units  8,000 Units SubCUTAneous DIALYSIS ZEHRAE, THU & SAT      Vancomycin Pharmacy Dosing- Dialysis Pt   Other Rx Dosing/Monitoring      vancomycin (VANCOCIN) 500 mg in 0.9% sodium chloride (MBP/ADV) 100 mL  500 mg IntraVENous DIALYSIS PRN      sodium chloride (NS) flush 5-10 mL  5-10 mL IntraVENous Q8H 10 mL at 08/16/17 2200    sodium chloride (NS) flush 5-10 mL  5-10 mL IntraVENous PRN      levoFLOXacin (LEVAQUIN) 500 mg in D5W IVPB  500 mg IntraVENous Q48H      cefepime (MAXIPIME) 1 g in 0.9% sodium chloride (MBP/ADV) 50 mL  1 g IntraVENous Q24H 1 g at 08/17/17 0007    budesonide (PULMICORT) 500 mcg/2 ml nebulizer suspension  500 mcg Nebulization BID  mcg at 08/16/17 2151    albuterol-ipratropium (DUO-NEB) 2.5 MG-0.5 MG/3 ML  3 mL Nebulization QID RT 3 mL at 08/16/17 2151    albuterol (PROVENTIL VENTOLIN) nebulizer solution 2.5 mg  2.5 mg Nebulization Q4H PRN      famotidine (PF) (PEPCID) 20 mg in sodium chloride 0.9 % 10 mL injection  20 mg IntraVENous Q24H 20 mg at 08/16/17 1056    heparin (porcine) injection 5,000 Units  5,000 Units SubCUTAneous Q12H 5,000 Units at 08/16/17 2352    ondansetron (ZOFRAN) injection 4 mg  4 mg IntraVENous Q6H PRN      insulin lispro (HUMALOG) injection   SubCUTAneous AC&HS 2 Units at 08/17/17 0008    acetaminophen (TYLENOL) tablet 650 mg  650 mg Oral Q6H  mg at 08/17/17 0442    PHENYLephrine (NEOSYNEPHRINE) 30,000 mcg in 0.9% sodium chloride 250 mL infusion   mcg/min IntraVENous TITRATE 25 mcg/min at 08/17/17 0133       Labs/Data:    Lab Results   Component Value Date/Time    WBC 19.2 08/17/2017 05:08 AM    Hemoglobin (POC) 11.6 08/16/2017 04:37 AM    HGB 9.3 08/17/2017 05:08 AM    Hematocrit (POC) 34 08/16/2017 04:37 AM    HCT 29.0 08/17/2017 05:08 AM    PLATELET 005 36/83/2959 05:08 AM    MCV 95.7 08/17/2017 05:08 AM       Lab Results   Component Value Date/Time    Sodium 132 08/17/2017 05:08 AM    Potassium 4.6 08/17/2017 05:08 AM    Chloride 97 08/17/2017 05:08 AM    CO2 22 08/17/2017 05:08 AM    Anion gap 13 08/17/2017 05:08 AM    Glucose 156 08/17/2017 05:08 AM    BUN 46 08/17/2017 05:08 AM    Creatinine 4.80 08/17/2017 05:08 AM    BUN/Creatinine ratio 10 08/17/2017 05:08 AM    GFR est AA 11 08/17/2017 05:08 AM    GFR est non-AA 9 08/17/2017 05:08 AM    Calcium 9.9 08/17/2017 05:08 AM       Patient seen and examined. Chart reviewed. Labs, data and other pertinent notes reviewed in last 24 hrs.     Discussed with patient, ICU RN and HD RN    Signed by:  Nida Tyler MD

## 2017-08-17 NOTE — PROGRESS NOTES
CM reviewed case with treatment team during Bedside Interdisciplinary Rounds with daughter present. Per daughter, patient was admitted from [facility] where she had been to 1-2 weeks. Patient was at home before admission to [facility]. Patient continues with hemodialysis (previously at [address] [place] [address]), wound care, treatment for infection, pain issue, etc.    CM spoke with [facility] Admissions Director ( Main # [phone]) , who confirmed that [facility] will accept patient at discharge. CM sent updated clinicals to [facility] via [employer]. CM to follow for discharge planning needs.

## 2017-08-17 NOTE — PROGRESS NOTES
Initial Pulmonary / Critical Care Consultation    Assessment / Plan:      1. Sepsis with fever and leukocytosis without clear source - marginal bp on presentation but with normal lactic acid level  2. ESRD - dialysis TTS  3. PVD s/p stent placement  4. Diastolic CHF / biventricular pacer AICD in place  5. CAD s/p stent  6. COPD - some increased work of breathing and rhonchi on exam  7. DM    · Continue broad abx and follow up cultures  · Renal to see  · Nebs with duoneb, pulmicort, and as needed albuterol  · Ok to move out of ICU if remains off of pressors later today and if resp status OK  · Family requests palliative      Subjective:      Patient having some back and shoulder pain. Tylenol did not help, adding Ultram at adjusted doses and dilaudid if needed. She can eat today. Still requiring Nicholas-Synephrine. We'll use continued plan and antibiotics. Pull 1 L on dialysis today. As she is on Nicholas-Synephrine will keep in the ICU continue current care. Streptococcal pneumoniae  is growing out of her blood cultures. Repeat cultures are pending. Narrow antibiotics accordingly. Thoracentesis done at [facility] approximately a week ago did not disclose any empyema. Chest x-ray currently with questionable effusion. Check chest CT.       ROS:  A comprehensive review of systems was negative except for that written in the HPI.     Objective:  Patient Vitals for the past 4 hrs:   BP Temp Temp src Pulse Resp SpO2 Weight   08/17/17 0905 121/44 - - 75 - 100 % -   08/17/17 0850 131/44 - - 75 23 100 % -   08/17/17 0835 126/49 - - 76 28 100 % -   08/17/17 0820 128/40 - - 75 18 100 % -   08/17/17 0805 132/45 - - 75 16 100 % -   08/17/17 0800 132/45 97.9 °F (36.6 °C) Oral 75 18 99 % -   08/17/17 0756 - - - - - - 66.7 kg (147 lb 0.8 oz)   08/17/17 0750 128/72 - - 75 27 98 % -   08/17/17 0743 - - - - - 99 % -   08/17/17 0735 (!) 117/34 - - 77 25 97 % -   08/17/17 0725 (!) 122/39 - - 75 23 95 % -   08/17/17 0705 118/44 - - 75 19 98 % -   17 0700 118/44 - - 75 16 96 % -   17 0650 109/40 - - 75 20 98 % -   17 0635 (!) 112/38 - - 76 27 92 % -   17 0620 110/43 97.9 °F (36.6 °C) Oral 77 24 91 % -   17 0600 110/43 - - 75 21 91 % -     Temp (24hrs), Av.2 °F (36.8 °C), Min:97.9 °F (36.6 °C), Max:99.1 °F (37.3 °C)    CVP:          Intake/Output Summary (Last 24 hours) at 17 0908  Last data filed at 17 0800   Gross per 24 hour   Intake           139.96 ml   Output                0 ml   Net           139.96 ml     Blood Sugar:  Glucose   Date Value Ref Range Status   2017 156 (H) 65 - 100 mg/dL Final   2017 125 (H) 65 - 100 mg/dL Final   08/15/2017 115 (H) 65 - 100 mg/dL Final   2017 89 65 - 100 mg/dL Final   2017 60 (L) 65 - 100 mg/dL Final     Exam:  Alert appearing  MM dry  Mild accessory use  Coarse bs bilaterally  RRR  Protuberant soft NT, no rebound  Warm and dry without edema    Lab data was reviewed. Radiology images were independently viewed and available reports were reviewed.     CXR - CM, AICD, no acute process    Lab:  Recent Labs      17   0508  17   1644  17   1023  17   0435  08/15/17   1428  08/15/17   1238   WBC  19.2*   --    --   19.6*   --   21.3*   HGB  9.3*   --    --   10.1*   --   10.9*   PLT  247   --    --   291   --   263   NA  132*   --    --   135*   --   136   K  4.6   --    --   4.0   --   3.9   CL  97   --    --   98   --   100   CO2  22   --    --   25   --   26   BUN  46*   --    --   33*   --   21*   CREA  4.80*   --    --   3.92*   --   2.72*   GLU  156*   --    --   125*   --   115*   CA  9.9   --    --   9.6   --   9.4   MG  2.2   --    --    --    --    --    PHOS  3.5   --    --    --    --    --    INR   --    --    --   1.2*   --    --    TROIQ   --   0.12*  0.14*  0.08*  0.07*   --   0.04   TBILI  0.6   --    --   0.7   --   0.7   SGOT  10*   --    --   12*   --   9*   LAC   --    --    --   1.7  1.4   -- ABG:  Recent Labs      08/16/17   0757  08/16/17   0457   PHI  7.357  7.389   PCO2I  40.8  40.4   PO2I  82  84   HCO3I  22.9  24.4   SO2I  96  96     All Micro Results     Procedure Component Value Units Date/Time    CULTURE, BLOOD, PAIRED [[id number]]  (Abnormal) Collected:  08/16/17 0438    Order Status:  Completed Specimen:  Blood Updated:  08/17/17 8373     Special Requests: NO SPECIAL REQUESTS        Culture result:         STREPTOCOCCUS PNEUMONIAE GROWING IN BOTH BOTTLES DRAWN (SITES = L FA AND L HAND) SENSITIVITY TO FOLLOW (A)              PRELIMINARY REPORT OF GRAM POSITIVE COCCI IN CHAINS GROWING IN BOTH BOTTLES DRAWN CALLED TO AND READ BACK BY Russel Pineda RN ON 8/16/17 AT [address] [place] [address] ICU).  MS (A)    CULTURE, BLOOD, PAIRED [[id number]]     Order Status:  Sent Specimen:  Blood             Howie Antoine MD

## 2017-08-17 NOTE — PROGRESS NOTES
0730: Bedside and Verbal shift change report given to Dahiana Smith RN (oncoming nurse) by Juve Pino RN (offgoing nurse). Report included the following information SBAR, Kardex, Intake/Output, MAR, Recent Results and Cardiac Rhythm Paced. 0800:  Assessment complete. No new issues. Dialysis on going. 0900:  Patient moaning in pain. Received orders from Dr. Cheryle Thompson for tramadol. Will give and continue to monitor. 1000:  Patient still moaning in pain. PRN dilaudid given. Will continue to monitor. Palliative care consult placed per daughter's request.    1100:  Patient still moaning in pain. Received new orders for additional dilaudid. Will give and continue to monitor. 1200:  Reassessment complete. No new issues. Patient still complaining of pain. Spoke to Dr. Dorothy Mix from palliative care will be by to see patient and family around 5. Family updated. 1230:  Patient still moaning in pain. PRN percocet given. Will continue to monitor. 1600:  Reassessment complete. No new issues. Patient's pain now well controlled. 1820:  Down to CT with patient on monitor. 1845:  Back to room from CT. Patient tolerated travel and scan well. Patient reattached to bedside monitor and offered dinner. Patient refused dinner at this time. Patient has refused bath as she hurts too much to turn. 1930:  Bedside and Verbal shift change report given to Juve Pino., ROSALIE (oncoming nurse) by Dahiana Smith RN (offgoing nurse). Report included the following information SBAR, Kardex, Intake/Output, MAR, Recent Results and Cardiac Rhythm Paced. Shift Summary:  Dialysis this morning with 1 liter off. Patient still on 25 mcg/min of Nicholas. Pain better controlled with dilaudid/percocet combination. Met with palliative care and will meet again tomorrow. Chest CT completed. Awaiting results.

## 2017-08-17 NOTE — WOUND CARE
WOCN Note:       New consult placed by RN for sacral assessment. Patient seen with Carmen Daughters RN  Family at bedside. Chart shows:  Admitted for Altered mental status  Fever  SIRS (systemic inflammatory response syndrome) (HCC)  Leukocytosis  Elevated troponin  Hypotension  ESRD (end stage renal disease) ([facility]  ; history of   Past Medical History:   Diagnosis Date    Acute on chronic diastolic CHF (congestive heart failure), NYHA class 2 ([facility] 2/19/2015    Adrenal mass, left (HCC)     2.4 Cm om Feb 2010    Anemia associated with chronic renal failure     Arthritis     right knee    Asthma     CKD (chronic kidney disease), stage IV (HCC)     ying,dialysis [employer] [facility]  [facility]    COPD     Diabetes ([facility] dx 1997~    type II    Generalized social phobia     HTN (hypertension)     Hypercholesteremia     Nausea & vomiting 11/11/2013    Other ill-defined conditions     fistula, R upper arm.  Pneumonia 12/10/2014    left lower lobe with sepsis    Presence of biventricular AICD 4/22/2016 4/20/16 [employer] biventricular AICD implant    Renal calculus or stone     small Lt by US 3/2012.  S/P AV lv ablation 04/22/2016 4/20/16    Smokers' cough ([facility]      Admitted from nursing home. Assessment:   Patient is A&O x 3, communicative, tearful and requires assist with repositioning. Bed: total care   Patient reports back pain that is worse than her chronic back pain. She screams with repositioning. RN premedicated her but the patient states that it's not working. She is refusing to turn for staff. Explained to her and family that it was important to reposition to avoid skin injury. Patient requested to be on her back and refused offloading pillows. Bilateral heels intact and without erythema. Dyschromia noted buttocks.   Blanching pink to sacrum    Left wrist, skin tear:  3 x 0.5 x 0.1 cm; 100% red; scant serosanguinous drainage; presently covered with tegaderm; patient refused care at this time; wound care materials left at bedside for RN to use when patient has pain managed. Recommendations:    1. Left wrist:  Apply mepitel one and cover with dry roll gauze. Change as needed for drainage. 2.  Sacrum and buttocks:  Twice daily cleanse, dry and apply [place]oe [place]. 3.  Turn team    Skin Care & Pressure Prevention:  Minimize layers of linen/pads under patient to optimize support surface. Turn/reposition approximately every 2 hours and offload heels. Discussed above plan with patient, daughter and Saji Ponce.     Transition of Care: Plan to follow as needed while admitted to hospital.    Brent Zapata, NOELN RN  Office [phone]  Pager [phone]

## 2017-08-17 NOTE — PROGRESS NOTES
Hospitalist Progress Note  Meño Wilson MD  Office: [phone]        Date of Service:  2017  NAME:  Nancy Cloeman  :  [id number]  MRN:  [id number]      Admission Summary:   Nancy Coleman is a 67 y.o. white female with past medical history of ESRD on hemodialysis -Thursday- Saturday, chronic diastolic CHF NYHA Class 2, anemia, adrenal mass, asthma, arthritis, CKD, COPD, type 2 diabetes mellitus, social phobia, anxiety, hypertension, hyperlipidemia, pneumonia, kidney stone, peripheral arterial disease presented to the ED from nursing home with reported fever, dyspnea, and altered mental status (AMS). Interval history / Subjective:   Patient primarily admitted to the Medical ICU with a working diagnosis of Sepsis secondary to ? Urinary tract in origin Vs pneumonia. Started on broad spectrum IV antibiotics. And patient currently being followed by Nephrology, Infectious disease, Cardiology and Intensivist for her multiple co morbidities. Assessment & Plan:     Sepsis 2/2 UTI Vs Pneumonia   Continue IV Vancomycin and Zosyn   ID consultation and follow up appreciated   CT scan of the chest and vertebrae couldn't be completed since patient could not lie flat over the bed.    No fever over the last 24 hours   MAP greater than 55 not needing pressors   Undergone dialysis today early in the morning   Persistent leukocytosis   Blood culture growing: strep pneumoniae   Appreciate palliative care consult     ESRD on HD   Nephrology on board     CAD/ Diastolic CHF s/p PPM/AICD placement   Continue with home medications   Follow cardiology recommendation   Echocardiogram   Slight troponin elevation most likely 2/2 demand ischemia and a component of ESRD     COPD   Continue with current medications     DM   Long acting insulin   And SSI   POCT blood glucose measurement     Code status: Full code   DVT prophylaxis: Heparin 5000 sq every 8 hours     Care Plan discussed with: Patient/Family, Nurse and   Disposition: TBD     Hospital Problems  Date Reviewed: 8/3/2017          Codes Class Noted POA    Leukocytosis ICD-10-CM: D72.829  ICD-9-CM: 288.60  8/16/2017 Unknown        Altered mental status ICD-10-CM: R41.82  ICD-9-CM: 780.97  8/16/2017 Unknown        Hypotension ICD-10-CM: I95.9  ICD-9-CM: 458.9  8/16/2017 Unknown        Fever ICD-10-CM: R50.9  ICD-9-CM: 780.60  8/16/2017 Unknown        SIRS (systemic inflammatory response syndrome) ([facility] ICD-10-CM: R65.10  ICD-9-CM: 995.90  8/16/2017 Unknown        Elevated troponin ICD-10-CM: R74.8  ICD-9-CM: 790.6  7/3/2015 Unknown        ESRD (end stage renal disease) ([facility] (Chronic) ICD-10-CM: N18.6  ICD-9-CM: 585.6  10/16/2012 Unknown                Review of Systems:   A comprehensive review of systems was negative except for that written in the HPI. Vital Signs:    Last 24hrs VS reviewed since prior progress note. Most recent are:  Visit Vitals    /69    Pulse 75    Temp 98.2 °F (36.8 °C)    Resp 25    Ht 5' 2\" (1.575 m)    Wt 66.7 kg (147 lb 0.8 oz)    SpO2 100%    BMI 26.9 kg/m2         Intake/Output Summary (Last 24 hours) at 08/17/17 1742  Last data filed at 08/17/17 1600   Gross per 24 hour   Intake           289.96 ml   Output             1000 ml   Net          -710.04 ml        Physical Examination:             Constitutional:  sick looking in respiratory distress. Patient saturating > 90% on 4 litre's NC    ENT:  Oral mucous moist, oropharynx benign. Neck supple,    Resp:  decreased air entry over the lower 2/3rd area of lung fields bilaterally, transmitted sounds all over the lung fields bilaterally    CV:  Rate in the 100's     GI:  Soft, non distended, non tender. normoactive bowel sounds, no hepatosplenomegaly     Musculoskeletal:  No edema, warm, 1+ pulses throughout    Neurologic:  Moves all extremities.  Anxious and agitated otherwise alert and oriented Data Review:    Review and/or order of clinical lab test      Labs:     Recent Labs      08/17/17   0508  08/16/17   0435   WBC  19.2*  19.6*   HGB  9.3*  10.1*   HCT  29.0*  31.6*   PLT  247  291     Recent Labs      08/17/17   0508  08/16/17   0435  08/15/17   1238   NA  132*  135*  136   K  4.6  4.0  3.9   CL  97  98  100   CO2  22  25  26   BUN  46*  33*  21*   CREA  4.80*  3.92*  2.72*   GLU  156*  125*  115*   CA  9.9  9.6  9.4   MG  2.2   --    --    PHOS  3.5   --    --      Recent Labs      08/17/17   0508  08/16/17   0435  08/15/17   1238   SGOT  10*  12*  9*   ALT  9*  12  14   AP  123*  145*  151*   TBILI  0.6  0.7  0.7   TP  6.9  7.0  7.7   ALB  2.7*  2.4*  2.8*   GLOB  4.2*  4.6*  4.9*   AML   --   16*   --    LPSE   --   53*   --      Recent Labs      08/16/17   0435   INR  1.2*   PTP  12.5*   APTT  36.0*      No results for input(s): FE, TIBC, PSAT, FERR in the last 72 hours. No results found for: FOL, RBCF   No results for input(s): PH, PCO2, PO2 in the last 72 hours.   Recent Labs      08/16/17   1644  08/16/17   1023  08/16/17   0435   TROIQ  0.12*  0.14*  0.08*  0.07*     Lab Results   Component Value Date/Time    Cholesterol, total 107 08/16/2017 04:35 AM    HDL Cholesterol 35 08/16/2017 04:35 AM    LDL, calculated 36.4 08/16/2017 04:35 AM    Triglyceride 178 08/16/2017 04:35 AM    CHOL/HDL Ratio 3.1 08/16/2017 04:35 AM     Lab Results   Component Value Date/Time    Glucose (POC) 166 08/17/2017 04:55 PM    Glucose (POC) 159 08/17/2017 11:42 AM    Glucose (POC) 149 08/17/2017 07:02 AM    Glucose (POC) 180 08/17/2017 12:01 AM    Glucose (POC) 204 08/16/2017 06:12 PM     Lab Results   Component Value Date/Time    Color DARK YELLOW 04/29/2016 02:40 AM    Appearance HAZY 04/29/2016 02:40 AM    Specific gravity 1.025 04/29/2016 02:40 AM    Specific gravity 1.018 07/27/2015 08:02 PM    pH (UA) 5.0 04/29/2016 02:40 AM    Protein 300 04/29/2016 02:40 AM    Glucose NEGATIVE  04/29/2016 02:40 AM Ketone TRACE 04/29/2016 02:40 AM    Bilirubin NEGATIVE  04/29/2016 02:40 AM    Urobilinogen 0.2 04/29/2016 02:40 AM    Nitrites NEGATIVE  04/29/2016 02:40 AM    Leukocyte Esterase NEGATIVE  04/29/2016 02:40 AM    Epithelial cells FEW 04/29/2016 02:40 AM    Bacteria 2+ 04/29/2016 02:40 AM    WBC 0-4 04/29/2016 02:40 AM    RBC 0-5 04/29/2016 02:40 AM         Medications Reviewed:     Current Facility-Administered Medications   Medication Dose Route Frequency    epoetin cornelio (EPOGEN;PROCRIT) injection 8,000 Units  8,000 Units SubCUTAneous DIALYSIS TUE, THU & SAT    famotidine (PEPCID) tablet 20 mg  20 mg Oral DAILY    aspirin chewable tablet 81 mg  81 mg Oral DAILY    clopidogrel (PLAVIX) tablet 75 mg  75 mg Oral DAILY    atorvastatin (LIPITOR) tablet 10 mg  10 mg Oral QHS    traMADol (ULTRAM) tablet 25 mg  25 mg Oral Q12H PRN    HYDROmorphone (PF) (DILAUDID) injection 0.5-1 mg  0.5-1 mg IntraVENous Q4H PRN    cefTRIAXone (ROCEPHIN) 1 g in 0.9% sodium chloride (MBP/ADV) 50 mL  1 g IntraVENous Q24H    oxyCODONE-acetaminophen (PERCOCET) 5-325 mg per tablet 1 Tab  1 Tab Oral Q6H PRN    sodium chloride (NS) flush 5-10 mL  5-10 mL IntraVENous Q8H    sodium chloride (NS) flush 5-10 mL  5-10 mL IntraVENous PRN    levoFLOXacin (LEVAQUIN) 500 mg in D5W IVPB  500 mg IntraVENous Q48H    budesonide (PULMICORT) 500 mcg/2 ml nebulizer suspension  500 mcg Nebulization BID RT    albuterol-ipratropium (DUO-NEB) 2.5 MG-0.5 MG/3 ML  3 mL Nebulization QID RT    albuterol (PROVENTIL VENTOLIN) nebulizer solution 2.5 mg  2.5 mg Nebulization Q4H PRN    heparin (porcine) injection 5,000 Units  5,000 Units SubCUTAneous Q12H    ondansetron (ZOFRAN) injection 4 mg  4 mg IntraVENous Q6H PRN    insulin lispro (HUMALOG) injection   SubCUTAneous AC&HS    acetaminophen (TYLENOL) tablet 650 mg  650 mg Oral Q6H PRN    PHENYLephrine (NEOSYNEPHRINE) 30,000 mcg in 0.9% sodium chloride 250 mL infusion   mcg/min IntraVENous TITRATE ______________________________________________________________________  EXPECTED LENGTH OF STAY:   ACTUAL LENGTH OF STAY:          1                 Luis Felipe uTrner MD

## 2017-08-17 NOTE — PROGRESS NOTES
Spiritual Care Assessment/Progress Notes    Aranza Cates [id number]  [id number]    1945  67 y.o.  female    Patient Telephone Number: [phone] (home)   Alevism Affiliation: Voodoo   Language: English   Extended Emergency Contact Information  Primary Emergency Contact: Chao Easton Phone: [phone]  Relation: Friend  Secondary Emergency Contact: [address] [address] Lee Phone: [phone]  Relation: Sister   Patient Active Problem List    Diagnosis Date Noted    Leukocytosis 08/16/2017    Altered mental status 08/16/2017    Hypotension 08/16/2017    Fever 08/16/2017    SIRS (systemic inflammatory response syndrome) ([address] 08/16/2017    Chest pain 05/24/2017    COPD exacerbation ([address] 11/14/2016    PAD (peripheral artery disease) ([address] 10/04/2016    AICD (automatic cardioverter/defibrillator) present 06/30/2016    Late effects of CVA (cerebrovascular accident) 06/30/2016    Type 2 diabetes mellitus without complication ([address] 64/69/3806    Iron deficiency anemia 06/30/2016    Chronic systolic heart failure ([address] 05/01/2016    ICD (implantable cardioverter-defibrillator) in place 05/01/2016    Hypoglycemia 04/29/2016    Leucocytosis 04/29/2016    Presence of biventricular AICD 04/22/2016    S/P AV lv ablation 04/22/2016    Cardiomyopathy ([address] 04/19/2016    Chronic systolic congestive heart failure ([address] 04/19/2016    Acute respiratory failure with hypoxia (HCC) 04/16/2016    PAF (paroxysmal atrial fibrillation) ([address] 04/16/2016    Fracture of right humerus 03/23/2016    Debility 03/23/2016    Weakness 03/23/2016    Hypoalbuminemia 03/23/2016    Right arm pain 03/23/2016    Goals of care, counseling/discussion 03/23/2016    Shock ([address] 03/21/2016    Acute upper GI bleed 03/21/2016    Weakness of right side of body 03/08/2016    ASHD (arteriosclerotic heart disease) 07/03/2015    Elevated troponin 07/03/2015    Type II or unspecified type diabetes mellitus without mention of complication, uncontrolled 03/15/2015    S/P PTCA (percutaneous transluminal coronary angioplasty) 03/09/2015    CAD (coronary artery disease) 03/09/2015    Myocardial ischemia 02/21/2015    Angina, class III ([address] 02/21/2015    Acute-on-chronic respiratory failure ([address] 02/19/2015    Acute on chronic diastolic CHF (congestive heart failure), NYHA class 2 ([address] 02/19/2015    Left-sided chest wall pain 01/02/2015    Pneumonia, organism unspecified 04/21/2014    Obesity 03/06/2014     Class: Chronic    ASVD (arteriosclerotic vascular disease) 03/06/2014     Class: Chronic    History of CVA (cerebrovascular accident) 03/06/2014     Class: Present on Admission    GERD (gastroesophageal reflux disease) 09/09/2013    COPD (chronic obstructive pulmonary disease) ([address] 09/05/2013    SOB (shortness of breath) 05/26/2013    DM (diabetes mellitus) type II controlled with renal manifestation ([address] 10/17/2012    AVF (arteriovenous fistula) [facility] 10/17/2012    ESRD (end stage renal disease) ([facility] 10/16/2012    Anemia of chronic kidney failure 10/16/2012    Adrenal tumor 10/08/2010    History of tobacco abuse 10/08/2010     Class: Present on Admission    HTN (hypertension) 10/08/2010        Date: 8/17/2017       Level of Confucianist/Spiritual Activity:  []         Involved in carson tradition/spiritual practice    []         Not involved in carson tradition/spiritual practice  [x]         Spiritually oriented    []         Claims no spiritual orientation    []         seeking spiritual identity  []         Feels alienated from Methodist practice/tradition  []         Feels angry about Methodist practice/tradition  [x]         Spirituality/Methodist tradition IS a resource for coping at this time.   []         Not able to assess due to medical condition    Services Provided Today:  []         crisis intervention    []         reading Scriptures  [x] spiritual assessment    [x]         prayer  [x]         empathic listening/emotional support  []         rites and rituals (cite in comments)  []         life review     []         Quaker support  []         theological development   []         advocacy  []         ethical dialog     []         blessing  []         bereavement support    [x]         support to family  []         anticipatory grief support   []         help with AMD  []         spiritual guidance    []         meditation      Spiritual Care Needs  [x]         Emotional Support  [x]         Spiritual/Mandaen Care  []         Loss/Adjustment  []         Advocacy/Referral                /Ethics  []         No needs expressed at               this time  []         Other: (note in               comments)  [address]  []         Follow up visits with               pt/family  []         Provide materials  []         Schedule sacraments  []         Contact Community               Clergy  [x]         Follow up as needed  []         Other: (note in               comments)     Comments: Saw Ms Zackary Snellen in ICU-01 at her nurse's request. Ms Zackary Snellen was in bed, groaning & with flat facial affect when  arrived; patient's daughter and another visitor were present. Provided active listening as Ms Zackary Snellen stated that she was so miserable she just wished that God would take her and take her soon. Family requested that  have prayer for patient. With Ms Zackary Snellen' permission, had prayer for patient and family and reassured them of continued prayers on their behalf. Encouraged patient to process her feeling but she continued to moan and share how uncomfortable she was. Patient's daughter stated that patient was not affiliated with any particular Christian but that members of daughter's Christian was checking on them frequently and numerous groups were keeping patient in prayer. Informed patient and family of 24-hour  availability for support. : Rev. Benton Fair.  Carol Awad; [facility], to contact [phone] [address] [facility] call: [phone]

## 2017-08-17 NOTE — PROGRESS NOTES
Cardiology Progress Note            Admit Date: 8/16/2017  Admit Diagnosis: Altered mental status; Fever;SIRS (systemic inflammatory res*  Date: 8/17/2017     Time: 9:00 AM    Subjective:  Patient c/o back and neck pain. Denies CP or SOB       Assessment and Plan      1. Elevated troponin - 0.14                        - No evidence for ACS on EKG                        - Review of prior troponin levels reveals chronic, low elevation                        - Non specific in this setting of sepsis, ESRD on HD and mild anemia                        - Echo 4/2017 EF 55-60%, Left ventricle: Moderate septal and severe posterior wall hypertrophy. Severely dilated LA, mild MR, moderate AS, mild TR.  2. CAD                        - s/p DYLAN to Cx 2/23/2015                        - On Plavix, Statin and ASA  3. H/o systolic HF - EF as low as 15-20%                        - EF recovered to 55-60% with last echo                        - BiV AICD placed on 4/16/2016  4. Afib                         - S/p AV node ablation 4/16/2016                        - Lopressor 50 mg BID                        - OAC with ASA only  5. Sepsis                        - per primary team                        - No clear source  6. ESRD                        - HD via right UE brachial fistula                        - TTS  7. PAD                        - recent stent placement left SFA                        - ASA and Plavix  8. COPD                        - Pulmonary on board     Troponin trend down from 0.14 to 0.12. Non specific in this setting of ESRD on HD and sepsis. Stable from a cardiac perspective. Echo today to check for infection on valves. Needs no further cardiac testing at this time. Will follow along in chart and from a distance. Will see again PRN.   I have seen and examined the patient and agree with PA assessment  Minimal non specific elevation in troponin in this setting, f/u echo but no other interventions planned from cardiac standpoint at this time unless of a change in status  ECG vpaced, patient mostly c/o back pain , no cp or sob reported, back pain is very positional and probably muscle skeletal defer rx to primary team  If echo unchanged we will continue to follow from a distance  F/u closely with Dr. Jg Liu upon dc, she will probably need stress test as outpatient  Needs to go back on asa plavix and lipitor no clear contraindications at this time         ROS:  A comprehensive review of systems was negative except for that written in the HPI. Objective:      Physical Exam:                Visit Vitals    /44  Comment: Nurse in to assess. NAD. Lines secured.  Pulse 75    Temp 97.9 °F (36.6 °C) (Oral)    Resp 23    Ht 5' 2\" (1.575 m)    Wt 66.7 kg (147 lb 0.8 oz)    SpO2 100%    BMI 26.9 kg/m2        General Appearance:   Well developed, well nourished,alert and oriented x 3, and   individual in no acute distress. Ears/Nose/Mouth/Throat:    Hearing grossly normal.         Neck:  Supple. Chest:    Lungs coarse BS to auscultation bilaterally. Cardiovascular:   Regular rate and rhythm, S1, S2 normal, no murmur. Abdomen:    Soft, non-tender, bowel sounds are active. Extremities:  No edema bilaterally. Skin:  Warm and dry. Telemetry: Paced.           Data Review:    Labs:    Recent Results (from the past 24 hour(s))   TROPONIN I    Collection Time: 08/16/17 10:23 AM   Result Value Ref Range    Troponin-I, Qt. 0.14 (H) <0.05 ng/mL   GLUCOSE, POC    Collection Time: 08/16/17  1:27 PM   Result Value Ref Range    Glucose (POC) 172 (H) 65 - 100 mg/dL    Performed by Pennie Diaz    TROPONIN I    Collection Time: 08/16/17  4:44 PM   Result Value Ref Range    Troponin-I, Qt. 0.12 (H) <0.05 ng/mL   GLUCOSE, POC    Collection Time: 08/16/17  6:12 PM   Result Value Ref Range    Glucose (POC) 204 (H) 65 - 100 mg/dL    Performed by [address] [place] [address], POC    Collection Time: 08/17/17 12:01 AM   Result Value Ref Range    Glucose (POC) 180 (H) 65 - 100 mg/dL    Performed by JAVI MENDEZ    TSH 3RD GENERATION    Collection Time: 08/17/17  5:08 AM   Result Value Ref Range    TSH 3.31 0.36 - 3.74 uIU/mL   T4, FREE    Collection Time: 08/17/17  5:08 AM   Result Value Ref Range    T4, Free 0.9 0.8 - 1.5 NG/DL   CBC WITH AUTOMATED DIFF    Collection Time: 08/17/17  5:08 AM   Result Value Ref Range    WBC 19.2 (H) 3.6 - 11.0 K/uL    RBC 3.03 (L) 3.80 - 5.20 M/uL    HGB 9.3 (L) 11.5 - 16.0 g/dL    HCT 29.0 (L) 35.0 - 47.0 %    MCV 95.7 80.0 - 99.0 FL    MCH 30.7 26.0 - 34.0 PG    MCHC 32.1 30.0 - 36.5 g/dL    RDW 15.2 (H) 11.5 - 14.5 %    PLATELET 649 232 - 603 K/uL    NEUTROPHILS 89 (H) 32 - 75 %    LYMPHOCYTES 2 (L) 12 - 49 %    MONOCYTES 8 5 - 13 %    EOSINOPHILS 1 0 - 7 %    BASOPHILS 0 0 - 1 %    ABS. NEUTROPHILS 17.1 (H) 1.8 - 8.0 K/UL    ABS. LYMPHOCYTES 0.4 (L) 0.8 - 3.5 K/UL    ABS. MONOCYTES 1.5 (H) 0.0 - 1.0 K/UL    ABS. EOSINOPHILS 0.2 0.0 - 0.4 K/UL    ABS.  BASOPHILS 0.0 0.0 - 0.1 K/UL    DF SMEAR SCANNED      PLATELET COMMENTS LARGE PLATELETS      RBC COMMENTS ANISOCYTOSIS  1+        RBC COMMENTS OVALOCYTES  PRESENT        RBC COMMENTS DAGO CELLS  PRESENT        RBC COMMENTS POLYCHROMASIA  PRESENT       MAGNESIUM    Collection Time: 08/17/17  5:08 AM   Result Value Ref Range    Magnesium 2.2 1.6 - 2.4 mg/dL   METABOLIC PANEL, COMPREHENSIVE    Collection Time: 08/17/17  5:08 AM   Result Value Ref Range    Sodium 132 (L) 136 - 145 mmol/L    Potassium 4.6 3.5 - 5.1 mmol/L    Chloride 97 97 - 108 mmol/L    CO2 22 21 - 32 mmol/L    Anion gap 13 5 - 15 mmol/L    Glucose 156 (H) 65 - 100 mg/dL    BUN 46 (H) 6 - 20 MG/DL    Creatinine 4.80 (H) 0.55 - 1.02 MG/DL    BUN/Creatinine ratio 10 (L) 12 - 20      GFR est AA 11 (L) >60 ml/min/1.73m2    GFR est non-AA 9 (L) >60 ml/min/1.73m2    Calcium 9.9 8.5 - 10.1 MG/DL    Bilirubin, total 0.6 0.2 - 1.0 MG/DL    ALT (SGPT) 9 (L) 12 - 78 U/L    AST (SGOT) 10 (L) 15 - 37 U/L    Alk.  phosphatase 123 (H) 45 - 117 U/L    Protein, total 6.9 6.4 - 8.2 g/dL    Albumin 2.7 (L) 3.5 - 5.0 g/dL    Globulin 4.2 (H) 2.0 - 4.0 g/dL    A-G Ratio 0.6 (L) 1.1 - 2.2     PHOSPHORUS    Collection Time: 08/17/17  5:08 AM   Result Value Ref Range    Phosphorus 3.5 2.6 - 4.7 MG/DL   GLUCOSE, POC    Collection Time: 08/17/17  7:02 AM   Result Value Ref Range    Glucose (POC) 149 (H) 65 - 100 mg/dL    Performed by [facility]           Radiology:        Current Facility-Administered Medications   Medication Dose Route Frequency    epoetin cornelio (EPOGEN;PROCRIT) injection 8,000 Units  8,000 Units SubCUTAneous DIALYSIS TUE, THU & SAT    traMADol (ULTRAM) tablet 25 mg  25 mg Oral Q6H PRN    HYDROmorphone (PF) (DILAUDID) injection 0.2 mg  0.2 mg IntraVENous Q4H PRN    famotidine (PEPCID) tablet 20 mg  20 mg Oral DAILY    Vancomycin Pharmacy Dosing- Dialysis Pt   Other Rx Dosing/Monitoring    vancomycin (VANCOCIN) 500 mg in 0.9% sodium chloride (MBP/ADV) 100 mL  500 mg IntraVENous DIALYSIS PRN    sodium chloride (NS) flush 5-10 mL  5-10 mL IntraVENous Q8H    sodium chloride (NS) flush 5-10 mL  5-10 mL IntraVENous PRN    levoFLOXacin (LEVAQUIN) 500 mg in D5W IVPB  500 mg IntraVENous Q48H    cefepime (MAXIPIME) 1 g in 0.9% sodium chloride (MBP/ADV) 50 mL  1 g IntraVENous Q24H    budesonide (PULMICORT) 500 mcg/2 ml nebulizer suspension  500 mcg Nebulization BID RT    albuterol-ipratropium (DUO-NEB) 2.5 MG-0.5 MG/3 ML  3 mL Nebulization QID RT    albuterol (PROVENTIL VENTOLIN) nebulizer solution 2.5 mg  2.5 mg Nebulization Q4H PRN    heparin (porcine) injection 5,000 Units  5,000 Units SubCUTAneous Q12H    ondansetron (ZOFRAN) injection 4 mg  4 mg IntraVENous Q6H PRN    insulin lispro (HUMALOG) injection   SubCUTAneous AC&HS    acetaminophen (TYLENOL) tablet 650 mg  650 mg Oral Q6H PRN    PHENYLephrine (NEOSYNEPHRINE) 30,000 mcg in 0.9% sodium chloride 250 mL infusion   mcg/min IntraVENous TITRATE          Suresh Corey MD     [facility] [address] [place] [address], [address] [place] [address] [address]   Kristin Acevedo   [phone]

## 2017-08-17 NOTE — NURSE NAVIGATOR
Chart reviewed by Heart Failure Nurse Navigator. Heart Failure database completed. EF current echo Pending. Previous echo 4/19/17 55-60; echo dated 3/24/16 15-20%    ACEi/ARB: not indicated at this time. BB: Lopressor 50 mg qd. CRT BiV ICD implanted 4/16/17. NYHA Functional Class not assigned. Documentation requested for NYHA Functional Class via Provider message on [employer]    Heart Failure Teach Back in Patient Education. Heart Failure Avoiding Triggers on Discharge Instructions. Cardiology; Dr. Lakesha Pickard. Seen by Dr. Parminder Lovelace in house.   SAMANTHA Galarza notified of admission

## 2017-08-17 NOTE — PROGRESS NOTES
ID Progress Note  2017    Subjective:     Much improved today. Strep pneumoniae from culture    Objective:     Antibiotics:  1. Rocephin   2. Levaquin     Vitals:   Visit Vitals    /69    Pulse 75    Temp 98.2 °F (36.8 °C)    Resp 25    Ht 5' 2\" (1.575 m)    Wt 66.7 kg (147 lb 0.8 oz)    SpO2 100%    BMI 26.9 kg/m2        Tmax:  Temp (24hrs), Av.1 °F (36.7 °C), Min:97.9 °F (36.6 °C), Max:98.2 °F (36.8 °C)      Exam:  Lungs:  clear to auscultation bilaterally, diminished breath sounds R base, L base  Heart:  regular rate and rhythm  Abdomen:  soft, non-tender. Bowel sounds normal. No masses,  no organomegaly    Labs:      Recent Labs      17   0508  17   0435  08/15/17   1238   WBC  19.2*  19.6*  21.3*   HGB  9.3*  10.1*  10.9*   PLT  247  291  263   BUN  46*  33*  21*   CREA  4.80*  3.92*  2.72*   SGOT  10*  12*  9*   AP  123*  145*  151*   TBILI  0.6  0.7  0.7       Cultures:     Lab Results   Component Value Date/Time    Specimen Description: [facility] 2014 11:30 AM    Specimen Description: NARES 2014 11:04 AM    Specimen Description: [facility] 2013 09:30 PM    Specimen Description: URINE 2013 09:30 PM     Lab Results   Component Value Date/Time    Culture result:  2017 04:35 AM     STREPTOCOCCUS PNEUMONIAE GROWING IN BOTH BOTTLES DRAWN (SITES = L FA AND L HAND) SENSITIVITY TO FOLLOW    Culture result:  2017 04:35 AM     PRELIMINARY REPORT OF GRAM POSITIVE COCCI IN CHAINS GROWING IN BOTH BOTTLES DRAWN CALLED TO AND READ BACK BY Russel Pineda RN ON 17 AT [address] [place] [address] ICU). MS    Culture result: NO GROWTH 28 DAYS 07/10/2017 05:20 PM    Culture result: NO GROWTH 4 DAYS 07/10/2017 05:20 PM    Culture result: NO GROWTH 4 DAYS 07/10/2017 05:20 PM       Radiology:     Line/Insert Date:           Assessment:     1. Pneumococcal bacteremia--small effusion on scans  2. Debility  3. COPD    Objective:     1.  Continue Rocephin, but increase dose    Tyrone Cabrera, MD

## 2017-08-17 NOTE — PROGRESS NOTES
Patient was admitted on 8/16/2017 early in AM with a working diagnosis of as listed below. Seen at the bed side, in mild respiratory distress. Started on Novolog prior to meals three times per day. Patient's condition discussed with family at the bed side in details. Questions answered. Currently in MICU. Agree with assessment and plan as documented by the night time physician. 1.  SIRS (systemic inflammatory response syndrome) - likely sepsis. Blood culture sent and results pending. Already started on empiric IV antibiotics with Levofloxacin, Cefepime, and Vancomycin. Repeat lactic acid levels q 6 hours. Patient remains hypotensive and her clinical status is critical.  I have spoken with patient's daughter Prabha Vernon [phone] and informed her of the same. Patient is full code. Admit to ICU for further evaluation and treatments.     2. Shock- likely secondary to sepsis and hypovolemia. Patient has ESRD but she is still hypotensive. Will give 0.9%  ml IVF bolus x 1. Order central venous line insertion. Order vasopressor support.     3. Altered mental status- likely metabolic encephalopathy. Will place on neurovascular checks and fall precautions.       4. ESRD (end stage renal disease). Consult nephrologist.     5.  Elevated troponin. Ordered repeat/ serial troponin levels.     6.  Leukocytosis. Repeat CBC.       7. Fever. No definite source of infection but consider possible sources, ie, LLE stent. Check blood cultures. LP was attempted but was unsuccessful. Will consult ID. Continue broad spectrum IV antibiotics. Will order Tylenol suppository prn. Order ice packs and cooling blanket prn.     8. Type 2 diabetes mellitus. Order Humalog insulin correctional coverage, scheduled blood glucose checks and check hemoglobin A1c level.     9. Asthma. Order Duoneb nebulizer treatments.     10. Acute on chronic diastolic CHF.   Chest xray portable shows no overt pulmonary edema. Patient is at risk for volume overload as she required some intravascular IV fluids replacement therapy. Monitor closely.       11. PAD s/p LLE stent. Continue plan as above.     12. LE edema. Monitor fluid status closely.     13. Complex Baker's cyst LLE. Consider for ortho consultation.     14. VTE prophylaxis. No SCDS with noted LLE stent.

## 2017-08-17 NOTE — DIALYSIS
[facility]                         [phone]  Vitals Pre Post Assessment Pre Post   /45 121/45 LOC AXOx3 AXOx3   HR 75 75 Lungs Crackles Crackles   Temp 97.9 97.7 Cardiac Monitored/Paced Monitored/Paced   Resp 24 24 Skin Warm and dry Warm and dry   Weight 66.7kg -1k Edema Generalized Generalized      Pain C/O back pain No complaints     Orders   Duration: Start: 0620 End: 0920 Total: 3 hrs   Dialyzer: Revaclear   K Bath: 2   Ca Bath: 2.0   Na / Bicarb: 140/35   Target Fluid Removal: 1k     Access   Type & Location: (R) AVF   Comments:                         + Thrill/+ Bruit. No signs or symptoms of infection observed to site. Cannulated with 15 G needles x two sites and secured with paper tape. Labs   Hep B status / date: 01/12/2017/Immune   Obtained/Reviewed  Critical Results Called Reviewed/ Obtained cultures. N/A     Meds Given   Name Dose Route   N /A                 Total Liters Process: 74.8   Net Fluid Removed: 1000 mL + Prime and rinse. Comments   Time Out Done: Yes   Primary Nurse Rpt Pre: Padmini Jules RN   Primary Nurse Rpt Post: Maria D Chowdhury RN   Pt Education: Procedural    Care Plan: Continue HD as prescribed. Tx Summary: Patient tolerated treatment well. Bp remained stable throughout treatment. All possible blood returned back to patient without any problems. 15 G needles pulled x 2 site, hemostasis achieved through handheld pressure x 5 minutes. Pressure dressing applied. Left patient in bed in stable condition and reported off to primary nurse.    Dialyzer lot#- [id number]  Tubing lot#-[id number]

## 2017-08-17 NOTE — PROGRESS NOTES
2330: Bedside and Verbal shift change report given to Mary Vu RN (oncoming nurse) by [facility], RN (offgoing nurse). Report included the following information SBAR, Kardex, ED Summary, Procedure Summary, Intake/Output, MAR, Recent Results, Cardiac Rhythm Paced and Alarm Parameters . 0730: Bedside and Verbal shift change report given to Quynh Velázquez (oncoming nurse) by Mary Vu RN (offgoing nurse). Report included the following information SBAR, Kardex, ED Summary, Procedure Summary, Intake/Output, MAR, Recent Results, Cardiac Rhythm Paced and Alarm Parameters .

## 2017-08-18 NOTE — DIABETES MGMT
DTC Note:  Chart reviewed for hyperglycemia. Noted that patient is now comfort care. If blood sugar >250 mg/dL, may want to consider NPH 10 units each morning (patient was taking 20 units with breakfast and 5 units with dinner PTA. DTC will continue to follow.   Danilo Ambriz MS, RN, CDE

## 2017-08-18 NOTE — PROGRESS NOTES
Cardiology Progress Note            Admit Date: 8/16/2017  Admit Diagnosis: Altered mental status; Fever;SIRS (systemic inflammatory res*  Date: 8/18/2017     Time: 9:00 AM    Subjective:  Continues with neck and back pain. Mental status failing. Continues with SOB.       Assessment and Plan      1. Elevated troponin - 0.14                        - No evidence for ACS on EKG                        - Review of prior troponin levels reveals chronic, low elevation                        - Non specific in this setting of sepsis, ESRD on HD and mild anemia                        - Echo 4/2017 EF 55-60%, Left ventricle: Moderate septal and severe posterior wall hypertrophy. Severely dilated LA, mild MR, moderate AS, mild TR.             - Repeat Echo - EF 35%, moderate diffuse hypokinesis. Moderate LA/RA dilation. There was a probable,    medium-sized mass associated with the pacing wire. The possibility that    this is a vegetation cannot be excluded  2. CAD                        - s/p DYLAN to Cx 2/23/2015                        - On Plavix, Statin and ASA  3. H/o systolic HF - EF as low as 15-20%                        - EF recovered to 55-60% with last echo                        - BiV AICD placed on 4/16/2016  4. Afib                         - S/p AV node ablation 4/16/2016                        - Lopressor stopped 2/2 hypotension                        - OAC with ASA only  5. Sepsis                        - per primary team                        - No clear source  6. ESRD                        - HD via right UE brachial fistula                        - TTS  7. PAD                        - recent stent placement left SFA                        - ASA and Plavix  8. COPD                        - Pulmonary on board     TTE with probable mass on pacer wire. EF now depressed to 35%.   Unable to start GDT meds as she is hypotensive and on KATARINA. D/w Dr. Moy Luna, patient with loculated right effusion. SOB persists. She will need a BRE eventually, but unable at this time given her current state. Family met with Palliative care and considering Hospice. BRE posted for 10:45 am  8/21, Monday, if Palliative route not chosen. Prognosis poor. Addendum:  11:12 AM  D/w Ninoska Estrella, NP for Palliative Care. Patient decided for hospice care. She no longer wants dialysis or treatments. Comfort care only. Daughter unavailable at the time for d/w Palliative Care. Palliative will re-see patient with daughter available to re-discuss patient's wishes and decide on comfort care, hospice, etc.  Patient with BiV ICD [facility]). IF hospice chosen, ICD will need to be deactivated. [facility] rep: Christina Banuelos [phone]       Events noted  I have seen and examined the patient and agree with PA. Assessment  Supportive of decision  We will remain available if needed         ROS:  A comprehensive review of systems was negative except for that written in the HPI. Objective:      Physical Exam:                Visit Vitals    BP (!) 106/38 (BP 1 Location: Left arm, BP Patient Position: At rest)    Pulse 75    Temp 97.8 °F (36.6 °C)    Resp 17    Ht 5' 2\" (1.575 m)    Wt 60.1 kg (132 lb 7.9 oz)    SpO2 97%    BMI 24.23 kg/m2        General Appearance:   Well developed, well nourished. Oriented to self only   Ears/Nose/Mouth/Throat:    Hearing grossly normal.         Neck:  Supple. Chest:    Lungs coarse BS to auscultation bilaterally. Cardiovascular:   Regular rate and rhythm, S1, S2 normal, no murmur. Abdomen:    Soft, non-tender, bowel sounds are active. Extremities:  No edema bilaterally. Skin:  Warm and dry. Telemetry: Paced.           Data Review:    Labs:    Recent Results (from the past 24 hour(s))   GLUCOSE, POC    Collection Time: 08/17/17  4:55 PM   Result Value Ref Range    Glucose (POC) 166 (H) 65 - 100 mg/dL    Performed by Ravinder Espinoza, POC    Collection Time: 08/17/17  9:50 PM   Result Value Ref Range    Glucose (POC) 174 (H) 65 - 100 mg/dL    Performed by Terry Dominguez, POC    Collection Time: 08/18/17  1:07 AM   Result Value Ref Range    Glucose (POC) 160 (H) 65 - 100 mg/dL    Performed by Bertha Leyva    METABOLIC PANEL, COMPREHENSIVE    Collection Time: 08/18/17  1:59 AM   Result Value Ref Range    Sodium 133 (L) 136 - 145 mmol/L    Potassium 3.9 3.5 - 5.1 mmol/L    Chloride 97 97 - 108 mmol/L    CO2 25 21 - 32 mmol/L    Anion gap 11 5 - 15 mmol/L    Glucose 167 (H) 65 - 100 mg/dL    BUN 29 (H) 6 - 20 MG/DL    Creatinine 3.16 (H) 0.55 - 1.02 MG/DL    BUN/Creatinine ratio 9 (L) 12 - 20      GFR est AA 17 (L) >60 ml/min/1.73m2    GFR est non-AA 14 (L) >60 ml/min/1.73m2    Calcium 9.2 8.5 - 10.1 MG/DL    Bilirubin, total 0.4 0.2 - 1.0 MG/DL    ALT (SGPT) 8 (L) 12 - 78 U/L    AST (SGOT) 10 (L) 15 - 37 U/L    Alk. phosphatase 123 (H) 45 - 117 U/L    Protein, total 6.8 6.4 - 8.2 g/dL    Albumin 2.4 (L) 3.5 - 5.0 g/dL    Globulin 4.4 (H) 2.0 - 4.0 g/dL    A-G Ratio 0.5 (L) 1.1 - 2.2     CBC WITH AUTOMATED DIFF    Collection Time: 08/18/17  1:59 AM   Result Value Ref Range    WBC 23.1 (H) 3.6 - 11.0 K/uL    RBC 3.11 (L) 3.80 - 5.20 M/uL    HGB 9.4 (L) 11.5 - 16.0 g/dL    HCT 29.6 (L) 35.0 - 47.0 %    MCV 95.2 80.0 - 99.0 FL    MCH 30.2 26.0 - 34.0 PG    MCHC 31.8 30.0 - 36.5 g/dL    RDW 15.3 (H) 11.5 - 14.5 %    PLATELET 825 336 - 346 K/uL    NEUTROPHILS 94 (H) 32 - 75 %    LYMPHOCYTES 2 (L) 12 - 49 %    MONOCYTES 4 (L) 5 - 13 %    EOSINOPHILS 0 0 - 7 %    BASOPHILS 0 0 - 1 %    ABS. NEUTROPHILS 21.7 (H) 1.8 - 8.0 K/UL    ABS. LYMPHOCYTES 0.5 (L) 0.8 - 3.5 K/UL    ABS. MONOCYTES 0.9 0.0 - 1.0 K/UL    ABS. EOSINOPHILS 0.0 0.0 - 0.4 K/UL    ABS.  BASOPHILS 0.0 0.0 - 0.1 K/UL    DF MANUAL      RBC COMMENTS ANISOCYTOSIS  1+        RBC COMMENTS POLYCHROMASIA  1+        RBC COMMENTS OVALOCYTES  PRESENT        RBC COMMENTS DAGO CELLS  PRESENT       MAGNESIUM    Collection Time: 08/18/17  1:59 AM   Result Value Ref Range    Magnesium 2.1 1.6 - 2.4 mg/dL   PHOSPHORUS    Collection Time: 08/18/17  1:59 AM   Result Value Ref Range    Phosphorus 2.9 2.6 - 4.7 MG/DL   NUCLEATED RBC    Collection Time: 08/18/17  1:59 AM   Result Value Ref Range    NRBC 0.6 (H) 0  WBC    ABSOLUTE NRBC 0.13 (H) 0.00 - 0.01 K/uL    WBC CORRECTED FOR NR ADJUSTED FOR NUCLEATED RBC'S     POC G3 - PUL    Collection Time: 08/18/17  2:03 AM   Result Value Ref Range    pH (POC) 7.423 7.35 - 7.45      pCO2 (POC) 46.6 (H) 35.0 - 45.0 MMHG    pO2 (POC) 114 (H) 80 - 100 MMHG    HCO3 (POC) 30.4 (H) 22 - 26 MMOL/L    sO2 (POC) 99 (H) 92 - 97 %    Base excess (POC) 6 mmol/L    Site LEFT RADIAL      Device: NASAL CANNULA      Flow rate (POC) 3 L/M    Allens test (POC) YES      Specimen type (POC) ARTERIAL      Total resp.  rate 20     GLUCOSE, POC    Collection Time: 08/18/17  7:08 AM   Result Value Ref Range    Glucose (POC) 250 (H) 65 - 100 mg/dL    Performed by Murali Alexis, POC    Collection Time: 08/18/17 12:23 PM   Result Value Ref Range    Glucose (POC) 218 (H) 65 - 100 mg/dL    Performed by Chago Avila           Radiology:        Current Facility-Administered Medications   Medication Dose Route Frequency    naloxone (NARCAN) injection 0.4 mg  0.4 mg IntraVENous ONCE    epoetin cornelio (EPOGEN;PROCRIT) injection 8,000 Units  8,000 Units SubCUTAneous DIALYSIS TUE, THU & SAT    famotidine (PEPCID) tablet 20 mg  20 mg Oral DAILY    aspirin chewable tablet 81 mg  81 mg Oral DAILY    clopidogrel (PLAVIX) tablet 75 mg  75 mg Oral DAILY    atorvastatin (LIPITOR) tablet 10 mg  10 mg Oral QHS    traMADol (ULTRAM) tablet 25 mg  25 mg Oral Q12H PRN    HYDROmorphone (PF) (DILAUDID) injection 0.5-1 mg  0.5-1 mg IntraVENous Q4H PRN    oxyCODONE-acetaminophen (PERCOCET) 5-325 mg per tablet 1 Tab  1 Tab Oral Q6H PRN  cefTRIAXone (ROCEPHIN) 2 g in 0.9% sodium chloride (MBP/ADV) 50 mL  2 g IntraVENous Q24H    sodium chloride (NS) flush 5-10 mL  5-10 mL IntraVENous Q8H    sodium chloride (NS) flush 5-10 mL  5-10 mL IntraVENous PRN    budesonide (PULMICORT) 500 mcg/2 ml nebulizer suspension  500 mcg Nebulization BID RT    albuterol-ipratropium (DUO-NEB) 2.5 MG-0.5 MG/3 ML  3 mL Nebulization QID RT    albuterol (PROVENTIL VENTOLIN) nebulizer solution 2.5 mg  2.5 mg Nebulization Q4H PRN    heparin (porcine) injection 5,000 Units  5,000 Units SubCUTAneous Q12H    ondansetron (ZOFRAN) injection 4 mg  4 mg IntraVENous Q6H PRN    insulin lispro (HUMALOG) injection   SubCUTAneous AC&HS    acetaminophen (TYLENOL) tablet 650 mg  650 mg Oral Q6H PRN    PHENYLephrine (NEOSYNEPHRINE) 30,000 mcg in 0.9% sodium chloride 250 mL infusion   mcg/min IntraVENous TITRATE          Sylvia Andres MD     [facility] [address] [place] [address], [address] [place] [address] [address]   [address]   [phone]

## 2017-08-18 NOTE — PROGRESS NOTES
Renal -    Received a call that Ms. Jose G Haines is transitioning to hospice and wants no further HD. Cancelled HD orders and let Zayda Brown know.     Shala Cary

## 2017-08-18 NOTE — PROGRESS NOTES
Initial Pulmonary / Critical Care Consultation    Assessment / Plan:      1. Streptococcal bacteremia  2. Loculated right effusion  3. Possible vegetation on the  wires  4. ESRD - dialysis TTS  5. PVD s/p stent placement  6. Diastolic CHF / biventricular pacer AICD in place  7. CAD s/p stent  8. COPD - some increased work of breathing and rhonchi on exam  9. DM    · Continue broad abx and follow up cultures  · HD /Renal  · Abx / ID  · Nebs with duoneb, pulmicort, and as needed albuterol  · Family requests palliative      Subjective:    ICU bed one    Events overnight include altered mental status and unequal pupils. Sent down for CT no obvious acute abnormality of the brain. Cardiology reviewed transthoracic echocardiogram. Due to streptococcal pneumoniae bacteremia there is a concern for infection on the wires. Transesophageal echocardiogram to be considered but we need to talk to the daughter first as she is considering palliative care. Chest CT shows a loculated discrete one area of fluid collection. We can probably get away with a small bore chest tube by radiology and TPA into that. Again, deferred until after palliative care discussion. Multiple possibilities in this chronically and progressively ill patient that we need to sit down with the palliative care team and the daughter and decide on what is medically reasonable to put her through. Plan to be determined based upon discussion today. Discussed with RN, cardiology PA & cardiologist.        ROS:  A comprehensive review of systems was negative except for that written in the HPI.     Objective:  Patient Vitals for the past 4 hrs:   BP Temp Pulse Resp SpO2 Weight   17 0900 133/52 - 75 26 97 % -   17 0800 90/45 (P) 97.1 °F (36.2 °C) 75 16 97 % -   17 0748 - - - - 94 % -   17 0719 - - - - - 60.1 kg (132 lb 7.9 oz)   17 0700 108/44 - 75 21 98 % -   17 0600 (!) 124/91 - 75 22 99 % -     Temp (24hrs), Av.1 °F (36.7 °C), Min:97.1 °F (36.2 °C), Max:99.6 °F (37.6 °C)    CVP:          Intake/Output Summary (Last 24 hours) at 08/18/17 0942  Last data filed at 08/18/17 0700   Gross per 24 hour   Intake           319.58 ml   Output                0 ml   Net           319.58 ml     Blood Sugar:  Glucose   Date Value Ref Range Status   08/18/2017 167 (H) 65 - 100 mg/dL Final   08/17/2017 156 (H) 65 - 100 mg/dL Final   08/16/2017 125 (H) 65 - 100 mg/dL Final   08/15/2017 115 (H) 65 - 100 mg/dL Final   08/08/2017 89 65 - 100 mg/dL Final     Exam:  Alert appearing  MM dry  Mild accessory use  Coarse bs bilaterally  RRR  Protuberant soft NT, no rebound  Warm and dry without edema    Lab data was reviewed. Radiology images were independently viewed and available reports were reviewed.     CXR - CM, AICD, no acute process    Lab:  Recent Labs      08/18/17   0159  08/17/17   0508  08/16/17   1644  08/16/17   1023  08/16/17   0435  08/15/17   1428   WBC  23.1*  19.2*   --    --   19.6*   --    HGB  9.4*  9.3*   --    --   10.1*   --    PLT  264  247   --    --   291   --    NA  133*  132*   --    --   135*   --    K  3.9  4.6   --    --   4.0   --    CL  97  97   --    --   98   --    CO2  25  22   --    --   25   --    BUN  29*  46*   --    --   33*   --    CREA  3.16*  4.80*   --    --   3.92*   --    GLU  167*  156*   --    --   125*   --    CA  9.2  9.9   --    --   9.6   --    MG  2.1  2.2   --    --    --    --    PHOS  2.9  3.5   --    --    --    --    INR   --    --    --    --   1.2*   --    TROIQ   --    --   0.12*  0.14*  0.08*  0.07*   --    TBILI  0.4  0.6   --    --   0.7   --    SGOT  10*  10*   --    --   12*   --    LAC   --    --    --    --   1.7  1.4     ABG:  Recent Labs      08/18/17   0203  08/16/17   0757  08/16/17   0457   PHI  7.423  7.357  7.389   PCO2I  46.6*  40.8  40.4   PO2I  114*  82  84   HCO3I  30.4*  22.9  24.4   SO2I  99*  96  96     All Micro Results     Procedure Component Value Units Date/Time    CULTURE, BLOOD, PAIRED [[id number]]  (Abnormal)  (Susceptibility) Collected:  08/16/17 0435    Order Status:  Completed Specimen:  Blood Updated:  08/18/17 0848     Special Requests: NO SPECIAL REQUESTS        Culture result:         STREPTOCOCCUS PNEUMONIAE GROWING IN BOTH BOTTLES DRAWN (SITES = L FA AND L HAND) (SENSITIVITIES PERFORMED BY E-TEST) (A)              PRELIMINARY REPORT OF GRAM POSITIVE COCCI IN CHAINS GROWING IN BOTH BOTTLES DRAWN CALLED TO AND READ BACK BY Russel Pineda RN ON 8/16/17 AT [address] [place] [address] ICU).  MS (A)    CULTURE, BLOOD, PAIRED [[id number]] Collected:  08/17/17 0839    Order Status:  Completed Specimen:  Blood Updated:  08/18/17 0616     Special Requests: NO SPECIAL REQUESTS        Culture result: NO GROWTH AFTER 20 HOURS               Celine Gonzalez MD

## 2017-08-18 NOTE — PROGRESS NOTES
0730: Bedside and Verbal shift change report given to Marylu Ortiz RN (oncoming nurse) by Maxi Zendejas RN (offgoing nurse). Report included the following information SBAR, Kardex, Intake/Output, MAR, Recent Results and Cardiac Rhythm Paced. 0800:  Assessment complete. Patient's mental status is waxing and waning, consistently oriented to self. Cardiology at bedside and updated on patient condition. 0830:  Dr. Mounika Singh at bedside and updated on patient condition. 0915:  Extensive conversation between Dr. Mounika Singh and patient's daughter Jeff Costa regarding defining goals and plan of care. Patient and daughter to meet with palliative care today, time pending. 0930:  Patient refusing medications. Patient clearly stated she no longer wants to do dialysis. Patient clearly stated she does not want a chest tube placed. Message left with palliative care to determine time they can see the patient. Pastoral care contacted to provide support for family and patient. 1030:  Palliative team and pastoral care at bedside. Patient affirmed she did not want to continue dialysis and that she did not want a chest tube placed. She continued to say \"I want to die\". Patient's code status changed to DNR after further discussion with palliative care. [place] sheet placed on chart. Per Dr. Jessica Montero, I will contact cardiology to deactivate patient's AICD. Patient moaning and writhing in pain, PRN percocet given. 1045: After palliative care conversation, patient medicated with PRN dilaudid per Dr. Jessica Montero as patient is still writhing in pain and unable to tolerate even a light touch to her skin. Dr. Kristen Henriquez, nephrology, notified of patient's change in code status and desire to no longer have dialysis. Spoke to Adalberto Mcgee, cardiology NP, who is already aware of situation and will provide contact information to [facility] rep when it is appropriate to contact them.     1100:  Patient looks more comfortable and states the pain medication is working. 1150: Spoke to Dr. Annika Mcgee regarding rash on patient's left lower leg. Received orders to hold rocephin and give benedryl. 1215:  Patient told me \"I want to change to my mind\". Upon further questioning, patient said \"I want to live\". I affirmed that she could make that choice and that we would continue to intervene and treat her medical problems as she wished. Dr. Annika Mcgee, attending and Dr. Mounika Singh notified. Palliative care meeting already scheduled for 1300.    1400:  Consult to hospice noted. AICD rep contacted for deactivation and order placed for such by cardiology. Patient to remain on pressors until tomorrow when family arrives. 1500:  AICD deactivated by rep, report placed in chart. 1600:  Reassessment complete. No new issues. Patient sleeping peacefully. 1930:  Bedside and Verbal shift change report given to Lillian Salgado RN (oncoming nurse) by Marylu Ortiz RN (offgoing nurse). Report included the following information SBAR, Kardex, Intake/Output, MAR, Recent Results and Cardiac Rhythm Paced.

## 2017-08-18 NOTE — PROGRESS NOTES
Nurse reports pt having AMS. Pt seen and evaluated. VS:  T   BP  HR  RR   O2sat     General:  Ill appearing female in mild respiratory distress   Head:  Normocephalic, without obvious abnormality, atraumatic   Eyes:  Conjunctivae/corneas clear. Pupils 2 mm reactive bilateral.   E/N/M/T: Nares normal. Septum midline. No nasal drainage or sinus tenderness  Tongue midline/ non-edematous  Clear oropharynx   Neck: Normal appearance and movements, symmetrical, trachea midline  No palpable adenopathy  No thyroid enlargement, tenderness or nodules  No carotid bruit   Normal JVD   Lungs:   Symmetrical chest expansion and respiratory effort  Fine rales to auscultation bilaterally   Chest wall:  No tenderness or deformity   Heart:  Regular rate and rhythm   Sounds normal; no murmur, click, rub or gallop   Abdomen:   Soft, no tenderness  No rebound, guarding, or rigidity  Non-distended  Bowel sounds normal  No masses or hepatosplenomegaly  No hernias present   Back: No CVA tenderness   Extremities: 1+ pitting pedal edema L foot with ecchymosis  No cyanosis   Pulses/  Vascular: 2+ radial/ 1+ DP bilateral pulses   Skin: Multi-stage ecchymosis BUE/ BLE   Musculo-      skeletal: Gait not tested  No calf tenderness   Neuro: GCS 12 (E4 V3 M5). Moves all extremities x 4. Generalized weakness. No slurred speech. No facial droop. Sensation grossly intact. Psych: Awake, disoriented, very anxious, agitated, uncooperative trying to remove devices   Geniturinary: Shaw catheter in place          A/P:  AMS. I was told that since admission patient mental status had improved and that her change tonight was noticed ~ 23:00. Patient was seen and on admission and her exam is not changed as she was disoriented at that time as well. She has no focal neurological deficits to suggest stroke. She is disoriented but awake, alert, and very agitated and uncooperative as such no Narcan needed. BG= 160 mg/dl.   I would hold any narcotics or sedative medications for now. Ordered ABG to rule out any possible hypercapnia but patient is too combative to attempt. Ordered stat CT head to rule out acute process. Continue neurovascular checks/ fall precautions.

## 2017-08-18 NOTE — PROGRESS NOTES
Request by staff to offer support to pt's daughter. Arrived to pt's room and consulted with pt's nurse. Daughter had left the unit but pt's sister, Tien Riggins was at bedside. Offered support to pt and sister; shared prayer at bedside. Pt was expressing that she was in pain; nursing staff aware and addressing her pain. Sister Stephanie shared that she and pt are very close and they have lived together for many years. Normalized Stephanie's emotions and their support for each other. Pt's sister indicated that pt has been through a lot with her health. Stephanie spoke of her own [employer] in [employer], offering words of comfort to pt. This  was present in room when Palliative NPs arrived and spoke with pt and sister. Ms Fox Never expressed her desire to not pursue aggressive measures. Affirmed staff's continued care for Ms. Dominique La and that we would continue to work on a plan that honors her wishes. Sister remains at bedside at this time. Chaplains will continue to follow as able/needed to offer support to pt and family.     Jemma Wilson, Palliative

## 2017-08-18 NOTE — PROGRESS NOTES
Palliative Medicine Consult  Robbie: [phone] ([phone])    Patient Name: Maria D King  YOB: 1945    Date of Initial Consult: August 17, 2017  Reason for Consult: Care Decisions; family requested  Requesting Provider: Dr. Chaparro Kaur   Primary Care Physician: Neena Hargrove MD      SUMMARY:     John Tanner is a 67y.o. year old with a past history of HTN, dyslipidemia, DM, asthma, COPD, ESRD- on hemodialysis, anemia, adrenal mass, arthritis right knee, generalized social phobia, pneumonia- 12/14, acute on chronic diastolic CHF,right arm fracture- 3/10/16, PAD s/p stent 8/10/17, who was admitted on 8/16/2017  with a diagnosis of SIRS, shock, AMS, Elevated troponin,leukocytosis, fever, hypotension (pressor support). Current medical issues leading to Palliative Medicine involvement include: daughter has met with out team in the past so she requested the consult. The pt has been declining in health for several months. Quality of life consists of in and out of the hospital or SNF for rehab. Pt has been less tolerant of dialysis sessions also. Reports of the pt saying she is tired and no longer wants to live. 8/18/17: vegetation noted on pace maker wire, loculated area rt lung and chest tube offered, BRE planned, pt verbalizing she doesn't want dialysis anymore     PALLIATIVE DIAGNOSES:   1. Generalized Pain  2. Cough  3. Hypoalbuminemia  4. Shortness of breath  5. Goals of care  6. Debility  7. Care Giver [place]     PLAN:   1. Met with the pt, daughter, sister,and friends along with Braden Canela NP  2. The pt declined overnight and developed more acute complications mentioned above. Pt verbalizing her desire to die and stop dialysis  3. Pt affirmed her desire to stop dialysis. Witnessed by her sister and staff. Pt agreed to DNR  4. Will place consult for hospice and comfort orders  5. AICD to be de-activated  6.  Multiple discussions held again with daughter again later in the day.  The pt now deferring to her daughter and sister for any other decisions. 7. Please do not burden the patient with additional care goal discussions  8. Answered all questions. Reviewed the specifics about transitioning to comfort measures (labs, diagnostics, antibiotics to be discontinued)  9. Reassured family that the pt will have any medications needed to maintain comfort  10. Plan: hospice and comfort measures to be implemented tomorrow after additional family visits. Daughter would like to maintain pressor support until such time unless the pt no longer requires it clinically. 11. Initial consult note routed to primary continuity provider  12.  Communicated plan of care with: Palliative IDT       GOALS OF CARE / TREATMENT PREFERENCES:   [====Goals of Care====]  GOALS OF CARE:  Patient / health care proxy stated goals: see above      TREATMENT PREFERENCES:   Code Status: DNR    Advance Care Planning:  Advance Care Planning 8/17/2017   Patient's Healthcare Decision Maker is: Named in scanned ACP document   Primary Decision Maker Name [place]   Primary Decision Maker Phone Number [phone]   Primary Decision Maker Relationship to Patient Adult child   Secondary Decision Maker Name -   Secondary Decision [address] Phone Number -   Secondary Decision Maker Relationship to Patient -   Confirm Advance Directive Yes, on file   Patient Would Like to Complete Advance Directive -       Other:    The palliative care team has discussed with patient / health care proxy about goals of care / treatment preferences for patient.  [====Goals of Care====]         HISTORY:     HPI/SUBJECTIVE:    The patient is:   [x] Verbal and participatory but limited due to condition  [] Non-participatory due to:      Clinical Pain Assessment (nonverbal scale for severity on nonverbal patients):   [++++ Clinical Pain Assessment++++]  [++++Pain Severity++++]: Pain: 6  [++++Pain Character++++]: pt unable to describe  [++++Pain Duration++++]: months but worse over the past few days  [++++Pain Effect++++]: limited mobility   [++++Pain Factors++++]: touching of knees and arms hurt  [++++Pain Frequency++++]:  constant  [++++Pain Location++++]: all over  [++++ Clinical Pain Assessment++++]  Duration: for how long has pt been experiencing pain (e.g., 2 days, 1 month, years)  Frequency: how often pain is an issue (e.g., several times per day, once every few days, constant)     FUNCTIONAL ASSESSMENT:     Palliative Performance Scale (PPS):  PPS: 30       PSYCHOSOCIAL/SPIRITUAL SCREENING:     Advance Care Planning:  Advance Care Planning 8/17/2017   Patient's Healthcare Decision Maker is: Named in scanned ACP document   Primary Decision Maker Name Radha Salvador   Primary Decision Maker Phone Number [phone]   Primary Decision Maker Relationship to Patient Adult child   Secondary Decision Maker Name -   Secondary Decision [address] [place] [address] Phone Number -   Secondary Decision Maker Relationship to Patient -   Confirm Advance Directive Yes, on file   Patient Would Like to Complete Advance Directive -        Any spiritual / Christianity concerns:  [] Yes /  [x] No    Caregiver Burnout:  [x] Yes /  [] No /  [] No Caregiver Present      Anticipatory grief assessment:   [x] Normal  / [] Maladaptive       ESAS Anxiety: Anxiety: 2    ESAS Depression:          REVIEW OF SYSTEMS:     Positive and pertinent negative findings in ROS are noted above in HPI. The following systems were [x] reviewed / [] unable to be reviewed as noted in HPI  Other findings are noted below. Systems: constitutional, ears/nose/mouth/throat, respiratory, gastrointestinal, genitourinary, musculoskeletal, integumentary, neurologic, psychiatric, endocrine. Positive findings noted below.   Modified ESAS Completed by: provider   Fatigue: 8 Drowsiness: 2     Pain: 6   Anxiety: 2 Nausea: 0   Anorexia: 5 Dyspnea: 2                    PHYSICAL EXAM:     From RN flowsheet:  Wt Readings from Last 3 Encounters:   08/18/17 132 lb 7.9 oz (60.1 kg)   08/15/17 144 lb 13.5 oz (65.7 kg)   08/08/17 146 lb 11.2 oz (66.5 kg)     Blood pressure 97/59, pulse 75, temperature (P) 97.1 °F (36.2 °C), resp. rate 24, height 5' 2\" (1.575 m), weight 132 lb 7.9 oz (60.1 kg), SpO2 98 %. Pain Scale 1: (P) Adult Nonverbal Pain Scale  Pain Intensity 1: 0  Pain Onset 1:  (patient has chronic back pain)  Pain Location 1: Back     Pain Description 1: Aching  Pain Intervention(s) 1: Medication (see MAR)  Last bowel movement, if known:     Constitutional: frail, ill appearing, uncomfortable  Eyes: pupils equal, anicteric  ENMT: no nasal discharge, moist mucous membranes  Cardiovascular: regular rhythm, distal pulses intact  Respiratory: breathing labored, symmetric  Gastrointestinal: soft non-tender, +bowel sounds  Musculoskeletal: no deformity, no tenderness to palpation  Skin: warm, dry, PAD  Neurologic: following commands, moving all extremities  Psychiatric: full affect, no hallucinations  Other:       HISTORY:     Active Problems:    ESRD (end stage renal disease) ([facility]) (10/16/2012)      Elevated troponin (7/3/2015)      Leukocytosis (8/16/2017)      Altered mental status (8/16/2017)      Hypotension (8/16/2017)      Fever (8/16/2017)      SIRS (systemic inflammatory response syndrome) ([facility] (8/16/2017)      Past Medical History:   Diagnosis Date    Acute on chronic diastolic CHF (congestive heart failure), NYHA class 2 ([address] 2/19/2015    Adrenal mass, left (HCC)     2.4 Cm om Feb 2010    Anemia associated with chronic renal failure     Arthritis     right knee    Asthma     CKD (chronic kidney disease), stage IV ([facility])     ying[facility]    COPD     Diabetes ([facility] dx 1997~    type II    Generalized social phobia     HTN (hypertension)     Hypercholesteremia     Nausea & vomiting 11/11/2013    Other ill-defined conditions     fistula, R upper arm.     Pneumonia 12/10/2014    left lower lobe with sepsis    Presence of biventricular AICD 4/22/2016 4/20/16 Deskarma biventricular AICD implant    Renal calculus or stone     small Lt by US 3/2012.  S/P AV lv ablation 04/22/2016 4/20/16    Smokers' cough ([address]       Past Surgical History:   Procedure Laterality Date    COLONOSCOPY,DIAGNOSTIC  3/28/2016         HX HEENT      Lasik eye surgery    HX OPEN CHOLECYSTECTOMY  ~ 1985    HX OTHER SURGICAL      adrenal mass removed    HX SHOULDER ARTHROSCOPY  2008    removal bone spur left shoulder    KNEE SCOPE,CLEAN/DRAIN      UPPER GI ENDOSCOPY,DIAGNOSIS  3/28/2016         VASCULAR SURGERY PROCEDURE UNLIST      dialysis in right arm      Family History   Problem Relation Age of Onset    Diabetes Mother     Heart Disease Mother     Hypertension Mother     High Cholesterol Mother     Arthritis-osteo Mother     Cancer Father      Lung    Arthritis-osteo Father     Cancer Brother      x2 - lung cancer      History reviewed, no pertinent family history.   Social History   Substance Use Topics    Smoking status: Former Smoker     Packs/day: 2.00     Years: 48.00     Types: Cigarettes     Quit date: 1/3/2012    Smokeless tobacco: Never Used    Alcohol use No     Allergies   Allergen Reactions    Grapefruit Hives    Orange Juice Hives    Peach (Prunus Persica) Hives      Current Facility-Administered Medications   Medication Dose Route Frequency    naloxone (NARCAN) injection 0.4 mg  0.4 mg IntraVENous ONCE    epoetin cornelio (EPOGEN;PROCRIT) injection 8,000 Units  8,000 Units SubCUTAneous DIALYSIS TUE, THU & SAT    famotidine (PEPCID) tablet 20 mg  20 mg Oral DAILY    aspirin chewable tablet 81 mg  81 mg Oral DAILY    clopidogrel (PLAVIX) tablet 75 mg  75 mg Oral DAILY    atorvastatin (LIPITOR) tablet 10 mg  10 mg Oral QHS    traMADol (ULTRAM) tablet 25 mg  25 mg Oral Q12H PRN    HYDROmorphone (PF) (DILAUDID) injection 0.5-1 mg  0.5-1 mg IntraVENous Q4H PRN    oxyCODONE-acetaminophen (PERCOCET) 5-325 mg per tablet 1 Tab  1 Tab Oral Q6H PRN    cefTRIAXone (ROCEPHIN) 2 g in 0.9% sodium chloride (MBP/ADV) 50 mL  2 g IntraVENous Q24H    sodium chloride (NS) flush 5-10 mL  5-10 mL IntraVENous Q8H    sodium chloride (NS) flush 5-10 mL  5-10 mL IntraVENous PRN    budesonide (PULMICORT) 500 mcg/2 ml nebulizer suspension  500 mcg Nebulization BID RT    albuterol-ipratropium (DUO-NEB) 2.5 MG-0.5 MG/3 ML  3 mL Nebulization QID RT    albuterol (PROVENTIL VENTOLIN) nebulizer solution 2.5 mg  2.5 mg Nebulization Q4H PRN    heparin (porcine) injection 5,000 Units  5,000 Units SubCUTAneous Q12H    ondansetron (ZOFRAN) injection 4 mg  4 mg IntraVENous Q6H PRN    insulin lispro (HUMALOG) injection   SubCUTAneous AC&HS    acetaminophen (TYLENOL) tablet 650 mg  650 mg Oral Q6H PRN    PHENYLephrine (NEOSYNEPHRINE) 30,000 mcg in 0.9% sodium chloride 250 mL infusion   mcg/min IntraVENous TITRATE          LAB AND IMAGING FINDINGS:     Lab Results   Component Value Date/Time    WBC 23.1 08/18/2017 01:59 AM    HGB 9.4 08/18/2017 01:59 AM    PLATELET 393 29/98/0051 01:59 AM     Lab Results   Component Value Date/Time    Sodium 133 08/18/2017 01:59 AM    Potassium 3.9 08/18/2017 01:59 AM    Chloride 97 08/18/2017 01:59 AM    CO2 25 08/18/2017 01:59 AM    BUN 29 08/18/2017 01:59 AM    Creatinine 3.16 08/18/2017 01:59 AM    Calcium 9.2 08/18/2017 01:59 AM    Magnesium 2.1 08/18/2017 01:59 AM    Phosphorus 2.9 08/18/2017 01:59 AM      Lab Results   Component Value Date/Time    AST (SGOT) 10 08/18/2017 01:59 AM    Alk.  phosphatase 123 08/18/2017 01:59 AM    Protein, total 6.8 08/18/2017 01:59 AM    Albumin 2.4 08/18/2017 01:59 AM    Globulin 4.4 08/18/2017 01:59 AM     Lab Results   Component Value Date/Time    INR 1.2 08/16/2017 04:35 AM    Prothrombin time 12.5 08/16/2017 04:35 AM    aPTT 36.0 08/16/2017 04:35 AM      Lab Results   Component Value Date/Time    Iron 67 04/12/2015 04:50 AM    TIBC 233 04/12/2015 04:50 AM    Iron % saturation 29 04/12/2015 04:50 AM    Ferritin 700 04/12/2015 04:50 AM      Lab Results   Component Value Date/Time    pH 7.38 11/01/2016 05:31 AM    PCO2 42 11/01/2016 05:31 AM    PO2 102 11/01/2016 05:31 AM     No components found for: [place]   Lab Results   Component Value Date/Time    CK 53 07/10/2017 08:47 AM    CK - MB 2.7 07/10/2017 08:47 AM                Total time: 65 minutes  Counseling / coordination time, spent as noted above: 55 minutes  > 50% counseling / coordination?: y    Prolonged service was provided for  [x]30 min   []75 min in face to face time in the presence of the patient, spent as noted above. Time Start:   0715  And 1181-1155  Time End: 1115  Note: this can only be billed with  (initial) or 21 [phone] (follow up). If multiple start / stop times, list each separately.

## 2017-08-18 NOTE — HOSPICE
[facility]  [phone]     Patient Name: Alexandrea Colbert  YOB: 1945  Age: 67 y.o. [address] [facility] RN Note:  Hospice consult received, reviewing chart. Will follow up with Unit Nurse and Care Manager to discuss plan of care, patient status and discharge disposition within the hour. Thank you for the opportunity to be of service to this patient.     Meagan Coffey RN

## 2017-08-18 NOTE — PROGRESS NOTES
Patient name: Nancy Coleman  MRN: [id number]    Nephrology Progress note:    Assessment:  ESRD: TTS  Sepsis:Strep Pneumoniae-> questionable vegetation on pacer wire noted on TTE  COPD  HTN: hypotensive  CAD: mild troponin bump  Anemia: hgb below goal  PAD: s/p recent Left SFA stent    Plan/Recommendations:  Next HD tomorrow  Daily Bld Cx   Consider BRE?-> ID input appreciated  IV Abx per ID  Renally adjust new meds  Am labs      Subjective:  Mild AMS overnight-> resolved. Weaning IV levophed-> on standby    ROS:   No CP, NO SOB    Exam:  Visit Vitals    BP (!) 124/91    Pulse 75    Temp 99.6 °F (37.6 °C)    Resp 22    Ht 5' 2\" (1.575 m)    Wt 66.7 kg (147 lb 0.8 oz)    SpO2 99%    BMI 26.9 kg/m2     Wt Readings from Last 3 Encounters:   08/17/17 66.7 kg (147 lb 0.8 oz)   08/15/17 65.7 kg (144 lb 13.5 oz)   08/08/17 66.5 kg (146 lb 11.2 oz)       Intake/Output Summary (Last 24 hours) at 08/18/17 9105  Last data filed at 08/18/17 0400   Gross per 24 hour   Intake            387.5 ml   Output             1000 ml   Net           -612.5 ml       Gen: NAD  HEENT: No icterus, mmm, no oral exudate, AT/NC  Lungs/Chest wall: Coarse b/l  Cardiovascular: Regular rate, normal rhythm. AICD  Abdomen/: Soft, NT, ND, BS+. Ext: R AVF +b/t. No peripheral edema  CNS: alert and awake.       Current Facility-Administered Medications   Medication Dose Route Frequency Last Dose    naloxone (NARCAN) injection 0.4 mg  0.4 mg IntraVENous ONCE      epoetin cornelio (EPOGEN;PROCRIT) injection 8,000 Units 8,000 Units SubCUTAneous DIALYSIS TUE, THU & SAT 8,000 Units at 08/17/17 2112    famotidine (PEPCID) tablet 20 mg  20 mg Oral DAILY 20 mg at 08/17/17 1102    aspirin chewable tablet 81 mg  81 mg Oral DAILY 81 mg at 08/17/17 1102    clopidogrel (PLAVIX) tablet 75 mg  75 mg Oral DAILY 75 mg at 08/17/17 1102    atorvastatin (LIPITOR) tablet 10 mg  10 mg Oral QHS 10 mg at 08/17/17 2207    traMADol (ULTRAM) tablet 25 mg  25 mg Oral Q12H PRN      HYDROmorphone (PF) (DILAUDID) injection 0.5-1 mg  0.5-1 mg IntraVENous Q4H PRN 1 mg at 08/17/17 2159    oxyCODONE-acetaminophen (PERCOCET) 5-325 mg per tablet 1 Tab  1 Tab Oral Q6H PRN 1 Tab at 08/17/17 1227    cefTRIAXone (ROCEPHIN) 2 g in 0.9% sodium chloride (MBP/ADV) 50 mL  2 g IntraVENous Q24H      sodium chloride (NS) flush 5-10 mL  5-10 mL IntraVENous Q8H 10 mL at 08/17/17 2207    sodium chloride (NS) flush 5-10 mL  5-10 mL IntraVENous PRN      budesonide (PULMICORT) 500 mcg/2 ml nebulizer suspension  500 mcg Nebulization BID  mcg at 08/17/17 1926    albuterol-ipratropium (DUO-NEB) 2.5 MG-0.5 MG/3 ML  3 mL Nebulization QID RT 3 mL at 08/17/17 1926    albuterol (PROVENTIL VENTOLIN) nebulizer solution 2.5 mg  2.5 mg Nebulization Q4H PRN      heparin (porcine) injection 5,000 Units  5,000 Units SubCUTAneous Q12H 5,000 Units at 08/17/17 2206    ondansetron (ZOFRAN) injection 4 mg  4 mg IntraVENous Q6H PRN      insulin lispro (HUMALOG) injection   SubCUTAneous AC&HS Stopped at 08/17/17 2200    acetaminophen (TYLENOL) tablet 650 mg  650 mg Oral Q6H  mg at 08/17/17 0442    PHENYLephrine (NEOSYNEPHRINE) 30,000 mcg in 0.9% sodium chloride 250 mL infusion   mcg/min IntraVENous TITRATE Stopped at 08/18/17 1334       Labs/Data:    Lab Results   Component Value Date/Time    WBC 23.1 08/18/2017 01:59 AM    Hemoglobin (POC) 11.6 08/16/2017 04:37 AM    HGB 9.4 08/18/2017 01:59 AM    Hematocrit (POC) 34 08/16/2017 04:37 AM    HCT 29.6 08/18/2017 01:59 AM    PLATELET 322 30/55/5059 01:59 AM    MCV 95.2 08/18/2017 01:59 AM       Lab Results   Component Value Date/Time    Sodium 133 08/18/2017 01:59 AM    Potassium 3.9 08/18/2017 01:59 AM    Chloride 97 08/18/2017 01:59 AM    CO2 25 08/18/2017 01:59 AM    Anion gap 11 08/18/2017 01:59 AM    Glucose 167 08/18/2017 01:59 AM    BUN 29 08/18/2017 01:59 AM    Creatinine 3.16 08/18/2017 01:59 AM    BUN/Creatinine ratio 9 08/18/2017 01:59 AM    GFR est AA 17 08/18/2017 01:59 AM    GFR est non-AA 14 08/18/2017 01:59 AM    Calcium 9.2 08/18/2017 01:59 AM       Patient seen and examined. Chart reviewed. Labs, data and other pertinent notes reviewed in last 24 hrs.     Discussed with patient, ICU RN     Signed by:  Jan Harvey MD

## 2017-08-18 NOTE — PROGRESS NOTES
Follow up visit with pt and family this afternoon. Pt's sister was leaving the hospital and plans to return tomorrow. Pt's daughter, daughter's , and their two children (pt's grandchildren) were at bedside. Offered brief support to pt, who expressed some concerns about being responsible for the current state of her body. Family offered reassurance to pt and spoke of God's love and forgiveness. Keshia appears to be a significant source of comfort and strength. Daughter requested prayer, and spoken words of affirmation and assurance were offered at bedside. Daughter spoke of the support that they have from her [address] [place] [address] St. Chaplains are available for continued support.     Jemma Chavira, Palliative

## 2017-08-18 NOTE — PROGRESS NOTES
8/18/17 1347: Noted order for hospice consult. Referral sent to [facility]. Hospice  [address], report they had already been notified of order by palliative NP Valentino Cleaver. Hospice to follow-up with patient/family. BEBA Vela/JANN     ---------------------  8/18/17 1239  CM following for discharge planning. Reviewed MD records, noted mention of potential hospice consult and patient reportedly seeking to stop HD treatment. CM s/w patient's ICU nurse Placido Tam. Placido Tam explains that patient expressed intent to seek CMO this morning, however changed her mind shortly thereafter and now seeking all aggressive treatment measures. Patient critically ill, not likely to survive the interventions required to treat her medical issues. RN reports that patient & family have meeting with Palliative Care at 1300hrs today. CM to defer further intervention until goals of care confirmed.  BEBA Vela/JANN

## 2017-08-18 NOTE — PROGRESS NOTES
Move to comfort emphasis noted, abx stopped--seems reasonable in this setting. Will sign off--thank you for the consult.

## 2017-08-18 NOTE — HOSPICE
[facility]  [phone]     Patient Name: Zachariah Bob  YOB: 1945  Age: 67 y.o. [address] [facility] RN Note:  Hospice consult received, reviewing chart. Will follow up with Unit Nurse and Care Manager to discuss plan of care, patient status and discharge disposition within the hour. Per Palliative note we were to reach out to them tomorrow, overwhelmed today. When I called to set up meeting time daughter stated she worked at [employer]. I met her to set up meeting for tomorrow and gave fredy a folder. Daughter requested hospe meeting between 9;30 and 10:30 am Saturday 8/18. Patient lives w/ 81 y/o sister. Daughter noted she can take off work to care for mother, not sure where care will be occur. Per daughters request DO NOT USE THE [facility] W/PATIENT USE COMFORT CARE. Thank you for the opportunity to be of service to this patient.     Alta Lorenzo [phone]

## 2017-08-19 NOTE — PROGRESS NOTES
Hospitalist Progress Note  Joel Og MD  Office: [phone]        Date of Service:  2017  NAME:  Valdez Grossman  :    MRN:  [id number]      Admission Summary:   Valdez Grossman is a 67 y.o. white female with past medical history of ESRD on hemodialysis -Thursday- Saturday, chronic diastolic CHF NYHA Class 2, anemia, adrenal mass, asthma, arthritis, CKD, COPD, type 2 diabetes mellitus, social phobia, anxiety, hypertension, hyperlipidemia, pneumonia, kidney stone, peripheral arterial disease presented to the ED from nursing home with reported fever, dyspnea, and altered mental status (AMS). Interval history / Subjective:   Patient primarily admitted to the Medical ICU with a working diagnosis of Sepsis secondary to ? Urinary tract in origin Vs pneumonia. Started on broad spectrum IV antibiotics. And patient currently being followed by Nephrology, Infectious disease, Cardiology and Intensivist for her multiple co morbidities.     CT head neg, Cx with strep Pneumonia bacteremia,TTE evaluated by card, concern for vegetation on the wires  With very Poor prognosis   Consultants support greatly appreiciated   Palliative care advised, family agreeable   Assessment & Plan:     Sepsis d/t strep pneumonia bacteremia   Concern for vegetations on the wires   RT effusion loculated  ESRD on HD  CAD/ Diastolic CHF s/p PPM/AICD placement   COPD   DM   Family requested palliative after discussion with Intensivist  Consulted palliative care , transitioned to [address] [place] [address],[address] discussed with: Patient/Family, Nurse and   Disposition: [address] Problems  Date Reviewed: 8/3/2017          Codes Class Noted POA    Leukocytosis ICD-10-CM: L00.673  ICD-9-CM: 288.60  2017 Unknown        Altered mental status ICD-10-CM: R41.82  ICD-9-CM: 780.97  2017 Unknown        Hypotension ICD-10-CM: I95.9  ICD-9-CM: 458.9  8/16/2017 Unknown        Fever ICD-10-CM: R50.9  ICD-9-CM: 780.60  8/16/2017 Unknown        SIRS (systemic inflammatory response syndrome) ([facility] ICD-10-CM: R65.10  ICD-9-CM: 995.90  8/16/2017 Unknown        Elevated troponin ICD-10-CM: R74.8  ICD-9-CM: 790.6  7/3/2015 Unknown        ESRD (end stage renal disease) ([facility].) (Chronic) ICD-10-CM: N18.6  ICD-9-CM: 585.6  10/16/2012 Unknown                    Vital Signs:    Last 24hrs VS reviewed since prior progress note. Most recent are:  Visit Vitals    BP 91/64    Pulse 75    Temp 98.7 °F (37.1 °C)    Resp 19    Ht 5' 2\" (1.575 m)    Wt 60.1 kg (132 lb 7.9 oz)    SpO2 100%    BMI 24.23 kg/m2         Intake/Output Summary (Last 24 hours) at 08/18/17 2220  Last data filed at 08/18/17 2200   Gross per 24 hour   Intake           235.04 ml   Output                0 ml   Net           235.04 ml        Physical Examination:             Constitutional: Alert awake   ENT:  Oral mucous memb dry    Resp:  decreased air entry    CV:  RRR    GI:  Soft, non distended, non tender. normoactive bowel sounds, no hepatosplenomegaly     Musculoskeletal:  No edema, warm    Neurologic:  Moves all extremities.  Anxious and agitated otherwise alert and oriented           Data Review:    Review and/or order of clinical lab test      Labs:     Recent Labs      08/18/17 0159 08/17/17   0508   WBC  23.1*  19.2*   HGB  9.4*  9.3*   HCT  29.6*  29.0*   PLT  264  247     Recent Labs      08/18/17   0159 08/17/17   0508  08/16/17   0435   NA  133*  132*  135*   K  3.9  4.6  4.0   CL  97  97  98   CO2  25  22  25   BUN  29*  46*  33*   CREA  3.16*  4.80*  3.92*   GLU  167*  156*  125*   CA  9.2  9.9  9.6   MG  2.1  2.2   --    PHOS  2.9  3.5   --      Recent Labs      08/18/17   0159  08/17/17   0508  08/16/17   0435   SGOT  10*  10*  12*   ALT  8*  9*  12   AP  123*  123*  145*   TBILI  0.4  0.6  0.7   TP  6.8  6.9  7.0   ALB  2.4*  2.7*  2.4*   GLOB  4.4*  4.2*  4.6*   AML   -- --   16*   LPSE   --    --   53*     Recent Labs      08/16/17   0435   INR  1.2*   PTP  12.5*   APTT  36.0*      No results for input(s): FE, TIBC, PSAT, FERR in the last 72 hours. No results found for: FOL, RBCF   No results for input(s): PH, PCO2, PO2 in the last 72 hours.   Recent Labs      08/16/17   1644  08/16/17   1023  08/16/17   0435   TROIQ  0.12*  0.14*  0.08*  0.07*     Lab Results   Component Value Date/Time    Cholesterol, total 107 08/16/2017 04:35 AM    HDL Cholesterol 35 08/16/2017 04:35 AM    LDL, calculated 36.4 08/16/2017 04:35 AM    Triglyceride 178 08/16/2017 04:35 AM    CHOL/HDL Ratio 3.1 08/16/2017 04:35 AM     Lab Results   Component Value Date/Time    Glucose (POC) 171 08/18/2017 04:29 PM    Glucose (POC) 218 08/18/2017 12:23 PM    Glucose (POC) 250 08/18/2017 07:08 AM    Glucose (POC) 160 08/18/2017 01:07 AM    Glucose (POC) 174 08/17/2017 09:50 PM     Lab Results   Component Value Date/Time    Color DARK YELLOW 04/29/2016 02:40 AM    Appearance HAZY 04/29/2016 02:40 AM    Specific gravity 1.025 04/29/2016 02:40 AM    Specific gravity 1.018 07/27/2015 08:02 PM    pH (UA) 5.0 04/29/2016 02:40 AM    Protein 300 04/29/2016 02:40 AM    Glucose NEGATIVE  04/29/2016 02:40 AM    Ketone TRACE 04/29/2016 02:40 AM    Bilirubin NEGATIVE  04/29/2016 02:40 AM    Urobilinogen 0.2 04/29/2016 02:40 AM    Nitrites NEGATIVE  04/29/2016 02:40 AM    Leukocyte Esterase NEGATIVE  04/29/2016 02:40 AM    Epithelial cells FEW 04/29/2016 02:40 AM    Bacteria 2+ 04/29/2016 02:40 AM    WBC 0-4 04/29/2016 02:40 AM    RBC 0-5 04/29/2016 02:40 AM         Medications Reviewed:     Current Facility-Administered Medications   Medication Dose Route Frequency    HYDROmorphone (PF) (DILAUDID) injection 0.5-1 mg  0.5-1 mg IntraVENous Q1H PRN    LORazepam (ATIVAN) injection 1 mg  1 mg IntraVENous Q2H PRN    famotidine (PEPCID) tablet 20 mg  20 mg Oral DAILY    aspirin chewable tablet 81 mg  81 mg Oral DAILY    clopidogrel (PLAVIX) tablet 75 mg  75 mg Oral DAILY    traMADol (ULTRAM) tablet 25 mg  25 mg Oral Q12H PRN    oxyCODONE-acetaminophen (PERCOCET) 5-325 mg per tablet 1 Tab  1 Tab Oral Q6H PRN    sodium chloride (NS) flush 5-10 mL  5-10 mL IntraVENous Q8H    sodium chloride (NS) flush 5-10 mL  5-10 mL IntraVENous PRN    budesonide (PULMICORT) 500 mcg/2 ml nebulizer suspension  500 mcg Nebulization BID RT    albuterol-ipratropium (DUO-NEB) 2.5 MG-0.5 MG/3 ML  3 mL Nebulization QID RT    albuterol (PROVENTIL VENTOLIN) nebulizer solution 2.5 mg  2.5 mg Nebulization Q4H PRN    ondansetron (ZOFRAN) injection 4 mg  4 mg IntraVENous Q6H PRN    insulin lispro (HUMALOG) injection   SubCUTAneous AC&HS    acetaminophen (TYLENOL) tablet 650 mg  650 mg Oral Q6H PRN    PHENYLephrine (NEOSYNEPHRINE) 30,000 mcg in 0.9% sodium chloride 250 mL infusion   mcg/min IntraVENous TITRATE     ______________________________________________________________________  EXPECTED LENGTH OF STAY:   ACTUAL LENGTH OF STAY:          2                 Joel Og MD

## 2017-08-19 NOTE — PROGRESS NOTES
Pulmonary / Critical Care Progress Note    Name: Lara Lefort  MRN: [id number]  Date: 2017 7:26 AM  Admission Date: 2017  : 1945      Impression:   Assessment / Plan:       1. Streptococcal bacteremia  2. Loculated right effusion  3. Possible vegetation pacer  wires  4. ESRD - dialysis TTS  5. PVD s/p stent placement  6. Diastolic CHF / biventricular pacer AICD in place  7. CAD s/p stent  8. COPD - some increased work of breathing and rhonchi on exam  9. DM  10. Altered mental status  11. anemia        Discussion/Plan:   · Comfort measures per family request  · meds for comfort only: abx off  · pulm meds as needed for comfort  · She is moving to hospice bed this am when available         SUBJECTIVE:    She remains confused and lethargic but she is easily roused. She denies sob, cp, abd pain. She is off pressor support.       Objective:    Vital Signs:  Visit Vitals    BP (!) 100/36    Pulse 75    Temp 98.8 °F (37.1 °C)    Resp 17    Ht 5' 2\" (1.575 m)    Wt 60.1 kg (132 lb 7.9 oz)    SpO2 100%    BMI 24.23 kg/m2     Temp (24hrs), Av.3 °F (36.8 °C), Min:97.1 °F (36.2 °C), Max:99.1 °F (37.3 °C)    Intake and Output:    1901 -  0700  In: 272.8 [I.V.:272.8]  Out: -        Intake/Output Summary (Last 24 hours) at 17 0726  Last data filed at 17 2200   Gross per 24 hour   Intake           128.17 ml   Output                0 ml   Net           128.17 ml       Physical Exam:       HEENT: eomi  HEART: reg  LUNGS: basilar rhonchi  ABD: soft, nontender, mildly distended  EXT: no clubbing  SKIN: macular rash bilat legs  NEURO: confused and lethargic    Labs:  Recent Labs      17   0159  17   0508   WBC  23.1*  19.2*   HGB  9.4*  9.3*   HCT  29.6*  29.0*   PLT  264  247     Recent Labs      17   0159  17   0508   NA  133*  132*   K  3.9  4.6   CL  97  97   CO2  25  22   GLU  167*  156* BUN  29*  46*   CREA  3.16*  4.80*   CA  9.2  9.9   MG  2.1  2.2   PHOS  2.9  3.5   ALB  2.4*  2.7*   TBILI  0.4  0.6   SGOT  10*  10*   ALT  8*  9*       Recent Labs      08/18/17   0203  08/16/17   0757   PHI  7.423  7.357   PO2I  114*  82   PCO2I  46.6*  40.8     No new labs available this AM.  PCXR: no new films      Pt is critically ill. Critical care time exclusive of procedures is 35\".   Jace Titus MD  8/19/2017

## 2017-08-19 NOTE — PROGRESS NOTES
Hospitalist Progress Note  Malorie Francis MD  Office: [phone]        Date of Service:  2017  NAME:  Denia Bradshaw  :    MRN:  [id number]      Admission Summary:   Denia Bradshaw is a 67 y.o. white female with past medical history of ESRD on hemodialysis -Thursday- Saturday, chronic diastolic CHF NYHA Class 2, anemia, adrenal mass, asthma, arthritis, CKD, COPD, type 2 diabetes mellitus, social phobia, anxiety, hypertension, hyperlipidemia, pneumonia, kidney stone, peripheral arterial disease presented to the ED from nursing home with reported fever, dyspnea, and altered mental status (AMS). Interval history / Subjective:   Patient appears comfortable. Having trouble speaking, is very weak. Family at bedside crying. Meeting with Hospice later this afternoon. Assessment & Plan:     Sepsis d/t strep pneumonia bacteremia   Concern for vegetations on the wires   RT effusion loculated  ESRD on HD  CAD/ Diastolic CHF s/p PPM/AICD placement   COPD   DM     Currently on comfort measures. Abx stopped yesterday. Family meeting with Hospice later today. Transition to Hospice.     Care Plan discussed with: Patient/Family, Nurse and   Disposition: [address] Problems  Date Reviewed: 8/3/2017          Codes Class Noted POA    Leukocytosis ICD-10-CM: D72.829  ICD-9-CM: 288.60  2017 Unknown        Altered mental status ICD-10-CM: R41.82  ICD-9-CM: 780.97  2017 Unknown        Hypotension ICD-10-CM: I95.9  ICD-9-CM: 458.9  2017 Unknown        Fever ICD-10-CM: R50.9  ICD-9-CM: 780.60  2017 Unknown        SIRS (systemic inflammatory response syndrome) ([facility] ICD-10-CM: R65.10  ICD-9-CM: 995.90  2017 Unknown        Elevated troponin ICD-10-CM: R74.8  ICD-9-CM: 790.6  7/3/2015 Unknown        ESRD (end stage renal disease) ([facility] (Chronic) ICD-10-CM: N18.6  ICD-9-CM: 585.6 10/16/2012 Unknown                    Vital Signs:    Last 24hrs VS reviewed since prior progress note. Most recent are:  Visit Vitals    BP (!) 105/30 (BP 1 Location: Right leg, BP Patient Position: At rest)    Pulse 75    Temp 100.4 °F (38 °C)    Resp 23    Ht 5' 2\" (1.575 m)    Wt 67.2 kg (148 lb 2.4 oz)    SpO2 99%    BMI 27.1 kg/m2         Intake/Output Summary (Last 24 hours) at 08/19/17 1710  Last data filed at 08/19/17 1200   Gross per 24 hour   Intake           374.09 ml   Output                0 ml   Net           374.09 ml        Physical Examination:             Constitutional: Alert awake   ENT:  Oral mucous memb dry    Resp:  decreased air entry    CV:  RRR    GI:  Soft, non distended, non tender. normoactive bowel sounds, no hepatosplenomegaly     Musculoskeletal:  No edema, warm    Neurologic:  Moves all extremities. Anxious and agitated otherwise alert and oriented           Data Review:    Review and/or order of clinical lab test      Labs:     Recent Labs      08/18/17   0159  08/17/17   0508   WBC  23.1*  19.2*   HGB  9.4*  9.3*   HCT  29.6*  29.0*   PLT  264  247     Recent Labs      08/18/17   0159  08/17/17   0508   NA  133*  132*   K  3.9  4.6   CL  97  97   CO2  25  22   BUN  29*  46*   CREA  3.16*  4.80*   GLU  167*  156*   CA  9.2  9.9   MG  2.1  2.2   PHOS  2.9  3.5     Recent Labs      08/18/17   0159  08/17/17   0508   SGOT  10*  10*   ALT  8*  9*   AP  123*  123*   TBILI  0.4  0.6   TP  6.8  6.9   ALB  2.4*  2.7*   GLOB  4.4*  4.2*     No results for input(s): INR, PTP, APTT in the last 72 hours. No lab exists for component: INREXT, INREXT   No results for input(s): FE, TIBC, PSAT, FERR in the last 72 hours. No results found for: FOL, RBCF   No results for input(s): PH, PCO2, PO2 in the last 72 hours. No results for input(s): CPK, CKNDX, TROIQ in the last 72 hours.     No lab exists for component: CPKMB  Lab Results   Component Value Date/Time    Cholesterol, total 107 08/16/2017 04:35 AM    HDL Cholesterol 35 08/16/2017 04:35 AM    LDL, calculated 36.4 08/16/2017 04:35 AM    Triglyceride 178 08/16/2017 04:35 AM    CHOL/HDL Ratio 3.1 08/16/2017 04:35 AM     Lab Results   Component Value Date/Time    Glucose (POC) 173 08/19/2017 12:11 AM    Glucose (POC) 171 08/18/2017 04:29 PM    Glucose (POC) 218 08/18/2017 12:23 PM    Glucose (POC) 250 08/18/2017 07:08 AM    Glucose (POC) 160 08/18/2017 01:07 AM     Lab Results   Component Value Date/Time    Color DARK YELLOW 04/29/2016 02:40 AM    Appearance HAZY 04/29/2016 02:40 AM    Specific gravity 1.025 04/29/2016 02:40 AM    Specific gravity 1.018 07/27/2015 08:02 PM    pH (UA) 5.0 04/29/2016 02:40 AM    Protein 300 04/29/2016 02:40 AM    Glucose NEGATIVE  04/29/2016 02:40 AM    Ketone TRACE 04/29/2016 02:40 AM    Bilirubin NEGATIVE  04/29/2016 02:40 AM    Urobilinogen 0.2 04/29/2016 02:40 AM    Nitrites NEGATIVE  04/29/2016 02:40 AM    Leukocyte Esterase NEGATIVE  04/29/2016 02:40 AM    Epithelial cells FEW 04/29/2016 02:40 AM    Bacteria 2+ 04/29/2016 02:40 AM    WBC 0-4 04/29/2016 02:40 AM    RBC 0-5 04/29/2016 02:40 AM         Medications Reviewed:     Current Facility-Administered Medications   Medication Dose Route Frequency    HYDROmorphone (PF) (DILAUDID) injection 0.5-1 mg  0.5-1 mg IntraVENous Q1H PRN    LORazepam (ATIVAN) injection 1 mg  1 mg IntraVENous Q2H PRN    famotidine (PEPCID) tablet 20 mg  20 mg Oral DAILY    aspirin chewable tablet 81 mg  81 mg Oral DAILY    clopidogrel (PLAVIX) tablet 75 mg  75 mg Oral DAILY    traMADol (ULTRAM) tablet 25 mg  25 mg Oral Q12H PRN    oxyCODONE-acetaminophen (PERCOCET) 5-325 mg per tablet 1 Tab  1 Tab Oral Q6H PRN    sodium chloride (NS) flush 5-10 mL  5-10 mL IntraVENous Q8H    sodium chloride (NS) flush 5-10 mL  5-10 mL IntraVENous PRN    budesonide (PULMICORT) 500 mcg/2 ml nebulizer suspension  500 mcg Nebulization BID RT    albuterol-ipratropium (DUO-NEB) 2.5 MG-0.5 MG/3 ML  3 mL Nebulization QID RT    albuterol (PROVENTIL VENTOLIN) nebulizer solution 2.5 mg  2.5 mg Nebulization Q4H PRN    ondansetron (ZOFRAN) injection 4 mg  4 mg IntraVENous Q6H PRN    insulin lispro (HUMALOG) injection   SubCUTAneous AC&HS    acetaminophen (TYLENOL) tablet 650 mg  650 mg Oral Q6H PRN    PHENYLephrine (NEOSYNEPHRINE) 30,000 mcg in 0.9% sodium chloride 250 mL infusion   mcg/min IntraVENous TITRATE     ______________________________________________________________________  EXPECTED LENGTH OF STAY:   ACTUAL LENGTH OF STAY:          3                 Sarah Friday, MD

## 2017-08-19 NOTE — PROGRESS NOTES
Patient name: Tasha Stafford  MRN: [id number]    Nephrology Progress note:    Assessment:  ESRD    I had a long discussion with her daughter. We talked about the patient's decline over the past year and her recent frequent hospital stays. I told her that the patient is critically ill and that I did not expect that she would survive this stay. The daughter understands. I told her that RRT would likely further drop her BP and could accelerate her eventual death. The daughter says that she understands. I told her that I thought that making the patient comfortable was the appropriate approach. She agreed. We decided that in addition to making her comfortable, we would NOT add or increase anything. No CPR, no HD, no CVVHD. No labs and no additional meds other than those to make the patient comfortable. She DID NOT want to decrease the pressor. She agrees not to increase the pressor when BP drops further. Subjective:  Poorly responsive.  Appears anxious    ROS:   Unable to     Exam:  Visit Vitals    BP (!) 84/70    Pulse 75    Temp 98.7 °F (37.1 °C)    Resp 19    Ht 5' 2\" (1.575 m)    Wt 67.2 kg (148 lb 2.4 oz)  Comment: pillows on bed comfort cre    SpO2 100%    BMI 27.1 kg/m2     Wt Readings from Last 3 Encounters:   08/19/17 67.2 kg (148 lb 2.4 oz)   08/15/17 65.7 kg (144 lb 13.5 oz)   08/08/17 66.5 kg (146 lb 11.2 oz)       Intake/Output Summary (Last 24 hours) at 08/19/17 1504  Last data filed at 08/19/17 1200   Gross per 24 hour   Intake           387.09 ml Output                0 ml   Net           387.09 ml       Gen: NAD  HEENT: agitated  Rhonchi bilaterally  No edema      Current Facility-Administered Medications   Medication Dose Route Frequency Last Dose    HYDROmorphone (PF) (DILAUDID) injection 0.5-1 mg  0.5-1 mg IntraVENous Q1H PRN 1 mg at 08/19/17 1243    LORazepam (ATIVAN) injection 1 mg  1 mg IntraVENous Q2H PRN 1 mg at 08/19/17 1457    famotidine (PEPCID) tablet 20 mg  20 mg Oral DAILY Stopped at 08/19/17 0900    aspirin chewable tablet 81 mg  81 mg Oral DAILY Stopped at 08/19/17 0900    clopidogrel (PLAVIX) tablet 75 mg  75 mg Oral DAILY Stopped at 08/19/17 0900    traMADol (ULTRAM) tablet 25 mg  25 mg Oral Q12H PRN      oxyCODONE-acetaminophen (PERCOCET) 5-325 mg per tablet 1 Tab  1 Tab Oral Q6H PRN 1 Tab at 08/18/17 1031    sodium chloride (NS) flush 5-10 mL  5-10 mL IntraVENous Q8H 10 mL at 08/19/17 1400    sodium chloride (NS) flush 5-10 mL  5-10 mL IntraVENous PRN 10 mL at 08/19/17 0749    budesonide (PULMICORT) 500 mcg/2 ml nebulizer suspension  500 mcg Nebulization BID  mcg at 08/19/17 0835    albuterol-ipratropium (DUO-NEB) 2.5 MG-0.5 MG/3 ML  3 mL Nebulization QID RT 3 mL at 08/19/17 0835    albuterol (PROVENTIL VENTOLIN) nebulizer solution 2.5 mg  2.5 mg Nebulization Q4H PRN      ondansetron (ZOFRAN) injection 4 mg  4 mg IntraVENous Q6H PRN      insulin lispro (HUMALOG) injection   SubCUTAneous AC&HS Stopped at 08/19/17 0730    acetaminophen (TYLENOL) tablet 650 mg  650 mg Oral Q6H  mg at 08/18/17 0705    PHENYLephrine (NEOSYNEPHRINE) 30,000 mcg in 0.9% sodium chloride 250 mL infusion   mcg/min IntraVENous TITRATE 40 mcg/min at 08/19/17 1246       Labs/Data:    Lab Results   Component Value Date/Time    WBC 23.1 08/18/2017 01:59 AM    Hemoglobin (POC) 11.6 08/16/2017 04:37 AM    HGB 9.4 08/18/2017 01:59 AM    Hematocrit (POC) 34 08/16/2017 04:37 AM    HCT 29.6 08/18/2017 01:59 AM    PLATELET 765 33/67/7012 01:59 AM    MCV 95.2 08/18/2017 01:59 AM       Lab Results   Component Value Date/Time    Sodium 133 08/18/2017 01:59 AM    Potassium 3.9 08/18/2017 01:59 AM    Chloride 97 08/18/2017 01:59 AM    CO2 25 08/18/2017 01:59 AM    Anion gap 11 08/18/2017 01:59 AM    Glucose 167 08/18/2017 01:59 AM    BUN 29 08/18/2017 01:59 AM    Creatinine 3.16 08/18/2017 01:59 AM    BUN/Creatinine ratio 9 08/18/2017 01:59 AM    GFR est AA 17 08/18/2017 01:59 AM    GFR est non-AA 14 08/18/2017 01:59 AM    Calcium 9.2 08/18/2017 01:59 AM       Patient seen and examined. Chart reviewed. Labs, data and other pertinent notes reviewed in last 24 hrs.     Discussed with patient, ICU RN     Signed by:  Randell Vee MD

## 2017-08-19 NOTE — PROGRESS NOTES
8765: MD spoke with pt daughter. Daughter decided to make pt full comfort care measures.  Orders were place for comfort care

## 2017-08-20 NOTE — PROGRESS NOTES
Shift summary: Uneventful shift. Pt on comfort care measures only. Received 1 mg dilaudid PRN x4 doses for pain.

## 2017-08-20 NOTE — HOSPICE
[facility]  [phone]    Patient Name: Subhash Pryor  YOB: 1945  Age: 67 y.o. [facility] RN Note:  Hospice consult noted. Chart reviewed. Plan of care discussed with patients nurse & care manager. In to meet with patients daughter, Eda Zambrano and her    Discussed Hospice philosophy, general plan of care, levels of care, services and on call procedures. Family information packet provided & reviewed with Eda Zambrano. Discussed with Hospice Attending, Dr. Chante Lennon. Patient meets criteria for inpatient hospice admission at routine level of care due to unstable condition, requiring IV Ativan, Dilaudid and Robinul to manage terminal symptoms   Eda Zambrano is not ready to make decision admit patient into hospice citing patients AMD. Eda Zambrano stated that she understands that patient is unable to continue dialysis due to her weakened conditions and comorbid medical issues. Discussed with Dr. Fracisco Correa. Will continue to follow. Thank you for the opportunity to be of service to this patient.     Kiet Laguerre RN, MSN, [facility] Clinical Liaison

## 2017-08-20 NOTE — PROGRESS NOTES
Pulmonary / Critical Care Progress Note    Name: Corinne Rye  MRN: [id number]  Date: 2017 7:26 AM  Admission Date: 2017  : 1945      Impression:   Assessment / Plan:       1. Streptococcal bacteremia  2. Loculated right effusion  3. Possible vegetation pacer  wires  4. ESRD - dialysis TTS  5. PVD s/p stent placement  6. Diastolic CHF / biventricular pacer AICD in place  7. CAD s/p stent  8. COPD - some increased work of breathing and rhonchi on exam  9. DM  10. coma  11. anemia        Discussion/Plan:   · Comfort measures per family request  · meds for comfort only: abx off  · pulm meds as needed for comfort  · She is moving to hospice bed this am         SUBJECTIVE:    Family has decided to proceed w/hospice and pressor agent stopped yesterday at 1700. Hospice bed obtained and transfer pending. She is comatose and demonstrates agonal breathing.       Objective:    Vital Signs:  Visit Vitals    /45 (BP 1 Location: Right leg, BP Patient Position: At rest)    Pulse 75    Temp (!) 101.1 °F (38.4 °C)    Resp 25    Ht 5' 2\" (1.575 m)    Wt 67 kg (147 lb 11.3 oz)    SpO2 98%    BMI 27.02 kg/m2     Temp (24hrs), Av °F (37.8 °C), Min:98.7 °F (37.1 °C), Max:101.1 °F (38.4 °C)    Intake and Output:    1901 -  0700  In: 470 [I.V.:470]  Out: -        Intake/Output Summary (Last 24 hours) at 17 0958  Last data filed at 17   Gross per 24 hour   Intake           422.08 ml   Output                0 ml   Net           422.08 ml       Physical Exam:       HEENT: eomi  HEART: reg  LUNGS: basilar rhonchi, agonal respir pattern  ABD: soft, nontender, mildly distended  EXT: no clubbing  SKIN: macular rash bilat legs  NEURO: comatose    Labs:  Recent Labs      17   0159   WBC  23.1*   HGB  9.4*   HCT  29.6*   PLT  264     Recent Labs      17   0159   NA  133*   K  3.9   CL  97   CO2  25   GLU 167*   BUN  29*   CREA  3.16*   CA  9.2   MG  2.1   PHOS  2.9   ALB  2.4*   TBILI  0.4   SGOT  10*   ALT  8*       Recent Labs      08/18/17   0203   PHI  7.423   PO2I  114*   PCO2I  46.6*     No new labs available this AM.  PCXR: no new films      Soila Baez MD  8/20/2017

## 2017-08-20 NOTE — PROGRESS NOTES
Pt on comfort measures based on VS and pt assessment. Pt seems uncomfortable will medicate with prn meds. 1010: pt more comfortable. Tried calling report to 2N  1100: TRANSFER - OUT REPORT:    Verbal report given to 2N RN(name) on Alexandrea Colbert  being transferred to 2N(unit) for routine progression of care       Report consisted of patients Situation, Background, Assessment and   Recommendations(SBAR). Information from the following report(s) Kardex, Intake/Output, MAR and Recent Results was reviewed with the receiving nurse. Lines:   Peripheral IV 08/16/17 Left Hand (Active)   Site Assessment Clean, dry, & intact 8/20/2017  9:00 AM   Phlebitis Assessment 0 8/20/2017  9:00 AM   Infiltration Assessment 0 8/20/2017  9:00 AM   Dressing Status Clean, dry, & intact 8/20/2017  9:00 AM   Dressing Type Transparent;Tape 8/20/2017  9:00 AM   Hub Color/Line Status Pink 8/20/2017  9:00 AM   Action Taken Open ports on tubing capped 8/20/2017  9:00 AM   Alcohol Cap Used Yes 8/20/2017  9:00 AM       Peripheral IV 08/16/17 Left Arm (Active)   Site Assessment Clean, dry, & intact 8/20/2017  9:00 AM   Phlebitis Assessment 0 8/20/2017  9:00 AM   Infiltration Assessment 0 8/20/2017  9:00 AM   Dressing Status Clean, dry, & intact 8/20/2017  9:00 AM   Dressing Type Transparent;Tape 8/20/2017  9:00 AM   Hub Color/Line Status Pink 8/20/2017  9:00 AM   Action Taken Open ports on tubing capped 8/20/2017  9:00 AM   Alcohol Cap Used Yes 8/20/2017  9:00 AM        Opportunity for questions and clarification was provided.       Patient transported with:   O2 @ 3 liters  Registered Nurse  [facility]

## 2017-08-20 NOTE — PROGRESS NOTES
Requested by patient's nurse to provide support to patient's daughter, who nurse stated was tearful. When  arrived to ICU-01, patient's daughter was at the bedside smiling and talking to another visitor, whom she introduced as her .  introduced self to . Provided active listening as daughter stated that she was feeling at peace since patient had transitioned to [address]  and that her  had been able to answer some concerns that she had had. Acknowledged the meaningful support provided by  and assured them of chaplains availability for any assistance if desired. Also assured daughter of continued prayers on behalf of patient and family. Spoke with patient's nurse following visit, who shared that family's  had arrived just after she had requested  visit. : Rev. Manuel Marrero; [facility], to contact [id number] Abdelrahman Purdy call: [phone]

## 2017-08-20 NOTE — PROGRESS NOTES
08/20/17 1240   Vital Signs   Temp 99.4 °F (37.4 °C)   Temp Source Axillary   Pulse (Heart Rate) 75   Heart Rate Source Monitor   Resp Rate 22   O2 Sat (%) (!) 83 %   Level of Consciousness (!) Responds to Pain   /82   MAP (Calculated) 108   BP 1 Method Automatic   BP 1 Location Right arm   BP Patient Position At rest   MEWS Score 4     MEWS 4. Patient is comfort measures only. Continue to monitor.

## 2017-08-20 NOTE — PROGRESS NOTES
TRANSFER - IN REPORT:    Verbal report received from [employer] (name) on Tina Flores  being received from ICU (unit) for routine progression of care      Report consisted of patients Situation, Background, Assessment and   Recommendations(SBAR). Information from the following report(s) SBAR, Kardex, Intake/Output, MAR and Recent Results was reviewed with the receiving nurse. Opportunity for questions and clarification was provided. Assessment completed upon patients arrival to unit and care assumed.

## 2017-08-20 NOTE — PROGRESS NOTES
Hospitalist Progress Note  Jagdeep Mckeon MD  Office: [phone]        Date of Service:  2017  NAME:  Howard Rodriguez  :    MRN:  [id number]      Admission Summary:   Howard Rodriguez is a 67 y.o. white female with past medical history of ESRD on hemodialysis -Thursday- Saturday, chronic diastolic CHF NYHA Class 2, anemia, adrenal mass, asthma, arthritis, CKD, COPD, type 2 diabetes mellitus, social phobia, anxiety, hypertension, hyperlipidemia, pneumonia, kidney stone, peripheral arterial disease presented to the ED from nursing home with reported fever, dyspnea, and altered mental status (AMS). Interval history / Subjective:   Patient unresponsive. Daughter: \"you don't have to do anything, I changed my mind again, I don't want anything\". I had an extensive discussion with patient's daughter at bedside. She has a very difficult time deciding what to do. I went over the admission diagnosis, what has been done so far and what would need to be done if we suspect pacemaker wire infection (explanting the pacemaker). Patient was taken off pressors yesterday, and BP slightly improved this morning (though still low). The Nephrologist spoke at length with patient's daughter explaining that patient would die if she were placed on HD. Dr. Jimmy Sparks has been taking care of the patient for the past 5 years and knows the patient well. I offered to contact Dr. Jimmy Sparks again to see if dialysis is an option now that the BP is slightly better, but daughter didn't want to, she said she spoke to him already and knows his opinion. I explained to her again that the concern is that even though the BP seems better now, it will likely lead to a rapid and significant drop in BP as soon as dialysis is initiated. Patient without dialysis usually  within a week.      Daughter thinks that patient is unresponsive because she has been receiving pain medications. I clarified that the pain medications and comfort medications are given as needed based on symptoms and if the nurse does not feel like the patient is uncomfortable, they will not be administered. Daughter was also alarmed when she heard that if the patient does not  within 72h of becoming an Inpatient Hospice patient, she would be moved to the Hospice house and she would have to cover the cost at 100%. She feels more comfortable if the patient stays on the Hospitalist service tonight. Assessment & Plan:     Sepsis d/t strep pneumonia bacteremia   Concern for vegetations on the wires   RT effusion loculated  ESRD on HD  CAD/ Diastolic CHF s/p PPM/AICD placement   COPD   DM     Currently on comfort measures only, confirmed with daughter today. Abx stopped . Family met with Hospice, but has some reservations about transitioning to Inpatient Hospice. Patient will stay on the Hospitalist service tonight. Patient is critically ill and at high risk of demise. Care Plan discussed with: Patient/Family, Nurse and   Disposition: Inpatient REHABILITATION [facility] Problems  Date Reviewed: 8/3/2017          Codes Class Noted POA    Leukocytosis ICD-10-CM: D72.829  ICD-9-CM: 288.60  2017 Unknown        Altered mental status ICD-10-CM: R41.82  ICD-9-CM: 780.97  2017 Unknown        Hypotension ICD-10-CM: I95.9  ICD-9-CM: 458.9  2017 Unknown        Fever ICD-10-CM: R50.9  ICD-9-CM: 780.60  2017 Unknown        SIRS (systemic inflammatory response syndrome) ([facility] ICD-10-CM: R65.10  ICD-9-CM: 995.90  2017 Unknown        Elevated troponin ICD-10-CM: R74.8  ICD-9-CM: 790.6  7/3/2015 Unknown        ESRD (end stage renal disease) ([facility] (Chronic) ICD-10-CM: N18.6  ICD-9-CM: 585.6  10/16/2012 Unknown                Vital Signs:    Last 24hrs VS reviewed since prior progress note.  Most recent are:  Visit Vitals    /82 (BP 1 Location: Right arm, BP Patient Position: At rest)    Pulse 75    Temp 99.4 °F (37.4 °C)    Resp 22    Ht 5' 2\" (1.575 m)    Wt 67 kg (147 lb 11.3 oz)    SpO2 (!) 83%    BMI 27.02 kg/m2         Intake/Output Summary (Last 24 hours) at 08/20/17 1615  Last data filed at 08/19/17 2000   Gross per 24 hour   Intake            18.33 ml   Output                0 ml   Net            18.33 ml        Physical Examination:     Did not examine the patient per daughter's request.         Constitutional: unresponsive           Data Review:    Review and/or order of clinical lab test      Labs:     Recent Labs      08/18/17 0159   WBC  23.1*   HGB  9.4*   HCT  29.6*   PLT  264     Recent Labs      08/18/17 0159   NA  133*   K  3.9   CL  97   CO2  25   BUN  29*   CREA  3.16*   GLU  167*   CA  9.2   MG  2.1   PHOS  2.9     Recent Labs      08/18/17 0159   SGOT  10*   ALT  8*   AP  123*   TBILI  0.4   TP  6.8   ALB  2.4*   GLOB  4.4*     No results for input(s): INR, PTP, APTT in the last 72 hours. No lab exists for component: INREXT, INREXT   No results for input(s): FE, TIBC, PSAT, FERR in the last 72 hours. No results found for: FOL, RBCF   No results for input(s): PH, PCO2, PO2 in the last 72 hours. No results for input(s): CPK, CKNDX, TROIQ in the last 72 hours.     No lab exists for component: CPKMB  Lab Results   Component Value Date/Time    Cholesterol, total 107 08/16/2017 04:35 AM    HDL Cholesterol 35 08/16/2017 04:35 AM    LDL, calculated 36.4 08/16/2017 04:35 AM    Triglyceride 178 08/16/2017 04:35 AM    CHOL/HDL Ratio 3.1 08/16/2017 04:35 AM     Lab Results   Component Value Date/Time    Glucose (POC) 173 08/19/2017 12:11 AM    Glucose (POC) 171 08/18/2017 04:29 PM    Glucose (POC) 218 08/18/2017 12:23 PM    Glucose (POC) 250 08/18/2017 07:08 AM    Glucose (POC) 160 08/18/2017 01:07 AM     Lab Results   Component Value Date/Time    Color DARK YELLOW 04/29/2016 02:40 AM    Appearance HAZY 04/29/2016 02:40 AM Specific gravity 1.025 04/29/2016 02:40 AM    Specific gravity 1.018 07/27/2015 08:02 PM    pH (UA) 5.0 04/29/2016 02:40 AM    Protein 300 04/29/2016 02:40 AM    Glucose NEGATIVE  04/29/2016 02:40 AM    Ketone TRACE 04/29/2016 02:40 AM    Bilirubin NEGATIVE  04/29/2016 02:40 AM    Urobilinogen 0.2 04/29/2016 02:40 AM    Nitrites NEGATIVE  04/29/2016 02:40 AM    Leukocyte Esterase NEGATIVE  04/29/2016 02:40 AM    Epithelial cells FEW 04/29/2016 02:40 AM    Bacteria 2+ 04/29/2016 02:40 AM    WBC 0-4 04/29/2016 02:40 AM    RBC 0-5 04/29/2016 02:40 AM         Medications Reviewed:     Current Facility-Administered Medications   Medication Dose Route Frequency    LORazepam (ATIVAN) injection 1 mg  1 mg IntraVENous Q15MIN PRN    acetaminophen (TYLENOL) suppository 650 mg  650 mg Rectal Q4H PRN    scopolamine (TRANSDERM-SCOP) 1.5 mg  1.5 mg TransDERmal Q72H PRN    glycopyrrolate (ROBINUL) injection 0.2 mg  0.2 mg IntraVENous Q4H PRN    HYDROmorphone (PF) (DILAUDID) injection 0.5-1 mg  0.5-1 mg IntraVENous Q15MIN PRN    diphenhydrAMINE (BENADRYL) injection 25 mg  25 mg IntraVENous Q6H PRN    sodium chloride (NS) flush 5-10 mL  5-10 mL IntraVENous Q8H    sodium chloride (NS) flush 5-10 mL  5-10 mL IntraVENous PRN    albuterol (PROVENTIL VENTOLIN) nebulizer solution 2.5 mg  2.5 mg Nebulization Q4H PRN    ondansetron (ZOFRAN) injection 4 mg  4 mg IntraVENous Q6H PRN     ______________________________________________________________________  EXPECTED LENGTH OF STAY:   ACTUAL LENGTH OF STAY:          4                 Qi Collier MD

## 2017-08-20 NOTE — PROGRESS NOTES
Follow-up visit with patient's daughter per request of Matteo Porter. Daughter had multiple family and friends present offering support and stated no needs at this time. Reminded of  availability as needed or desired. Chaplain Gladys Beltran M.Div.    Paging Service [phone])

## 2017-08-20 NOTE — ROUTINE PROCESS
1930: Bedside and Verbal shift change report given to Kika Gordon RN (oncoming nurse) by Ish Tucker RN (offgoing nurse). Report included the following information SBAR, Kardex, ED Summary, Procedure Summary, Intake/Output, MAR, Accordion, Recent Results, Med Rec Status, Cardiac Rhythm Paced at 75 and Alarm Parameters . 0730: Bedside and Verbal shift change report given to Ish Tucker RN (oncoming nurse) by Kika Gordon RN (offgoing nurse). Report included the following information SBAR, Kardex, Intake/Output, MAR, Accordion, Recent Results, Med Rec Status, Cardiac Rhythm Paced at 75 and Alarm Parameters .

## 2017-08-21 NOTE — HOSPICE
[facility]  [phone]     Patient Name: Dinh Grant  YOB: 1945  Age: 67 y.o. [facility] MSW Note:      MSW received call from  who reported daughter Yelena ( [phone]) is ready to meet with hospice. Daughter was not in room. MSW called daughter to schedule time to meet. MSW left  for her to return my call. 12:20 pm. This MSW will be meeting with Yelena between 1:15 and 1:30 pm to sign consents. Thank you for the opportunity to be of service to this patient.       Tyler Sands MSW, [address] [place] [address] Samish  [phone]

## 2017-08-21 NOTE — PROGRESS NOTES
Bedside and Verbal shift change report given to [facility] LUCINDA RN (oncoming nurse) by Gerda Morrison (offgoing nurse). Report included the following information SBAR, Kardex, Intake/Output and Med Rec Status.

## 2017-08-21 NOTE — PROGRESS NOTES
Palliative Medicine Consult  [place]: [phone] ([phone])    Patient Name: Lara Lefort  YOB: 1945    Date of Initial Consult: August 17, 2017  Reason for Consult: Care Decisions; family requested  Requesting Provider: Dr. Nirali Coley   Primary Care Physician: Elda Napier MD      SUMMARY:     Naveen Hernandez is a 67y.o. year old with a past history of HTN, dyslipidemia, DM, asthma, COPD, ESRD- on hemodialysis, anemia, adrenal mass, arthritis right knee, generalized social phobia, pneumonia- 12/14, acute on chronic diastolic CHF,right arm fracture- 3/10/16, PAD s/p stent 8/10/17, who was admitted on 8/16/2017  with a diagnosis of SIRS, shock, AMS, Elevated troponin,leukocytosis, fever, hypotension (pressor support). Current medical issues leading to Palliative Medicine involvement include: daughter has met with out team in the past so she requested the consult. The pt has been declining in health for several months. Quality of life consists of in and out of the hospital or SNF for rehab. Pt has been less tolerant of dialysis sessions also. Reports of the pt saying she is tired and no longer wants to live. 8/18/17: vegetation noted on pace maker wire, loculated area rt lung and chest tube offered, BRE planned, pt verbalizing she doesn't want dialysis anymore     PALLIATIVE DIAGNOSES:   1. Generalized Pain  2. Tachypnea  3. Less responsive  4. anuria  5. Cough  6. Hypoalbuminemia  7. Goals of care  8. Debility  9. Care Giver [place]     PLAN:   1. Met with the patient's daughter, sister,and friend  2. Pt appears more imminent today. Full comfort measures in place  3. Discussed concerns with hospice enrollment and offered to help clarify  4. Reviewed in detail criteria for different levels of hospice, disposition, and out of pocket expenses  5. Family verbalized an understanding and now agree to hospice enrollment  6. Family hoping the pt will qualify for GIP.   If the pt does not meet inpatient criteria they are interested in the [facility]  7. discussed with hospice and they will follow up with the family today        8. Initial consult note routed to primary continuity provider  9.  Communicated plan of care with: Palliative IDT       GOALS OF CARE / TREATMENT PREFERENCES:   [====Goals of Care====]  GOALS OF CARE:  Patient / health care proxy stated goals: see above      TREATMENT PREFERENCES:   Code Status: DNR    Advance Care Planning:  Advance Care Planning 8/17/2017   Patient's Healthcare Decision Maker is: Named in scanned ACP document   Primary Decision Maker Name Chava Carolina   Primary Decision Maker Phone Number [phone]   Primary Decision Maker Relationship to Patient Adult child   Secondary Decision Maker Name -   Secondary Decision [address] Phone Number -   Secondary Decision Maker Relationship to Patient -   Confirm Advance Directive Yes, on file   Patient Would Like to Complete Advance Directive -       Other:    The palliative care team has discussed with patient / health care proxy about goals of care / treatment preferences for patient.  [====Goals of Care====]         HISTORY:     HPI/SUBJECTIVE:    The patient is:   [] Verbal and participatory but limited due to condition  [x] Non-participatory due to:   Medical condition    Clinical Pain Assessment (nonverbal scale for severity on nonverbal patients):   [++++ Clinical Pain Assessment++++]  [++++Pain Severity++++]: Pain: 5  [++++Pain Character++++]: pt unable to describe  [++++Pain Duration++++]: months but worse over the past few days  [++++Pain Effect++++]: limited mobility   [++++Pain Factors++++]:   [++++Pain Frequency++++]:  constant  [++++Pain Location++++]: previously all over  [++++ Clinical Pain Assessment++++]  Duration: for how long has pt been experiencing pain (e.g., 2 days, 1 month, years)  Frequency: how often pain is an issue (e.g., several times per day, once every few days, constant) FUNCTIONAL ASSESSMENT:     Palliative Performance Scale (PPS):  PPS: 30       PSYCHOSOCIAL/SPIRITUAL SCREENING:     Advance Care Planning:  Advance Care Planning 8/17/2017   Patient's Healthcare Decision Maker is: Named in scanned ACP document   Primary Decision Maker Name Sebas Jennings   Primary Decision Maker Phone Number [phone]   Primary Decision Maker Relationship to Patient Adult child   Secondary Decision Maker Name -   Secondary Decision [address] Phone Number -   Secondary Decision Maker Relationship to Patient -   Confirm Advance Directive Yes, on file   Patient Would Like to Complete Advance Directive -        Any spiritual / Amish concerns:  [] Yes /  [x] No    Caregiver Burnout:  [x] Yes /  [] No /  [] No Caregiver Present      Anticipatory grief assessment:   [x] Normal  / [] Maladaptive       ESAS Anxiety: Anxiety: 2    ESAS Depression:          REVIEW OF SYSTEMS:     Positive and pertinent negative findings in ROS are noted above in HPI. The following systems were [x] reviewed / [] unable to be reviewed as noted in HPI  Other findings are noted below. Systems: constitutional, ears/nose/mouth/throat, respiratory, gastrointestinal, genitourinary, musculoskeletal, integumentary, neurologic, psychiatric, endocrine. Positive findings noted below. Modified ESAS Completed by: provider   Fatigue: 9 Drowsiness: 2     Pain: 5   Anxiety: 2 Nausea: 0   Anorexia: 10 Dyspnea: 7                    PHYSICAL EXAM:     From RN flowsheet:  Wt Readings from Last 3 Encounters:   08/21/17 141 lb 5 oz (64.1 kg)   08/15/17 144 lb 13.5 oz (65.7 kg)   08/08/17 146 lb 11.2 oz (66.5 kg)     Blood pressure 127/65, pulse 75, temperature 99.1 °F (37.3 °C), resp. rate 19, height 5' 2\" (1.575 m), weight 141 lb 5 oz (64.1 kg), SpO2 95 %.     Pain Scale 1: Adult Nonverbal Pain Scale  Pain Intensity 1: 6  Pain Onset 1: j  Pain Location 1: Generalized     Pain Description 1: Aching  Pain Intervention(s) 1: Medication (see MAR)  Last bowel movement, if known:     Constitutional: frail, ill appearing, uncomfortable, furrowed brows  Eyes: pupils equal, anicteric  ENMT: no nasal discharge, moist mucous membranes  Cardiovascular: regular rhythm, distal pulses intact  Respiratory: breathing labored, symmetric  Gastrointestinal: soft non-tender, +bowel sounds  Musculoskeletal: no deformity, no tenderness to palpation  Skin: warm, dry, PAD  Neurologic: unresponsive  Psychiatric: full affect, no hallucinations  Other:       HISTORY:     Active Problems:    ESRD (end stage renal disease) (HCC) (10/16/2012)      Elevated troponin (7/3/2015)      Leukocytosis (8/16/2017)      Altered mental status (8/16/2017)      Hypotension (8/16/2017)      Fever (8/16/2017)      SIRS (systemic inflammatory response syndrome) ([address] (8/16/2017)      Past Medical History:   Diagnosis Date    Acute on chronic diastolic CHF (congestive heart failure), NYHA class 2 ([address] 2/19/2015    Adrenal mass, left ([facility])     2.4 Cm om Feb 2010    Anemia associated with chronic renal failure     Arthritis     right knee    Asthma     CKD (chronic kidney disease), stage IV ([facility])     ying[facility] tues[facility]    COPD     Diabetes ([address] dx 1997~    type II    Generalized social phobia     HTN (hypertension)     Hypercholesteremia     Nausea & vomiting 11/11/2013    Other ill-defined conditions     fistula, R upper arm.  Pneumonia 12/10/2014    left lower lobe with sepsis    Presence of biventricular AICD 4/22/2016 4/20/16 [employer] biventricular AICD implant    Renal calculus or stone     small Lt by US 3/2012.       S/P AV lv ablation 04/22/2016 4/20/16    Smokers' cough ([address]       Past Surgical History:   Procedure Laterality Date    COLONOSCOPY,DIAGNOSTIC  3/28/2016         HX HEENT      Lasik eye surgery    HX OPEN CHOLECYSTECTOMY  ~ 1985    HX OTHER SURGICAL      adrenal mass removed    HX SHOULDER ARTHROSCOPY 2008    removal bone spur left shoulder    KNEE SCOPE,CLEAN/DRAIN      UPPER GI ENDOSCOPY,DIAGNOSIS  3/28/2016         VASCULAR SURGERY PROCEDURE UNLIST      dialysis in right arm      Family History   Problem Relation Age of Onset    Diabetes Mother     Heart Disease Mother     Hypertension Mother     High Cholesterol Mother     Arthritis-osteo Mother     Cancer Father      Lung    Arthritis-osteo Father     Cancer Brother      x2 - lung cancer      History reviewed, no pertinent family history.   Social History   Substance Use Topics    Smoking status: Former Smoker     Packs/day: 2.00     Years: 48.00     Types: Cigarettes     Quit date: 1/3/2012    Smokeless tobacco: Never Used    Alcohol use No     Allergies   Allergen Reactions    Grapefruit Hives    Orange Juice Hives    Peach (Prunus Persica) Hives      Current Facility-Administered Medications   Medication Dose Route Frequency    ibuprofen (ADVIL;MOTRIN) 100 mg/5 mL oral suspension 200 mg  200 mg Oral Q6H PRN    LORazepam (ATIVAN) injection 1 mg  1 mg IntraVENous Q15MIN PRN    acetaminophen (TYLENOL) suppository 650 mg  650 mg Rectal Q4H PRN    scopolamine (TRANSDERM-SCOP) 1.5 mg  1.5 mg TransDERmal Q72H PRN    glycopyrrolate (ROBINUL) injection 0.2 mg  0.2 mg IntraVENous Q4H PRN    HYDROmorphone (PF) (DILAUDID) injection 0.5-1 mg  0.5-1 mg IntraVENous Q15MIN PRN    diphenhydrAMINE (BENADRYL) injection 25 mg  25 mg IntraVENous Q6H PRN    sodium chloride (NS) flush 5-10 mL  5-10 mL IntraVENous Q8H    sodium chloride (NS) flush 5-10 mL  5-10 mL IntraVENous PRN    albuterol (PROVENTIL VENTOLIN) nebulizer solution 2.5 mg  2.5 mg Nebulization Q4H PRN    ondansetron (ZOFRAN) injection 4 mg  4 mg IntraVENous Q6H PRN          LAB AND IMAGING FINDINGS:     Lab Results   Component Value Date/Time    WBC 23.1 08/18/2017 01:59 AM    HGB 9.4 08/18/2017 01:59 AM    PLATELET 706 31/58/9171 01:59 AM     Lab Results   Component Value Date/Time Sodium 133 08/18/2017 01:59 AM    Potassium 3.9 08/18/2017 01:59 AM    Chloride 97 08/18/2017 01:59 AM    CO2 25 08/18/2017 01:59 AM    BUN 29 08/18/2017 01:59 AM    Creatinine 3.16 08/18/2017 01:59 AM    Calcium 9.2 08/18/2017 01:59 AM    Magnesium 2.1 08/18/2017 01:59 AM    Phosphorus 2.9 08/18/2017 01:59 AM      Lab Results   Component Value Date/Time    AST (SGOT) 10 08/18/2017 01:59 AM    Alk. phosphatase 123 08/18/2017 01:59 AM    Protein, total 6.8 08/18/2017 01:59 AM    Albumin 2.4 08/18/2017 01:59 AM    Globulin 4.4 08/18/2017 01:59 AM     Lab Results   Component Value Date/Time    INR 1.2 08/16/2017 04:35 AM    Prothrombin time 12.5 08/16/2017 04:35 AM    aPTT 36.0 08/16/2017 04:35 AM      Lab Results   Component Value Date/Time    Iron 67 04/12/2015 04:50 AM    TIBC 233 04/12/2015 04:50 AM    Iron % saturation 29 04/12/2015 04:50 AM    Ferritin 700 04/12/2015 04:50 AM      Lab Results   Component Value Date/Time    pH 7.38 11/01/2016 05:31 AM    PCO2 42 11/01/2016 05:31 AM    PO2 102 11/01/2016 05:31 AM     No components found for: [place]   Lab Results   Component Value Date/Time    CK 53 07/10/2017 08:47 AM    CK - MB 2.7 07/10/2017 08:47 AM                Total time: 35 minutes  Counseling / coordination time, spent as noted above: 25 minutes  > 50% counseling / coordination?: y    Prolonged service was provided for  [x]30 min   []75 min in face to face time in the presence of the patient, spent as noted above. Time Start:     Time End:  Note: this can only be billed with 98967 (initial) or 78624 (follow up). If multiple start / stop times, list each separately.

## 2017-08-21 NOTE — DISCHARGE SUMMARY
Discharge Summary       PATIENT ID: Nancy Coleman  MRN: [id number]   YOB: 1945    DATE OF ADMISSION: 8/16/2017  4:15 AM    DATE OF DISCHARGE: 8/21/2017  PRIMARY CARE PROVIDER: Belgica Girard MD     ATTENDING PHYSICIAN: Amelia Harris M.D.   DISCHARGING PROVIDER: Amelia Harris MD    To contact this individual call [phone] and ask the  to page. If unavailable ask to be transferred the Adult Hospitalist Department. CONSULTATIONS: IP CONSULT TO HOSPITALIST  IP CONSULT TO NEPHROLOGY  IP CONSULT TO INTENSIVIST  IP CONSULT TO INFECTIOUS DISEASES  IP CONSULT TO PALLIATIVE CARE - PROVIDER  IP CONSULT TO CARDIOLOGY    PROCEDURES/SURGERIES: * No surgery found *    ADMITTING DIAGNOSES & HOSPITAL COURSE:   Bettina Johnson a 67 y. o. white female with past medical history of ESRD on hemodialysis onTuesday-Thursday- Saturday, chronic diastolic CHF NYHA Class 2, anemia, adrenal mass, asthma, arthritis, CKD, COPD, type 2 diabetes mellitus, social phobia, anxiety, hypertension, hyperlipidemia, pneumonia, kidney stone, peripheral arterial disease presented to the ED from nursing home with reported fever, dyspnea, and altered mental status (AMS). Septic shock/ Sepsis d/t Strep pneumoniae bacteremia, POA:   - hypotension requiring pressors, fevers, agitation;   - initially started on empiric IV Abx with Levaquin, Cefepime, Vancomycin;   - blood culture: STREPTOCOCCUS PNEUMONIAE;  - Echo: probable, medium-sized mass associated with the pacing wire. The possibility that  this is a vegetation cannot be excluded. - CT chest: New loculated pleural effusion right lung base.  - CT head: no acute findings;   - CT cervical/thoraci/lumbar spine: No acute fracture or subluxation. No CT evidence of discitis.   - poor prognosis discussed with daughter by all providers, appreciate support;   - Palliative Medicine consulted;   - Hospice consulted;     ESRD on HD:  - in the setting of sepsis, patient was unable to tolerate Dialysis due to hypotension;   - this was explained to the daughter;     Metabolic encephalopathy:  - due to sepsis. Mild elevation in Troponin:  - due to sepsis;  - Cardiology was consulted: no further work-up. - peak Troponin 0.14;     Type 2 diabetes mellitus:  -  [place]ispro Insulin Sliding Scale;     COPD, no acute exacerbation:  - Duoneb nebulizer treatments and PRN O2.     Acute on chronic diastolic CHF, NYHA II:   - Chest xray portable shows no overt pulmonary edema on admission;  - continue home medications as able. - s/p AICD;     PAD s/p LLE stenting:  - supportive care.     Complex Baker's cyst, LLE:  - planned Ortho consult.       On 8/18, patient's daughter and POA, met with Hospice and patient was transitioned to comfort care only. Abx were stopped 8/18. Patient was slowly weaned off pressors overnight per daughter's wishes. Patient was discharged to [facility] was further care. DISCHARGE DIAGNOSES / PLAN:      Comfort care per Hospice. PENDING TEST RESULTS:   At the time of discharge the following test results are still pending: None    FOLLOW UP APPOINTMENTS:    Follow-up Information     None           ADDITIONAL CARE RECOMMENDATIONS: per Hospice    DIET: Comfort feeding    ACTIVITY: Bedrest    WOUND CARE: Comfort care only    EQUIPMENT needed: None      DISCHARGE MEDICATIONS:  Current Discharge Medication List            NOTIFY YOUR PHYSICIAN FOR ANY OF THE FOLLOWING:   Fever over 101 degrees for 24 hours. Chest pain, shortness of breath, fever, chills, nausea, vomiting, diarrhea, change in mentation, falling, weakness, bleeding. Severe pain or pain not relieved by medications. Or, any other signs or symptoms that you may have questions about.     DISPOSITION:    Home With:   OT  PT  HH  RN       Long term SNF/Inpatient Rehab    Independent/assisted living   x Hospice    Other:       PATIENT CONDITION AT DISCHARGE:     Functional status   x Poor Deconditioned     Independent      Cognition:  Unresponsive    Lucid     Forgetful     Dementia      Catheters/lines (plus indication)    Shaw     PICC     PEG     None      Code status     Full code    x DNR      PHYSICAL EXAMINATION AT DISCHARGE:    08/21/17 0855 99.1 °F (37.3 °C) 75 127/65 -- At rest 19 95 %       Unresponsive, did not examine the patient per daughter's request.       CHRONIC MEDICAL DIAGNOSES:  Problem List as of 8/21/2017  Date Reviewed: 8/3/2017          Codes Class Noted - Resolved    Leukocytosis ICD-10-CM: D72.829  ICD-9-CM: 288.60  8/16/2017 - Present        Altered mental status ICD-10-CM: R41.82  ICD-9-CM: 780.97  8/16/2017 - Present        Hypotension ICD-10-CM: I95.9  ICD-9-CM: 458.9  8/16/2017 - Present        Fever ICD-10-CM: R50.9  ICD-9-CM: 780.60  8/16/2017 - Present        SIRS (systemic inflammatory response syndrome) ([facility] ICD-10-CM: R65.10  ICD-9-CM: 995.90  8/16/2017 - Present        Chest pain ICD-10-CM: R07.9  ICD-9-CM: 786.50  5/24/2017 - Present        COPD exacerbation ([facility] ICD-10-CM: J44.1  ICD-9-CM: 491.21  11/14/2016 - Present        PAD (peripheral artery disease) ([facility] ICD-10-CM: I73.9  ICD-9-CM: 443.9  10/4/2016 - Present    Overview Signed 3/7/2017  8:52 AM by Kristel Gray NP     R SFA intervention 3/6/17              AICD (automatic cardioverter/defibrillator) present ICD-10-CM: Z95.810  ICD-9-CM: V45.02  6/30/2016 - Present        Late effects of CVA (cerebrovascular accident) ICD-10-CM: I69.90  ICD-9-CM: 438.9  6/30/2016 - Present        Type 2 diabetes mellitus without complication ([facility] WCT-30-QX: E11.9  ICD-9-CM: 250.00  6/30/2016 - Present        Iron deficiency anemia ICD-10-CM: D50.9  ICD-9-CM: 280.9  6/30/2016 - Present        Chronic systolic heart failure (HCC) (Chronic) ICD-10-CM: R65.99  ICD-9-CM: 428.22  5/1/2016 - Present        ICD (implantable cardioverter-defibrillator) in place (Chronic) ICD-10-CM: Z95.810  ICD-9-CM: V45.02 5/1/2016 - Present        Hypoglycemia ICD-10-CM: E16.2  ICD-9-CM: 251.2  4/29/2016 - Present        Leucocytosis ICD-10-CM: D72.829  ICD-9-CM: 288.60  4/29/2016 - Present        Presence of biventricular AICD ICD-10-CM: Z95.810  ICD-9-CM: V45.02  4/22/2016 - Present    Overview Signed 4/22/2016  7:58 AM by Inez Bardales NP     4/20/16 [employer] biventricular AICD implant             S/P AV lv ablation ICD-10-CM: Z98.890  ICD-9-CM: V45.89  4/22/2016 - Present    Overview Signed 4/22/2016  7:58 AM by Inez Bardales NP     4/20/16             Cardiomyopathy ([facility] ICD-10-CM: I42.9  ICD-9-CM: 425.4  4/19/2016 - Present        Chronic systolic congestive heart failure ([facility] ICD-10-CM: I50.22  ICD-9-CM: 428.22, 428.0  4/19/2016 - Present        Acute respiratory failure with hypoxia (HCC) ICD-10-CM: J96.01  ICD-9-CM: 518.81  4/16/2016 - Present        PAF (paroxysmal atrial fibrillation) (HCC) ICD-10-CM: I48.0  ICD-9-CM: 427.31  4/16/2016 - Present        Fracture of right humerus ICD-10-CM: S42.301A  ICD-9-CM: 812.20  3/23/2016 - Present        Debility ICD-10-CM: R53.81  ICD-9-CM: 799.3  3/23/2016 - Present        Weakness ICD-10-CM: R53.1  ICD-9-CM: 780.79  3/23/2016 - Present        Hypoalbuminemia ICD-10-CM: E88.09  ICD-9-CM: 273.8  3/23/2016 - Present        Right arm pain ICD-10-CM: M79.601  ICD-9-CM: 729.5  3/23/2016 - Present        Goals of care, counseling/discussion ICD-10-CM: Z71.89  ICD-9-CM: V65.49  3/23/2016 - Present        Shock ([address] ICD-10-CM: R57.9  ICD-9-CM: 785.50  3/21/2016 - Present        Acute upper GI bleed ICD-10-CM: K92.2  ICD-9-CM: 578.9  3/21/2016 - Present        Weakness of right side of body ICD-10-CM: R53.1  ICD-9-CM: 728.87  3/8/2016 - Present        ASHD (arteriosclerotic heart disease) ICD-10-CM: I25.10  ICD-9-CM: 414.00  7/3/2015 - Present        Elevated troponin ICD-10-CM: R74.8  ICD-9-CM: 790.6  7/3/2015 - Present        Type II or unspecified type diabetes mellitus without mention of complication, uncontrolled (Chronic) ICD-10-CM: E11.65  ICD-9-CM: 250.02  3/15/2015 - Present        S/P PTCA (percutaneous transluminal coronary angioplasty) ICD-10-CM: Z98.61  ICD-9-CM: V45.82  3/9/2015 - Present        CAD (coronary artery disease) ICD-10-CM: I25.10  ICD-9-CM: 414.00  3/9/2015 - Present        Myocardial ischemia ICD-10-CM: I25.9  ICD-9-CM: 414.8  2/21/2015 - Present        Angina, class III ([facility] ICD-10-CM: I20.9  ICD-9-CM: 413.9  2/21/2015 - Present        Acute-on-chronic respiratory failure ([facility] ICD-10-CM: J96.20  ICD-9-CM: 518.84  2/19/2015 - Present        Acute on chronic diastolic CHF (congestive heart failure), NYHA class 2 ([facility]) ICD-10-CM: I50.33  ICD-9-CM: 428.33, 428.0  2/19/2015 - Present        Left-sided chest wall pain ICD-10-CM: R07.89  ICD-9-CM: 786.52  1/2/2015 - Present        Pneumonia, organism unspecified ICD-10-CM: J18.9  ICD-9-CM: 486  4/21/2014 - Present        Obesity ICD-10-CM: E66.9  ICD-9-CM: 278.00 Chronic 3/6/2014 - Present    Overview Signed 3/6/2014  8:03 AM by Meenu Maurer MD     Body mass index is 30.54 kg/(m^2). 3/6/2014               ASVD (arteriosclerotic vascular disease) ICD-10-CM: I70.90  ICD-9-CM: 440. 9 Chronic 3/6/2014 - Present        History of CVA (cerebrovascular accident) ICD-10-CM: Z86.73  ICD-9-CM: V12.54 Present on Admission 3/6/2014 - Present    Overview Signed 3/6/2014  8:04 AM by Meenu Maurer MD     Caudate nucleus              GERD (gastroesophageal reflux disease) ICD-10-CM: K21.9  ICD-9-CM: 530.81  9/9/2013 - Present        COPD (chronic obstructive pulmonary disease) ([facility] ICD-10-CM: J44.9  ICD-9-CM: 496  9/5/2013 - Present        SOB (shortness of breath) ICD-10-CM: R06.02  ICD-9-CM: 786.05  5/26/2013 - Present        DM (diabetes mellitus) type II controlled with renal manifestation (HCC) (Chronic) ICD-10-CM: E11.29  ICD-9-CM: 250.40  10/17/2012 - Present        AVF (arteriovenous fistula) ([facility] (Chronic) ICD-10-CM: I77.0  ICD-9-CM: 447.0  10/17/2012 - Present        ESRD (end stage renal disease) ([facility] (Chronic) ICD-10-CM: N18.6  ICD-9-CM: 585.6  10/16/2012 - Present        Anemia of chronic kidney failure (Chronic) ICD-10-CM: N18.9, D63.1  ICD-9-CM: 285.21, 585.9  10/16/2012 - Present        Adrenal tumor ICD-10-CM: D49.7  ICD-9-CM: 239.7  10/8/2010 - Present        History of tobacco abuse ICD-10-CM: Z87.891  ICD-9-CM: V15.82 Present on Admission 10/8/2010 - Present        HTN (hypertension) (Chronic) ICD-10-CM: I10  ICD-9-CM: 401.9  10/8/2010 - Present        RESOLVED: Constipation ICD-10-CM: K59.00  ICD-9-CM: 564.00  3/23/2016 - 4/17/2016        RESOLVED: Sepsis ([facility] ICD-10-CM: A41.9  ICD-9-CM: 038.9, 995.91  3/21/2016 - 4/17/2016        RESOLVED: Acute blood loss anemia ICD-10-CM: D62  ICD-9-CM: 285.1  3/21/2016 - 4/17/2016        RESOLVED: Cellulitis of right leg ICD-10-CM: L03.115  ICD-9-CM: 682.6  2/19/2015 - 4/17/2016        RESOLVED: Acute diastolic heart failure ([facility] ICD-10-CM: I50.31  ICD-9-CM: 428.31  1/19/2015 - 2/24/2015        RESOLVED: COPD exacerbation ([facility] ICD-10-CM: J44.1  ICD-9-CM: 491.21  1/2/2015 - 4/17/2016        RESOLVED: Pneumonia ICD-10-CM: J18.9  ICD-9-CM: 486  12/10/2014 - 4/17/2016        RESOLVED: Essential hypertension, malignant ICD-10-CM: I10  ICD-9-CM: 401.0 Present on Admission 3/6/2014 - 4/17/2016        RESOLVED: Nausea & vomiting ICD-10-CM: R11.2  ICD-9-CM: 787.01 Acute 11/11/2013 - 3/6/2014        RESOLVED: COPD with acute exacerbation ([facility] ICD-10-CM: J44.1  ICD-9-CM: 491.21 Present on Admission 10/22/2013 - 3/15/2015        RESOLVED: Acute bronchitis ICD-10-CM: J20.9  ICD-9-CM: 466.0  9/9/2013 - 3/6/2014        RESOLVED: Sepsis(995.91) ICD-10-CM: A41.9  ICD-9-CM: 995.91 Acute 11/22/2012 - 3/6/2014        RESOLVED: Hemodialysis catheter infection ([facility] ICD-10-CM: T82. 7XXA  ICD-9-CM: 996.62  11/22/2012 - 3/6/2014        RESOLVED: Hemodialysis catheter infection [facility]) ICD-10-CM: T82. 7XXA  ICD-9-CM: 996.62  10/19/2012 - 3/6/2014    Overview Signed 10/19/2012 11:07 AM by Brenden Peñaloza MD     [facility] on outpatient blood cultures from catheter on 10/13/2012.              RESOLVED: Acute on chronic renal insufficiency ICD-10-CM: N28.9, N18.9  ICD-9-CM: 593.9, 585.9  3/7/2012 - 3/6/2014              Greater than 35 minutes were spent with the patient on counseling and coordination of care    Signed:   Jaime Holcomb MD  8/21/2017  1:43 PM

## 2017-08-21 NOTE — PROGRESS NOTES
Order request received to deactivate patient's biventricular ICD d/t hospice and comfort care.    Dr. Zarina Macario and Dr Renee Morales aware  Nilton Bekah, representative from [address] contacted and he be at [facility] around 1330 to deactivate Biv ICD  Spoke with patient's RN Delia      Addendum  I was asked to help by calling rep to turn off ICD shock therapies as patient is in hospice and Dr Zarina Macario is off post call

## 2017-08-21 NOTE — HOSPICE
[facility]  [phone]    Inpatient Nursing PRE Admission   Patient Name: Jame Dan  YOB: 1945  Age: 67 y.o. Date of PLANNED Hospice Admission: 2017  Hospice Attending: Tone Luu  Primary Care Physician: Crow Sandoval MD     Home Hospice Zip Code:  Expected  (if patient transferred to [facility]): ADVANCE CARE PLANNING    Code Status: DNR  Durable DNR: [x]  Yes  []  No  Advance Care Planning 2017   Patient's Healthcare Decision Maker is: Named in scanned ACP document   Primary Decision Maker Name Elizabeth Boudreaux   Primary Decision Maker Phone Number [phone]   Primary Decision Maker Relationship to Patient Adult child   Secondary Decision Maker Name -   Secondary Decision [address] Phone Number -   Secondary Decision Maker Relationship to Patient -   Confirm Advance Directive Yes, on file   Patient Would Like to Complete Advance Directive -       Episcopal: Nondenominational   Home:  [facility] SUMMARY   ER Visits/ Hospitalizations in past year:  4  Hospice Diagnosis: End Stage Renal Thalia Quiroga a 67 y. o. year old with Stage IV Kidney Disease who has been on renal dialysis since , . Patient was admitted to [facility] on   from a rehab  facility with fever and altered mental status. She was diagnosed with Septic Shock SIRS, hypotention and elevated troponin level. Patients fever is suspected to be secondary to an infected pacer wire. . Patient was in the ICU  on IV antibiotics and pressors. On 1917 patients  Nephrologistdvised that patient would not be able to withstand further dialysis and  her family elected for comfort measures. Patient vital signs have stabilized, with symptoms of mild agitation, terminal secretions managed on prn opioid, Ativan and Robinul.   Patients  Daughter/MPOA  is consenting to hospice admission and patient will be transferred to the [facility] for futher care. Patients WBC is 23.1. Alumin level 2.4    Patient additonally   past history of HTN, dyslipidemia, DM, asthma, COPD,  anemia, adrenal mass,  acute on chronic diastolic CHF, NIHY class II. Patient has an AICD which was turned off on 8 Patient was hospitalized in March of this year with acute respiratory failure and acute GI Bleed. . Earlier this month patient, was hospitalized with acute leg pain secondary to PAD and had LLE stenting on  8/10/17.     Onset Date of Hospice Diagnosis:  2012  Summary of Disease Progression Leading to Hospice Diagnosis:     Co-Morbidities:   Patient Active Problem List   Diagnosis Code    Adrenal tumor D49.7    History of tobacco abuse Z87.891    HTN (hypertension) I10    ESRD (end stage renal disease) ([facility] N18.6    Anemia of chronic kidney failure N18.9, D63.1    DM (diabetes mellitus) type II controlled with renal manifestation ([facility]) E11.29    AVF (arteriovenous fistula) ([facility]) I77.0    SOB (shortness of breath) R06.02    COPD (chronic obstructive pulmonary disease) ([facility]) J44.9    GERD (gastroesophageal reflux disease) K21.9    Obesity E66.9    ASVD (arteriosclerotic vascular disease) I70.90    History of CVA (cerebrovascular accident) Z86.73    Pneumonia, organism unspecified J18.9    Left-sided chest wall pain R07.89    Acute-on-chronic respiratory failure ([facility]) J96.20    Acute on chronic diastolic CHF (congestive heart failure), NYHA class 2 ([facility]) I50.33    Myocardial ischemia I25.9    Angina, class III ([facility]) I20.9    S/P PTCA (percutaneous transluminal coronary angioplasty) Z98.61    CAD (coronary artery disease) I25.10    Type II or unspecified type diabetes mellitus without mention of complication, uncontrolled E11.65    ASHD (arteriosclerotic heart disease) I25.10    Elevated troponin R74.8    Weakness of right side of body R53.1    Shock ([facility] R57.9    Acute upper GI bleed K92.2    Fracture of right humerus S42.301A    Debility R53.81    Weakness R53.1    Hypoalbuminemia E88.09    Right arm pain M79.601    Goals of care, counseling/discussion Z71.89    Acute respiratory failure with hypoxia ([facility]) J96.01    PAF (paroxysmal atrial fibrillation) ([facility]) I48.0    Cardiomyopathy ([facility]) I42.9    Chronic systolic congestive heart failure ([facility]) I50.22    Presence of biventricular AICD Z95.810    S/P AV lv ablation Z98.890    Hypoglycemia E16.2    Leucocytosis D72.829    Chronic systolic heart failure ([facility]) I50.22    ICD (implantable cardioverter-defibrillator) in place Z95.810    AICD (automatic cardioverter/defibrillator) present Z95.810    Late effects of CVA (cerebrovascular accident) I69.90    Type 2 diabetes mellitus without complication ([facility]) X40.4    Iron deficiency anemia D50.9    PAD (peripheral artery disease) ([facility]) I73.9    COPD exacerbation ([facility]) J44.1    Chest pain R07.9    Leukocytosis D72.829    Altered mental status R41.82    Hypotension I95.9    Fever R50.9    SIRS (systemic inflammatory response syndrome) ([facility]) R65.10     Diagnoses RELATED to the terminal prognosis: CHF, Cardiomyopathy,Diabetes, Sepsis, Protein Calorie Malnutrition  Other Diagnoses:     Rationale for a prognosis of life expectancy of 6 months or less if the disease follows its normal course (Disease Specific History): Garo Love is a 67 y.o. who is being  admitted to [facility]. The patient's principle diagnosis of End Stage Renal disease, complicated by acute sepsis  has resulted in fever, respiratory distress altered mental status, terminal agitation,  Functionally, the patient's Palliative Performance Scale has declined over a period of months  and is estimated at 20 Objective information that support this patients limited prognosis includes: elevated WBC 23, decreasing  Alertness, anorexia. The patient/family chose comfort measures with the support of Hospice.     Patient meets for Routine  LOC as evidenced by diagnosis of renal failure with multiple comorbid conditions, terminal symptoms requiring IV medications to manage. Verbal certification of terminal diagnosis with life expectancy of 6 months or less received from: Dr. Chante Ng. Primary care provider and Dr. Saintclair Haines, Medical Director of Hospice  Prognosis estimated based on 08/21/17 clinical assessment is:   [] Few to Many Hours  [x] Hours to Days   [] Few to Many Days   [] Days to Weeks   [] Few to Many Weeks   [] Weeks to Months   [] Few to Many Months    CLINICAL INFORMATION   Allergies: Allergies   Allergen Reactions    Grapefruit Hives    Orange Juice Hives    Peach (Prunus Persica) Hives       Wt Readings from Last 3 Encounters:   08/21/17 64.1 kg (141 lb 5 oz)   08/15/17 65.7 kg (144 lb 13.5 oz)   08/08/17 66.5 kg (146 lb 11.2 oz)     Ht Readings from Last 3 Encounters:   08/16/17 5' 2\" (1.575 m)   08/15/17 5' 2\" (1.575 m)   08/02/17 5' 2\" (1.575 m)     Body mass index is 25.85 kg/(m^2). Visit Vitals    /65 (BP 1 Location: Right arm, BP Patient Position: At rest)    Pulse 75    Temp 99.1 °F (37.3 °C)    Resp 19    Ht 5' 2\" (1.575 m)    Wt 64.1 kg (141 lb 5 oz)    SpO2 95%    BMI 25.85 kg/m2       LAB VALUES  No results found for this visit on 08/16/17 (from the past 12 hour(s)). No results found for this visit on 08/16/17 (from the past 6 hour(s)). Lab Results   Component Value Date/Time    Protein, total 6.8 08/18/2017 01:59 AM    Albumin 2.4 08/18/2017 01:59 AM       Currently this patient has:  [x] Supplemental O2 [] Peripheral IV  [] PICC    [] PORT   [] Shaw Catheter [] NG Tube   [] PEG Tube [] Ostomy    [x] AICD: Has ICD been deactivated?   [x] Yes [] No:______    Progression to DEPENDENCE WITH ADLs (include time frame): days  - Dependent for bathing: personal hygiene and grooming   - Dependent for dressing: dressing and undressing   - Dependent for transferring: movement and mobility   - Dependent for toileting: continence-related tasks including control and hygiene   - Dependent for eating: preparing food and feeding    ASSESSMENT & PLAN     1. Symptom Issues Identified: Minimally responsive, temp 99.1. Agitation, Respiratory distress, on 3 L oxygen by nasal cannula    2. Spiritual Issues Identified: Patient is Pentecostal by [employer]. Daughter has not engaged in conversation    3. Psych/ Social/ Emotional Issues Identified: Patients daughter & Shiloh Bobo is grieving, having difficult making the decision to enter  Hospice.  She has two children, ages 3 & 3.    4.  Care Coordination:   Transfer to: [employer]               (home or [facility])    Report will be called by Primary RN Morena Martin     (Nurse and MD)    Transportation by: [facility]   Schedule time will be called to [facility]     (time)    Medications Needs: comfort medicaitons    DME:     Supplies:

## 2017-08-21 NOTE — PROGRESS NOTES
1735 Received from hospital.  Client unresponsive-but moans with turning interventions. No family present. Received orders from Dr Chyna Live for comfort meds. Client with use of accessory muscles to breathe. Coarse Ronchi throughout. Hypoactive BS. Pedal pulses weakly palpable. Extremities warm. Arms reddened, L wrist skin tear with occlusive dressing  R arm hemodialysis access covered with dry dressing. No thrill or bruit. 2 PIVs present L FA and L hand area  1920  Scheduled Morphine and Lorazepam given. DTR Nelson Sosa in room advised Morphine was in the same class a dilauadid-client received this 1 mg q 1-2 hours prn in hospital.  Educated Lorazepam same medication as Ativan.

## 2017-08-21 NOTE — PROGRESS NOTES
CM arranged ambulance transport for 4:30 with [facility]. [facility].     Levar Angel, BSW, ACM

## 2017-08-21 NOTE — PROGRESS NOTES
CM reviewed chart. Per hospice nurse, pt is inpatient hospice appropriate, however family was not ready to admit into hospice services. Per Palliative Care today, pt's family is ready for hospice today. Hospice informed. CM will continue to be available incase any needs arise.   Munir Campos, BSW, ACM

## 2017-08-21 NOTE — HOSPICE
[facility]  [phone]    Patient Name: Saeed Whitlock  YOB: 1945  Age: 67 y.o. [address] [facility] MSW Note:      In to meet with daughter Vonna Severs and her  Harmony Delgado to sign hospice consents for transfer to [facility] today.  will arrange transportation for this afternoon. Hospice consents, ABN, DDNR, and FAP forms completed and faxed to [facility]. Reviewed Hospice care and [facility] information. Per daughter they will be using [facility] services. Discussed plan for disposition in the event patient would exceed the 2 to 3 week routine level of care maximum stay. Daughter continues to be very overwhelmed. MSW provided emotional support and can appear to be gruff at times. Daughter has 3and 1year old. Patient lives with 80year old sister who cannot care for patient. Per Palliative note, going home is not an option. MSW had met briefly with daughter on Friday. She is a [employer] employee at [employer]. FAP forms completed and faxed in to be processed. Thank you for the opportunity to be of service to this patient.     Chelsea Councilman MSW, [address] [place] [address]  [phone]

## 2017-08-21 NOTE — PROGRESS NOTES
Primary Nurse Maryann Chirinos RN and Jesse Ndiaye RN performed a dual skin assessment on this patient No impairment noted; skin is fragile, ecchymotic  Todd score is 10

## 2017-08-22 NOTE — HOSPICE
This MSW called patient's daughter Nishant Ramirez to offer condolences and provide emotional support. MSW had met with patient's daughter multiple occasions at [facility]. MSW emailed bereavement information to her for follow-up services for counseling. Daughter was very pleased with [facility] and the staff there particularly [address] [place] [address]. Patient was transferred to [facility] from [facility] after hours, initial MSW assessment was not completed before patient passed.     Daniel Austin MSW, [address] [place] [address] [place]  [phone]

## 2017-08-22 NOTE — PROGRESS NOTES
1900 Report received from Domenico, [facility]. Pt is Routine level of care. Dx End Stage Renal Disease. 1 Spoke with pts daughter at the bedside. She is planning to go home. She is torn as to whether to stay. She has 2 small children at home. She understands that her mom could pass at anytime, she ask that we keep her medicated and in comfort. Given assurance and emotional support. Pt is unresponsive to verbal or tactile stimuli. Extremities are warm to touch. Skin color is sallow. Respirations labored,  Using accessary muscles. O2 via nasal canula at 3L/M.   Dgt has left for home. 2130 Morphine, Lorazepam given to manage symptoms of pain, Robinul given for increased secretions. Pt repositioned to her right side. 2300 Resp rate 28 shallow and labored. Scheduled medications given to manage symptoms. Scopolamine patch applied behind left ear for management of secretions. 0130 Resp rapid and shallow. No signs of pain. Lorazepam prn dose given to manage resp distress  0230 Pt found without apical heartbeat or respirations. Pt pronounced by this nurse. Post Mortem care given. 0230 Phone call to the pts tayla Kinney to notify. Unable to reach at the cell number or the house number. Each go to voice mail. Message left at the cell number to call the [facility]. 2665 Post mortem care given. 3475 Repeat phone calls to Misty's cell and home numbers with no answer  0340 Repeat calls to both home and cell, no answer. 2852 Repeat call to pts daughter. No response. 0530 Return call from tayla Kinney. She states she was aware that I had called but could not bring herself to answer the phone. She at first said she wanted to come to view the body but decided to wait to view her at the  home. She is tearful but grieving appropriately. [address] home called. [address] picked up body.     NAME OF PATIENT:  Tasha Stafford    LEVEL OF CARE:  Routine    REASON FOR GIP:   na    *PATIENT REMAINS ELIGIBLE FOR GIP LEVEL OF CARE AS EVIDENCED BY: (MUST BE ADDRESSED OF PATIENT GIP)      REASON FOR RESPITE:  na    O2 SAFETY:  Concentrator positioning (6\" from furniture/drapes), Tanks stored in gómez , No petroleum based products on face while oxygen in use and Oxygen sign on the door    FALL INTERVENTIONS PROVIDED:   Implemented/recommended use of fall risk identification flag to all team members, Implemented/recommended resources for alarm system (personal alarm, bed alarm, call bell, etc.) , Implemented/recommended environmental changes (remove hazards, lower bed, improve lighting, etc.) and Implemented/recommended increased supervision/assistance    INTERDISPLINARY COMMUNICATION/COLLABORATION:  Physician, MSW, [place] and RN, CNA    NEW MEDICATION INITIATION DOCUMENTATION:  Documentation completed in Clinical Note in [facility]    Reason medication is being initiated: Morphine changed to Dilaudid as pt was taking in the hospital    MD / Provider name consulted re: change in status / initiation of new medication:  Dr Demetra Mcdonnell Symptom(s):  Ongoing symptom of pain  New Order(s):  Dilaudid scheduled and prn    Name of the person notified of the changes:  Cee Costa    Name of person being taught:  Cee Costa    Instructions given:  Med changed to same as taking in the hospital    Side Effects taught:  Increased drowsiness    Response to teaching:  Voices understanding      83 [facility]:  Is Patient/family satisfied with symptom level Yes     DISCHARGE PLAN:  Remain at [facility] until end of life, Routine status

## 2017-08-22 NOTE — PROGRESS NOTES
Hospice on- call    Earlier spoke to [facility] about orders for new admission and pt was started on morphine and ativan scheduled- see that Kike Borja dtr, aware. However upon review of labs, am switching pt back to Dilaudid - as concerned that metabolites of morphine may cause side effects given her ESRD.

## 2017-08-22 NOTE — PROGRESS NOTES
[address] No Visit Note    This pt was admitted to the [facility] on  and  on . I had neither contact w/ the pt nor the family during this brief period. Therefore neither a [facility] DONYA was completed nor a working [employer] developed. PLAN:  Respond to requests for spiritual care and bereavement support by Hospice Chaplain as received   Coordinate w/ Bereavement Team as required.   Echo Handy, [address] [place] [address]  [phone]

## 2022-01-01 NOTE — PROGRESS NOTES
[address] SHIFT NURSING NOTE    Bedside shift change report given to RN (oncoming nurse) by Margaret Andujar (offgoing nurse). Report included the following information SBAR, Kardex, ED Summary, Recent Results, Med Rec Status and Cardiac Rhythm Paced. SHIFT SUMMARY: Comfortable throughout shift - co pain to left rib cage, increased with coughing - relief with PRN pain medication. Wound care consult ordered due to noted skin tear on left elbow - dressed with a transparent dressing - clean and intact. Admission Date 5/24/2017   Admission Diagnosis Chest pain   Consults IP CONSULT TO NEPHROLOGY        Consults   [] PT   [] OT   [] Speech   [] Palliative      [] Hospice    [] Case Management   [] None   Cardiac Monitoring   [x] Yes   [] No     Antibiotics   [x] Yes   [] No   GI Prophylaxis  (Ex: Protonix, Pepcid, etc,.)   [] Yes   [x] No          DVT Prophylaxis   SCDs:             Kevin stockings:         [x] Medication (Ex: Lovenox, Eliquis, Brilinta, Coumadin,  Heparin, etc..)   [] Contraindicated   [] No VTE needed       Urinary Catheter             LDAs               Peripheral IV 03/06/17 Left Antecubital (Active)       Peripheral IV 05/25/17 Left Forearm (Active)   Site Assessment Clean, dry, & intact 5/25/2017  8:00 PM   Phlebitis Assessment 0 5/25/2017  8:00 PM   Infiltration Assessment 0 5/25/2017  8:00 PM   Dressing Status Clean, dry, & intact 5/25/2017  8:00 PM   Dressing Type Transparent 5/25/2017  8:00 PM   Hub Color/Line Status Blue;Flushed; Infusing 5/25/2017  8:00 PM                      I/Os   Intake/Output Summary (Last 24 hours) at 05/26/17 0018  Last data filed at 05/25/17 2000   Gross per 24 hour   Intake              220 ml   Output             2249 ml   Net            -2029 ml         Activity Level Activity Level: Up with Assistance     Activity Assistance: Partial (one person)   Diet Active Orders   There are no active orders of the following type(s): Diet.       Purposeful Rounding every 1-2 Health Maintenance Due   Topic Date Due   • Hepatitis B Vaccine (2 of 3 - 3-dose series) 2022       Patient is due for topics as listed above but is not proceeding with Immunization(s) Hep B at this time.  Appt scheduled to perform Immunization(s) Hep B on 2022.     hour?   [x] Yes    Burak Score  Total Score: 2   Bed Alarm (If score 3 or >)   [x] Yes    [] Refused (See signed refusal form in chart)   Todd Score  Todd Score: 19       Todd Score (if score 14 or less)   [] PMT consult   [] Nutrition consult   [] Wound Care consult      []  Specialty bed         Influenza Vaccine Received Flu Vaccine for Current Season (usually Sept-March): Not Flu Season               Needs prior to discharge:   Home O2 required:    [] Yes   [x] No     If yes, how much O2 required?     Other:    Last Bowel Movement Date: 05/25/17   Readmission Risk Assessment Tool Score High Risk            43       Total Score        3 Relationship with PCP    4 More than 1 Admission in calendar year    5 Patient Insurance is Medicare, Medicaid or Self Pay    31 Charlson Comorbidity Score        Criteria that do not apply:    Patient Living Status    Patient Length of Stay > 5       Expected Length of Stay 4d 21h   Actual Length of Stay 2

## 2024-12-02 NOTE — ED NOTES
Per ELIZABETH Hernandez PA-C, pt is to receive oral medications, wait 15 min and if pain is not improved, then receive IM medication. [id number]